# Patient Record
Sex: FEMALE | Race: BLACK OR AFRICAN AMERICAN | NOT HISPANIC OR LATINO | Employment: UNEMPLOYED | URBAN - METROPOLITAN AREA
[De-identification: names, ages, dates, MRNs, and addresses within clinical notes are randomized per-mention and may not be internally consistent; named-entity substitution may affect disease eponyms.]

---

## 2017-03-14 ENCOUNTER — HOSPITAL ENCOUNTER (EMERGENCY)
Facility: HOSPITAL | Age: 47
Discharge: HOME | End: 2017-03-14
Attending: EMERGENCY MEDICINE
Payer: MEDICAID

## 2017-03-14 VITALS
DIASTOLIC BLOOD PRESSURE: 74 MMHG | TEMPERATURE: 98 F | SYSTOLIC BLOOD PRESSURE: 122 MMHG | BODY MASS INDEX: 27.49 KG/M2 | WEIGHT: 165 LBS | HEIGHT: 65 IN | RESPIRATION RATE: 16 BRPM | OXYGEN SATURATION: 100 % | HEART RATE: 90 BPM

## 2017-03-14 DIAGNOSIS — R10.13 EPIGASTRIC PAIN: Primary | ICD-10-CM

## 2017-03-14 DIAGNOSIS — K27.9 PUD (PEPTIC ULCER DISEASE): ICD-10-CM

## 2017-03-14 LAB
ALBUMIN SERPL BCP-MCNC: 4.1 G/DL
ALP SERPL-CCNC: 73 U/L
ALT SERPL W/O P-5'-P-CCNC: 64 U/L
ANION GAP SERPL CALC-SCNC: 8 MMOL/L
AST SERPL-CCNC: 92 U/L
BACTERIA #/AREA URNS HPF: NORMAL /HPF
BASOPHILS # BLD AUTO: 0.01 K/UL
BASOPHILS NFR BLD: 0.2 %
BILIRUB SERPL-MCNC: 0.6 MG/DL
BILIRUB UR QL STRIP: NEGATIVE
BUN SERPL-MCNC: 16 MG/DL
CALCIUM SERPL-MCNC: 9.7 MG/DL
CHLORIDE SERPL-SCNC: 106 MMOL/L
CLARITY UR: ABNORMAL
CO2 SERPL-SCNC: 27 MMOL/L
COLOR UR: ABNORMAL
CREAT SERPL-MCNC: 0.9 MG/DL
DIFFERENTIAL METHOD: ABNORMAL
EOSINOPHIL # BLD AUTO: 0.1 K/UL
EOSINOPHIL NFR BLD: 0.9 %
ERYTHROCYTE [DISTWIDTH] IN BLOOD BY AUTOMATED COUNT: 13.7 %
EST. GFR  (AFRICAN AMERICAN): >60 ML/MIN/1.73 M^2
EST. GFR  (NON AFRICAN AMERICAN): >60 ML/MIN/1.73 M^2
GLUCOSE SERPL-MCNC: 89 MG/DL
GLUCOSE UR QL STRIP: NEGATIVE
HCT VFR BLD AUTO: 42.3 %
HGB BLD-MCNC: 13.9 G/DL
HGB UR QL STRIP: NEGATIVE
HYALINE CASTS #/AREA URNS LPF: 0 /LPF
KETONES UR QL STRIP: ABNORMAL
LEUKOCYTE ESTERASE UR QL STRIP: NEGATIVE
LIPASE SERPL-CCNC: 20 U/L
LYMPHOCYTES # BLD AUTO: 1 K/UL
LYMPHOCYTES NFR BLD: 17 %
MCH RBC QN AUTO: 29.3 PG
MCHC RBC AUTO-ENTMCNC: 32.9 %
MCV RBC AUTO: 89 FL
MICROSCOPIC COMMENT: NORMAL
MONOCYTES # BLD AUTO: 0.4 K/UL
MONOCYTES NFR BLD: 6.3 %
NEUTROPHILS # BLD AUTO: 4.4 K/UL
NEUTROPHILS NFR BLD: 75.4 %
NITRITE UR QL STRIP: NEGATIVE
PH UR STRIP: 5 [PH] (ref 5–8)
PLATELET # BLD AUTO: 335 K/UL
PMV BLD AUTO: 10.7 FL
POTASSIUM SERPL-SCNC: 3.9 MMOL/L
PROT SERPL-MCNC: 7.7 G/DL
PROT UR QL STRIP: ABNORMAL
RBC # BLD AUTO: 4.75 M/UL
RBC #/AREA URNS HPF: 2 /HPF (ref 0–4)
SODIUM SERPL-SCNC: 141 MMOL/L
SP GR UR STRIP: >1.03 (ref 1–1.03)
SQUAMOUS #/AREA URNS HPF: 1 /HPF
TROPONIN I SERPL DL<=0.01 NG/ML-MCNC: <0.006 NG/ML
URN SPEC COLLECT METH UR: ABNORMAL
UROBILINOGEN UR STRIP-ACNC: ABNORMAL EU/DL
WBC # BLD AUTO: 5.76 K/UL
WBC #/AREA URNS HPF: 1 /HPF (ref 0–5)

## 2017-03-14 PROCEDURE — 84484 ASSAY OF TROPONIN QUANT: CPT

## 2017-03-14 PROCEDURE — 99284 EMERGENCY DEPT VISIT MOD MDM: CPT

## 2017-03-14 PROCEDURE — 25000003 PHARM REV CODE 250: Performed by: EMERGENCY MEDICINE

## 2017-03-14 PROCEDURE — 93005 ELECTROCARDIOGRAM TRACING: CPT

## 2017-03-14 PROCEDURE — 85025 COMPLETE CBC W/AUTO DIFF WBC: CPT

## 2017-03-14 PROCEDURE — 80053 COMPREHEN METABOLIC PANEL: CPT

## 2017-03-14 PROCEDURE — 81000 URINALYSIS NONAUTO W/SCOPE: CPT

## 2017-03-14 PROCEDURE — 83690 ASSAY OF LIPASE: CPT

## 2017-03-14 RX ORDER — TRAMADOL HYDROCHLORIDE 50 MG/1
50 TABLET ORAL EVERY 8 HOURS PRN
Qty: 20 TABLET | Refills: 0 | Status: SHIPPED | OUTPATIENT
Start: 2017-03-14 | End: 2017-03-17

## 2017-03-14 RX ORDER — ONDANSETRON 8 MG/1
8 TABLET, ORALLY DISINTEGRATING ORAL
Status: COMPLETED | OUTPATIENT
Start: 2017-03-14 | End: 2017-03-14

## 2017-03-14 RX ORDER — ESOMEPRAZOLE MAGNESIUM 40 MG/1
40 CAPSULE, DELAYED RELEASE ORAL
Qty: 60 CAPSULE | Refills: 1 | Status: SHIPPED | OUTPATIENT
Start: 2017-03-14 | End: 2018-10-20

## 2017-03-14 RX ADMIN — LIDOCAINE HYDROCHLORIDE: 20 SOLUTION ORAL; TOPICAL at 06:03

## 2017-03-14 RX ADMIN — ONDANSETRON 8 MG: 8 TABLET, ORALLY DISINTEGRATING ORAL at 06:03

## 2017-03-14 NOTE — ED AVS SNAPSHOT
[facility]  [place] [address]               Aide Almaraz   3/14/2017  5:40 PM   ED    Description:  Female : 1970   Department:  [facility]           Your Care was Coordinated By:     Provider Role From To    Juan Merrill MD Attending Provider [phone] --      Reason for Visit     Abdominal Pain           Diagnoses this Visit        Comments    Epigastric pain    -  Primary     PUD (peptic ulcer disease)           ED Disposition     ED Disposition Condition Comment    Discharge             To Do List           Follow-up Information     Schedule an appointment as soon as possible for a visit with Vidya Chávez MD.    Specialty:  Family Medicine    Contact information:    [facility] [address] [place] [address]  [phone]         These Medications        Disp Refills Start End    esomeprazole (NEXIUM) 40 MG capsule 60 capsule 1 3/14/2017 2017    Take 1 capsule (40 mg total) by mouth 2 (two) times daily before meals. - Oral    Pharmacy: [facility] [address] [place] [address] [address] AT [facility] Ph #: [phone]       tramadol (ULTRAM) 50 mg tablet 20 tablet 0 3/14/2017 3/17/2017    Take 1 tablet (50 mg total) by mouth every 8 (eight) hours as needed for Pain. - Oral    Pharmacy: [facility] [address] [place] [address] [address] AT [facility] Ph #: [phone]         [facility]     [facility] -  Assistance  Registered nurses in the [facility] provide clinical advisement, health education, appointment booking, and other advisory services.  Call for this free service at [phone].             Medications           Message regarding Medications     Verify the changes and/or additions to your medication regime listed below are the same as discussed with your clinician today.  If any of these changes or additions are  incorrect, please notify your healthcare provider.        START taking these NEW medications        Refills    esomeprazole (NEXIUM) 40 MG capsule 1    Sig: Take 1 capsule (40 mg total) by mouth 2 (two) times daily before meals.    Class: Print    Route: Oral    tramadol (ULTRAM) 50 mg tablet 0    Sig: Take 1 tablet (50 mg total) by mouth every 8 (eight) hours as needed for Pain.    Class: Print    Route: Oral      These medications were administered today        Dose Freq    (pyxis) gi cocktail (mylanta 30 mL, lidocaine 2 % viscous 10 mL, dicyclomine 10 mL) 50 mL  ED 1 Time    Sig: Take by mouth ED 1 Time.    Class: Normal    Route: Oral      STOP taking these medications     ibuprofen (ADVIL,MOTRIN) 800 MG tablet Take 800 mg by mouth 3 (three) times daily.           Verify that the below list of medications is an accurate representation of the medications you are currently taking.  If none reported, the list may be blank. If incorrect, please contact your healthcare provider. Carry this list with you in case of emergency.           Current Medications     ADDERALL XR 25 mg 24 hr capsule Take 1 tablet by mouth once daily.    eszopiclone 3 mg Tab Take 1 tablet (3 mg total) by mouth nightly as needed.    diazepam (VALIUM) 5 MG tablet Take 1 tablet (5 mg total) by mouth every 8 (eight) hours as needed for Anxiety or Insomnia.    esomeprazole (NEXIUM) 40 MG capsule Take 1 capsule (40 mg total) by mouth 2 (two) times daily before meals.    hydrOXYzine HCl (ATARAX) 50 MG tablet Take 1 tablet (50 mg total) by mouth nightly as needed (Insomnia).    metoprolol succinate (TOPROL-XL) 100 MG 24 hr tablet Take 1 tablet (100 mg total) by mouth once daily.    tramadol (ULTRAM) 50 mg tablet Take 1 tablet (50 mg total) by mouth every 8 (eight) hours as needed for Pain.           Clinical Reference Information           Your Vitals Were     BP Pulse Temp Resp Height Weight    119/77 (BP Location: Left arm, Patient Position: Sitting,  "BP Method: Automatic) 92 98.7 °F (37.1 °C) (Oral) 18 5' 5" (1.651 m) 74.8 kg (165 lb)    SpO2 BMI             100% 27.46 kg/m2         Allergies as of 3/14/2017     No Known Allergies      Immunizations Administered on Date of Encounter - 3/14/2017     None      ED Micro, Lab, POCT     Start Ordered       Status Ordering Provider    03/14/17 1714 03/14/17 1713  Troponin I  STAT      In process     03/14/17 1540 03/14/17 1539  CBC auto differential  Once      Final result     03/14/17 1540 03/14/17 1539  Comprehensive metabolic panel  Once      Final result     03/14/17 1540 03/14/17 1539  Urinalysis  Once      Final result     03/14/17 1540 03/14/17 1539  Lipase  Once     Comments:  For upper or mid abdominal pain.    Final result     03/14/17 1539 03/14/17 1539  Urinalysis Microscopic  Once      Final result       ED Imaging Orders     None      Discharge References/Attachments     PEPTIC ULCER DISEASE (ALL CAUSES) (ENGLISH)      [facility] Sign-Up     Activating your [facility] account is as easy as 1-2-3!     1) Visit [email or web address], select Sign Up Now, enter this activation code and your date of birth, then select Next.  [email or web address]  Expires: 4/28/2017  6:29 PM      2) Create a username and password to use when you visit [facility] in the future and select a security question in case you lose your password and select Next.    3) Enter your e-mail address and click Sign Up!    Additional Information  If you have questions, please e-mail [email or web address] or call [phone] to talk to our [facility] staff. Remember, [facility] is NOT to be used for urgent needs. For medical emergencies, dial 911.          [facility] complies with applicable Federal civil rights laws and does not discriminate on the basis of race, color, national origin, age, disability, or sex.        Language Assistance Services     ATTENTION: Language assistance services are available, free of charge. Please call " [phone].      ATENCIÓN: Si habla español, tiene a graham disposición servicios gratuitos de asistencia lingüística. Llame al [phone].     CHÚ Ý: N?u b?n nói Ti?ng Vi?t, có các d?ch v? h? tr? ngôn ng? mi?n phí dành cho b?n. G?i s? [phone].

## 2017-03-14 NOTE — ED PROVIDER NOTES
"Encounter Date: 3/14/2017    SCRIBE #1 NOTE: I, Cale Merlos, am scribing for, and in the presence of,  Juan Merrill MD. I have scribed the following portions of the note - Other sections scribed: HPI/ROS.       History     Chief Complaint   Patient presents with    Abdominal Pain     epigastric pain started yesterday, worse today while she was driving she got lightheaded and diaphoretic. evaluated by EMS on scene and then decided to drive herself here     Review of patient's allergies indicates:  No Known Allergies  HPI Comments: CC: Abdominal Pain    HPI: This 47 y.o. Female with anemia, gall stones, supraventricular tachycardia, PTSD, DM, and HTN presents to the ED c/o sharp, severe (pain rating 7/10), upper middle abdominal pain which began a few weeks ago. The pt stated that she has been taking a lot of "Motrin and goodies" for her degenerative disc disease. Last night the pt's stomach began to hurt so she took more Motrin but the pain was still there when she awoke today. When the pt was on the way to work today, she began to feel lightheaded and her hands went numb so she called EMS. Pt also c/o back pain. The pt denies fever, nausea, or vomiting. No treatment has been attempted.      The history is provided by the patient. No  was used.     Past Medical History:   Diagnosis Date    Anemia     Diabetes mellitus     Gall stones     Hypertension     PTSD (post-traumatic stress disorder)     Supraventricular tachycardia      Past Surgical History:   Procedure Laterality Date    ABDOMINAL SURGERY      Gastric sleeve    ANKLE FUSION       SECTION      placenta previa    CHOLECYSTECTOMY  2009    gastric      HYSTERECTOMY  2001    Inguinal hernia  2004     Family History   Problem Relation Age of Onset    Heart disease Father     Diabetes Mother      hypoglycemic    Breast cancer Maternal Aunt      Social History   Substance Use Topics    Smoking status: " Never Smoker    Smokeless tobacco: Never Used    Alcohol use No     Review of Systems   Constitutional: Negative for fever.   HENT: Negative for sore throat.    Respiratory: Negative for shortness of breath.    Cardiovascular: Negative for chest pain.   Gastrointestinal: Positive for abdominal pain. Negative for nausea.   Genitourinary: Negative for dysuria.   Musculoskeletal: Positive for back pain.   Skin: Negative for rash.   Neurological: Positive for light-headedness and numbness (hands). Negative for weakness.   Hematological: Does not bruise/bleed easily.   All other systems reviewed and are negative.      Physical Exam   Initial Vitals   BP Pulse Resp Temp SpO2   03/14/17 1537 03/14/17 1537 03/14/17 1537 03/14/17 1537 03/14/17 1537   129/79 106 17 98 °F (36.7 °C) 100 %     Physical Exam  Nursing note and vitals reviewed.  Constitutional: Uncomfortable appearing middle aged female in no obvious distress.  HENT:    Head: NC/AT    Eyes: Conjunctivae normal.   (-) scleral icterus.              Mouth/Throat: MMM.     Neck: Neck supple, normal rom.   Cardiovascular: Tachycardic, regular rhythm  Pulmonary/Chest: CTAB   Abdominal: Soft. ND/epigastric TTP  w/o guarding or rebound.  (-) CVA tenderness.  Musculoskeletal: FROM of all major joints. No extremity edema or tenderness.  Neurological: A&Ox4, Normal speech.  No focal motor deficits.  Normal gait  Skin: Skin is warm and dry.   Psychiatric: normal mood and affect.      ED Course   Procedures  Labs Reviewed   CBC W/ AUTO DIFFERENTIAL - Abnormal; Notable for the following:        Result Value    Gran% 75.4 (*)     Lymph% 17.0 (*)     All other components within normal limits    Narrative:     For upper or mid abdominal pain.   COMPREHENSIVE METABOLIC PANEL - Abnormal; Notable for the following:     AST 92 (*)     ALT 64 (*)     All other components within normal limits    Narrative:     For upper or mid abdominal pain.   URINALYSIS - Abnormal; Notable for the  following:     Appearance, UA Hazy (*)     Specific Gravity, UA >1.030 (*)     Protein, UA 1+ (*)     Ketones, UA Trace (*)     Urobilinogen, UA 2.0-3.0 (*)     All other components within normal limits   LIPASE    Narrative:     For upper or mid abdominal pain.   URINALYSIS MICROSCOPIC   TROPONIN I     EKG Readings: (Independently Interpreted)   Initial Reading: No STEMI.   Normal sinus rhythm, rate 99, normal axis/intervals, no ST/T-wave abnormalities.          Medical Decision Making:   History:   I obtained history from: EMS provider.  Old Medical Records: I decided to obtain old medical records.  Old Records Summarized: records from clinic visits and other records.  Independently Interpreted Test(s):   I have ordered and independently interpreted EKG Reading(s) - see prior notes  Clinical Tests:   Lab Tests: Ordered and Reviewed  Radiological Study: Reviewed  Medical Tests: Ordered and Reviewed    Additional Medical Decision Making:   Initial differential diagnosis includes but is not limited to... cholecystitis, food poisoning vs gastroenteritis, pancreatitis,appendicitis IBS/IBD, mesenteric ischemia, SBO.    Emergent evaluation of a 47 y.o. female with epigastric abdominal pain x several days.   On exam, she has epigastric ttp without guarding or rebound.   Based on the physical exam, I doubt an acute surgical abdomen and/or peritonitis.  Basic screening labs unremarkable including baseline H&H.  EKG without evidence of acute ischemia or dysrhythmia.  After having received a GI cocktail, she reports marked improvement.  Findings at this time are most consistent with peptic ulcer disease likely secondary to NSAID use.  She's been started on Nexium twice a day ×8 weeks.  She has been strongly advised to refrain from any and all NSAID use.  She has been prescribed Ultram to be used when necessary.  She has been advised to follow up with her primary care physician as well as GI for reevaluation further  management.  Abdominal pain precautions as well as return instructions discussed prior to discharge.          Scribe Attestation:   Scribe #1: I performed the above scribed service and the documentation accurately describes the services I performed. I attest to the accuracy of the note.    Attending Attestation:           Physician Attestation for Scribe:  Physician Attestation Statement for Scribe #1: I, Juan Merrill MD, reviewed documentation, as scribed by Cale Merlos in my presence, and it is both accurate and complete.                 ED Course     Clinical Impression:   The primary encounter diagnosis was Epigastric pain. A diagnosis of PUD (peptic ulcer disease) was also pertinent to this visit.    Disposition:   Disposition: Discharged       Juan Merrill MD  04/17/17 5126

## 2017-03-14 NOTE — ED TRIAGE NOTES
"Upper center abd pains radiating into back.  Suddenly had light headedness, "it felt like I was going to pass out".   Nausea this morning but denies vomiting.  Denies diarrhea or fever.  Pains at 9/10.  Denies chest pains or sob.  Denies trauma.  "

## 2017-10-31 ENCOUNTER — HOSPITAL ENCOUNTER (INPATIENT)
Facility: HOSPITAL | Age: 47
LOS: 1 days | Discharge: HOME | DRG: 440 | End: 2017-11-01
Attending: EMERGENCY MEDICINE | Admitting: HOSPITALIST
Payer: MEDICAID

## 2017-10-31 DIAGNOSIS — K85.00 IDIOPATHIC ACUTE PANCREATITIS WITHOUT INFECTION OR NECROSIS: Primary | ICD-10-CM

## 2017-10-31 LAB
ALBUMIN SERPL BCP-MCNC: 3.8 G/DL
ALP SERPL-CCNC: 84 U/L
ALT SERPL W/O P-5'-P-CCNC: 37 U/L
ANION GAP SERPL CALC-SCNC: 11 MMOL/L
APTT BLDCRRT: 25.3 SEC
AST SERPL-CCNC: 82 U/L
BASOPHILS # BLD AUTO: 0.02 K/UL
BASOPHILS NFR BLD: 0.2 %
BILIRUB SERPL-MCNC: 0.7 MG/DL
BNP SERPL-MCNC: <10 PG/ML
BUN SERPL-MCNC: 16 MG/DL
CALCIUM SERPL-MCNC: 9.7 MG/DL
CHLORIDE SERPL-SCNC: 107 MMOL/L
CO2 SERPL-SCNC: 23 MMOL/L
CREAT SERPL-MCNC: 0.9 MG/DL
DIFFERENTIAL METHOD: ABNORMAL
EOSINOPHIL # BLD AUTO: 0.1 K/UL
EOSINOPHIL NFR BLD: 0.9 %
ERYTHROCYTE [DISTWIDTH] IN BLOOD BY AUTOMATED COUNT: 13.9 %
EST. GFR  (AFRICAN AMERICAN): >60 ML/MIN/1.73 M^2
EST. GFR  (NON AFRICAN AMERICAN): >60 ML/MIN/1.73 M^2
GLUCOSE SERPL-MCNC: 106 MG/DL
HCT VFR BLD AUTO: 42 %
HGB BLD-MCNC: 14.3 G/DL
INR PPP: 1
LIPASE SERPL-CCNC: 263 U/L
LYMPHOCYTES # BLD AUTO: 1.2 K/UL
LYMPHOCYTES NFR BLD: 13.2 %
MCH RBC QN AUTO: 30.6 PG
MCHC RBC AUTO-ENTMCNC: 34 G/DL
MCV RBC AUTO: 90 FL
MONOCYTES # BLD AUTO: 0.6 K/UL
MONOCYTES NFR BLD: 6.4 %
NEUTROPHILS # BLD AUTO: 7.5 K/UL
NEUTROPHILS NFR BLD: 79.2 %
PLATELET # BLD AUTO: 329 K/UL
PMV BLD AUTO: 11.3 FL
POTASSIUM SERPL-SCNC: 4.6 MMOL/L
PROT SERPL-MCNC: 7.9 G/DL
PROTHROMBIN TIME: 10.1 SEC
RBC # BLD AUTO: 4.67 M/UL
SODIUM SERPL-SCNC: 141 MMOL/L
TROPONIN I SERPL DL<=0.01 NG/ML-MCNC: <0.006 NG/ML
WBC # BLD AUTO: 9.4 K/UL

## 2017-10-31 PROCEDURE — 96365 THER/PROPH/DIAG IV INF INIT: CPT

## 2017-10-31 PROCEDURE — 96361 HYDRATE IV INFUSION ADD-ON: CPT

## 2017-10-31 PROCEDURE — 80053 COMPREHEN METABOLIC PANEL: CPT

## 2017-10-31 PROCEDURE — 63600175 PHARM REV CODE 636 W HCPCS: Performed by: EMERGENCY MEDICINE

## 2017-10-31 PROCEDURE — 63600175 PHARM REV CODE 636 W HCPCS: Performed by: HOSPITALIST

## 2017-10-31 PROCEDURE — 12000002 HC ACUTE/MED SURGE SEMI-PRIVATE ROOM

## 2017-10-31 PROCEDURE — 25000003 PHARM REV CODE 250: Performed by: HOSPITALIST

## 2017-10-31 PROCEDURE — 85730 THROMBOPLASTIN TIME PARTIAL: CPT

## 2017-10-31 PROCEDURE — 93010 ELECTROCARDIOGRAM REPORT: CPT | Mod: ,,, | Performed by: INTERNAL MEDICINE

## 2017-10-31 PROCEDURE — 85610 PROTHROMBIN TIME: CPT

## 2017-10-31 PROCEDURE — 25000003 PHARM REV CODE 250: Performed by: EMERGENCY MEDICINE

## 2017-10-31 PROCEDURE — 85025 COMPLETE CBC W/AUTO DIFF WBC: CPT

## 2017-10-31 PROCEDURE — 99285 EMERGENCY DEPT VISIT HI MDM: CPT | Mod: 25

## 2017-10-31 PROCEDURE — 93005 ELECTROCARDIOGRAM TRACING: CPT

## 2017-10-31 PROCEDURE — 96375 TX/PRO/DX INJ NEW DRUG ADDON: CPT

## 2017-10-31 PROCEDURE — 84484 ASSAY OF TROPONIN QUANT: CPT

## 2017-10-31 PROCEDURE — 83880 ASSAY OF NATRIURETIC PEPTIDE: CPT

## 2017-10-31 PROCEDURE — 83690 ASSAY OF LIPASE: CPT

## 2017-10-31 RX ORDER — MAG HYDROX/ALUMINUM HYD/SIMETH 200-200-20
60 SUSPENSION, ORAL (FINAL DOSE FORM) ORAL
Status: COMPLETED | OUTPATIENT
Start: 2017-10-31 | End: 2017-10-31

## 2017-10-31 RX ORDER — PANTOPRAZOLE SODIUM 40 MG/1
80 TABLET, DELAYED RELEASE ORAL
Status: COMPLETED | OUTPATIENT
Start: 2017-10-31 | End: 2017-10-31

## 2017-10-31 RX ORDER — METOPROLOL SUCCINATE 50 MG/1
100 TABLET, EXTENDED RELEASE ORAL DAILY
Status: DISCONTINUED | OUTPATIENT
Start: 2017-10-31 | End: 2017-11-01 | Stop reason: HOSPADM

## 2017-10-31 RX ORDER — DEXTROSE MONOHYDRATE AND SODIUM CHLORIDE 5; .9 G/100ML; G/100ML
INJECTION, SOLUTION INTRAVENOUS CONTINUOUS
Status: DISCONTINUED | OUTPATIENT
Start: 2017-10-31 | End: 2017-11-01 | Stop reason: HOSPADM

## 2017-10-31 RX ORDER — ONDANSETRON 2 MG/ML
4 INJECTION INTRAMUSCULAR; INTRAVENOUS EVERY 6 HOURS PRN
Status: DISCONTINUED | OUTPATIENT
Start: 2017-10-31 | End: 2017-11-01

## 2017-10-31 RX ORDER — ENOXAPARIN SODIUM 100 MG/ML
40 INJECTION SUBCUTANEOUS EVERY 24 HOURS
Status: DISCONTINUED | OUTPATIENT
Start: 2017-10-31 | End: 2017-11-01 | Stop reason: HOSPADM

## 2017-10-31 RX ORDER — DIPHENHYDRAMINE HYDROCHLORIDE 50 MG/ML
25 INJECTION INTRAMUSCULAR; INTRAVENOUS
Status: COMPLETED | OUTPATIENT
Start: 2017-10-31 | End: 2017-10-31

## 2017-10-31 RX ORDER — PANTOPRAZOLE SODIUM 40 MG/1
40 TABLET, DELAYED RELEASE ORAL 2 TIMES DAILY
Status: DISCONTINUED | OUTPATIENT
Start: 2017-10-31 | End: 2017-11-01 | Stop reason: HOSPADM

## 2017-10-31 RX ORDER — ONDANSETRON 2 MG/ML
8 INJECTION INTRAMUSCULAR; INTRAVENOUS
Status: COMPLETED | OUTPATIENT
Start: 2017-10-31 | End: 2017-10-31

## 2017-10-31 RX ORDER — ACETAMINOPHEN 325 MG/1
650 TABLET ORAL EVERY 6 HOURS PRN
Status: DISCONTINUED | OUTPATIENT
Start: 2017-10-31 | End: 2017-11-01 | Stop reason: HOSPADM

## 2017-10-31 RX ORDER — HYDROMORPHONE HYDROCHLORIDE 2 MG/ML
1 INJECTION, SOLUTION INTRAMUSCULAR; INTRAVENOUS; SUBCUTANEOUS
Status: COMPLETED | OUTPATIENT
Start: 2017-10-31 | End: 2017-10-31

## 2017-10-31 RX ORDER — HYDROMORPHONE HYDROCHLORIDE 2 MG/ML
1 INJECTION, SOLUTION INTRAMUSCULAR; INTRAVENOUS; SUBCUTANEOUS
Status: DISCONTINUED | OUTPATIENT
Start: 2017-10-31 | End: 2017-11-01

## 2017-10-31 RX ORDER — DIAZEPAM 5 MG/1
5 TABLET ORAL EVERY 8 HOURS PRN
Status: DISCONTINUED | OUTPATIENT
Start: 2017-10-31 | End: 2017-11-01 | Stop reason: HOSPADM

## 2017-10-31 RX ADMIN — PANTOPRAZOLE SODIUM 80 MG: 40 TABLET, DELAYED RELEASE ORAL at 08:10

## 2017-10-31 RX ADMIN — ONDANSETRON 8 MG: 2 INJECTION INTRAMUSCULAR; INTRAVENOUS at 08:10

## 2017-10-31 RX ADMIN — DIPHENHYDRAMINE HYDROCHLORIDE 25 MG: 50 INJECTION, SOLUTION INTRAMUSCULAR; INTRAVENOUS at 11:10

## 2017-10-31 RX ADMIN — METOPROLOL SUCCINATE 100 MG: 50 TABLET, EXTENDED RELEASE ORAL at 01:10

## 2017-10-31 RX ADMIN — PROMETHAZINE HYDROCHLORIDE 12.5 MG: 25 INJECTION INTRAMUSCULAR; INTRAVENOUS at 11:10

## 2017-10-31 RX ADMIN — PANTOPRAZOLE SODIUM 40 MG: 40 TABLET, DELAYED RELEASE ORAL at 08:10

## 2017-10-31 RX ADMIN — HYDROMORPHONE HYDROCHLORIDE 1 MG: 2 INJECTION, SOLUTION INTRAMUSCULAR; INTRAVENOUS; SUBCUTANEOUS at 08:10

## 2017-10-31 RX ADMIN — HYDROMORPHONE HYDROCHLORIDE 1 MG: 2 INJECTION, SOLUTION INTRAMUSCULAR; INTRAVENOUS; SUBCUTANEOUS at 01:10

## 2017-10-31 RX ADMIN — ONDANSETRON 4 MG: 2 INJECTION INTRAMUSCULAR; INTRAVENOUS at 09:10

## 2017-10-31 RX ADMIN — ENOXAPARIN SODIUM 40 MG: 100 INJECTION SUBCUTANEOUS at 05:10

## 2017-10-31 RX ADMIN — PANTOPRAZOLE SODIUM 40 MG: 40 TABLET, DELAYED RELEASE ORAL at 01:10

## 2017-10-31 RX ADMIN — DEXTROSE MONOHYDRATE AND SODIUM CHLORIDE: 5; .9 INJECTION, SOLUTION INTRAVENOUS at 01:10

## 2017-10-31 RX ADMIN — DIAZEPAM 5 MG: 5 TABLET ORAL at 09:10

## 2017-10-31 RX ADMIN — ACETAMINOPHEN 650 MG: 325 TABLET ORAL at 05:10

## 2017-10-31 RX ADMIN — HYDROMORPHONE HYDROCHLORIDE 1 MG: 2 INJECTION, SOLUTION INTRAMUSCULAR; INTRAVENOUS; SUBCUTANEOUS at 05:10

## 2017-10-31 RX ADMIN — ALUMINUM HYDROXIDE, MAGNESIUM HYDROXIDE, AND SIMETHICONE 60 ML: 200; 200; 20 SUSPENSION ORAL at 08:10

## 2017-10-31 RX ADMIN — DEXTROSE MONOHYDRATE AND SODIUM CHLORIDE: 5; .9 INJECTION, SOLUTION INTRAVENOUS at 09:10

## 2017-10-31 RX ADMIN — SODIUM CHLORIDE 1000 ML: 0.9 INJECTION, SOLUTION INTRAVENOUS at 08:10

## 2017-10-31 RX ADMIN — ONDANSETRON 4 MG: 2 INJECTION INTRAMUSCULAR; INTRAVENOUS at 04:10

## 2017-10-31 NOTE — H&P
"[facility] Hospital Medicine  History & Physical    Patient Name: Aide Almaraz  MRN: [id number]  Admission Date: 10/31/2017  Attending Physician: Mitul Stafford MD   Primary Care Provider: Primary Doctor No         Patient information was obtained from patient and ER records.     Subjective:     Principal Problem:Idiopathic acute pancreatitis without infection or necrosis    Chief Complaint:   Chief Complaint   Patient presents with    Chest Pain     woke up 0300 with midsternal chest pain and back pain        HPI: 47 y.o F with anemia, , HTN and supraventricular tachycardia presents to the ED c/o acute onset of "sharp" left xiphoid process pain with associated cervical and thoracic back pain which woke her up out of her sleep at 0300. The pt describes her xiphoid process pain as constant with intermittent "thigh pain." The pt also reports R upper extremity pain and numbness, lightheadedness, diarrhea x1 and emesis x5. The pt denies any exacerbating or alleviating factors. The pt also denies sore throat, cough, SOB, fever, chills, headache and abdominal pain. Pt attempted tx with pepto-bismol with no relief.she has lipase level of 283,consistent with acure pancreatitis,she denies alcohol consumption,she has history of cholecystitis,she has only mild elevated AST,she say her abdominal pain is improved at this time.    Past Medical History:   Diagnosis Date    Anemia     Celiac disease     Diabetes mellitus     Gall stones     Hypertension     PTSD (post-traumatic stress disorder)     Supraventricular tachycardia        Past Surgical History:   Procedure Laterality Date    ABDOMINAL SURGERY      Gastric sleeve    ANKLE FUSION       SECTION      placenta previa    CHOLECYSTECTOMY  2009    gastric      HYSTERECTOMY  2001    Inguinal hernia  2004       Review of patient's allergies indicates:  No Known Allergies    No current facility-administered " medications on file prior to encounter.      Current Outpatient Prescriptions on File Prior to Encounter   Medication Sig    ADDERALL XR 25 mg 24 hr capsule Take 1 tablet by mouth once daily.    eszopiclone 3 mg Tab Take 1 tablet (3 mg total) by mouth nightly as needed.    metoprolol succinate (TOPROL-XL) 100 MG 24 hr tablet Take 1 tablet (100 mg total) by mouth once daily.    diazepam (VALIUM) 5 MG tablet Take 1 tablet (5 mg total) by mouth every 8 (eight) hours as needed for Anxiety or Insomnia.    esomeprazole (NEXIUM) 40 MG capsule Take 1 capsule (40 mg total) by mouth 2 (two) times daily before meals.    hydrOXYzine HCl (ATARAX) 50 MG tablet Take 1 tablet (50 mg total) by mouth nightly as needed (Insomnia).     Family History     Problem Relation (Age of Onset)    Breast cancer Maternal Aunt    Diabetes Mother    Heart disease Father        Social History Main Topics    Smoking status: Never Smoker    Smokeless tobacco: Never Used    Alcohol use No    Drug use: No    Sexual activity: No     Review of Systems   Constitutional: Positive for appetite change. Negative for activity change.   HENT: Negative for congestion and dental problem.    Eyes: Negative for discharge and itching.   Respiratory: Negative for apnea and chest tightness.    Cardiovascular: Negative for chest pain.   Gastrointestinal: Positive for abdominal pain, nausea and vomiting.   Endocrine: Negative for cold intolerance and polydipsia.   Genitourinary: Negative for difficulty urinating and dyspareunia.   Musculoskeletal: Negative for arthralgias and back pain.   Skin: Negative for color change and pallor.   Allergic/Immunologic: Negative for environmental allergies and food allergies.   Neurological: Negative for dizziness and facial asymmetry.   Hematological: Negative for adenopathy. Does not bruise/bleed easily.   Psychiatric/Behavioral: Negative for agitation and behavioral problems.     Objective:     Vital Signs (Most  Recent):  Temp: 97.4 °F (36.3 °C) (10/31/17 1259)  Pulse: 79 (10/31/17 1259)  Resp: 19 (10/31/17 1259)  BP: 105/61 (10/31/17 1259)  SpO2: 97 % (10/31/17 1259) Vital Signs (24h Range):  Temp:  [97.4 °F (36.3 °C)] 97.4 °F (36.3 °C)  Pulse:  [68-94] 79  Resp:  [19-20] 19  SpO2:  [95 %-100 %] 97 %  BP: ()/(56-71) 105/61     Weight: (!) 171.6 kg (378 lb 5 oz)  Body mass index is 62.95 kg/m².    Physical Exam   Constitutional: She is oriented to person, place, and time. No distress.   HENT:   Head: Atraumatic.   Eyes: Pupils are equal, round, and reactive to light.   Neck: Normal range of motion.   Cardiovascular: Normal rate and regular rhythm.    Pulmonary/Chest: Effort normal and breath sounds normal.   Abdominal: Soft. There is tenderness.   Musculoskeletal: Normal range of motion. She exhibits no edema.   Neurological: She is oriented to person, place, and time. No cranial nerve deficit. Coordination normal.   Skin: Skin is warm and dry. She is not diaphoretic.   Psychiatric: She has a normal mood and affect. Her behavior is normal.        Significant Labs:   CBC:   Recent Labs  Lab 10/31/17  0749   WBC 9.40   HGB 14.3   HCT 42.0        CMP:   Recent Labs  Lab 10/31/17  0749      K 4.6      CO2 23      BUN 16   CREATININE 0.9   CALCIUM 9.7   PROT 7.9   ALBUMIN 3.8   BILITOT 0.7   ALKPHOS 84   AST 82*   ALT 37   ANIONGAP 11   EGFRNONAA >60     Lipase:   Recent Labs  Lab 10/31/17  0749   LIPASE 263*           Assessment/Plan:     * Idiopathic acute pancreatitis without infection or necrosis    Unclear etiology,she is not a drinker,has history of cholecystectomy,will do Abdominal US,check triglyceride level.she is NPO and is on IVF with IV Antiemetic and pain medication.          History of supraventricular tachycardia    On BB.          Essential hypertension, benign    Stable on metoprolol.            VTE Risk Mitigation         Ordered     enoxaparin injection 40 mg  Daily     Route:   Subcutaneous        10/31/17 1410     Low Risk of VTE  Once      10/31/17 1241             Mitul Stafford MD  Department of Hospital Medicine   [facility]

## 2017-10-31 NOTE — ASSESSMENT & PLAN NOTE
Unclear etiology,she is not a drinker,has history of cholecystectomy,will do Abdominal US,check triglyceride level.she is NPO and is on IVF with IV Antiemetic and pain medication.

## 2017-10-31 NOTE — ED PROVIDER NOTES
"Encounter Date: 10/31/2017    SCRIBE #1 NOTE: I, Jaylen Palencia, am scribing for, and in the presence of,  Bear Eden MD. I have scribed the following portions of the note - Other sections scribed: HPI and ROS.       History     Chief Complaint   Patient presents with    Chest Pain     woke up 0300 with midsternal chest pain and back pain     CC: Chest Pain     HPI: 47 y.o F with anemia, DM, HTN and supraventricular tachycardia presents to the ED c/o acute onset of "sharp" left xiphoid process pain with associated cervical and thoracic back pain which woke her up out of her sleep at 0300. The pt describes her xiphoid process pain as constant with intermittent "thigh pain." The pt also reports R upper extremity pain and numbness, lightheadedness, diarrhea x1 and emesis x5. The pt denies any exacerbating or alleviating factors. The pt also denies sore throat, cough, SOB, fever, chills, headache and abdominal pain. Pt attempted tx with pepto-bismol with no relief.       The history is provided by the patient. No  was used.     Review of patient's allergies indicates:  No Known Allergies  Past Medical History:   Diagnosis Date    Anemia     Celiac disease     Diabetes mellitus     Gall stones     Hypertension     PTSD (post-traumatic stress disorder)     Supraventricular tachycardia      Past Surgical History:   Procedure Laterality Date    ABDOMINAL SURGERY      Gastric sleeve    ANKLE FUSION  2011     SECTION      placenta previa    CHOLECYSTECTOMY  2009    gastric      HYSTERECTOMY  2001    Inguinal hernia  2004     Family History   Problem Relation Age of Onset    Heart disease Father     Diabetes Mother      hypoglycemic    Breast cancer Maternal Aunt      Social History   Substance Use Topics    Smoking status: Never Smoker    Smokeless tobacco: Never Used    Alcohol use No     Review of Systems   Constitutional: Negative for chills and fever.   HENT: " Negative for congestion, ear pain, rhinorrhea and sore throat.    Eyes: Negative for pain and visual disturbance.   Respiratory: Negative for cough and shortness of breath.    Cardiovascular: Negative for chest pain.   Gastrointestinal: Positive for diarrhea (x1) and vomiting (x5). Negative for abdominal pain and nausea.   Genitourinary: Negative for dysuria.   Musculoskeletal: Positive for back pain. Negative for neck pain.        (+) L xiphoid pain  (+) RUE pain   Skin: Negative for rash.   Neurological: Positive for light-headedness and numbness (R upper extremity). Negative for headaches.       Physical Exam     Initial Vitals [10/31/17 0718]   BP Pulse Resp Temp SpO2   129/64 94 20 97.4 °F (36.3 °C) 100 %      MAP       85.67         Physical Exam  The patient was examined specifically for the following:   General:No significant distress, Good color, Warm and dry. Head and neck:Scalp atraumatic, Neck supple. Neurological:Appropriate conversation, Gross motor deficits. Eyes:Conjugate gaze, Clear corneas. ENT: No epistaxis. Cardiac: Regular rate and rhythm, Grossly normal heart tones. Pulmonary: Wheezing, Rales. Gastrointestinal: Abdominal tenderness, Abdominal distention. Musculoskeletal: Extremity deformity, Apparent pain with range of motion of the joints. Skin: Rash.   The findings on examination were normal except for the following: The patient is crying.  She is severely distress.  The lungs are clear and free of wheezing rales of the rhonchi.  The patient is slightly tachycardic with a heart rate of 110 during the physical examination.  The abdomen is nontender the chest is nontender the thoracic back is nontender there is no pain with range of motion of the joints of the right upper extremity.  The patient has no rash.  Xiphoid chest is nontender.  The patient has brisk pulses in all 4 extremities.  ED Course   Procedures  Labs Reviewed   COMPREHENSIVE METABOLIC PANEL - Abnormal; Notable for the  following:        Result Value    AST 82 (*)     All other components within normal limits   CBC W/ AUTO DIFFERENTIAL - Abnormal; Notable for the following:     Gran% 79.2 (*)     Lymph% 13.2 (*)     All other components within normal limits   LIPASE - Abnormal; Notable for the following:     Lipase 263 (*)     All other components within normal limits   B-TYPE NATRIURETIC PEPTIDE   TROPONIN I   PROTIME-INR   APTT     EKG Readings: (Independently Interpreted)   Patient's left cardiogram reveals a normal sinus rhythm with a heart rate of 83.  The FL QRS and QT intervals are normal.  There is no evidence of acute myocardial infarction or malignant arrhythmia.       X-Rays:   Independently Interpreted Readings:   Other Readings:  Chest x-ray fails to reveal pneumothorax pneumonia pleural effusion      Medical decision making: Given the above this patient presents emergency with subxiphoid chest pain, right thoracic back pain pain in the right upper extremity.  She has good pulses throughout.  Her mediastinum is normal on chest x-ray.  There is no pneumothorax pneumonia pleural effusion.  The patient's lites cardiogram shows no evidence of ischemia.  The troponin is negative.  The lipase is 263.  This is more than 3 times normal.  The patient will be admitted for pancreatitis.  I discussed case with , who agrees with disposition.  This curious that the patient has very little left upper quadrant abdominal tenderness.  It is curious that the pain is radiating to the right side.  I thought a thoracic aortic dissection but the aorta looks normal on x-ray.  The patient has brisk pulses in all 4 extremities.  I will admit the patient for further evaluation by hospital medicine.             Scribe Attestation:   Scribe #1: I performed the above scribed service and the documentation accurately describes the services I performed. I attest to the accuracy of the note.    Attending Attestation:           Physician  Attestation for Scribe:  Physician Attestation Statement for Scribe #1: I, Bear Eden MD, reviewed documentation, as scribed by Jaylen Palencia in my presence, and it is both accurate and complete.                 ED Course      Clinical Impression:   The encounter diagnosis was Idiopathic acute pancreatitis without infection or necrosis.                           Bear Eden MD  10/31/17 1803

## 2017-10-31 NOTE — PLAN OF CARE
Problem: Patient Care Overview  Goal: Plan of Care Review  Outcome: Ongoing (interventions implemented as appropriate)   10/31/17 3986   Coping/Psychosocial   Plan Of Care Reviewed With patient     Patient calm and alert.  Notified MD of fever; ordered oral anti-pyretic.  Medicated as needed for pain.  IVF initiated.  Monitoring labs. Pt is NPO.

## 2017-10-31 NOTE — SUBJECTIVE & OBJECTIVE
Past Medical History:   Diagnosis Date    Anemia     Celiac disease     Diabetes mellitus     Gall stones     Hypertension     PTSD (post-traumatic stress disorder)     Supraventricular tachycardia        Past Surgical History:   Procedure Laterality Date    ABDOMINAL SURGERY      Gastric sleeve    ANKLE FUSION  2011     SECTION      placenta previa    CHOLECYSTECTOMY  2009    gastric      HYSTERECTOMY  2001    Inguinal hernia  2004       Review of patient's allergies indicates:  No Known Allergies    No current facility-administered medications on file prior to encounter.      Current Outpatient Prescriptions on File Prior to Encounter   Medication Sig    ADDERALL XR 25 mg 24 hr capsule Take 1 tablet by mouth once daily.    eszopiclone 3 mg Tab Take 1 tablet (3 mg total) by mouth nightly as needed.    metoprolol succinate (TOPROL-XL) 100 MG 24 hr tablet Take 1 tablet (100 mg total) by mouth once daily.    diazepam (VALIUM) 5 MG tablet Take 1 tablet (5 mg total) by mouth every 8 (eight) hours as needed for Anxiety or Insomnia.    esomeprazole (NEXIUM) 40 MG capsule Take 1 capsule (40 mg total) by mouth 2 (two) times daily before meals.    hydrOXYzine HCl (ATARAX) 50 MG tablet Take 1 tablet (50 mg total) by mouth nightly as needed (Insomnia).     Family History     Problem Relation (Age of Onset)    Breast cancer Maternal Aunt    Diabetes Mother    Heart disease Father        Social History Main Topics    Smoking status: Never Smoker    Smokeless tobacco: Never Used    Alcohol use No    Drug use: No    Sexual activity: No     Review of Systems   Constitutional: Positive for appetite change. Negative for activity change.   HENT: Negative for congestion and dental problem.    Eyes: Negative for discharge and itching.   Respiratory: Negative for apnea and chest tightness.    Cardiovascular: Negative for chest pain.   Gastrointestinal: Positive for abdominal pain, nausea and  vomiting.   Endocrine: Negative for cold intolerance and polydipsia.   Genitourinary: Negative for difficulty urinating and dyspareunia.   Musculoskeletal: Negative for arthralgias and back pain.   Skin: Negative for color change and pallor.   Allergic/Immunologic: Negative for environmental allergies and food allergies.   Neurological: Negative for dizziness and facial asymmetry.   Hematological: Negative for adenopathy. Does not bruise/bleed easily.   Psychiatric/Behavioral: Negative for agitation and behavioral problems.     Objective:     Vital Signs (Most Recent):  Temp: 97.4 °F (36.3 °C) (10/31/17 1259)  Pulse: 79 (10/31/17 1259)  Resp: 19 (10/31/17 1259)  BP: 105/61 (10/31/17 1259)  SpO2: 97 % (10/31/17 1259) Vital Signs (24h Range):  Temp:  [97.4 °F (36.3 °C)] 97.4 °F (36.3 °C)  Pulse:  [68-94] 79  Resp:  [19-20] 19  SpO2:  [95 %-100 %] 97 %  BP: ()/(56-71) 105/61     Weight: (!) 171.6 kg (378 lb 5 oz)  Body mass index is 62.95 kg/m².    Physical Exam   Constitutional: She is oriented to person, place, and time. No distress.   HENT:   Head: Atraumatic.   Eyes: Pupils are equal, round, and reactive to light.   Neck: Normal range of motion.   Cardiovascular: Normal rate and regular rhythm.    Pulmonary/Chest: Effort normal and breath sounds normal.   Abdominal: Soft. There is tenderness.   Musculoskeletal: Normal range of motion. She exhibits no edema.   Neurological: She is oriented to person, place, and time. No cranial nerve deficit. Coordination normal.   Skin: Skin is warm and dry. She is not diaphoretic.   Psychiatric: She has a normal mood and affect. Her behavior is normal.        Significant Labs:   CBC:   Recent Labs  Lab 10/31/17  0749   WBC 9.40   HGB 14.3   HCT 42.0        CMP:   Recent Labs  Lab 10/31/17  0749      K 4.6      CO2 23      BUN 16   CREATININE 0.9   CALCIUM 9.7   PROT 7.9   ALBUMIN 3.8   BILITOT 0.7   ALKPHOS 84   AST 82*   ALT 37   ANIONGAP 11    EGFRNONAA >60     Lipase:   Recent Labs  Lab 10/31/17  0749   LIPASE 263*

## 2017-10-31 NOTE — HPI
"47 y.o F with anemia, , HTN and supraventricular tachycardia presents to the ED c/o acute onset of "sharp" left xiphoid process pain with associated cervical and thoracic back pain which woke her up out of her sleep at 0300. The pt describes her xiphoid process pain as constant with intermittent "thigh pain." The pt also reports R upper extremity pain and numbness, lightheadedness, diarrhea x1 and emesis x5. The pt denies any exacerbating or alleviating factors. The pt also denies sore throat, cough, SOB, fever, chills, headache and abdominal pain. Pt attempted tx with pepto-bismol with no relief.she has lipase level of 283,consistent with acure pancreatitis,she denies alcohol consumption,she has history of cholecystitis,she has only mild elevated AST,she say her abdominal pain is improved at this time.  "

## 2017-10-31 NOTE — ED TRIAGE NOTES
Pt arrived to ED via personal transport with c/o of midsternal chest pain x 2-3 hrs. Pt describes the pain as a pressure, with no aggrevating or relieving factors. Pt states the pain radiates to the lower back and she is experiencing tingling and heaviness of the right arm. Pt also reports nausea and 4 episodes vomiting this morning. Pt is AAO x 4 at this time.

## 2017-11-01 VITALS
RESPIRATION RATE: 17 BRPM | TEMPERATURE: 99 F | BODY MASS INDEX: 28.6 KG/M2 | HEIGHT: 65 IN | DIASTOLIC BLOOD PRESSURE: 62 MMHG | HEART RATE: 70 BPM | SYSTOLIC BLOOD PRESSURE: 102 MMHG | OXYGEN SATURATION: 97 % | WEIGHT: 171.63 LBS

## 2017-11-01 LAB
ALBUMIN SERPL BCP-MCNC: 3.7 G/DL
ALP SERPL-CCNC: 81 U/L
ALT SERPL W/O P-5'-P-CCNC: 161 U/L
ANION GAP SERPL CALC-SCNC: 9 MMOL/L
AST SERPL-CCNC: 126 U/L
BILIRUB SERPL-MCNC: 0.8 MG/DL
BUN SERPL-MCNC: 7 MG/DL
CALCIUM SERPL-MCNC: 9.3 MG/DL
CHLORIDE SERPL-SCNC: 106 MMOL/L
CO2 SERPL-SCNC: 24 MMOL/L
CREAT SERPL-MCNC: 0.8 MG/DL
EST. GFR  (AFRICAN AMERICAN): >60 ML/MIN/1.73 M^2
EST. GFR  (NON AFRICAN AMERICAN): >60 ML/MIN/1.73 M^2
GLUCOSE SERPL-MCNC: 101 MG/DL
LIPASE SERPL-CCNC: 12 U/L
POTASSIUM SERPL-SCNC: 4.1 MMOL/L
PROT SERPL-MCNC: 7.4 G/DL
SODIUM SERPL-SCNC: 139 MMOL/L
TRIGL SERPL-MCNC: 85 MG/DL

## 2017-11-01 PROCEDURE — 84478 ASSAY OF TRIGLYCERIDES: CPT

## 2017-11-01 PROCEDURE — 83690 ASSAY OF LIPASE: CPT

## 2017-11-01 PROCEDURE — 25000003 PHARM REV CODE 250: Performed by: EMERGENCY MEDICINE

## 2017-11-01 PROCEDURE — 36415 COLL VENOUS BLD VENIPUNCTURE: CPT

## 2017-11-01 PROCEDURE — 80053 COMPREHEN METABOLIC PANEL: CPT

## 2017-11-01 PROCEDURE — 63600175 PHARM REV CODE 636 W HCPCS: Performed by: EMERGENCY MEDICINE

## 2017-11-01 PROCEDURE — 25000003 PHARM REV CODE 250: Performed by: HOSPITALIST

## 2017-11-01 RX ORDER — HYDROXYZINE PAMOATE 25 MG/1
50 CAPSULE ORAL EVERY 12 HOURS PRN
Status: DISCONTINUED | OUTPATIENT
Start: 2017-11-01 | End: 2017-11-01 | Stop reason: HOSPADM

## 2017-11-01 RX ORDER — ONDANSETRON 2 MG/ML
4 INJECTION INTRAMUSCULAR; INTRAVENOUS EVERY 4 HOURS PRN
Status: DISCONTINUED | OUTPATIENT
Start: 2017-11-01 | End: 2017-11-01 | Stop reason: HOSPADM

## 2017-11-01 RX ADMIN — HYDROXYZINE PAMOATE 50 MG: 25 CAPSULE ORAL at 12:11

## 2017-11-01 RX ADMIN — ONDANSETRON 4 MG: 2 INJECTION INTRAMUSCULAR; INTRAVENOUS at 03:11

## 2017-11-01 RX ADMIN — ONDANSETRON 4 MG: 2 INJECTION INTRAMUSCULAR; INTRAVENOUS at 08:11

## 2017-11-01 RX ADMIN — HYDROMORPHONE HYDROCHLORIDE 1 MG: 2 INJECTION, SOLUTION INTRAMUSCULAR; INTRAVENOUS; SUBCUTANEOUS at 08:11

## 2017-11-01 RX ADMIN — DEXTROSE MONOHYDRATE AND SODIUM CHLORIDE: 5; .9 INJECTION, SOLUTION INTRAVENOUS at 06:11

## 2017-11-01 RX ADMIN — HYDROMORPHONE HYDROCHLORIDE 1 MG: 2 INJECTION, SOLUTION INTRAMUSCULAR; INTRAVENOUS; SUBCUTANEOUS at 12:11

## 2017-11-01 RX ADMIN — HYDROMORPHONE HYDROCHLORIDE 1 MG: 2 INJECTION, SOLUTION INTRAMUSCULAR; INTRAVENOUS; SUBCUTANEOUS at 05:11

## 2017-11-01 RX ADMIN — PANTOPRAZOLE SODIUM 40 MG: 40 TABLET, DELAYED RELEASE ORAL at 08:11

## 2017-11-01 RX ADMIN — DIAZEPAM 5 MG: 5 TABLET ORAL at 08:11

## 2017-11-01 NOTE — NURSING
Discharge instructions provided to pt regarding taking medication as prescribed, going to follow up appointment.  Pt education on medication changes.   Return to work notice provided.   Education provided on activity: as tolerated.   Diet: regular as tolerated. [place] encouraged    Additional education provided on follow up appointment:  Dr. Caldera 11/6/17 7569.      Education provided on 24/7 on call nurse line, Advance Directives, and Language assistance Services if needed. Instructions provided on Patient Portal using [employer].   Copy of all instructions given to pt.   Pt verbalized understanding of all instructions.   Pt stable and ready for discharge.

## 2017-11-01 NOTE — PLAN OF CARE
Problem: Patient Care Overview  Goal: Plan of Care Review  Outcome: Ongoing (interventions implemented as appropriate)   11/01/17 0618   Coping/Psychosocial   Plan Of Care Reviewed With patient   AAO x4. Abdominal pain being managed with dilaudid IV PRN. Still with complaints of nausea. States she vomited 3-4 times in small amounts, and clear and yellowish in color. Zofran IV given for complaints of nausea which only gives her little to no relief. Hydroxyzine PO given for complaints of itchiness. Will continue to monitor.

## 2017-11-01 NOTE — NURSING
PIV removed. Sensicare utilized. Gauze and coban dressing applied for bleeding precautions.   Pt tolerated well.

## 2017-11-01 NOTE — DISCHARGE SUMMARY
"[facility]  Discharge Summary      Patient Name: Aide Almaraz  MRN: [id number]  Admission Date: 10/31/2017  Hospital Length of Stay: 1 days  Discharge Date and Time:  11/01/2017 10:14 AM  Attending Physician: Mitul Stafford MD   Discharging Provider: Mitul Stafford MD  Primary Care Provider: Primary Doctor No      HPI:   47 y.o F with anemia, , HTN and supraventricular tachycardia presents to the ED c/o acute onset of "sharp" left xiphoid process pain with associated cervical and thoracic back pain which woke her up out of her sleep at 0300. The pt describes her xiphoid process pain as constant with intermittent "thigh pain." The pt also reports R upper extremity pain and numbness, lightheadedness, diarrhea x1 and emesis x5. The pt denies any exacerbating or alleviating factors. The pt also denies sore throat, cough, SOB, fever, chills, headache and abdominal pain. Pt attempted tx with pepto-bismol with no relief.she has lipase level of 283,consistent with acure pancreatitis,she denies alcohol consumption,she has history of cholecystitis,she has only mild elevated AST,she say her abdominal pain is improved at this time.    * No surgery found *      Hospital Course:   47 y.o F with anemia, , HTN and supraventricular tachycardia presents to the ED c/o acute onset of "sharp" left xiphoid process pain with associated cervical and thoracic back pain which woke her up out of her sleep at 0300. The pt describes her xiphoid process pain as constant with intermittent "thigh pain." The pt also reports R upper extremity pain and numbness, lightheadedness, diarrhea x1 and emesis x5. The pt denies any exacerbating or alleviating factors. The pt also denies sore throat, cough, SOB, fever, chills, headache and abdominal pain. Pt attempted tx with pepto-bismol with no relief.she has lipase level of 283,consistent with acure pancreatitis,she denies regular alcohol " consumption,she has history of cholecystitis,she has only mild elevated AST,ALT  with no elevated TB,abdominal US show no sign of obstruction,,she has history of cholecystectomy,she may consume alcohol more than stated,she was NPO and and on IVF,her lipase level was back to normal and she tolerated diet,abdomijnal pain is much improved,she will follow with her PCP and GI  Dr. Baker,in next few days.she has been consulted avoid alcohol consumption.     Consults:     No new Assessment & Plan notes have been filed under this hospital service since the last note was generated.  Service: Hospital Medicine    Final Active Diagnoses:    Diagnosis Date Noted POA    PRINCIPAL PROBLEM:  Idiopathic acute pancreatitis without infection or necrosis [K85.00] 10/31/2017 Yes    Essential hypertension, benign [I10] 02/25/2016 Yes    History of supraventricular tachycardia [Z86.79] 02/25/2016 Not Applicable      Problems Resolved During this Admission:    Diagnosis Date Noted Date Resolved POA       Discharged Condition: stable    Disposition: Home or Self Care    Follow Up:  Follow-up Information     Primary Doctor No In 1 week.               Patient Instructions:     Diet general     Activity as tolerated         Significant Diagnostic Studies: Labs:   CMP   Recent Labs  Lab 10/31/17  0749 11/01/17  0450    139   K 4.6 4.1    106   CO2 23 24    101   BUN 16 7   CREATININE 0.9 0.8   CALCIUM 9.7 9.3   PROT 7.9 7.4   ALBUMIN 3.8 3.7   BILITOT 0.7 0.8   ALKPHOS 84 81   AST 82* 126*   ALT 37 161*   ANIONGAP 11 9   ESTGFRAFRICA >60 >60   EGFRNONAA >60 >60    and CBC   Recent Labs  Lab 10/31/17  0749   WBC 9.40   HGB 14.3   HCT 42.0        Radiology: Ultrasound: abdomen     Pending Diagnostic Studies:     None         Medications:  Reconciled Home Medications:   Current Discharge Medication List      CONTINUE these medications which have NOT CHANGED    Details   ADDERALL XR 25 mg 24 hr capsule Take 1 tablet  by mouth once daily.  Refills: 0      eszopiclone 3 mg Tab Take 1 tablet (3 mg total) by mouth nightly as needed.  Qty: 20 tablet, Refills: 2    Associated Diagnoses: Insomnia, unspecified insomnia      metoprolol succinate (TOPROL-XL) 100 MG 24 hr tablet Take 1 tablet (100 mg total) by mouth once daily.  Qty: 30 tablet, Refills: 11      esomeprazole (NEXIUM) 40 MG capsule Take 1 capsule (40 mg total) by mouth 2 (two) times daily before meals.  Qty: 60 capsule, Refills: 1      hydrOXYzine HCl (ATARAX) 50 MG tablet Take 1 tablet (50 mg total) by mouth nightly as needed (Insomnia).  Qty: 30 tablet, Refills: 0         STOP taking these medications       diazepam (VALIUM) 5 MG tablet Comments:   Reason for Stopping:               Indwelling Lines/Drains at time of discharge:   Lines/Drains/Airways          No matching active lines, drains, or airways          Time spent on the discharge of patient: 30  minutes  Patient was seen and examined on the date of discharge and determined to be suitable for discharge.         Mitul Stafford MD  Department of Hospital Medicine  [facility]

## 2017-11-01 NOTE — PLAN OF CARE
Problem: Patient Care Overview  Goal: Plan of Care Review  Outcome: Ongoing (interventions implemented as appropriate)  Acute pain resolving. Nausea medication given as needed. Some improvement noted. No falls or injury. Pt assisted with all ALDs. Pt stable and ready for discharge.

## 2017-11-01 NOTE — PROGRESS NOTES
Follow-up Information     Isidoro Caldera MD On 11/6/2017.    Specialty:  Internal Medicine  Why:  Appointment scheduled for Monday November 6th at 12:30pm  Contact information:  [address]  [place] [id number] [phone]                 Thank you for choosing [facility] for your care. Within 48-72 hours after leaving the hospital you will receive a call from [facility] Nurses following up to see how you are doing. The team will ask you a few questions and the call will last approximately 20 minutes.     Please answer any calls you may receive from [facility] we want to continue to support you as you manage your healthcare needs. [facility] is happy to have the opportunity to serve you.     [facility] On Call Nurse Care Line - 24/7 Assistance  Registered [facility] nurses can provide appointment booking, health education, clinical advisement, and other advisory services.   Call for this free service at [phone].              Sincerely,   Your [facility] Team,   Tasha Chowdhury RN/Tn  [phone]

## 2017-11-01 NOTE — PLAN OF CARE
11/01/17 1120   Final Note   Assessment Type Final Discharge Note   Discharge Disposition Home   Hospital Follow Up  Appt(s) scheduled? Yes   Discharge plans and expectations educations in teach back method with documentation complete? Yes   Right Care Referral Info   Post Acute Recommendation No Care     Follow-up Information     Isidoro Caldera MD On 11/6/2017.    Specialty:  Internal Medicine  Why:  Appointment scheduled for Monday November 6th at 12:30pm  Contact information:  [address]  [place] [address] [phone]                 Call placed to Jessika molina nurse to notify her that all CM needs have been addressed and that she may proceed with nursing d/c instructions to complete d/c process

## 2017-11-01 NOTE — PLAN OF CARE
11/01/17 1119   Discharge Assessment   Assessment Type Discharge Planning Assessment  (discharge order entered prior to completion of d/c assessment )

## 2017-11-01 NOTE — HOSPITAL COURSE
"47 y.o F with anemia, , HTN and supraventricular tachycardia presents to the ED c/o acute onset of "sharp" left xiphoid process pain with associated cervical and thoracic back pain which woke her up out of her sleep at 0300. The pt describes her xiphoid process pain as constant with intermittent "thigh pain." The pt also reports R upper extremity pain and numbness, lightheadedness, diarrhea x1 and emesis x5. The pt denies any exacerbating or alleviating factors. The pt also denies sore throat, cough, SOB, fever, chills, headache and abdominal pain. Pt attempted tx with pepto-bismol with no relief.she has lipase level of 283,consistent with acure pancreatitis,she denies regular alcohol consumption,she has history of cholecystitis,she has only mild elevated AST,ALT  with no elevated TB,abdominal US show no sign of obstruction,,she has history of cholecystectomy,she may consume alcohol more than stated,she was NPO and and on IVF,her lipase level was back to normal and she tolerated diet,abdomijnal pain is much improved,she will follow with her PCP and GI  Dr. Baker,in next few days.she has been consulted avoid alcohol consumption.  "

## 2017-11-01 NOTE — NURSING
Tasha with CM notified to check on d/c status. Pt not cleared by cm as of now. Will call me or right note when pt clear. [phone].

## 2018-02-27 ENCOUNTER — HOSPITAL ENCOUNTER (EMERGENCY)
Facility: HOSPITAL | Age: 48
Discharge: HOME | End: 2018-02-27
Attending: EMERGENCY MEDICINE
Payer: MEDICAID

## 2018-02-27 VITALS
SYSTOLIC BLOOD PRESSURE: 118 MMHG | DIASTOLIC BLOOD PRESSURE: 69 MMHG | TEMPERATURE: 98 F | RESPIRATION RATE: 18 BRPM | OXYGEN SATURATION: 100 % | WEIGHT: 165 LBS | BODY MASS INDEX: 27.46 KG/M2 | HEART RATE: 62 BPM

## 2018-02-27 DIAGNOSIS — F43.21 GRIEF REACTION: Primary | ICD-10-CM

## 2018-02-27 DIAGNOSIS — R07.9 CHEST PAIN: ICD-10-CM

## 2018-02-27 LAB
ANION GAP SERPL CALC-SCNC: 7 MMOL/L
BASOPHILS # BLD AUTO: 0.04 K/UL
BASOPHILS NFR BLD: 0.9 %
BUN SERPL-MCNC: 10 MG/DL
CALCIUM SERPL-MCNC: 9.8 MG/DL
CHLORIDE SERPL-SCNC: 109 MMOL/L
CO2 SERPL-SCNC: 26 MMOL/L
CREAT SERPL-MCNC: 0.8 MG/DL
DIFFERENTIAL METHOD: ABNORMAL
EOSINOPHIL # BLD AUTO: 0 K/UL
EOSINOPHIL NFR BLD: 0.9 %
ERYTHROCYTE [DISTWIDTH] IN BLOOD BY AUTOMATED COUNT: 13.6 %
EST. GFR  (AFRICAN AMERICAN): >60 ML/MIN/1.73 M^2
EST. GFR  (NON AFRICAN AMERICAN): >60 ML/MIN/1.73 M^2
GLUCOSE SERPL-MCNC: 95 MG/DL
HCT VFR BLD AUTO: 40.7 %
HGB BLD-MCNC: 13.7 G/DL
LYMPHOCYTES # BLD AUTO: 2.1 K/UL
LYMPHOCYTES NFR BLD: 48.8 %
MCH RBC QN AUTO: 30.4 PG
MCHC RBC AUTO-ENTMCNC: 33.7 G/DL
MCV RBC AUTO: 90 FL
MONOCYTES # BLD AUTO: 0.3 K/UL
MONOCYTES NFR BLD: 6.3 %
NEUTROPHILS # BLD AUTO: 1.9 K/UL
NEUTROPHILS NFR BLD: 43.1 %
PLATELET # BLD AUTO: 344 K/UL
PMV BLD AUTO: 10.5 FL
POTASSIUM SERPL-SCNC: 4.5 MMOL/L
RBC # BLD AUTO: 4.5 M/UL
SODIUM SERPL-SCNC: 142 MMOL/L
TROPONIN I SERPL DL<=0.01 NG/ML-MCNC: <0.006 NG/ML
WBC # BLD AUTO: 4.3 K/UL

## 2018-02-27 PROCEDURE — 85025 COMPLETE CBC W/AUTO DIFF WBC: CPT

## 2018-02-27 PROCEDURE — 84484 ASSAY OF TROPONIN QUANT: CPT

## 2018-02-27 PROCEDURE — 93010 ELECTROCARDIOGRAM REPORT: CPT | Mod: ,,, | Performed by: INTERNAL MEDICINE

## 2018-02-27 PROCEDURE — 80048 BASIC METABOLIC PNL TOTAL CA: CPT

## 2018-02-27 PROCEDURE — 93005 ELECTROCARDIOGRAM TRACING: CPT

## 2018-02-27 PROCEDURE — 25000003 PHARM REV CODE 250: Performed by: EMERGENCY MEDICINE

## 2018-02-27 PROCEDURE — 99284 EMERGENCY DEPT VISIT MOD MDM: CPT

## 2018-02-27 RX ORDER — DIAZEPAM 5 MG/1
10 TABLET ORAL
Status: COMPLETED | OUTPATIENT
Start: 2018-02-27 | End: 2018-02-27

## 2018-02-27 RX ADMIN — DIAZEPAM 10 MG: 5 TABLET ORAL at 12:02

## 2018-02-27 NOTE — DISCHARGE INSTRUCTIONS
Continue your medications as prescribed.  It is very important that she make an appointment to see her psychiatrist as soon as possible.  Read the attached information about grief and use some of the coping techniques they recommend.  Return to the emergency room immediately for worsening chest pain, breathing difficulty, feelings of hurting yourself or any new or worsening symptoms.

## 2018-02-27 NOTE — ED TRIAGE NOTES
"Pt arrived to ED crying and stating to overwhelmed " my mother past away on Sunday" Pt denies CP but states " my heart hurts." emotionally.Denies SOB at this time  "

## 2018-02-27 NOTE — ED PROVIDER NOTES
"Encounter Date: 2018    SCRIBE #1 NOTE: I, Osmar Francis, am scribing for, and in the presence of,  Ryder Gillespie MD. I have scribed the following portions of the note - Other sections scribed: HPI and ROS.       History     Chief Complaint   Patient presents with    Anxiety     "I feel like I am on the verge of having a nervous breakdown.  My mother passed away , and I am having a hard time coping with it."  Denies SI.       CC: Anxiety    HPI: Patient is a 48 y.o. female with history of PTSD who presents to ED for evaluation of anxiety. Patient reports that her mother figure  2 days ago. She has been "trying to hold herself together" but is not handling the grief well. Patient attests to a decreased appetite as well as difficulty sleeping. Patient otherwise has no complaints. Denies SI.      The history is provided by the patient. No  was used.     Review of patient's allergies indicates:  No Known Allergies  Past Medical History:   Diagnosis Date    Anemia     Celiac disease     Diabetes mellitus     Gall stones     Hypertension     PTSD (post-traumatic stress disorder)     Supraventricular tachycardia      Past Surgical History:   Procedure Laterality Date    ABDOMINAL SURGERY      Gastric sleeve    ANKLE FUSION       SECTION      placenta previa    CHOLECYSTECTOMY  2009    gastric      HYSTERECTOMY  2001    Inguinal hernia  2004     Family History   Problem Relation Age of Onset    Heart disease Father     Diabetes Mother      hypoglycemic    Breast cancer Maternal Aunt      Social History   Substance Use Topics    Smoking status: Never Smoker    Smokeless tobacco: Never Used    Alcohol use No     Review of Systems   Constitutional: Positive for appetite change (decreased). Negative for fever.   HENT: Negative for sore throat.    Eyes: Negative for visual disturbance.   Respiratory: Negative for shortness of breath.  "   Cardiovascular: Negative for chest pain.   Gastrointestinal: Negative for abdominal pain, diarrhea, nausea and vomiting.   Genitourinary: Negative for dysuria.   Musculoskeletal: Negative for back pain.   Skin: Negative for rash.   Neurological: Negative for weakness and headaches.   Hematological: Does not bruise/bleed easily.   Psychiatric/Behavioral: Negative for suicidal ideas.       Physical Exam     Initial Vitals [02/27/18 1157]   BP Pulse Resp Temp SpO2   134/80 (!) 112 20 98.6 °F (37 °C) 100 %      MAP       98         Physical Exam    Nursing note and vitals reviewed.  Constitutional: She appears well-developed and well-nourished. She is cooperative.  Non-toxic appearance.   Patient is tearful upon examination   HENT:   Head: Normocephalic and atraumatic.   Mouth/Throat: Oropharynx is clear and moist.   Eyes: Conjunctivae and EOM are normal. Pupils are equal, round, and reactive to light.   Neck: Normal range of motion and full passive range of motion without pain. Neck supple. No thyromegaly present. No JVD present.   Cardiovascular: Normal rate, regular rhythm, normal heart sounds and normal pulses.   Pulmonary/Chest: Effort normal and breath sounds normal. No respiratory distress.   Abdominal: Soft. Normal appearance and bowel sounds are normal. She exhibits no distension and no mass. There is no tenderness.   Musculoskeletal: Normal range of motion.   Neurological: She is alert and oriented to person, place, and time. She has normal strength. No cranial nerve deficit or sensory deficit.   Skin: Skin is warm, dry and intact. No rash noted.   Psychiatric: She has a normal mood and affect. Her speech is normal and behavior is normal. Judgment and thought content normal.         ED Course   Procedures  Labs Reviewed   CBC W/ AUTO DIFFERENTIAL - Abnormal; Notable for the following:        Result Value    Lymph% 48.8 (*)     All other components within normal limits   BASIC METABOLIC PANEL - Abnormal;  Notable for the following:     Anion Gap 7 (*)     All other components within normal limits   TROPONIN I     EKG Readings: (Independently Interpreted)    NSR, nl axis, nl intervals, no definite acute ischemic changes           Medical Decision Making:   Initial Assessment:   Patient with history of anxiety presents complaining of feeling on the verge of a nervous breakdown and her heart hurting in the setting of the recent death of her mother figure.  Patient denies suicidal ideation and homicidal ideation or hallucinations  Differential Diagnosis:   Grief reaction  Anxiety  ACS  Independently Interpreted Test(s):   I have ordered and independently interpreted EKG Reading(s) - see prior notes  Clinical Tests:   Lab Tests: Ordered and Reviewed  Radiological Study: Ordered and Reviewed  ED Management:  Patient tearful emotional on arrival, denies suicidal ideation, homicidal ideation or hallucinations.  Given by mouth Valium in the ER with significant improvement in symptoms.  EKG without acute ischemic changes labs within acceptable limits with negative troponin.  Low clinical suspicion of ACS in this patient.  Symptoms likely due to grief reaction.  Significant time spent with patient discussing the grief process and coping mechanisms.  Patient does not appear to be a danger to herself or others, but offered evaluation by psychiatrist which she declined stating that she feels better.  Patient reports having follow-up with her psychiatrist on Thursday.  Patient given strict instructions to contact 911 or return to the emergency room if she feels like she cannot cope.  Patient expresses understanding and agreement with treatment plan.  She states that she feels better and she would like to be discharged she can return to playing the .  Patient hemodynamically stable, clinically sober, with steady gait at time of discharge.            Scribe Attestation:   Scribe #1: I performed the above scribed service and  the documentation accurately describes the services I performed. I attest to the accuracy of the note.    Attending Attestation:           Physician Attestation for Scribe:  Physician Attestation Statement for Scribe #1: I, Ryder Gillespie MD, reviewed documentation, as scribed by Osmar Francis in my presence, and it is both accurate and complete.                    Clinical Impression:   The primary encounter diagnosis was Grief reaction. A diagnosis of Chest pain was also pertinent to this visit.    Disposition:   Disposition: Discharged  Condition: Stable                        Ryder Gillespie MD  02/28/18 0700

## 2018-10-20 ENCOUNTER — HOSPITAL ENCOUNTER (EMERGENCY)
Facility: HOSPITAL | Age: 48
Discharge: HOME | End: 2018-10-20
Attending: EMERGENCY MEDICINE
Payer: MEDICAID

## 2018-10-20 VITALS
SYSTOLIC BLOOD PRESSURE: 116 MMHG | DIASTOLIC BLOOD PRESSURE: 64 MMHG | BODY MASS INDEX: 27.49 KG/M2 | OXYGEN SATURATION: 96 % | HEART RATE: 82 BPM | HEIGHT: 65 IN | RESPIRATION RATE: 19 BRPM | TEMPERATURE: 98 F | WEIGHT: 165 LBS

## 2018-10-20 DIAGNOSIS — R07.89 CHEST PAIN, NON-CARDIAC: Primary | ICD-10-CM

## 2018-10-20 DIAGNOSIS — R10.13 ABDOMINAL PAIN, ACUTE, EPIGASTRIC: ICD-10-CM

## 2018-10-20 DIAGNOSIS — R07.9 CHEST PAIN: ICD-10-CM

## 2018-10-20 LAB
ALBUMIN SERPL BCP-MCNC: 4 G/DL
ALP SERPL-CCNC: 73 U/L
ALT SERPL W/O P-5'-P-CCNC: 17 U/L
ANION GAP SERPL CALC-SCNC: 10 MMOL/L
AST SERPL-CCNC: 20 U/L
BASOPHILS # BLD AUTO: 0.02 K/UL
BASOPHILS NFR BLD: 0.6 %
BILIRUB SERPL-MCNC: 0.5 MG/DL
BILIRUB UR QL STRIP: NEGATIVE
BUN SERPL-MCNC: 13 MG/DL
CALCIUM SERPL-MCNC: 9.8 MG/DL
CHLORIDE SERPL-SCNC: 107 MMOL/L
CLARITY UR: CLEAR
CO2 SERPL-SCNC: 22 MMOL/L
COLOR UR: YELLOW
CREAT SERPL-MCNC: 0.8 MG/DL
DIFFERENTIAL METHOD: ABNORMAL
EOSINOPHIL # BLD AUTO: 0.1 K/UL
EOSINOPHIL NFR BLD: 2.8 %
ERYTHROCYTE [DISTWIDTH] IN BLOOD BY AUTOMATED COUNT: 14.1 %
EST. GFR  (AFRICAN AMERICAN): >60 ML/MIN/1.73 M^2
EST. GFR  (NON AFRICAN AMERICAN): >60 ML/MIN/1.73 M^2
GLUCOSE SERPL-MCNC: 87 MG/DL
GLUCOSE UR QL STRIP: NEGATIVE
HCT VFR BLD AUTO: 43.2 %
HGB BLD-MCNC: 14 G/DL
HGB UR QL STRIP: NEGATIVE
KETONES UR QL STRIP: ABNORMAL
LEUKOCYTE ESTERASE UR QL STRIP: NEGATIVE
LIPASE SERPL-CCNC: 18 U/L
LYMPHOCYTES # BLD AUTO: 2.1 K/UL
LYMPHOCYTES NFR BLD: 65.9 %
MCH RBC QN AUTO: 30.2 PG
MCHC RBC AUTO-ENTMCNC: 32.4 G/DL
MCV RBC AUTO: 93 FL
MICROSCOPIC COMMENT: NORMAL
MONOCYTES # BLD AUTO: 0.2 K/UL
MONOCYTES NFR BLD: 6.3 %
NEUTROPHILS # BLD AUTO: 0.8 K/UL
NEUTROPHILS NFR BLD: 24.4 %
NITRITE UR QL STRIP: NEGATIVE
PH UR STRIP: 7 [PH] (ref 5–8)
PLATELET # BLD AUTO: ABNORMAL K/UL
PLATELET BLD QL SMEAR: ABNORMAL
PMV BLD AUTO: ABNORMAL FL
POTASSIUM SERPL-SCNC: 4.2 MMOL/L
PROT SERPL-MCNC: 7.3 G/DL
PROT UR QL STRIP: NEGATIVE
RBC # BLD AUTO: 4.63 M/UL
RBC #/AREA URNS HPF: 2 /HPF (ref 0–4)
SODIUM SERPL-SCNC: 139 MMOL/L
SP GR UR STRIP: 1.02 (ref 1–1.03)
SQUAMOUS #/AREA URNS HPF: NORMAL /HPF
TROPONIN I SERPL DL<=0.01 NG/ML-MCNC: <0.006 NG/ML
TROPONIN I SERPL DL<=0.01 NG/ML-MCNC: <0.006 NG/ML
URN SPEC COLLECT METH UR: ABNORMAL
UROBILINOGEN UR STRIP-ACNC: ABNORMAL EU/DL
WBC # BLD AUTO: 3.2 K/UL

## 2018-10-20 PROCEDURE — 80053 COMPREHEN METABOLIC PANEL: CPT

## 2018-10-20 PROCEDURE — 96375 TX/PRO/DX INJ NEW DRUG ADDON: CPT

## 2018-10-20 PROCEDURE — 25000003 PHARM REV CODE 250: Performed by: EMERGENCY MEDICINE

## 2018-10-20 PROCEDURE — 96361 HYDRATE IV INFUSION ADD-ON: CPT

## 2018-10-20 PROCEDURE — 93005 ELECTROCARDIOGRAM TRACING: CPT

## 2018-10-20 PROCEDURE — C9113 INJ PANTOPRAZOLE SODIUM, VIA: HCPCS | Performed by: EMERGENCY MEDICINE

## 2018-10-20 PROCEDURE — 85025 COMPLETE CBC W/AUTO DIFF WBC: CPT

## 2018-10-20 PROCEDURE — 96372 THER/PROPH/DIAG INJ SC/IM: CPT

## 2018-10-20 PROCEDURE — 93010 ELECTROCARDIOGRAM REPORT: CPT | Mod: ,,, | Performed by: INTERNAL MEDICINE

## 2018-10-20 PROCEDURE — 81000 URINALYSIS NONAUTO W/SCOPE: CPT

## 2018-10-20 PROCEDURE — 83690 ASSAY OF LIPASE: CPT

## 2018-10-20 PROCEDURE — 96374 THER/PROPH/DIAG INJ IV PUSH: CPT

## 2018-10-20 PROCEDURE — 63600175 PHARM REV CODE 636 W HCPCS: Performed by: EMERGENCY MEDICINE

## 2018-10-20 PROCEDURE — 84484 ASSAY OF TROPONIN QUANT: CPT

## 2018-10-20 PROCEDURE — 84484 ASSAY OF TROPONIN QUANT: CPT | Mod: 91

## 2018-10-20 PROCEDURE — 99284 EMERGENCY DEPT VISIT MOD MDM: CPT | Mod: 25

## 2018-10-20 RX ORDER — BETAMETHASONE SODIUM PHOSPHATE AND BETAMETHASONE ACETATE 3; 3 MG/ML; MG/ML
12 INJECTION, SUSPENSION INTRA-ARTICULAR; INTRALESIONAL; INTRAMUSCULAR; SOFT TISSUE
Status: COMPLETED | OUTPATIENT
Start: 2018-10-20 | End: 2018-10-20

## 2018-10-20 RX ORDER — PANTOPRAZOLE SODIUM 40 MG/10ML
80 INJECTION, POWDER, LYOPHILIZED, FOR SOLUTION INTRAVENOUS
Status: COMPLETED | OUTPATIENT
Start: 2018-10-20 | End: 2018-10-20

## 2018-10-20 RX ORDER — MORPHINE SULFATE 4 MG/ML
6 INJECTION, SOLUTION INTRAMUSCULAR; INTRAVENOUS
Status: DISCONTINUED | OUTPATIENT
Start: 2018-10-20 | End: 2018-10-20

## 2018-10-20 RX ORDER — HYDROCODONE BITARTRATE AND ACETAMINOPHEN 5; 325 MG/1; MG/1
2 TABLET ORAL
Status: DISCONTINUED | OUTPATIENT
Start: 2018-10-20 | End: 2018-10-20

## 2018-10-20 RX ORDER — HYDROCODONE BITARTRATE AND ACETAMINOPHEN 5; 325 MG/1; MG/1
1 TABLET ORAL
Status: COMPLETED | OUTPATIENT
Start: 2018-10-20 | End: 2018-10-20

## 2018-10-20 RX ORDER — ONDANSETRON 4 MG/1
4 TABLET, ORALLY DISINTEGRATING ORAL
Status: COMPLETED | OUTPATIENT
Start: 2018-10-20 | End: 2018-10-20

## 2018-10-20 RX ORDER — MAG HYDROX/ALUMINUM HYD/SIMETH 200-200-20
60 SUSPENSION, ORAL (FINAL DOSE FORM) ORAL
Status: COMPLETED | OUTPATIENT
Start: 2018-10-20 | End: 2018-10-20

## 2018-10-20 RX ORDER — CYCLOBENZAPRINE HCL 10 MG
10 TABLET ORAL 3 TIMES DAILY PRN
Qty: 15 TABLET | Refills: 0 | Status: SHIPPED | OUTPATIENT
Start: 2018-10-20 | End: 2018-10-25

## 2018-10-20 RX ORDER — MORPHINE SULFATE 4 MG/ML
6 INJECTION, SOLUTION INTRAMUSCULAR; INTRAVENOUS
Status: COMPLETED | OUTPATIENT
Start: 2018-10-20 | End: 2018-10-20

## 2018-10-20 RX ORDER — DICYCLOMINE HYDROCHLORIDE 10 MG/ML
20 INJECTION INTRAMUSCULAR
Status: COMPLETED | OUTPATIENT
Start: 2018-10-20 | End: 2018-10-20

## 2018-10-20 RX ORDER — LORAZEPAM 2 MG/ML
1 INJECTION INTRAMUSCULAR
Status: COMPLETED | OUTPATIENT
Start: 2018-10-20 | End: 2018-10-20

## 2018-10-20 RX ADMIN — HYDROCODONE BITARTRATE AND ACETAMINOPHEN 1 TABLET: 5; 325 TABLET ORAL at 10:10

## 2018-10-20 RX ADMIN — PANTOPRAZOLE SODIUM 80 MG: 40 INJECTION, POWDER, FOR SOLUTION INTRAVENOUS at 01:10

## 2018-10-20 RX ADMIN — ONDANSETRON 4 MG: 4 TABLET, ORALLY DISINTEGRATING ORAL at 11:10

## 2018-10-20 RX ADMIN — LORAZEPAM 1 MG: 2 INJECTION INTRAMUSCULAR; INTRAVENOUS at 01:10

## 2018-10-20 RX ADMIN — DICYCLOMINE HYDROCHLORIDE 20 MG: 10 INJECTION INTRAMUSCULAR at 01:10

## 2018-10-20 RX ADMIN — BETAMETHASONE SODIUM PHOSPHATE AND BETAMETHASONE ACETATE 12 MG: 3; 3 INJECTION, SUSPENSION INTRA-ARTICULAR; INTRALESIONAL; INTRAMUSCULAR at 11:10

## 2018-10-20 RX ADMIN — ALUMINUM HYDROXIDE, MAGNESIUM HYDROXIDE, AND SIMETHICONE 60 ML: 200; 200; 20 SUSPENSION ORAL at 01:10

## 2018-10-20 RX ADMIN — SODIUM CHLORIDE 1000 ML: 0.9 INJECTION, SOLUTION INTRAVENOUS at 12:10

## 2018-10-20 RX ADMIN — MORPHINE SULFATE 6 MG: 4 INJECTION INTRAVENOUS at 11:10

## 2018-10-20 NOTE — ED TRIAGE NOTES
Pt arrived via personal transport with c/o midsrernal cp that radiates to left arm and neck as well as back; pt also c/o n/v; pt states symptoms began 4 days ago; denies fever, chills, diarrhea, at this time;  Pt reports intermittent sob with cp

## 2018-10-20 NOTE — ED NOTES
Pt complaining of abdominal pain upon discharge. While walking to discharge fraser with pt and daughter, pt assisted with wheelchair, near nursing station. And wheeled back to room.

## 2018-10-20 NOTE — ED PROVIDER NOTES
Encounter Date: 10/20/2018  SORT:   48-year-old female history of hypertension, diabetes, SVT presenting for evaluation of a history of intermittent chest pain shortness of breath and nausea.Initial orders placed. ZEE Galvan      History     Chief Complaint   Patient presents with    Chest Pain     pt c/o intermittent stabbing chest pain with SOB and nausea X 3 days.     HPI   This 48-year-old female presents to the emergency room complaining of a left pectoral chest pain.  The patient has had intermittent pain there for 3 days.  The patient has some very mild shortness of breath.  She is essentially otherwise well without other injuries or problems.  She has a history of congestive heart failure.  She has never had pulmonary emboli are deep venous thrombosis she is not using estrogen therapy.  There is no cough or hemoptysis.  The patient is otherwise well. The patient's pain is left, unilateral, pectoral very lateral in very superior.   Review of patient's allergies indicates:  No Known Allergies  Past Medical History:   Diagnosis Date    Anemia     Celiac disease     Diabetes mellitus     Gall stones     Hypertension     PTSD (post-traumatic stress disorder)     Supraventricular tachycardia      Past Surgical History:   Procedure Laterality Date    ABDOMINAL SURGERY      Gastric sleeve    ANKLE FUSION       SECTION      placenta previa    CHOLECYSTECTOMY  2009    gastric      HYSTERECTOMY  2001    Inguinal hernia  2004     Family History   Problem Relation Age of Onset    Heart disease Father     Diabetes Mother         hypoglycemic    Breast cancer Maternal Aunt      Social History     Tobacco Use    Smoking status: Never Smoker    Smokeless tobacco: Never Used   Substance Use Topics    Alcohol use: No    Drug use: No     Review of Systems  The patient was questioned specifically with regard to the following.  General: Fever, chills, sweats. Neuro: Headache. Eyes:  eye problems. ENT: Ear pain, sore throat. Cardiovascular: Chest pain. Respiratory: Cough, shortness of breath. Gastrointestinal: Abdominal pain, vomiting, diarrhea. Genitourinary: Painful urination.  Musculoskeletal: Arm and leg problems. Skin: Rash.  The review of systems was negative except for the following:  Chest pain and shortness of breath  Physical Exam     Initial Vitals [10/20/18 0956]   BP Pulse Resp Temp SpO2   122/66 94 18 98.3 °F (36.8 °C) 100 %      MAP       --         Physical Exam  The patient was examined specifically for the following:   General:No significant distress, Good color, Warm and dry. Head and neck:Scalp atraumatic, Neck supple. Neurological:Appropriate conversation, Gross motor deficits. Eyes:Conjugate gaze, Clear corneas. ENT: No epistaxis. Cardiac: Regular rate and rhythm, Grossly normal heart tones. Pulmonary: Wheezing, Rales. Gastrointestinal: Abdominal tenderness, Abdominal distention. Musculoskeletal: Extremity deformity, Apparent pain with range of motion of the joints. Skin: Rash.   The findings on examination were normal except for the following:  The patient has tenderness of the chest in the anterior axillary line at the axilla.  Moving the left arm anteriorly across the front of the body reproduces the patient's pain. Posterior distraction of the left arm does not produce any symptoms.  Patient has a heart rate of 89 during the physical examination. Her oxygen saturations 100%.  Her blood pressure is 125/75.  There is no evidence of respiratory distress the lungs are clear and free of wheezing rales or rhonchi.  Heart tones are normal.  The patient has regular rate and rhythm. The abdomen is soft.  There is no rash. There is no leg swelling or tenderness.  ED Course   Procedures  Labs Reviewed   CBC W/ AUTO DIFFERENTIAL - Abnormal; Notable for the following components:       Result Value    WBC 3.20 (*)     Gran # (ANC) 0.8 (*)     Mono # 0.2 (*)     Gran% 24.4 (*)      Lymph% 65.9 (*)     Platelet Estimate Clumped (*)     All other components within normal limits   COMPREHENSIVE METABOLIC PANEL - Abnormal; Notable for the following components:    CO2 22 (*)     All other components within normal limits   URINALYSIS, REFLEX TO URINE CULTURE - Abnormal; Notable for the following components:    Ketones, UA Trace (*)     Urobilinogen, UA 4.0-6.0 (*)     All other components within normal limits    Narrative:     Preferred Collection Type->Urine, Clean Catch   TROPONIN I   LIPASE   TROPONIN I   URINALYSIS MICROSCOPIC    Narrative:     Preferred Collection Type->Urine, Clean Catch     EKG Readings: (Independently Interpreted)   This patient is in a normal sinus rhythm with a heart rate of 93.  The patient has a normal axis.  There are no significant ST segment or T-wave changes.  This is a normal EKG.       Imaging Results          X-Ray Chest AP Portable (Final result)  Result time 10/20/18 10:31:40    Final result by Juan Ortiz MD (10/20/18 10:31:40)                 Impression:      As above.      Electronically signed by: Juan Ortiz MD  Date:    10/20/2018  Time:    10:31             Narrative:    EXAMINATION:  XR CHEST AP PORTABLE    CLINICAL HISTORY:  Chest Pain;    TECHNIQUE:  Single frontal view of the chest was performed.    FINDINGS:  The lungs are clear.  There is no pneumothorax or pleural fluid.  The cardiac silhouette is not enlarged.  The osseous structures are unremarkable.                                    Additional MDM:   PERC Rule:   Age is greater than or equal to 50 = 0.0  Heart Rate is greater than or equal to 100 = 0.0  SaO2 on room air < 95% = 0.0  Unilateral leg swelling = 0.0  Hemoptysis = 0.0  Recent surgery or trauma = 0.0  Prior PE or DVT =  0.0  Hormone use = 0.00  PERC Score = 0  Heart Score:    History:          Slightly suspicious.  ECG:             Normal  Age:               45-65 years  Risk factors: 1-2 risk factors  Troponin:       Less than or  equal to normal limit  Final Score: 2        Medical decision making:  This patient presents to the emergency with a left anterior shoulder pain. Pain is sharp in positional.  The patient has tenderness in the anterior axillary line.  I believe this is musculoskeletal.  There is no precordial discomfort.  The patient has vague symptoms of shortness of breath but a good oxygen saturation and no tachycardia.  I doubt pulmonary embolus.  The patient is perc negative. This syndrome this none suggest cardiac disease.  The troponin is negative the EKG is essentially unremarkable. I will discharge on steroids because this is recurrent problem for this patient.  I will have her follow up with primary care.    Emergency room course:  The patient was being discharged from the emergency room after shot of morphine for her left shoulder strain.  She then developed acute epigastric abdominal pain while standing in the bacon. This radiates straight through to her back.  The patient was put back in her examination room.  She was re-examined.  She had mild diffuse epigastric tenderness.  There is no guarding rebound mass or distention.  Laboratory work was ordered.  The patient's lipase, repeat troponin and urinalysis were negative.  Magnesium was normal.  The patient was treated with Ativan Protonix and Mylanta.  Her abdominal pain completely resolved.  She is ready for discharge again at 12:35 p.m..  She is asymptomatic.                 Clinical Impression:   The primary encounter diagnosis was Chest pain, non-cardiac. Diagnoses of Chest pain and Abdominal pain, acute, epigastric were also pertinent to this visit.                             Bear Eden MD  10/20/18 1130       Bear Eden MD  10/20/18 123       Bear Eden MD  10/20/18 7441

## 2019-05-14 ENCOUNTER — HOSPITAL ENCOUNTER (EMERGENCY)
Facility: HOSPITAL | Age: 49
End: 2019-05-14
Attending: EMERGENCY MEDICINE
Payer: MEDICAID

## 2019-05-14 VITALS
OXYGEN SATURATION: 96 % | HEIGHT: 65 IN | WEIGHT: 164 LBS | RESPIRATION RATE: 18 BRPM | BODY MASS INDEX: 27.32 KG/M2 | TEMPERATURE: 98 F | DIASTOLIC BLOOD PRESSURE: 61 MMHG | HEART RATE: 81 BPM | SYSTOLIC BLOOD PRESSURE: 116 MMHG

## 2019-05-14 DIAGNOSIS — N64.4 BREAST PAIN, LEFT: Primary | ICD-10-CM

## 2019-05-14 PROCEDURE — 99284 EMERGENCY DEPT VISIT MOD MDM: CPT

## 2019-05-14 PROCEDURE — 25000003 PHARM REV CODE 250: Performed by: PHYSICIAN ASSISTANT

## 2019-05-14 RX ORDER — FLUCONAZOLE 200 MG/1
200 TABLET ORAL DAILY
Qty: 1 TABLET | Refills: 0 | Status: SHIPPED | OUTPATIENT
Start: 2019-05-14 | End: 2019-05-15

## 2019-05-14 RX ORDER — IBUPROFEN 400 MG/1
400 TABLET ORAL EVERY 4 HOURS
COMMUNITY
End: 2019-12-04

## 2019-05-14 RX ORDER — CEPHALEXIN 500 MG/1
500 CAPSULE ORAL
Status: COMPLETED | OUTPATIENT
Start: 2019-05-14 | End: 2019-05-14

## 2019-05-14 RX ORDER — CEPHALEXIN 500 MG/1
500 CAPSULE ORAL EVERY 12 HOURS
Qty: 20 CAPSULE | Refills: 0 | Status: SHIPPED | OUTPATIENT
Start: 2019-05-14 | End: 2019-05-24

## 2019-05-14 RX ADMIN — CEPHALEXIN 500 MG: 500 CAPSULE ORAL at 09:05

## 2019-05-14 NOTE — ED TRIAGE NOTES
The pt states her left breast started hurting when she removed her bra last night. Reports she applied ice last night and when she woke up this morning she still was having pain her her left breast.

## 2019-05-14 NOTE — ED PROVIDER NOTES
"Encounter Date: 2019    SCRIBE #1 NOTE: I, Nikki Lillianwacody, am scribing for, and in the presence of,  Ashish Venegas PA-C. I have scribed the following portions of the note - Other sections scribed: HPI and ROS.       History     Chief Complaint   Patient presents with    Breast Pain     " I am having pain in my left breast, I noticed it last night". Pt reports tenderness to light palpation, describes it as " a knot".      CC: Breast Pain    HPI: This 49 y.o. Female, with a medical history of anemia, celiac disease, diabetes mellitus, gall stones, hypertension, PTSD, and supraventricular tachycardia, presents to the ED c/o left breast pain. Pt reports that she began to experience pain to the left breast upon returning home from work last night. She states that she initially attributed the pain to her bra, prompting her to take the bra off and place ice to the area. She reports that she awoke this morning with persisting pain accompanied by a hardened area felt to the breast. The symptoms are acute, constant and severe (8/10). No previous episodes of similar symptoms. Pt denies chest pain, shortness of breath and emesis. No other associated symptoms. No alleviating factors.    The history is provided by the patient.     Review of patient's allergies indicates:   Allergen Reactions    Latex, natural rubber Itching and Rash     Past Medical History:   Diagnosis Date    Anemia     Celiac disease     Diabetes mellitus     Gall stones     Hypertension     PTSD (post-traumatic stress disorder)     Supraventricular tachycardia      Past Surgical History:   Procedure Laterality Date    ABDOMINAL SURGERY      Gastric sleeve    ANKLE FUSION       SECTION      placenta previa    CHOLECYSTECTOMY  2009    gastric      gastric sleeve      pt reported     HERNIA REPAIR      HYSTERECTOMY  2001    Inguinal hernia  2004     Family History   Problem Relation Age of Onset    Heart disease " Father     Diabetes Mother         hypoglycemic    Breast cancer Maternal Aunt      Social History     Tobacco Use    Smoking status: Never Smoker    Smokeless tobacco: Never Used   Substance Use Topics    Alcohol use: No    Drug use: No     Review of Systems   Constitutional: Negative for fever.   HENT: Negative for sore throat.    Respiratory: Negative for shortness of breath.    Cardiovascular: Negative for chest pain.   Gastrointestinal: Negative for nausea.   Genitourinary: Negative for dysuria.   Musculoskeletal: Negative for back pain.        (+) left breast pain (with hardened area)   Skin: Negative for rash.   Neurological: Negative for weakness.       Physical Exam     Initial Vitals [05/14/19 0826]   BP Pulse Resp Temp SpO2   120/73 110 20 99.1 °F (37.3 °C) 99 %      MAP       --         Physical Exam    Nursing note and vitals reviewed.  Constitutional: She appears well-developed and well-nourished. She is not diaphoretic. No distress.   HENT:   Head: Normocephalic and atraumatic.   Nose: Nose normal.   Eyes: Conjunctivae and EOM are normal. Right eye exhibits no discharge. Left eye exhibits no discharge.   Neck: Normal range of motion. No tracheal deviation present. No JVD present.   Cardiovascular: Normal rate, regular rhythm and normal heart sounds. Exam reveals no friction rub.    No murmur heard.  Pulmonary/Chest: Breath sounds normal. No stridor. No respiratory distress. She has no wheezes. She has no rhonchi. She has no rales. She exhibits no tenderness.   Musculoskeletal: Normal range of motion.   Neurological: She is alert and oriented to person, place, and time.   Skin: Skin is warm and dry. No rash noted. No pallor.        4 cm by 4 cm area of very faint erythema with associated tenderness. There is a palpable mass at the 4:00 position.  No induration or fluctuance.         ED Course   Procedures  Labs Reviewed - No data to display       Imaging Results    None          Medical Decision  Making:   History:   Old Medical Records: I decided to obtain old medical records.  Initial Assessment:   49-year-old female with breast pain  ED Management:  Patient appears to be developing very early cellulitis.  No obvious/drainable abscess in the ED at this time.  Symptoms should undergo further investigation by OBGYN for concerns of potential underlying malignancy.  No systemic sepsis.    Sent home with supportive care. Advising PCP follow up. Strict return precautions discussed. Agreeable to plan.     Other:   I have discussed this case with another health care provider.            Scribe Attestation:   Scribe #1: I performed the above scribed service and the documentation accurately describes the services I performed. I attest to the accuracy of the note.    Attending Attestation:           Physician Attestation for Scribe:  Physician Attestation Statement for Scribe #1: I, Ashish Venegas PA-C, reviewed documentation, as scribed by Nikki Caballero in my presence, and it is both accurate and complete.                    Clinical Impression:       ICD-10-CM ICD-9-CM   1. Breast pain, left N64.4 611.71                                Ashish Venegas PA-C  05/14/19 0915

## 2019-05-15 ENCOUNTER — OFFICE VISIT (OUTPATIENT)
Dept: OBSTETRICS AND GYNECOLOGY | Facility: CLINIC | Age: 49
End: 2019-05-15
Payer: MEDICAID

## 2019-05-15 VITALS
BODY MASS INDEX: 29.61 KG/M2 | SYSTOLIC BLOOD PRESSURE: 116 MMHG | DIASTOLIC BLOOD PRESSURE: 84 MMHG | WEIGHT: 177.69 LBS | HEIGHT: 65 IN

## 2019-05-15 DIAGNOSIS — N63.25 BREAST LUMP ON LEFT SIDE AT 3 O'CLOCK POSITION: Primary | ICD-10-CM

## 2019-05-15 PROCEDURE — 99213 OFFICE O/P EST LOW 20 MIN: CPT | Mod: PBBFAC | Performed by: OBSTETRICS & GYNECOLOGY

## 2019-05-15 PROCEDURE — 99203 PR OFFICE/OUTPT VISIT, NEW, LEVL III, 30-44 MIN: ICD-10-PCS | Mod: S$PBB,,, | Performed by: OBSTETRICS & GYNECOLOGY

## 2019-05-15 PROCEDURE — 99999 PR PBB SHADOW E&M-EST. PATIENT-LVL III: CPT | Mod: PBBFAC,,, | Performed by: OBSTETRICS & GYNECOLOGY

## 2019-05-15 PROCEDURE — 99999 PR PBB SHADOW E&M-EST. PATIENT-LVL III: ICD-10-PCS | Mod: PBBFAC,,, | Performed by: OBSTETRICS & GYNECOLOGY

## 2019-05-15 PROCEDURE — 99203 OFFICE O/P NEW LOW 30 MIN: CPT | Mod: S$PBB,,, | Performed by: OBSTETRICS & GYNECOLOGY

## 2019-05-15 RX ORDER — ESZOPICLONE 3 MG/1
TABLET, FILM COATED ORAL
Refills: 0 | COMMUNITY
Start: 2019-04-15 | End: 2023-06-08

## 2019-05-15 RX ORDER — CLONAZEPAM 1 MG/1
TABLET ORAL
Refills: 0 | COMMUNITY
Start: 2019-04-15 | End: 2019-08-08

## 2019-05-15 NOTE — PROGRESS NOTES
Gynecology    SUBJECTIVE:     Chief Complaint: Breast Pain (left )       History of Present Illness:  49 year old who presents with left sided breast tenderness two days ago.  She also noticed a lump in the breast.  She used some cold compresses at the time, but then had pain the next morning.  She had a low grade fever at home, but took ibuprofen.  She then went to the emergency room.  Temp in the ED was 99.1.  ED note suspects early cellulitis.  She was started on antibiotics- keflex and diflucan.  She is having pain without touching it.  No breast skin changes, no nipple retraction, no nipple discharge.  She does have a strong family history of breast cancer- paternal aunt, maternal aunt, and sister.  Does report that she had another fever this morning, unsure the number.  Has taken motrin.  Is feeling very weak and has general malaise for the last week or two.     Review of Systems:  Review of Systems   Constitutional: Positive for chills, fatigue and fever.   Integumentary:  Positive for breast mass and breast tenderness. Negative for nipple discharge and breast skin changes.   Breast: Positive for lump, mass, mastodynia and tenderness.Negative for asymmetry, nipple discharge and skin changes       OBJECTIVE:     Physical Exam:  Physical Exam   Constitutional: She is oriented to person, place, and time. She appears well-developed and well-nourished.   Pulmonary/Chest: Effort normal. Right breast exhibits no inverted nipple, no mass, no nipple discharge, no skin change and no tenderness. Left breast exhibits mass and tenderness. Left breast exhibits no inverted nipple, no nipple discharge and no skin change.       Neurological: She is oriented to person, place, and time.   Skin: No pallor.   Psychiatric: She has a normal mood and affect. Her behavior is normal. Judgment and thought content normal.   Nursing note and vitals reviewed.          ASSESSMENT:       ICD-10-CM ICD-9-CM    1. Breast lump on left side at  3 o'clock position N63.20 611.72 Mammo Digital Diagnostic Bilat with Robert      US Breast Left Complete          Plan:      Aide was seen today for breast pain.    Diagnoses and all orders for this visit:    Breast lump on left side at 3 o'clock position  -     Mammo Digital Diagnostic Bilat with Robert; Future  -     US Breast Left Complete; Future    - sent for diagnostic mammogram and breast ultrasound  - low suspicion for abscess given no induration, no fluctuance; concern for possible malignancy    Orders Placed This Encounter   Procedures    Mammo Digital Diagnostic Bilat with Robert    US Breast Left Complete       Follow up for annual or prn.    Radha Alcocer

## 2019-05-16 ENCOUNTER — TELEPHONE (OUTPATIENT)
Dept: OBSTETRICS AND GYNECOLOGY | Facility: CLINIC | Age: 49
End: 2019-05-16

## 2019-05-16 ENCOUNTER — HOSPITAL ENCOUNTER (OUTPATIENT)
Dept: RADIOLOGY | Facility: OTHER | Age: 49
Discharge: HOME | End: 2019-05-16
Attending: OBSTETRICS & GYNECOLOGY
Payer: MEDICAID

## 2019-05-16 DIAGNOSIS — F41.9 ANXIETY: Primary | ICD-10-CM

## 2019-05-16 DIAGNOSIS — N63.25 BREAST LUMP ON LEFT SIDE AT 3 O'CLOCK POSITION: ICD-10-CM

## 2019-05-16 PROCEDURE — 77066 DX MAMMO INCL CAD BI: CPT | Mod: TC

## 2019-05-16 PROCEDURE — 77066 MAMMO DIGITAL DIAGNOSTIC BILAT WITH TOMOSYNTHESIS_CAD: ICD-10-PCS | Mod: 26,,, | Performed by: RADIOLOGY

## 2019-05-16 PROCEDURE — 76642 ULTRASOUND BREAST LIMITED: CPT | Mod: 26,LT,, | Performed by: RADIOLOGY

## 2019-05-16 PROCEDURE — 77062 MAMMO DIGITAL DIAGNOSTIC BILAT WITH TOMOSYNTHESIS_CAD: ICD-10-PCS | Mod: 26,,, | Performed by: RADIOLOGY

## 2019-05-16 PROCEDURE — 76642 US BREAST LEFT LIMITED: ICD-10-PCS | Mod: 26,LT,, | Performed by: RADIOLOGY

## 2019-05-16 PROCEDURE — 77062 BREAST TOMOSYNTHESIS BI: CPT | Mod: 26,,, | Performed by: RADIOLOGY

## 2019-05-16 PROCEDURE — 76642 ULTRASOUND BREAST LIMITED: CPT | Mod: TC,LT

## 2019-05-16 PROCEDURE — 77066 DX MAMMO INCL CAD BI: CPT | Mod: 26,,, | Performed by: RADIOLOGY

## 2019-05-16 RX ORDER — CLONAZEPAM 1 MG/1
1 TABLET ORAL NIGHTLY PRN
Qty: 10 TABLET | Refills: 0 | Status: SHIPPED | OUTPATIENT
Start: 2019-05-16 | End: 2019-12-04

## 2019-05-17 ENCOUNTER — HOSPITAL ENCOUNTER (EMERGENCY)
Facility: HOSPITAL | Age: 49
Discharge: HOME | End: 2019-05-17
Attending: EMERGENCY MEDICINE
Payer: MEDICAID

## 2019-05-17 ENCOUNTER — TELEPHONE (OUTPATIENT)
Dept: RADIOLOGY | Facility: OTHER | Age: 49
End: 2019-05-17

## 2019-05-17 VITALS
TEMPERATURE: 99 F | HEART RATE: 86 BPM | BODY MASS INDEX: 27.49 KG/M2 | HEIGHT: 65 IN | DIASTOLIC BLOOD PRESSURE: 62 MMHG | RESPIRATION RATE: 16 BRPM | SYSTOLIC BLOOD PRESSURE: 108 MMHG | OXYGEN SATURATION: 97 % | WEIGHT: 165 LBS

## 2019-05-17 DIAGNOSIS — F41.9 ANXIETY DISORDER, UNSPECIFIED TYPE: Primary | ICD-10-CM

## 2019-05-17 PROCEDURE — 96375 TX/PRO/DX INJ NEW DRUG ADDON: CPT

## 2019-05-17 PROCEDURE — 99284 EMERGENCY DEPT VISIT MOD MDM: CPT | Mod: 25

## 2019-05-17 PROCEDURE — 96374 THER/PROPH/DIAG INJ IV PUSH: CPT

## 2019-05-17 PROCEDURE — 63600175 PHARM REV CODE 636 W HCPCS: Performed by: EMERGENCY MEDICINE

## 2019-05-17 RX ORDER — LORAZEPAM 2 MG/ML
1 INJECTION INTRAMUSCULAR
Status: COMPLETED | OUTPATIENT
Start: 2019-05-17 | End: 2019-05-17

## 2019-05-17 RX ORDER — HALOPERIDOL 5 MG/ML
5 INJECTION INTRAMUSCULAR
Status: COMPLETED | OUTPATIENT
Start: 2019-05-17 | End: 2019-05-17

## 2019-05-17 RX ADMIN — LORAZEPAM 1 MG: 2 INJECTION INTRAMUSCULAR; INTRAVENOUS at 07:05

## 2019-05-17 RX ADMIN — HALOPERIDOL LACTATE 5 MG: 5 INJECTION INTRAMUSCULAR at 07:05

## 2019-05-17 NOTE — TELEPHONE ENCOUNTER
"Patient very anxious and crying "I'm so scared." She expressed concerns about breast biopsy and having breast cancer due to family history. Patient states she received a prescription from MD for Klonipin but she has taken it so much in the past it is not effective. Message sent to MD per pt request for a different prescription. Discussed calming techniques with patient and recommended seeking support from family and other's. Patient states her psychiatrist is out of town and may be back next week. Instructed patient to call 911 or go to the emergency room if she feels like she cannot cope. Spoke with patient for over 20 minutes. Plans to keep biopsy appt at this time.  "

## 2019-05-17 NOTE — ED PROVIDER NOTES
"Encounter Date: 2019       History     Chief Complaint   Patient presents with    Panic Attack     hx of anxiety; pt states that she has been anxious all day and has taken her klonopin "several" times today and it has not helped; pt is tearful in triage; pt states she wanted to go to her daughter's graduation today and could not make it and is also scared that she has breast cancer becasue "everyone else in my family does"     HPI   This 49-year-old female presents to the emergency room complaining of being severely anxious about having breast cancer.  The patient underwent a mammogram and breast ultrasound on the left for palpable breast mass. No one told her about the results.  She is afraid that she is having breast cancer.  She is dysfunctional and cannot a tender daughter's graduation.  She is otherwise well in her normal state of good health.  The patient has a history of PTSD.  Review of patient's allergies indicates:   Allergen Reactions    Latex, natural rubber Itching and Rash     Past Medical History:   Diagnosis Date    Anemia     Celiac disease     Diabetes mellitus     Gall stones     Hypertension     PTSD (post-traumatic stress disorder)     Supraventricular tachycardia      Past Surgical History:   Procedure Laterality Date    ABDOMINAL SURGERY      Gastric sleeve    ANKLE FUSION  2011     SECTION      placenta previa    CHOLECYSTECTOMY  2009    gastric      gastric sleeve      pt reported     HERNIA REPAIR      HYSTERECTOMY  2001    Inguinal hernia  2004     Family History   Problem Relation Age of Onset    Heart disease Father     Diabetes Mother         hypoglycemic    Breast cancer Maternal Aunt     Breast cancer Sister     Breast cancer Paternal Aunt     Ovarian cancer Neg Hx      Social History     Tobacco Use    Smoking status: Never Smoker    Smokeless tobacco: Never Used   Substance Use Topics    Alcohol use: No    Drug use: No     Review of " Systems  The patient was questioned specifically with regard to the following.  General: Fever, chills, sweats. Neuro: Headache. Eyes: eye problems. ENT: Ear pain, sore throat. Cardiovascular: Chest pain. Respiratory: Cough, shortness of breath. Gastrointestinal: Abdominal pain, vomiting, diarrhea. Genitourinary: Painful urination.  Musculoskeletal: Arm and leg problems. Skin: Rash.  The review of systems was negative except for the following:  Anxiety about having breast cancer  Physical Exam     Initial Vitals [05/17/19 1756]   BP Pulse Resp Temp SpO2   123/80 110 (!) 24 99.1 °F (37.3 °C) (!) 93 %      MAP       --         Physical Exam  The patient was examined specifically for the following:   General:No significant distress, Good color, Warm and dry. Head and neck:Scalp atraumatic, Neck supple. Neurological:Appropriate conversation, Gross motor deficits. Eyes:Conjugate gaze, Clear corneas. ENT: No epistaxis. Cardiac: Regular rate and rhythm, Grossly normal heart tones. Pulmonary: Wheezing, Rales. Gastrointestinal: Abdominal tenderness, Abdominal distention. Musculoskeletal: Extremity deformity, Apparent pain with range of motion of the joints. Skin: Rash.   The findings on examination were normal except for the following:  The patient is crying and upset.  I find no significant masses in the left breast.   ED Course   Procedures  Labs Reviewed - No data to display       Imaging Results    None       Medical decision making:  Reviewed this patient's mammogram port.  She has a 1 cm lesion left breast that is moderately concerning.  The patient refused to be consoled during her emergency room visit.  She was treated with Ativan twice.  She was treated with Haldol once.  The patient finally went to sleep and is resting at 8:30 p.m..  Her  is here.  He is comfortable taking her home.  He will bring her back if he has any more problems.  She can continue her Klonopin.  [facility] her [facility].                          Clinical Impression:       ICD-10-CM ICD-9-CM   1. Anxiety disorder, unspecified type F41.9 300.00                                Bear Eden MD  05/17/19 2047

## 2019-05-17 NOTE — ED TRIAGE NOTES
"Pt brought in by EMS. States pt has hx of anxiety; pt states that she has been anxious all day and has taken her klonopin "several" times today and it has not helped. Pt is tearful in triage; States she wanted to go to her daughter's graduation today and could not make it and is also scared that she has breast cancer becasue "everyone else in my family does."  "

## 2019-05-18 NOTE — DISCHARGE INSTRUCTIONS
Continue her Klonopin.  Rest.  Return immediately if you get worse or if new problems develop.  Please follow-up with the  [facility] on Monday for  treatment of your anxiety.  Continue Klonopin.  Rest.

## 2019-05-20 ENCOUNTER — HOSPITAL ENCOUNTER (OUTPATIENT)
Dept: RADIOLOGY | Facility: OTHER | Age: 49
Discharge: HOME | End: 2019-05-20
Attending: OBSTETRICS & GYNECOLOGY
Payer: MEDICAID

## 2019-05-20 DIAGNOSIS — R92.8 ABNORMAL MAMMOGRAM: ICD-10-CM

## 2019-05-20 DIAGNOSIS — N63.0 BREAST MASS: Primary | ICD-10-CM

## 2019-05-20 PROCEDURE — 19083 US BREAST BIOPSY WITH IMAGING 1ST SITE LEFT: ICD-10-PCS | Mod: LT,,, | Performed by: RADIOLOGY

## 2019-05-20 PROCEDURE — 88305 TISSUE EXAM BY PATHOLOGIST: CPT | Mod: 26,,, | Performed by: PATHOLOGY

## 2019-05-20 PROCEDURE — 27201068 US BREAST BIOPSY WITH IMAGING 1ST SITE LEFT

## 2019-05-20 PROCEDURE — 19083 BX BREAST 1ST LESION US IMAG: CPT | Mod: LT,,, | Performed by: RADIOLOGY

## 2019-05-20 PROCEDURE — 88305 TISSUE SPECIMEN TO PATHOLOGY, RADIOLOGY: ICD-10-PCS | Mod: 26,,, | Performed by: PATHOLOGY

## 2019-05-20 PROCEDURE — 25000003 PHARM REV CODE 250: Performed by: RADIOLOGY

## 2019-05-20 PROCEDURE — 88305 TISSUE EXAM BY PATHOLOGIST: CPT | Performed by: PATHOLOGY

## 2019-05-20 RX ORDER — LIDOCAINE HCL/EPINEPHRINE/PF 2%-1:200K
10 VIAL (ML) INJECTION ONCE
Status: COMPLETED | OUTPATIENT
Start: 2019-05-20 | End: 2019-05-20

## 2019-05-20 RX ORDER — LIDOCAINE HYDROCHLORIDE 10 MG/ML
5 INJECTION INFILTRATION; PERINEURAL ONCE
Status: COMPLETED | OUTPATIENT
Start: 2019-05-20 | End: 2019-05-20

## 2019-05-20 RX ADMIN — LIDOCAINE HYDROCHLORIDE 5 ML: 10 INJECTION, SOLUTION INFILTRATION; PERINEURAL at 10:05

## 2019-05-20 RX ADMIN — LIDOCAINE HYDROCHLORIDE,EPINEPHRINE BITARTRATE 10 ML: 20; .005 INJECTION, SOLUTION EPIDURAL; INFILTRATION; INTRACAUDAL; PERINEURAL at 10:05

## 2019-05-22 ENCOUNTER — TELEPHONE (OUTPATIENT)
Dept: RADIOLOGY | Facility: OTHER | Age: 49
End: 2019-05-22

## 2019-05-22 NOTE — TELEPHONE ENCOUNTER
Patient notified of left breast biopsy results per pathology reported on 5/22/19. Diagnosis: BENIGN BREAST TISSUE WITH CHRONICALLY INFLAMED GRANULATION TISSUE AND USUAL DUCTAL HYPERPLASIA. Explained to patient results are negative for breast cancer. Instructed on need for short interval follow up in 6 months. Questions answered, patient happy and calm. She is requesting surgical consult to review options for removal as lump has been bothersome and painful. Appointment request sent. Will follow up to confirm.

## 2019-06-10 ENCOUNTER — OFFICE VISIT (OUTPATIENT)
Dept: SURGERY | Facility: CLINIC | Age: 49
End: 2019-06-10
Attending: SURGERY
Payer: MEDICAID

## 2019-06-10 VITALS
WEIGHT: 164.88 LBS | HEART RATE: 82 BPM | BODY MASS INDEX: 27.47 KG/M2 | DIASTOLIC BLOOD PRESSURE: 81 MMHG | HEIGHT: 65 IN | SYSTOLIC BLOOD PRESSURE: 126 MMHG

## 2019-06-10 DIAGNOSIS — N63.20 LEFT BREAST MASS: Primary | ICD-10-CM

## 2019-06-10 PROCEDURE — 99999 PR PBB SHADOW E&M-EST. PATIENT-LVL IV: CPT | Mod: PBBFAC,,, | Performed by: SURGERY

## 2019-06-10 PROCEDURE — 99203 PR OFFICE/OUTPT VISIT, NEW, LEVL III, 30-44 MIN: ICD-10-PCS | Mod: S$PBB,,, | Performed by: SURGERY

## 2019-06-10 PROCEDURE — 99999 PR PBB SHADOW E&M-EST. PATIENT-LVL IV: ICD-10-PCS | Mod: PBBFAC,,, | Performed by: SURGERY

## 2019-06-10 PROCEDURE — 99203 OFFICE O/P NEW LOW 30 MIN: CPT | Mod: S$PBB,,, | Performed by: SURGERY

## 2019-06-10 PROCEDURE — 99214 OFFICE O/P EST MOD 30 MIN: CPT | Mod: PBBFAC,PO | Performed by: SURGERY

## 2019-06-10 NOTE — PROGRESS NOTES
History and Physical  [facility]  Department of Surgery    CHIEF COMPLAINT: left breast mass    REFERRING:  Aaareferral Self  No address on file  Primary Doctor No      Subjective:      Aide Almaraz is a 49 y.o. postmenopausal female referred for evaluation of a breast mass. Change was noted recently.  Patient does routinely do self breast exams.  Patient has noted a change on breast exam.  Patient denies nipple discharge. Patient denies to previous breast biopsy. Patient denies a personal history of breast cancer.    GYN History:  Age of menarche was 13. Age of menopause was unsure. hysterectomy. Patient denies hormonal therapy. Patient is . Age of first live birth was 20.  Patient did not breast feed.    FAMILY history:  Maternal aunt breast cancer at age 50s.  Paternal aunt breast cancer at age 59  Sister breast cancer at age 42?  Unsure if cancerous.      Past Medical History:   Diagnosis Date    Anemia     Celiac disease     Diabetes mellitus     Gall stones     Hypertension     PTSD (post-traumatic stress disorder)     Supraventricular tachycardia      Past Surgical History:   Procedure Laterality Date    ABDOMINAL SURGERY      Gastric sleeve    ANKLE FUSION       SECTION      placenta previa    CHOLECYSTECTOMY  2009    gastric      gastric sleeve      pt reported     HERNIA REPAIR      HYSTERECTOMY  2001    Inguinal hernia  2004     Current Outpatient Medications on File Prior to Visit   Medication Sig Dispense Refill    clonazePAM (KLONOPIN) 1 MG tablet TK 1 T PO  BID  0    clonazePAM (KLONOPIN) 1 MG tablet Take 1 tablet (1 mg total) by mouth nightly as needed for Anxiety. 10 tablet 0    eszopiclone (LUNESTA) 3 mg Tab TK 1 T PO QD HS  0    ibuprofen (ADVIL,MOTRIN) 400 MG tablet Take 400 mg by mouth every 4 (four) hours.       No current facility-administered medications on file prior to visit.      Social History     Socioeconomic History     Marital status: Single     Spouse name: Not on file    Number of children: 2    Years of education: Not on file    Highest education level: Not on file   Occupational History    Not on file   Social Needs    Financial resource strain: Not on file    Food insecurity:     Worry: Not on file     Inability: Not on file    Transportation needs:     Medical: Not on file     Non-medical: Not on file   Tobacco Use    Smoking status: Never Smoker    Smokeless tobacco: Never Used   Substance and Sexual Activity    Alcohol use: No    Drug use: No    Sexual activity: Never     Partners: Male     Birth control/protection: Surgical   Lifestyle    Physical activity:     Days per week: Not on file     Minutes per session: Not on file    Stress: Not on file   Relationships    Social connections:     Talks on phone: Not on file     Gets together: Not on file     Attends Rastafari service: Not on file     Active member of club or organization: Not on file     Attends meetings of clubs or organizations: Not on file     Relationship status: Not on file   Other Topics Concern    Not on file   Social History Narrative    Not on file     Family History   Problem Relation Age of Onset    Heart disease Father     Diabetes Mother         hypoglycemic    Breast cancer Maternal Aunt     Breast cancer Sister     Breast cancer Paternal Aunt     Ovarian cancer Neg Hx        Review of Systems  Pertinent items are noted in HPI.       Objective:        LMP  (LMP Unknown)   General appearance: alert, appears stated age and cooperative  Head: Normocephalic, without obvious abnormality, atraumatic  Neck: no adenopathy and supple, symmetrical, trachea midline  Lungs: normal effort, nonlabored breathing  Breasts: No nipple retraction or dimpling, No nipple discharge or bleeding, No axillary or supraclavicular adenopathy, positive findings: palpable mass left bresat 3OC  Heart: regular rate and rhythm  Abdomen: soft, non-tender;  bowel sounds normal; no masses,  no organomegaly  Extremities: extremities normal, atraumatic, no cyanosis or edema  Skin: normal, no edema and no lesions noted  Lymph nodes: Cervical, supraclavicular, and axillary nodes normal.  Neurologic: Grossly normal    Radiology review: Images personally reviewed by me in the clinic.  Mammogram with US 05/16/19  Findings:  This procedure was performed using tomosynthesis.  Computer-aided detection was utilized in the interpretation of this examination.  The breasts are heterogeneously dense, which may obscure small masses.      Left  US Breast Left Limited  There is a 9 mm x 9 mm x 6 mm irregularly shaped, hypoechoic mass with angular margins seen in the left breast at 3 o'clock, 3 cm from the nipple. The mass correlates with the palpable mass reported by the patient.      Mammo Digital Diagnostic Bilat with Robert  There are no corresponding masses seen on this modality.      Right  Mammo Digital Diagnostic Bilat with Robert  There is no evidence of suspicious masses, calcifications, or other abnormal findings.      The left axilla is normal.      Impression:  Left  Mass: Left breast 9 mm x 9 mm x 6 mm mass at the 3 o'clock position. Assessment: 4B - Suspicious finding. Biopsy is recommended.      Right  There is no mammographic or sonographic evidence of malignancy.      The findings and recommendations were discussed in detail with the patient.      BI-RADS Category:   Overall: 4B - Moderate Suspicion for Malignancy    Pathology review:  FINAL PATHOLOGIC DIAGNOSIS  Breast, left, 3 o'clock, stereotactic biopsy:  -Benign breast tissue with chronically inflamed granulation tissue and usual ductal hyperplasia.  -Negative for atypia or malignancy.    Assessment:      Aide Almaraz is a 49 y.o. perimenopausal female with left breast mass     Plan:   We discussed the options for management of this.    We discussed observation vs. surgical excision. Surgical excision is usually  recommended if the mass is growing, or is symptomatic.  If we move forward with observation, we will need repeat imaging in 6 months and will need to ensure stability for 2 years.  Patient understand the options.  All questions were asked and answered.  Patient elects to proceed with surgical excision.    Surgery will be coordinated with the patient's schedule.  Risks and benefits were explained including but not limited to bleeding, infection, pain, recurrence, and need for further surgery.

## 2019-07-12 ENCOUNTER — TELEPHONE (OUTPATIENT)
Dept: SURGERY | Facility: CLINIC | Age: 49
End: 2019-07-12

## 2019-07-12 NOTE — TELEPHONE ENCOUNTER
----- Message from Bethanie Fnog sent at 7/12/2019 11:05 AM CDT -----  Contact: Patient  Needs Advice    Reason for call: Patient have some questions about procedure she has scheduled. Last time her breast wasn't numb and she has some concerns        Communication Preference: [phone]    Additional Information:

## 2019-07-12 NOTE — TELEPHONE ENCOUNTER
"Left VM with my direct contact information, patient advised to give a return call with any questions or concerns regarding upcoming surgery and "seed" marker placement   "

## 2019-07-16 ENCOUNTER — TELEPHONE (OUTPATIENT)
Dept: SURGERY | Facility: CLINIC | Age: 49
End: 2019-07-16

## 2019-07-16 NOTE — TELEPHONE ENCOUNTER
Patient called regarding SEED placement on 7-24-19, patient states she had a very difficult time with her biopsy and difficulty with pain control, patient states she felt the entire biopsy and it was extremely painful, patient is very anxious and worried about upcoming procedure, pt has hx anxiety and PTSD and takes Klonopin daily, will reach out to MD at this time regarding patient questions and concerns for seed marker placement

## 2019-07-17 ENCOUNTER — TELEPHONE (OUTPATIENT)
Dept: SURGERY | Facility: CLINIC | Age: 49
End: 2019-07-17

## 2019-07-17 NOTE — TELEPHONE ENCOUNTER
Returned call to patient, discussed seed localization with patient again, pt has anxiety regarding procedure, all questions answered at this time, pt given reassurance, notified radiology regarding patient's anxiety and lidocaine tolerance

## 2019-07-17 NOTE — TELEPHONE ENCOUNTER
----- Message from Mercedez Caldera sent at 7/17/2019 11:47 AM CDT -----  Contact: Patient   Needs Advice    Reason for call: Patient needs to speak with the nurse regarding some information that she previously requested concerning her breast marker         Communication Preference: [phone]     Additional Information: please contact the patient

## 2019-07-24 ENCOUNTER — HOSPITAL ENCOUNTER (OUTPATIENT)
Dept: RADIOLOGY | Facility: HOSPITAL | Age: 49
Discharge: HOME | End: 2019-07-24
Attending: SURGERY
Payer: MEDICAID

## 2019-07-24 DIAGNOSIS — N63.20 BREAST MASS, LEFT: ICD-10-CM

## 2019-07-24 PROCEDURE — 77065 MAMMO DIGITAL DIAGNOSTIC LEFT WITH CAD: ICD-10-PCS | Mod: 26,LT,, | Performed by: RADIOLOGY

## 2019-07-24 PROCEDURE — 25000003 PHARM REV CODE 250: Mod: PO | Performed by: SURGERY

## 2019-07-24 PROCEDURE — 19285 PERQ DEV BREAST 1ST US IMAG: CPT | Mod: LT,,, | Performed by: RADIOLOGY

## 2019-07-24 PROCEDURE — 77065 DX MAMMO INCL CAD UNI: CPT | Mod: TC,PO,LT

## 2019-07-24 PROCEDURE — 19285 US LEFT MAGSEED LOCALIZATION: ICD-10-PCS | Mod: LT,,, | Performed by: RADIOLOGY

## 2019-07-24 PROCEDURE — A4648 IMPLANTABLE TISSUE MARKER: HCPCS | Mod: PO

## 2019-07-24 PROCEDURE — 77065 DX MAMMO INCL CAD UNI: CPT | Mod: 26,LT,, | Performed by: RADIOLOGY

## 2019-07-24 RX ORDER — LIDOCAINE HYDROCHLORIDE 20 MG/ML
9 INJECTION, SOLUTION INFILTRATION; PERINEURAL ONCE
Status: COMPLETED | OUTPATIENT
Start: 2019-07-24 | End: 2019-07-24

## 2019-07-24 RX ADMIN — LIDOCAINE HYDROCHLORIDE 9 ML: 20 INJECTION, SOLUTION INFILTRATION; PERINEURAL at 09:07

## 2019-07-25 ENCOUNTER — TELEPHONE (OUTPATIENT)
Dept: SURGERY | Facility: CLINIC | Age: 49
End: 2019-07-25

## 2019-07-25 ENCOUNTER — ANESTHESIA EVENT (OUTPATIENT)
Dept: SURGERY | Facility: HOSPITAL | Age: 49
End: 2019-07-25
Payer: MEDICAID

## 2019-07-25 NOTE — TELEPHONE ENCOUNTER
Returned call to patient regarding surgery questions, all questions answered at this time, arrival time confirmed for 0900

## 2019-07-25 NOTE — TELEPHONE ENCOUNTER
Spoke to Patient.  Arrival time of 0900 given to check in at the Surgery Center on the 2nd Floor of the Hospital on [facility].  Instructed to remain NPO after 0000, to wash up tonight and in the morning with an antibacterial soap, not to wear anything metal or to apply any lotions, powders, or deodorant.  Patient is very concerned about having her Supervisor - Sonia Coleman visit.  She has asked her Supervisor not to come on the day of surgery.  She is asking for our assistance to prevent Ms. Coleman from having access to her or any of her medical information.  Plan to speak to the Surgery Center Charge Nurse, the Surgery Center Liason Nurse, and the Surgery Center check in desk employees.  Patient verbalized understanding of instructions.

## 2019-07-25 NOTE — ANESTHESIA PREPROCEDURE EVALUATION
[facility]  Anesthesia Pre-Operative Evaluation         Patient Name: Aide Almaraz  YOB: 1970  MRN: [id number]    SUBJECTIVE:     Pre-operative evaluation for Procedure(s) (LRB):  EXCISIONAL BIOPSY LEFT with SEED (CONSENT AM OF) 1.0 hr case (Left)     2019    Aide Almaraz is a 49 y.o. female w/ a significant PMHx of HTN and anxiety.  Pt noted a breast lump, so underwent biopsy, which showed benign tissue.    Patient now presents for the above procedure(s).      LDA: None documented.    Prev airway: None documented.    Drips: None documented.      Patient Active Problem List   Diagnosis    Essential hypertension, benign    Anxiety disorder    History of depression    PTSD (post-traumatic stress disorder)    Insomnia    History of supraventricular tachycardia    BMI 30.0-30.9,adult    Non morbid obesity due to excess calories    Hypercholesterolemia    Fatigue    Epigastric pain    Headache    Idiopathic acute pancreatitis without infection or necrosis    Grief reaction       Review of patient's allergies indicates:   Allergen Reactions    Latex, natural rubber Itching and Rash       Current Inpatient Medications:      No current facility-administered medications on file prior to encounter.      Current Outpatient Medications on File Prior to Encounter   Medication Sig Dispense Refill    clonazePAM (KLONOPIN) 1 MG tablet TK 1 T PO  BID  0    clonazePAM (KLONOPIN) 1 MG tablet Take 1 tablet (1 mg total) by mouth nightly as needed for Anxiety. 10 tablet 0    eszopiclone (LUNESTA) 3 mg Tab TK 1 T PO QD HS  0    ibuprofen (ADVIL,MOTRIN) 400 MG tablet Take 400 mg by mouth every 4 (four) hours.         Past Surgical History:   Procedure Laterality Date    ABDOMINAL SURGERY      Gastric sleeve    ANKLE FUSION       SECTION      placenta previa    CHOLECYSTECTOMY  2009    gastric      gastric sleeve      pt reported     HERNIA REPAIR       HYSTERECTOMY  09/19/2001    Inguinal hernia  2004       Social History     Socioeconomic History    Marital status: Single     Spouse name: Not on file    Number of children: 2    Years of education: Not on file    Highest education level: Not on file   Occupational History    Not on file   Social Needs    Financial resource strain: Not on file    Food insecurity:     Worry: Not on file     Inability: Not on file    Transportation needs:     Medical: Not on file     Non-medical: Not on file   Tobacco Use    Smoking status: Never Smoker    Smokeless tobacco: Never Used   Substance and Sexual Activity    Alcohol use: No    Drug use: No    Sexual activity: Never     Partners: Male     Birth control/protection: Surgical   Lifestyle    Physical activity:     Days per week: Not on file     Minutes per session: Not on file    Stress: Not on file   Relationships    Social connections:     Talks on phone: Not on file     Gets together: Not on file     Attends Restoration service: Not on file     Active member of club or organization: Not on file     Attends meetings of clubs or organizations: Not on file     Relationship status: Not on file   Other Topics Concern    Not on file   Social History Narrative    Not on file       OBJECTIVE:     Vital Signs Range (Last 24H):         Significant Labs:  Lab Results   Component Value Date    WBC 3.20 (L) 10/20/2018    HGB 14.0 10/20/2018    HCT 43.2 10/20/2018    PLT SEE COMMENT 10/20/2018    CHOL 224 (H) 04/18/2016    TRIG 85 11/01/2017    HDL 74 04/18/2016    ALT 17 10/20/2018    AST 20 10/20/2018     10/20/2018    K 4.2 10/20/2018     10/20/2018    CREATININE 0.8 10/20/2018    BUN 13 10/20/2018    CO2 22 (L) 10/20/2018    TSH 1.650 04/18/2016    INR 1.0 10/31/2017       Diagnostic Studies: No relevant studies.    EKG: No recent studies available.    2D ECHO:  No results found for this or any previous visit.      ASSESSMENT/PLAN:            07/25/2019  Aide Almaraz is a 49 y.o., female.    Anesthesia Evaluation         Review of Systems  Anesthesia Hx:  No problems with previous Anesthesia Denies Hx of Anesthetic complications   Social:  Non-Smoker, No Alcohol Use    Hematology/Oncology:  Hematology Normal   Oncology Normal    -- Denies Anemia:    EENT/Dental:EENT/Dental Normal   Cardiovascular:   Hypertension, well controlled Denies CABG/stent.         Pulmonary:  Pulmonary Normal  Denies COPD.  Denies Asthma.    Renal/:  Renal/ Normal  Denies Chronic Renal Disease.     Hepatic/GI:  Hepatic/GI Normal    Musculoskeletal:  Musculoskeletal Normal    Neurological:   Denies CVA. Denies Seizures.    Endocrine:   Denies Hypothyroidism. Denies Hyperthyroidism.    Psych:   Psychiatric History anxiety          Physical Exam  General:  Well nourished    Airway/Jaw/Neck:  Airway Findings: Mouth Opening: Normal Tongue: Normal  General Airway Assessment: Adult  Mallampati: I  TM Distance: Normal, at least 6 cm  Jaw/Neck Findings:  Micrognathia: Negative Neck ROM: Normal ROM     Eyes/Ears/Nose:  EYES/EARS/NOSE FINDINGS: Normal   Dental:  Dental Findings: In tact   Chest/Lungs:  Chest/Lungs Clear    Heart/Vascular:  Heart Findings: Normal Heart murmur: negative    Abdomen:  Abdomen Findings: Normal    Musculoskeletal:  Musculoskeletal Findings: Normal   Skin:  Skin Findings: Normal    Mental Status:  Mental Status Findings:  Cooperative, Alert and Oriented         Anesthesia Plan  Type of Anesthesia, risks & benefits discussed:  Anesthesia Type:  MAC, general  Patient's Preference:   Intra-op Monitoring Plan: standard ASA monitors  Intra-op Monitoring Plan Comments:   Post Op Pain Control Plan: per primary service following discharge from PACU, IV/PO Opioids PRN and multimodal analgesia  Post Op Pain Control Plan Comments:   Induction:   IV  Beta Blocker:  Patient is not currently on a Beta-Blocker (No further documentation required).       Informed  Consent: Patient understands risks and agrees with Anesthesia plan.  Questions answered. Anesthesia consent signed with patient.  ASA Score: 2     Day of Surgery Review of History & Physical:            Ready For Surgery From Anesthesia Perspective.

## 2019-07-25 NOTE — TELEPHONE ENCOUNTER
----- Message from Larry Bradley sent at 7/25/2019  1:12 PM CDT -----  Contact: pt  Needs Advice    Reason for call: Pt calling to speak with the nurse regarding what is the protocol of having additional people coming with her to the surgery.         Communication Preference: [phone]     Additional Information:

## 2019-07-26 ENCOUNTER — HOSPITAL ENCOUNTER (OUTPATIENT)
Dept: RADIOLOGY | Facility: HOSPITAL | Age: 49
Discharge: HOME | End: 2019-07-26
Attending: SURGERY | Admitting: SURGERY
Payer: MEDICAID

## 2019-07-26 ENCOUNTER — ANESTHESIA (OUTPATIENT)
Dept: SURGERY | Facility: HOSPITAL | Age: 49
End: 2019-07-26
Payer: MEDICAID

## 2019-07-26 ENCOUNTER — HOSPITAL ENCOUNTER (OUTPATIENT)
Facility: HOSPITAL | Age: 49
Discharge: HOME | End: 2019-07-26
Attending: SURGERY | Admitting: SURGERY
Payer: MEDICAID

## 2019-07-26 VITALS
BODY MASS INDEX: 27.49 KG/M2 | HEIGHT: 65 IN | TEMPERATURE: 98 F | DIASTOLIC BLOOD PRESSURE: 82 MMHG | HEART RATE: 66 BPM | RESPIRATION RATE: 14 BRPM | SYSTOLIC BLOOD PRESSURE: 144 MMHG | WEIGHT: 165 LBS | OXYGEN SATURATION: 99 %

## 2019-07-26 DIAGNOSIS — N63.20 LEFT BREAST MASS: Primary | ICD-10-CM

## 2019-07-26 DIAGNOSIS — N63.20 BREAST MASS, LEFT: ICD-10-CM

## 2019-07-26 PROCEDURE — 63600175 PHARM REV CODE 636 W HCPCS: Performed by: SURGERY

## 2019-07-26 PROCEDURE — 25000003 PHARM REV CODE 250: Performed by: STUDENT IN AN ORGANIZED HEALTH CARE EDUCATION/TRAINING PROGRAM

## 2019-07-26 PROCEDURE — 88307 TISSUE EXAM BY PATHOLOGIST: CPT | Mod: 26,,, | Performed by: PATHOLOGY

## 2019-07-26 PROCEDURE — 76098 X-RAY EXAM SURGICAL SPECIMEN: CPT | Mod: 26,,, | Performed by: RADIOLOGY

## 2019-07-26 PROCEDURE — 37000009 HC ANESTHESIA EA ADD 15 MINS: Performed by: SURGERY

## 2019-07-26 PROCEDURE — 63600175 PHARM REV CODE 636 W HCPCS: Performed by: STUDENT IN AN ORGANIZED HEALTH CARE EDUCATION/TRAINING PROGRAM

## 2019-07-26 PROCEDURE — 00400 ANES INTEGUMENTARY SYS NOS: CPT | Performed by: SURGERY

## 2019-07-26 PROCEDURE — 71000044 HC DOSC ROUTINE RECOVERY FIRST HOUR: Performed by: SURGERY

## 2019-07-26 PROCEDURE — 19125 PR EXCISE BREAST LES W XRAY MARKER: ICD-10-PCS | Mod: LT,,, | Performed by: SURGERY

## 2019-07-26 PROCEDURE — 36000707: Performed by: SURGERY

## 2019-07-26 PROCEDURE — D9220A PRA ANESTHESIA: ICD-10-PCS | Mod: ,,, | Performed by: ANESTHESIOLOGY

## 2019-07-26 PROCEDURE — 88307 TISSUE SPECIMEN TO PATHOLOGY - SURGERY: ICD-10-PCS | Mod: 26,,, | Performed by: PATHOLOGY

## 2019-07-26 PROCEDURE — 71000015 HC POSTOP RECOV 1ST HR: Performed by: SURGERY

## 2019-07-26 PROCEDURE — 76098 MAMMO BREAST SPECIMEN: ICD-10-PCS | Mod: 26,,, | Performed by: RADIOLOGY

## 2019-07-26 PROCEDURE — 76098 X-RAY EXAM SURGICAL SPECIMEN: CPT | Mod: TC,PO

## 2019-07-26 PROCEDURE — D9220A PRA ANESTHESIA: Mod: ,,, | Performed by: ANESTHESIOLOGY

## 2019-07-26 PROCEDURE — 36000706: Performed by: SURGERY

## 2019-07-26 PROCEDURE — 19125 EXCISION BREAST LESION: CPT | Mod: LT,,, | Performed by: SURGERY

## 2019-07-26 PROCEDURE — 88307 TISSUE EXAM BY PATHOLOGIST: CPT | Performed by: PATHOLOGY

## 2019-07-26 PROCEDURE — 37000008 HC ANESTHESIA 1ST 15 MINUTES: Performed by: SURGERY

## 2019-07-26 PROCEDURE — 25000003 PHARM REV CODE 250: Performed by: SURGERY

## 2019-07-26 RX ORDER — MIDAZOLAM HYDROCHLORIDE 1 MG/ML
INJECTION, SOLUTION INTRAMUSCULAR; INTRAVENOUS
Status: DISCONTINUED | OUTPATIENT
Start: 2019-07-26 | End: 2019-07-26

## 2019-07-26 RX ORDER — LIDOCAINE HYDROCHLORIDE 10 MG/ML
INJECTION, SOLUTION EPIDURAL; INFILTRATION; INTRACAUDAL; PERINEURAL
Status: DISCONTINUED | OUTPATIENT
Start: 2019-07-26 | End: 2019-07-26 | Stop reason: HOSPADM

## 2019-07-26 RX ORDER — ONDANSETRON 8 MG/1
8 TABLET, ORALLY DISINTEGRATING ORAL EVERY 8 HOURS PRN
Status: DISCONTINUED | OUTPATIENT
Start: 2019-07-26 | End: 2019-07-26 | Stop reason: HOSPADM

## 2019-07-26 RX ORDER — ACETAMINOPHEN 10 MG/ML
INJECTION, SOLUTION INTRAVENOUS
Status: DISCONTINUED | OUTPATIENT
Start: 2019-07-26 | End: 2019-07-26

## 2019-07-26 RX ORDER — SODIUM CHLORIDE 9 MG/ML
INJECTION, SOLUTION INTRAVENOUS CONTINUOUS
Status: DISCONTINUED | OUTPATIENT
Start: 2019-07-26 | End: 2019-07-26 | Stop reason: HOSPADM

## 2019-07-26 RX ORDER — KETAMINE HYDROCHLORIDE 10 MG/ML
INJECTION, SOLUTION INTRAMUSCULAR; INTRAVENOUS
Status: DISCONTINUED | OUTPATIENT
Start: 2019-07-26 | End: 2019-07-26

## 2019-07-26 RX ORDER — SODIUM CHLORIDE 0.9 % (FLUSH) 0.9 %
10 SYRINGE (ML) INJECTION
Status: DISCONTINUED | OUTPATIENT
Start: 2019-07-26 | End: 2019-07-26 | Stop reason: HOSPADM

## 2019-07-26 RX ORDER — LIDOCAINE HCL/PF 100 MG/5ML
SYRINGE (ML) INTRAVENOUS
Status: DISCONTINUED | OUTPATIENT
Start: 2019-07-26 | End: 2019-07-26

## 2019-07-26 RX ORDER — FENTANYL CITRATE 50 UG/ML
INJECTION, SOLUTION INTRAMUSCULAR; INTRAVENOUS
Status: DISCONTINUED | OUTPATIENT
Start: 2019-07-26 | End: 2019-07-26

## 2019-07-26 RX ORDER — HYDROCODONE BITARTRATE AND ACETAMINOPHEN 5; 325 MG/1; MG/1
1 TABLET ORAL EVERY 4 HOURS PRN
Status: DISCONTINUED | OUTPATIENT
Start: 2019-07-26 | End: 2019-07-26 | Stop reason: HOSPADM

## 2019-07-26 RX ORDER — HYDROCODONE BITARTRATE AND ACETAMINOPHEN 5; 325 MG/1; MG/1
1 TABLET ORAL EVERY 6 HOURS PRN
Qty: 5 TABLET | Refills: 0 | Status: SHIPPED | OUTPATIENT
Start: 2019-07-26 | End: 2019-08-08

## 2019-07-26 RX ORDER — PROPOFOL 10 MG/ML
INJECTION, EMULSION INTRAVENOUS
Status: DISCONTINUED | OUTPATIENT
Start: 2019-07-26 | End: 2019-07-26

## 2019-07-26 RX ORDER — FENTANYL CITRATE 50 UG/ML
25 INJECTION, SOLUTION INTRAMUSCULAR; INTRAVENOUS EVERY 5 MIN PRN
Status: DISCONTINUED | OUTPATIENT
Start: 2019-07-26 | End: 2019-07-26 | Stop reason: HOSPADM

## 2019-07-26 RX ORDER — CEFAZOLIN SODIUM 1 G/3ML
2 INJECTION, POWDER, FOR SOLUTION INTRAMUSCULAR; INTRAVENOUS
Status: COMPLETED | OUTPATIENT
Start: 2019-07-26 | End: 2019-07-26

## 2019-07-26 RX ORDER — ONDANSETRON 2 MG/ML
INJECTION INTRAMUSCULAR; INTRAVENOUS
Status: DISCONTINUED | OUTPATIENT
Start: 2019-07-26 | End: 2019-07-26

## 2019-07-26 RX ORDER — PROPOFOL 10 MG/ML
INJECTION, EMULSION INTRAVENOUS CONTINUOUS PRN
Status: DISCONTINUED | OUTPATIENT
Start: 2019-07-26 | End: 2019-07-26

## 2019-07-26 RX ORDER — LIDOCAINE HYDROCHLORIDE 10 MG/ML
1 INJECTION, SOLUTION EPIDURAL; INFILTRATION; INTRACAUDAL; PERINEURAL ONCE
Status: DISCONTINUED | OUTPATIENT
Start: 2019-07-26 | End: 2019-07-26 | Stop reason: HOSPADM

## 2019-07-26 RX ADMIN — FENTANYL CITRATE 50 MCG: 50 INJECTION, SOLUTION INTRAMUSCULAR; INTRAVENOUS at 10:07

## 2019-07-26 RX ADMIN — LIDOCAINE HYDROCHLORIDE 60 MG: 20 INJECTION, SOLUTION INTRAVENOUS at 10:07

## 2019-07-26 RX ADMIN — ONDANSETRON 4 MG: 2 INJECTION INTRAMUSCULAR; INTRAVENOUS at 11:07

## 2019-07-26 RX ADMIN — CEFAZOLIN 2 G: 330 INJECTION, POWDER, FOR SOLUTION INTRAMUSCULAR; INTRAVENOUS at 10:07

## 2019-07-26 RX ADMIN — FENTANYL CITRATE 25 MCG: 50 INJECTION, SOLUTION INTRAMUSCULAR; INTRAVENOUS at 10:07

## 2019-07-26 RX ADMIN — FENTANYL CITRATE 25 MCG: 50 INJECTION, SOLUTION INTRAMUSCULAR; INTRAVENOUS at 11:07

## 2019-07-26 RX ADMIN — SODIUM CHLORIDE: 0.9 INJECTION, SOLUTION INTRAVENOUS at 10:07

## 2019-07-26 RX ADMIN — KETAMINE HYDROCHLORIDE 20 MG: 10 INJECTION, SOLUTION INTRAMUSCULAR; INTRAVENOUS at 10:07

## 2019-07-26 RX ADMIN — PROPOFOL 20 MG: 10 INJECTION, EMULSION INTRAVENOUS at 10:07

## 2019-07-26 RX ADMIN — KETAMINE HYDROCHLORIDE 10 MG: 10 INJECTION, SOLUTION INTRAMUSCULAR; INTRAVENOUS at 10:07

## 2019-07-26 RX ADMIN — MIDAZOLAM HYDROCHLORIDE 2 MG: 1 INJECTION, SOLUTION INTRAMUSCULAR; INTRAVENOUS at 10:07

## 2019-07-26 RX ADMIN — ACETAMINOPHEN 1000 MG: 10 INJECTION, SOLUTION INTRAVENOUS at 10:07

## 2019-07-26 RX ADMIN — PROPOFOL 50 MCG/KG/MIN: 10 INJECTION, EMULSION INTRAVENOUS at 10:07

## 2019-07-26 RX ADMIN — HYDROCODONE BITARTRATE AND ACETAMINOPHEN 1 TABLET: 5; 325 TABLET ORAL at 11:07

## 2019-07-26 NOTE — DISCHARGE INSTRUCTIONS
POSTOPERATIVE INSTRUCTIONS FOLLOWING BIOPSY OR LUMPECTOMY    The following are post-operative instructions that will help you to recover from your surgery.  Please read over these instructions carefully and contact us if we can answer any of your questions or concerns.    Dressing/breast binder (surgi-bra)  A surgical bra may be placed around your chest after your surgery.  If you are given the bra, please wear it as close to 24 hours a day as possible until your post-operative clinic appointment.  If the elastic around the bra irritates your skin, you may wear a soft t-shirt underneath the bra.  You may go without wearing the bra long enough to bath, to launder and dry the bra.  If the bra is extremely uncomfortable, you may wear a supportive sports bra instead after 2 days.  You may shower after 2 days.  Do not take a tub bath and do not soak the surgical site. Please do not remove the white strips of tape (steri-strips) that cover your incision.  They will be removed at your clinic visit.    Activity   You should be able to return to your regular activities 2 days after your surgery.  However, do not engage in strenuous activities in which you use your upper body such as:  golf, tennis, aerobics, washing windows, raking the yard, mopping, vacuuming, heavy lifting (e.g children) until you are seen for your follow-up appointment in clinic.    Medication for pain  You may find that over the counter pain medications may be sufficient for your pain.  You will be given a prescription for pain medication for more severe pain.  You should not drive or operate machinery while taking these.  Please take narcotics with food.  Narcotics can cause, or worse, constipation.  You will need to increase your fluid intake, eat high fiber foods (such as fruits and bran) and make sure that you are up and walking. You may need to take an over the counter stool softener for constipation.    Please report the following:   Temperature  greater than 101 degrees   Discharge or bad odor from the wound   Excessive bleeding, such as bloody dressing or extreme bruising   Redness at incision and/or drain sites   Swelling or buildup of fluid around incision     Additional information  I will see you approximately 2 weeks following your surgery.  If this follow-up appointment has not been made, please call the office.    If you have any questions or problems, please call my office or my nurse.    Dr. Flor Willson RN  [phone]    After hours and on weekends, you may call the main [facility] line at [phone] and ask to have the general surgery resident paged or have me paged.

## 2019-07-26 NOTE — DISCHARGE SUMMARY
[facility]  Discharge Summary  General Surgery      Admit Date: 7/26/2019    Discharge Date and Time:  07/26/2019    Attending Physician: Flor Rosas MD     Discharge Provider: Flor Rosas    Reason for Admission: surgery    Procedures Performed: Procedure(s) (LRB):  EXCISIONAL BIOPSY LEFT with SEED (CONSENT AM OF) 1.0 hr case (Left)    Final Diagnoses:   Principal Problem: left breast mass       Discharged Condition: stable    Disposition: Home or Self Care    Follow Up/Patient Instructions: 7-14 days    Medications:  Reconciled Home Medications:      Medication List      ASK your doctor about these medications    * clonazePAM 1 MG tablet  Commonly known as:  KLONOPIN  TK 1 T PO  BID     * clonazePAM 1 MG tablet  Commonly known as:  KlonoPIN  Take 1 tablet (1 mg total) by mouth nightly as needed for Anxiety.     eszopiclone 3 mg Tab  Commonly known as:  LUNESTA  TK 1 T PO QD HS     ibuprofen 400 MG tablet  Commonly known as:  ADVIL,MOTRIN  Take 400 mg by mouth every 4 (four) hours.         * This list has 2 medication(s) that are the same as other medications prescribed for you. Read the directions carefully, and ask your doctor or other care provider to review them with you.              No discharge procedures on file.      Diet: regular    Activity: as tolerated

## 2019-07-26 NOTE — OP NOTE
DATE OF PROCEDURE: 7/26/2019    SURGEON: Surgeon(s) and Role:     * Flor Rosas MD - Primary    PREOPERATIVE DIAGNOSIS: mass of the left breast     POSTOPERATIVE DIAGNOSIS: same    ANESTHESIA: local and IV sedation    PROCEDURES PERFORMED:    left breast seed localization excisional biopsy      PROCEDURE IN DETAIL:   The patient underwent informed consent.  The seed was placed in the left breast. The localization films were reviewed.    She was then brought to the Operating Room and placed in the supine position. Anesthesia with local/monitored anesthesia care with sedation was administered.  The left breast, anterior chest, arm and axilla were then prepped and draped in a sterile fashion.     We turned our attention to the left breast. Local anesthetic was injected in the area. An incision was made in the periareolar position of the left breast. The specimen was dissected circumferentially around the lesion using the seed and probe as a guide.  The localization lumpectomy specimen was marked using short stitch superior, long stitch lateral.  It was then submitted for specimen radiograph, which confirmed the seed, clip and area of interest within the specimen.     Within the lumpectomy cavity, hemostasis was achieved with cautery. The wound was irrigated until clear. There was no evidence of bleeding. It was closed in multiple layers with deep dermal and subcutaneous interrupted Vicryl sutures and a running 4-0 Monocryl subcuticular skin closure.    Dermabond was applied. Sterile fluff gauze was placed and a post-procedure bra was placed. She tolerated the procedure well without complication and was turned over to Anesthesia for transport to the recovery area in a satisfactory condition. All specimens were sent to Pathology for permanent sectioning.    ESTIMATED BLOOD LOSS:   5 ml    COMPLICATIONS: none    DISPOSITION:  PACU--hemodynamically stable    ATTESTATION:  I performed the procedure.

## 2019-07-26 NOTE — H&P
History and Physical  [facility]  Department of Surgery     CHIEF COMPLAINT: left breast mass     REFERRING:  Aaareferral Self  No address on file  Primary Doctor No        Subjective:       Aide Almaraz is a 49 y.o. postmenopausal female referred for evaluation of a breast mass. Change was noted recently.  Patient does routinely do self breast exams.  Patient has noted a change on breast exam.  Patient denies nipple discharge. Patient denies to previous breast biopsy. Patient denies a personal history of breast cancer.     GYN History:  Age of menarche was 13. Age of menopause was unsure. hysterectomy. Patient denies hormonal therapy. Patient is . Age of first live birth was 20.  Patient did not breast feed.     FAMILY history:  Maternal aunt breast cancer at age 50s.  Paternal aunt breast cancer at age 59  Sister breast cancer at age 42?  Unsure if cancerous.            Past Medical History:   Diagnosis Date    Anemia      Celiac disease      Diabetes mellitus      Gall stones      Hypertension      PTSD (post-traumatic stress disorder)      Supraventricular tachycardia             Past Surgical History:   Procedure Laterality Date    ABDOMINAL SURGERY         Gastric sleeve    ANKLE FUSION        SECTION         placenta previa    CHOLECYSTECTOMY   2009    gastric        gastric sleeve         pt reported     HERNIA REPAIR        HYSTERECTOMY   2001    Inguinal hernia   2004             Current Outpatient Medications on File Prior to Visit   Medication Sig Dispense Refill    clonazePAM (KLONOPIN) 1 MG tablet TK 1 T PO  BID   0    clonazePAM (KLONOPIN) 1 MG tablet Take 1 tablet (1 mg total) by mouth nightly as needed for Anxiety. 10 tablet 0    eszopiclone (LUNESTA) 3 mg Tab TK 1 T PO QD HS   0    ibuprofen (ADVIL,MOTRIN) 400 MG tablet Take 400 mg by mouth every 4 (four) hours.          No current facility-administered medications on file prior to  visit.       Social History            Socioeconomic History    Marital status: Single       Spouse name: Not on file    Number of children: 2    Years of education: Not on file    Highest education level: Not on file   Occupational History    Not on file   Social Needs    Financial resource strain: Not on file    Food insecurity:       Worry: Not on file       Inability: Not on file    Transportation needs:       Medical: Not on file       Non-medical: Not on file   Tobacco Use    Smoking status: Never Smoker    Smokeless tobacco: Never Used   Substance and Sexual Activity    Alcohol use: No    Drug use: No    Sexual activity: Never       Partners: Male       Birth control/protection: Surgical   Lifestyle    Physical activity:       Days per week: Not on file       Minutes per session: Not on file    Stress: Not on file   Relationships    Social connections:       Talks on phone: Not on file       Gets together: Not on file       Attends Anabaptism service: Not on file       Active member of club or organization: Not on file       Attends meetings of clubs or organizations: Not on file       Relationship status: Not on file   Other Topics Concern    Not on file   Social History Narrative    Not on file            Family History   Problem Relation Age of Onset    Heart disease Father      Diabetes Mother           hypoglycemic    Breast cancer Maternal Aunt      Breast cancer Sister      Breast cancer Paternal Aunt      Ovarian cancer Neg Hx           Review of Systems  Pertinent items are noted in HPI.       Objective:      LMP  (LMP Unknown)   General appearance: alert, appears stated age and cooperative  Head: Normocephalic, without obvious abnormality, atraumatic  Neck: no adenopathy and supple, symmetrical, trachea midline  Lungs: normal effort, nonlabored breathing  Breasts: No nipple retraction or dimpling, No nipple discharge or bleeding, No axillary or supraclavicular adenopathy,  positive findings: palpable mass left bresat 3OC  Heart: regular rate and rhythm  Abdomen: soft, non-tender; bowel sounds normal; no masses,  no organomegaly  Extremities: extremities normal, atraumatic, no cyanosis or edema  Skin: normal, no edema and no lesions noted  Lymph nodes: Cervical, supraclavicular, and axillary nodes normal.  Neurologic: Grossly normal     Radiology review: Images personally reviewed by me in the clinic.  Mammogram with US 05/16/19  Findings:  This procedure was performed using tomosynthesis.  Computer-aided detection was utilized in the interpretation of this examination.  The breasts are heterogeneously dense, which may obscure small masses.      Left  US Breast Left Limited  There is a 9 mm x 9 mm x 6 mm irregularly shaped, hypoechoic mass with angular margins seen in the left breast at 3 o'clock, 3 cm from the nipple. The mass correlates with the palpable mass reported by the patient.      Mammo Digital Diagnostic Bilat with Robert  There are no corresponding masses seen on this modality.      Right  Mammo Digital Diagnostic Bilat with Robert  There is no evidence of suspicious masses, calcifications, or other abnormal findings.      The left axilla is normal.      Impression:  Left  Mass: Left breast 9 mm x 9 mm x 6 mm mass at the 3 o'clock position. Assessment: 4B - Suspicious finding. Biopsy is recommended.      Right  There is no mammographic or sonographic evidence of malignancy.      The findings and recommendations were discussed in detail with the patient.      BI-RADS Category:   Overall: 4B - Moderate Suspicion for Malignancy     Pathology review:  FINAL PATHOLOGIC DIAGNOSIS  Breast, left, 3 o'clock, stereotactic biopsy:  -Benign breast tissue with chronically inflamed granulation tissue and usual ductal hyperplasia.  -Negative for atypia or malignancy.     Assessment:       Aide Almaraz is a 49 y.o. perimenopausal female with left breast mass     Plan:   We discussed the  options for management of this.     We discussed observation vs. surgical excision. Surgical excision is usually recommended if the mass is growing, or is symptomatic.  If we move forward with observation, we will need repeat imaging in 6 months and will need to ensure stability for 2 years.  Patient understand the options.  All questions were asked and answered.  Patient elects to proceed with surgical excision.     Surgery will be coordinated with the patient's schedule.  Risks and benefits were explained including but not limited to bleeding, infection, pain, recurrence, and need for further surgery.

## 2019-07-26 NOTE — ANESTHESIA POSTPROCEDURE EVALUATION
Anesthesia Post Evaluation    Patient: Aide Almaraz    Procedure(s) Performed: Procedure(s) (LRB):  EXCISIONAL BIOPSY LEFT with SEED (CONSENT AM OF) 1.0 hr case (Left)    Final Anesthesia Type: general  Patient location during evaluation: PACU  Patient participation: Yes- Able to Participate  Level of consciousness: awake and alert  Post-procedure vital signs: reviewed and stable  Pain management: adequate  Airway patency: patent  PONV status at discharge: No PONV  Anesthetic complications: no      Cardiovascular status: blood pressure returned to baseline  Respiratory status: unassisted  Hydration status: euvolemic  Follow-up not needed.          Vitals Value Taken Time   /82 7/26/2019 12:17 PM   Temp 36.8 °C (98.2 °F) 7/26/2019 12:15 PM   Pulse 63 7/26/2019 12:23 PM   Resp 14 7/26/2019 12:15 PM   SpO2 100 % 7/26/2019 12:23 PM   Vitals shown include unvalidated device data.      No case tracking events are documented in the log.      Pain/Estefanía Score: Pain Rating Prior to Med Admin: 6 (7/26/2019 11:28 AM)  Estefanía Score: 9 (7/26/2019 12:10 PM)

## 2019-07-26 NOTE — TRANSFER OF CARE
"Anesthesia Transfer of Care Note    Patient: Aide Almaraz    Procedure(s) Performed: Procedure(s) (LRB):  EXCISIONAL BIOPSY LEFT with SEED (CONSENT AM OF) 1.0 hr case (Left)    Patient location: PACU    Anesthesia Type: general and MAC    Transport from OR: Transported from OR on 6-10 L/min O2 by face mask with adequate spontaneous ventilation    Post pain: adequate analgesia    Post assessment: no apparent anesthetic complications and tolerated procedure well    Post vital signs: stable    Level of consciousness: awake, alert and oriented    Nausea/Vomiting: no nausea/vomiting    Complications: none    Transfer of care protocol was followed      Last vitals:   Visit Vitals  /60 (BP Location: Right arm, Patient Position: Lying)   Pulse 67   Temp 37 °C (98.6 °F) (Oral)   Resp 18   Ht 5' 5" (1.651 m)   Wt 74.8 kg (165 lb)   LMP  (LMP Unknown)   SpO2 100%   BMI 27.46 kg/m²     "

## 2019-07-28 ENCOUNTER — HOSPITAL ENCOUNTER (EMERGENCY)
Facility: HOSPITAL | Age: 49
Discharge: HOME | End: 2019-07-28
Attending: EMERGENCY MEDICINE
Payer: MEDICAID

## 2019-07-28 ENCOUNTER — NURSE TRIAGE (OUTPATIENT)
Dept: ADMINISTRATIVE | Facility: CLINIC | Age: 49
End: 2019-07-28

## 2019-07-28 VITALS
DIASTOLIC BLOOD PRESSURE: 66 MMHG | BODY MASS INDEX: 27.99 KG/M2 | SYSTOLIC BLOOD PRESSURE: 125 MMHG | HEIGHT: 65 IN | WEIGHT: 168 LBS | RESPIRATION RATE: 15 BRPM | HEART RATE: 71 BPM | OXYGEN SATURATION: 100 % | TEMPERATURE: 98 F

## 2019-07-28 DIAGNOSIS — G89.18 POST-OP PAIN: Primary | ICD-10-CM

## 2019-07-28 DIAGNOSIS — N64.4 BREAST PAIN: ICD-10-CM

## 2019-07-28 LAB
BASOPHILS # BLD AUTO: 0.07 K/UL (ref 0–0.2)
BASOPHILS NFR BLD: 1.5 % (ref 0–1.9)
DIFFERENTIAL METHOD: ABNORMAL
EOSINOPHIL # BLD AUTO: 0.1 K/UL (ref 0–0.5)
EOSINOPHIL NFR BLD: 1.9 % (ref 0–8)
ERYTHROCYTE [DISTWIDTH] IN BLOOD BY AUTOMATED COUNT: 16.4 % (ref 11.5–14.5)
HCT VFR BLD AUTO: 49.7 % (ref 37–48.5)
HGB BLD-MCNC: 14.9 G/DL (ref 12–16)
IMM GRANULOCYTES # BLD AUTO: 0.03 K/UL (ref 0–0.04)
IMM GRANULOCYTES NFR BLD AUTO: 0.6 % (ref 0–0.5)
LYMPHOCYTES # BLD AUTO: 2 K/UL (ref 1–4.8)
LYMPHOCYTES NFR BLD: 41.2 % (ref 18–48)
MCH RBC QN AUTO: 30.5 PG (ref 27–31)
MCHC RBC AUTO-ENTMCNC: 30 G/DL (ref 32–36)
MCV RBC AUTO: 102 FL (ref 82–98)
MONOCYTES # BLD AUTO: 0.4 K/UL (ref 0.3–1)
MONOCYTES NFR BLD: 8.5 % (ref 4–15)
NEUTROPHILS # BLD AUTO: 2.2 K/UL (ref 1.8–7.7)
NEUTROPHILS NFR BLD: 46.3 % (ref 38–73)
NRBC BLD-RTO: 0 /100 WBC
PLATELET # BLD AUTO: 322 K/UL (ref 150–350)
PMV BLD AUTO: 10.8 FL (ref 9.2–12.9)
RBC # BLD AUTO: 4.88 M/UL (ref 4–5.4)
WBC # BLD AUTO: 4.81 K/UL (ref 3.9–12.7)

## 2019-07-28 PROCEDURE — 99284 PR EMERGENCY DEPT VISIT,LEVEL IV: ICD-10-PCS | Mod: ,,, | Performed by: PHYSICIAN ASSISTANT

## 2019-07-28 PROCEDURE — 25000003 PHARM REV CODE 250: Performed by: PHYSICIAN ASSISTANT

## 2019-07-28 PROCEDURE — 96374 THER/PROPH/DIAG INJ IV PUSH: CPT

## 2019-07-28 PROCEDURE — 63600175 PHARM REV CODE 636 W HCPCS: Performed by: PHYSICIAN ASSISTANT

## 2019-07-28 PROCEDURE — 99284 EMERGENCY DEPT VISIT MOD MDM: CPT | Mod: ,,, | Performed by: PHYSICIAN ASSISTANT

## 2019-07-28 PROCEDURE — 25000003 PHARM REV CODE 250: Performed by: EMERGENCY MEDICINE

## 2019-07-28 PROCEDURE — 85025 COMPLETE CBC W/AUTO DIFF WBC: CPT

## 2019-07-28 PROCEDURE — 99284 EMERGENCY DEPT VISIT MOD MDM: CPT | Mod: 25

## 2019-07-28 RX ORDER — ONDANSETRON 4 MG/1
8 TABLET, ORALLY DISINTEGRATING ORAL EVERY 6 HOURS PRN
Qty: 21 TABLET | Refills: 0 | Status: SHIPPED | OUTPATIENT
Start: 2019-07-28 | End: 2019-08-04

## 2019-07-28 RX ORDER — ONDANSETRON 2 MG/ML
4 INJECTION INTRAMUSCULAR; INTRAVENOUS
Status: COMPLETED | OUTPATIENT
Start: 2019-07-28 | End: 2019-07-28

## 2019-07-28 RX ORDER — HYDROCODONE BITARTRATE AND ACETAMINOPHEN 5; 325 MG/1; MG/1
1 TABLET ORAL
Status: COMPLETED | OUTPATIENT
Start: 2019-07-28 | End: 2019-07-28

## 2019-07-28 RX ORDER — OXYCODONE AND ACETAMINOPHEN 5; 325 MG/1; MG/1
1 TABLET ORAL EVERY 4 HOURS PRN
Qty: 12 TABLET | Refills: 0 | Status: SHIPPED | OUTPATIENT
Start: 2019-07-28 | End: 2019-08-08

## 2019-07-28 RX ORDER — ONDANSETRON 4 MG/1
4 TABLET, ORALLY DISINTEGRATING ORAL
Status: COMPLETED | OUTPATIENT
Start: 2019-07-28 | End: 2019-07-28

## 2019-07-28 RX ADMIN — HYDROCODONE BITARTRATE AND ACETAMINOPHEN 1 TABLET: 5; 325 TABLET ORAL at 03:07

## 2019-07-28 RX ADMIN — HYDROCODONE BITARTRATE AND ACETAMINOPHEN 1 TABLET: 5; 325 TABLET ORAL at 11:07

## 2019-07-28 RX ADMIN — ONDANSETRON 4 MG: 2 INJECTION INTRAMUSCULAR; INTRAVENOUS at 12:07

## 2019-07-28 RX ADMIN — ONDANSETRON 4 MG: 4 TABLET, ORALLY DISINTEGRATING ORAL at 11:07

## 2019-07-28 NOTE — ED NOTES
LOC: The patient is awake and alert; oriented x 3 and speaking appropriately.  APPEARANCE: Patient resting comfortably, patient is clean and well groomed  SKIN: warm and dry, normal skin turgor & moist mucus membranes, skin intact, no breakdown noted. Surgical incision left breast post cystectomy 2 days ago  MUSCULOSKELETAL: Patient moving all extremities well, no obvious swelling or deformities noted  RESPIRATORY: Airway is open and patent,  respirations are spontaneous, normal effort and rate  CARDIAC: Patient has a normal rate, no peripheral edema noted, capillary refill < 3 seconds; No complaints of chest pain   ABDOMEN: Soft and non tender to palpation, no distention noted.

## 2019-07-28 NOTE — CONSULTS
[facility]  General Surgery  Consult Note    Inpatient consult to General Surgery  Consult performed by: Natalie Griffith MD  Consult ordered by: Kristy Oconnor PA-C        Subjective:     Chief Complaint/Reason for Admission: Breast pain    History of Present Illness:   Aide Almaraz is a 49 y.o. female s/p lumpectomy on  who presents for evaluation of surgical site pain. Was given four norcos which she has already used and now reports pain at the surgical site. She reports chills but no fever. No redness or drainage from the site. She has been wearing her post surgical bra. When she took the pain medications it improved her symptoms. No leukocytosis on cbc.    No current facility-administered medications on file prior to encounter.      Current Outpatient Medications on File Prior to Encounter   Medication Sig    clonazePAM (KLONOPIN) 1 MG tablet Take 1 tablet (1 mg total) by mouth nightly as needed for Anxiety.    eszopiclone (LUNESTA) 3 mg Tab TK 1 T PO QD HS    ibuprofen (ADVIL,MOTRIN) 400 MG tablet Take 400 mg by mouth every 4 (four) hours.    clonazePAM (KLONOPIN) 1 MG tablet TK 1 T PO  BID    HYDROcodone-acetaminophen (NORCO) 5-325 mg per tablet Take 1 tablet by mouth every 6 (six) hours as needed for Pain.       Review of patient's allergies indicates:   Allergen Reactions    Latex, natural rubber Itching and Rash       Past Medical History:   Diagnosis Date    Anemia     Celiac disease     Diabetes mellitus     Gall stones     Hypertension     PTSD (post-traumatic stress disorder)     Supraventricular tachycardia      Past Surgical History:   Procedure Laterality Date    ABDOMINAL SURGERY      Gastric sleeve    ANKLE FUSION  2011    BREAST SURGERY       SECTION      placenta previa    CHOLECYSTECTOMY  2009    gastric      gastric sleeve      pt reported     HERNIA REPAIR      HYSTERECTOMY  2001    Inguinal hernia  2004     Family  History     Problem Relation (Age of Onset)    Breast cancer Maternal Aunt, Sister, Paternal Aunt    Diabetes Mother    Heart disease Father        Tobacco Use    Smoking status: Never Smoker    Smokeless tobacco: Never Used   Substance and Sexual Activity    Alcohol use: Yes     Comment: rarely    Drug use: No    Sexual activity: Never     Partners: Male     Birth control/protection: Surgical     Review of Systems   Constitutional: Positive for chills. Negative for fever.   Respiratory: Negative for cough and shortness of breath.    Cardiovascular: Negative for chest pain and palpitations.   Gastrointestinal: Positive for nausea. Negative for abdominal distention, abdominal pain, diarrhea and vomiting.   Neurological: Negative for dizziness and headaches.   Psychiatric/Behavioral: Negative for confusion.     Objective:     Vital Signs (Most Recent):  Temp: 98 °F (36.7 °C) (07/28/19 1345)  Pulse: 71 (07/28/19 1345)  Resp: 15 (07/28/19 1345)  BP: 125/66 (07/28/19 1345)  SpO2: 100 % (07/28/19 1345) Vital Signs (24h Range):  Temp:  [98 °F (36.7 °C)-98.4 °F (36.9 °C)] 98 °F (36.7 °C)  Pulse:  [69-71] 71  Resp:  [15-18] 15  SpO2:  [100 %] 100 %  BP: (117-125)/(59-66) 125/66     Weight: 76.2 kg (168 lb)  Body mass index is 27.96 kg/m².    No intake or output data in the 24 hours ending 07/28/19 1509    Physical Exam   Constitutional: She is oriented to person, place, and time. She appears well-developed and well-nourished. No distress.   Cardiovascular: Normal rate and regular rhythm.   Pulmonary/Chest: Effort normal. No respiratory distress.   Incision c/d/i with dermabond in place  No erythema or drainage. Minimal induration. No fluctuance. Some tenderness to palpation.       Abdominal: Soft. She exhibits no distension.   Neurological: She is alert and oriented to person, place, and time.   Skin: Skin is warm and dry.   Psychiatric: She has a normal mood and affect. Her behavior is normal.       Significant  Labs:  CBC:   Recent Labs   Lab 07/28/19  1436   WBC 4.81   RBC 4.88   HGB 14.9   HCT 49.7*      *   MCH 30.5   MCHC 30.0*       Assessment/Plan:     Aide Almaraz is a 49 y.o. female with incisional pain. No signs of infection.   Patient was instructed to continue using her surgical bra and was educated on the symptoms of infection, which would prompt a return to the ED.   She will be discharged home with percocet and already has a follow up clinic visit made.    Natalie Griffith MD  General Surgery  [facility]

## 2019-07-28 NOTE — ED PROVIDER NOTES
Encounter Date: 2019    SCRIBE #1 NOTE: I, Juan Malcolm, am scribing for, and in the presence of,  Dr. Berrios. I have scribed the following portions of the note - the APC attestation.       History     Chief Complaint   Patient presents with    Post-op Problem     bx of L breast, having more pain     49-year-old female with PMHx of PTSD, anemia, SVT, HTN, DM, celiac disease, and s/p left breast cystectomy on  who presents to the ED with c/o post op pain. She reports that her post op pain was mildly controlled with take the 5 tablets of Norco, prescribed to her by her surgeon. She ran out of this medication and has had acute worsening of her pain. She describes her pain as a constant ache, localized to her incision site, worse with movement of her left arm and neck. She rates her pain as an 8/10 currently. She has been taking Tylenol and Aleve with mild temporary relief. She last took Tylenol around 6:30 AM this morning. She also reports nausea and chills, but denies fevers. Denies wound drainage, bleeding, chest pain, SOB, vomiting, abdominal pain, numbness, weakness, urinary symptoms, or any other medical complaints.     The history is provided by the patient.     Review of patient's allergies indicates:   Allergen Reactions    Latex, natural rubber Itching and Rash     Past Medical History:   Diagnosis Date    Anemia     Celiac disease     Diabetes mellitus     Gall stones     Hypertension     PTSD (post-traumatic stress disorder)     Supraventricular tachycardia      Past Surgical History:   Procedure Laterality Date    ABDOMINAL SURGERY      Gastric sleeve    ANKLE FUSION      BREAST SURGERY       SECTION      placenta previa    CHOLECYSTECTOMY  2009    EXCISIONAL BIOPSY LEFT with SEED (CONSENT AM OF) 1.0 hr case Left 2019    [facility]    gastric      gastric sleeve      pt reported     HERNIA REPAIR       HYSTERECTOMY  09/19/2001    Inguinal hernia  2004     Family History   Problem Relation Age of Onset    Heart disease Father     Diabetes Mother         hypoglycemic    Breast cancer Maternal Aunt     Breast cancer Sister     Breast cancer Paternal Aunt     Ovarian cancer Neg Hx      Social History     Tobacco Use    Smoking status: Never Smoker    Smokeless tobacco: Never Used   Substance Use Topics    Alcohol use: Yes     Comment: rarely    Drug use: No     Review of Systems   Constitutional: Positive for chills. Negative for fever.   HENT: Negative for congestion.    Eyes: Negative for visual disturbance.   Respiratory: Negative for shortness of breath.    Cardiovascular: Negative for chest pain.        +left breast pain   Gastrointestinal: Positive for nausea. Negative for abdominal pain and vomiting.   Genitourinary: Negative for dysuria, frequency and hematuria.   Musculoskeletal: Negative for back pain and neck pain.   Skin: Positive for wound (surgical incision site). Negative for color change and rash.   Neurological: Negative for dizziness, weakness, numbness and headaches.   Hematological: Does not bruise/bleed easily.   Psychiatric/Behavioral: Negative for confusion.       Physical Exam     Initial Vitals [07/28/19 1012]   BP Pulse Resp Temp SpO2   (!) 117/59 69 18 98.4 °F (36.9 °C) 100 %      MAP       --         Physical Exam    Nursing note and vitals reviewed.  Constitutional: She appears well-developed and well-nourished.   HENT:   Head: Normocephalic and atraumatic.   Eyes: Conjunctivae and EOM are normal.   Neck: Normal range of motion. Neck supple.   Cardiovascular: Normal rate, regular rhythm and normal heart sounds.   Pulmonary/Chest: Breath sounds normal. She has no wheezes. She has no rhonchi. She has no rales.   Left areola surgical incision site c/d/i with dermabond in place. No surrounding erythema or drainage. Minimal induration. No fluctuance. Some tenderness to palpation.     Abdominal: Soft. There is no tenderness. There is no rebound and no guarding.   Musculoskeletal: Normal range of motion. She exhibits no edema or tenderness.   Neurological: She is alert and oriented to person, place, and time. She has normal strength.   Skin: Skin is warm. Capillary refill takes less than 2 seconds.   Psychiatric: She has a normal mood and affect.                  ED Course   Procedures  Labs Reviewed   CBC W/ AUTO DIFFERENTIAL - Abnormal; Notable for the following components:       Result Value    Hematocrit 49.7 (*)     Mean Corpuscular Volume 102 (*)     Mean Corpuscular Hemoglobin Conc 30.0 (*)     RDW 16.4 (*)     Immature Granulocytes 0.6 (*)     All other components within normal limits   CBC W/ AUTO DIFFERENTIAL          Imaging Results    None          Medical Decision Making:   History:   Old Medical Records: I decided to obtain old medical records.  Old Records Summarized: records from clinic visits.       <> Summary of Records: Reviewed op note from 7/26/2019.   Initial Assessment:   49-year-old female with PMHx of PTSD, anemia, SVT, HTN, DM, celiac disease, and s/p left breast cystectomy on 7/26/219 who presents to the ED with c/o post op pain. She ran out of Norco tablets and has had difficulty controlling pain ever since. She has associated nausea and chills, denies fevers. Vital signs are stable. RRR. Lungs CTA bilaterally. Abdomen soft and nontender. Left areola surgical incision site c/d/i with dermabond in place. No surrounding erythema or drainage. Minimal induration. No fluctuance. Some tenderness to palpation. See above picture.   Differential Diagnosis:   DDx includes but is not limited to post operative pain, abscess, other infectious process.   Clinical Tests:   Lab Tests: Ordered and Reviewed  ED Management:  Will give Norco 5-325 mg and Zofran 4 mg for symptomatic relief. Discussed with General Surgery, who will come evaluate the patient in the ED. Recommended obtaining  CBC to check WBC.     Upon reassessment, patient reports slight improvement in pain. Will given an additional dose of medications while waiting for results.     CBC without leukocytosis. Hemoglobin stable. After their evaluation, General Surgery recommended discharging with prescription for Perocet. Reviewed proper use of surgical bra. Reviewed signs and symptoms of infection. Pt given strict ER return precautions. All questions answered. Pt expressed understanding and is agreeable with plan.   Other:   I have discussed this case with another health care provider.       <> Summary of the Discussion: General Surgery            Scribe Attestation:   Scribe #1: I performed the above scribed service and the documentation accurately describes the services I performed. I attest to the accuracy of the note.    Attending Attestation:     Physician Attestation Statement for NP/PA:   I discussed this assessment and plan of this patient with the NP/PA, but I did not personally examine the patient. The face to face encounter was performed by the NP/PA.                     Clinical Impression:       ICD-10-CM ICD-9-CM   1. Post-op pain G89.18 338.18   2. Breast pain N64.4 611.71         Disposition:   Disposition: Discharged  Condition: Stable                        Kristy Oconnor PA-C  07/29/19 9055       Mark Berrios MD  07/30/19 7701

## 2019-07-28 NOTE — TELEPHONE ENCOUNTER
Friday had incisional biopsy of left breast with seed.  This am it is very sore in the surgical area, under her arm and in her back as well.  She is out of pain medication, Norco 5/325.  She has tried using cold to the area, and tylenol, aleve, but still too painful to function.  Rates pain 8/10, constant, with episodes of sharp intermittent pain as well.  She states the incisional area is a little warm to touch, and there is bruising, but no increased redness/drainage.      Reason for Disposition   Severe pain in the incision    Protocols used: POST-OP INCISION SYMPTOMS-A-AH

## 2019-07-28 NOTE — ED TRIAGE NOTES
2 days ago has a left breast cystectomy- now presents w/ uncontrolled pain .  Needs another pain rx .  Having chills but no fever. Poor appetite.   
reviewed

## 2019-07-28 NOTE — DISCHARGE INSTRUCTIONS
You were evaluated by General Surgery today in the Er. They would like you to continues to take Percocet for additional relief. You can also take Zofran as needed for nausea. Please return to the ER if you develop fevers, incision site drainage, or any other concerning symptoms.

## 2019-07-29 ENCOUNTER — DOCUMENTATION ONLY (OUTPATIENT)
Dept: SURGERY | Facility: CLINIC | Age: 49
End: 2019-07-29

## 2019-07-29 NOTE — PROGRESS NOTES
Spoke with patient.  She went to the ED yesterday due to poor pain control.  She is doing better now.  Reports pain is improved.  Keep scheduled postop visit.

## 2019-08-05 ENCOUNTER — TELEPHONE (OUTPATIENT)
Dept: SURGERY | Facility: CLINIC | Age: 49
End: 2019-08-05

## 2019-08-05 NOTE — TELEPHONE ENCOUNTER
----- Message from Eufemiaiadidi Thompsontes sent at 8/5/2019  3:23 PM CDT -----  Contact: pt  Needs Advice    Reason for call: the pt is calling to speak with the nurse regarding pain she is experiencing. The pt states when she puts on her seat belt or when she hits bumps in a vehicle her breast give her pain. The pt states she is trying to put more padding around her breast and even wearing a sports bra but its not helping.         Communication Preference: [phone]     Additional Information:

## 2019-08-06 NOTE — TELEPHONE ENCOUNTER
Returned call to patient, discussed post op questions, all questions answered at this time, patient states pain is controlled with current pain medication, post op education reinforced, post op appt reminder provided

## 2019-08-08 ENCOUNTER — OFFICE VISIT (OUTPATIENT)
Dept: SURGERY | Facility: CLINIC | Age: 49
End: 2019-08-08
Payer: MEDICAID

## 2019-08-08 VITALS
HEART RATE: 96 BPM | SYSTOLIC BLOOD PRESSURE: 138 MMHG | TEMPERATURE: 98 F | WEIGHT: 168 LBS | BODY MASS INDEX: 27.99 KG/M2 | HEIGHT: 65 IN | DIASTOLIC BLOOD PRESSURE: 89 MMHG

## 2019-08-08 DIAGNOSIS — N63.20 LEFT BREAST MASS: Primary | ICD-10-CM

## 2019-08-08 PROCEDURE — 99024 PR POST-OP FOLLOW-UP VISIT: ICD-10-PCS | Mod: ,,, | Performed by: SURGERY

## 2019-08-08 PROCEDURE — 99213 OFFICE O/P EST LOW 20 MIN: CPT | Mod: PBBFAC | Performed by: SURGERY

## 2019-08-08 PROCEDURE — 99024 POSTOP FOLLOW-UP VISIT: CPT | Mod: ,,, | Performed by: SURGERY

## 2019-08-08 PROCEDURE — 99999 PR PBB SHADOW E&M-EST. PATIENT-LVL III: ICD-10-PCS | Mod: PBBFAC,,, | Performed by: SURGERY

## 2019-08-08 PROCEDURE — 99999 PR PBB SHADOW E&M-EST. PATIENT-LVL III: CPT | Mod: PBBFAC,,, | Performed by: SURGERY

## 2019-08-08 NOTE — PROGRESS NOTES
REFERRING PHYSICIAN:  No referring provider defined for this encounter.       Primary Doctor No    DATE: 8/8/2019    DIAGNOSIS:    This is a 49 y.o. female with mass of the left breast.    TREATMENT SUMMARY:  The patient is status post left excisional breast biopsy.  Final pathology showed fibrous tissue, possible diabetic mastopathy.    INTERVAL HISTORY:   Aide Almaraz comes in for a post-op check.  She denies fever, chills, chest pain or shortness of breath.  Her pain is well controlled.        PHYSICAL EXAMINATION:   General:  This is a well appearing female with appropriate speech, affect and gait.     Breast:  Incision clean, dry, and intact    IMPRESSION:   The patient has had an uneventful postoperative course.    PLAN:   1. return in 6 months for a follow up office visit and breast exam  2. bilateral mammogram in 9 months  3. The patient is advised in continued exam of the breast chest wall and to report to this office sooner should she note any areas of abnormality or concern.

## 2019-10-08 ENCOUNTER — HOSPITAL ENCOUNTER (EMERGENCY)
Facility: HOSPITAL | Age: 49
Discharge: HOME | End: 2019-10-08
Attending: EMERGENCY MEDICINE
Payer: MEDICAID

## 2019-10-08 VITALS
WEIGHT: 170 LBS | HEIGHT: 65 IN | HEART RATE: 70 BPM | BODY MASS INDEX: 28.32 KG/M2 | RESPIRATION RATE: 16 BRPM | SYSTOLIC BLOOD PRESSURE: 105 MMHG | TEMPERATURE: 98 F | DIASTOLIC BLOOD PRESSURE: 59 MMHG | OXYGEN SATURATION: 98 %

## 2019-10-08 DIAGNOSIS — R07.89 ATYPICAL CHEST PAIN: ICD-10-CM

## 2019-10-08 DIAGNOSIS — R07.9 CHEST PAIN: Primary | ICD-10-CM

## 2019-10-08 LAB
ALBUMIN SERPL BCP-MCNC: 4.2 G/DL (ref 3.5–5.2)
ALP SERPL-CCNC: 73 U/L (ref 55–135)
ALT SERPL W/O P-5'-P-CCNC: 44 U/L (ref 10–44)
ANION GAP SERPL CALC-SCNC: 8 MMOL/L (ref 8–16)
AST SERPL-CCNC: 30 U/L (ref 10–40)
BASOPHILS # BLD AUTO: 0.02 K/UL (ref 0–0.2)
BASOPHILS NFR BLD: 0.5 % (ref 0–1.9)
BILIRUB SERPL-MCNC: 0.6 MG/DL (ref 0.1–1)
BNP SERPL-MCNC: 23 PG/ML (ref 0–99)
BUN SERPL-MCNC: 11 MG/DL (ref 6–20)
CALCIUM SERPL-MCNC: 9.7 MG/DL (ref 8.7–10.5)
CHLORIDE SERPL-SCNC: 110 MMOL/L (ref 95–110)
CO2 SERPL-SCNC: 24 MMOL/L (ref 23–29)
CREAT SERPL-MCNC: 0.8 MG/DL (ref 0.5–1.4)
D DIMER PPP IA.FEU-MCNC: <0.19 MG/L FEU
DIFFERENTIAL METHOD: ABNORMAL
EOSINOPHIL # BLD AUTO: 0.1 K/UL (ref 0–0.5)
EOSINOPHIL NFR BLD: 2.2 % (ref 0–8)
ERYTHROCYTE [DISTWIDTH] IN BLOOD BY AUTOMATED COUNT: 13.9 % (ref 11.5–14.5)
EST. GFR  (AFRICAN AMERICAN): >60 ML/MIN/1.73 M^2
EST. GFR  (NON AFRICAN AMERICAN): >60 ML/MIN/1.73 M^2
GLUCOSE SERPL-MCNC: 92 MG/DL (ref 70–110)
HCT VFR BLD AUTO: 42.8 % (ref 37–48.5)
HGB BLD-MCNC: 13.9 G/DL (ref 12–16)
INR PPP: 0.9 (ref 0.8–1.2)
LYMPHOCYTES # BLD AUTO: 2.1 K/UL (ref 1–4.8)
LYMPHOCYTES NFR BLD: 51.1 % (ref 18–48)
MCH RBC QN AUTO: 31.1 PG (ref 27–31)
MCHC RBC AUTO-ENTMCNC: 32.5 G/DL (ref 32–36)
MCV RBC AUTO: 96 FL (ref 82–98)
MONOCYTES # BLD AUTO: 0.3 K/UL (ref 0.3–1)
MONOCYTES NFR BLD: 8.5 % (ref 4–15)
NEUTROPHILS # BLD AUTO: 1.5 K/UL (ref 1.8–7.7)
NEUTROPHILS NFR BLD: 37.9 % (ref 38–73)
PLATELET # BLD AUTO: 345 K/UL (ref 150–350)
PMV BLD AUTO: 10.8 FL (ref 9.2–12.9)
POTASSIUM SERPL-SCNC: 4.4 MMOL/L (ref 3.5–5.1)
PROT SERPL-MCNC: 7.4 G/DL (ref 6–8.4)
PROTHROMBIN TIME: 9.6 SEC (ref 9–12.5)
RBC # BLD AUTO: 4.47 M/UL (ref 4–5.4)
SODIUM SERPL-SCNC: 142 MMOL/L (ref 136–145)
TROPONIN I SERPL DL<=0.01 NG/ML-MCNC: <0.006 NG/ML (ref 0–0.03)
WBC # BLD AUTO: 4.01 K/UL (ref 3.9–12.7)

## 2019-10-08 PROCEDURE — 85379 FIBRIN DEGRADATION QUANT: CPT

## 2019-10-08 PROCEDURE — 93010 EKG 12-LEAD: ICD-10-PCS | Mod: ,,, | Performed by: INTERNAL MEDICINE

## 2019-10-08 PROCEDURE — 84484 ASSAY OF TROPONIN QUANT: CPT

## 2019-10-08 PROCEDURE — 85610 PROTHROMBIN TIME: CPT

## 2019-10-08 PROCEDURE — S0028 INJECTION, FAMOTIDINE, 20 MG: HCPCS | Performed by: EMERGENCY MEDICINE

## 2019-10-08 PROCEDURE — 96374 THER/PROPH/DIAG INJ IV PUSH: CPT

## 2019-10-08 PROCEDURE — 63600175 PHARM REV CODE 636 W HCPCS: Performed by: EMERGENCY MEDICINE

## 2019-10-08 PROCEDURE — 85025 COMPLETE CBC W/AUTO DIFF WBC: CPT

## 2019-10-08 PROCEDURE — 99285 EMERGENCY DEPT VISIT HI MDM: CPT | Mod: 25

## 2019-10-08 PROCEDURE — 93010 ELECTROCARDIOGRAM REPORT: CPT | Mod: ,,, | Performed by: INTERNAL MEDICINE

## 2019-10-08 PROCEDURE — 93005 ELECTROCARDIOGRAM TRACING: CPT

## 2019-10-08 PROCEDURE — 83880 ASSAY OF NATRIURETIC PEPTIDE: CPT

## 2019-10-08 PROCEDURE — 25000003 PHARM REV CODE 250: Performed by: EMERGENCY MEDICINE

## 2019-10-08 PROCEDURE — 80053 COMPREHEN METABOLIC PANEL: CPT

## 2019-10-08 PROCEDURE — 96375 TX/PRO/DX INJ NEW DRUG ADDON: CPT

## 2019-10-08 RX ORDER — KETOROLAC TROMETHAMINE 30 MG/ML
15 INJECTION, SOLUTION INTRAMUSCULAR; INTRAVENOUS
Status: COMPLETED | OUTPATIENT
Start: 2019-10-08 | End: 2019-10-08

## 2019-10-08 RX ORDER — PROCHLORPERAZINE EDISYLATE 5 MG/ML
10 INJECTION INTRAMUSCULAR; INTRAVENOUS ONCE
Status: COMPLETED | OUTPATIENT
Start: 2019-10-08 | End: 2019-10-08

## 2019-10-08 RX ORDER — HYDROCODONE BITARTRATE AND ACETAMINOPHEN 5; 325 MG/1; MG/1
1 TABLET ORAL EVERY 4 HOURS PRN
Qty: 8 TABLET | Refills: 0 | Status: SHIPPED | OUTPATIENT
Start: 2019-10-08 | End: 2019-12-04

## 2019-10-08 RX ORDER — DIPHENHYDRAMINE HYDROCHLORIDE 50 MG/ML
25 INJECTION INTRAMUSCULAR; INTRAVENOUS
Status: COMPLETED | OUTPATIENT
Start: 2019-10-08 | End: 2019-10-08

## 2019-10-08 RX ORDER — SUCRALFATE 1 G/1
1 TABLET ORAL 4 TIMES DAILY
Qty: 60 TABLET | Refills: 0 | Status: SHIPPED | OUTPATIENT
Start: 2019-10-08 | End: 2019-12-04

## 2019-10-08 RX ORDER — FAMOTIDINE 10 MG/ML
40 INJECTION INTRAVENOUS
Status: COMPLETED | OUTPATIENT
Start: 2019-10-08 | End: 2019-10-08

## 2019-10-08 RX ORDER — ONDANSETRON 2 MG/ML
4 INJECTION INTRAMUSCULAR; INTRAVENOUS
Status: COMPLETED | OUTPATIENT
Start: 2019-10-08 | End: 2019-10-08

## 2019-10-08 RX ORDER — SUCRALFATE 1 G/10ML
1 SUSPENSION ORAL
Status: COMPLETED | OUTPATIENT
Start: 2019-10-08 | End: 2019-10-08

## 2019-10-08 RX ORDER — NAPROXEN SODIUM 220 MG/1
324 TABLET, FILM COATED ORAL ONCE
Status: COMPLETED | OUTPATIENT
Start: 2019-10-08 | End: 2019-10-08

## 2019-10-08 RX ORDER — LORAZEPAM 2 MG/ML
2 INJECTION INTRAMUSCULAR
Status: COMPLETED | OUTPATIENT
Start: 2019-10-08 | End: 2019-10-08

## 2019-10-08 RX ORDER — LIDOCAINE 50 MG/G
1 PATCH TOPICAL
Status: DISCONTINUED | OUTPATIENT
Start: 2019-10-08 | End: 2019-10-08 | Stop reason: HOSPADM

## 2019-10-08 RX ADMIN — ASPIRIN 81 MG 324 MG: 81 TABLET ORAL at 10:10

## 2019-10-08 RX ADMIN — LORAZEPAM 2 MG: 2 INJECTION INTRAMUSCULAR; INTRAVENOUS at 10:10

## 2019-10-08 RX ADMIN — SUCRALFATE 1 G: 1 SUSPENSION ORAL at 12:10

## 2019-10-08 RX ADMIN — KETOROLAC TROMETHAMINE 15 MG: 30 INJECTION, SOLUTION INTRAMUSCULAR at 11:10

## 2019-10-08 RX ADMIN — PROCHLORPERAZINE EDISYLATE 10 MG: 5 INJECTION INTRAMUSCULAR; INTRAVENOUS at 01:10

## 2019-10-08 RX ADMIN — LIDOCAINE 1 PATCH: 50 PATCH CUTANEOUS at 11:10

## 2019-10-08 RX ADMIN — DIPHENHYDRAMINE HYDROCHLORIDE 25 MG: 50 INJECTION INTRAMUSCULAR; INTRAVENOUS at 01:10

## 2019-10-08 RX ADMIN — FAMOTIDINE 40 MG: 10 INJECTION INTRAVENOUS at 12:10

## 2019-10-08 RX ADMIN — SODIUM CHLORIDE 1000 ML: 0.9 INJECTION, SOLUTION INTRAVENOUS at 12:10

## 2019-10-08 RX ADMIN — ONDANSETRON HYDROCHLORIDE 4 MG: 2 SOLUTION INTRAMUSCULAR; INTRAVENOUS at 11:10

## 2019-10-08 NOTE — ED TRIAGE NOTES
Pt presents to ED c/o intermittent chest pains that radiate to her back, numbness in left arm.  Symptoms started 2 weeks ago.

## 2019-10-08 NOTE — ED PROVIDER NOTES
Encounter Date: 10/8/2019    SCRIBE #1 NOTE: I, Nikki Caballero, am scribing for, and in the presence of,  Td Valderrama MD. I have scribed the following portions of the note - Other sections scribed: HPI, ROS and PE.       History     Chief Complaint   Patient presents with    Chest Pain     Pt reports intermittent cp (tightness) x 2 weeks that radiates to back. Pt also c/o left arm numbness, vomiting, HA, SOB. Hx of SVT     CC: Chest Pain    HPI: This 49 y.o female, with a medical history of anemia, celiac disease, diabetes mellitus, hypertension, PTSD, and supraventricular tachycardia, presents to the ED c/o intermittent, severe (8/10) chest pain for the last few weeks. Pt reports that the symptoms worsened on Sunday as she was unable to move during an episode of pain, which began after experiencing light-headedness and emesis. She states that she was later able to fall asleep, but she notes that she awoke yesterday morning experiencing pain to the back, neck and left upper arm as well as a tingling sensation to the fingers. Pt reports experiencing similar symptoms in the past, noting that she was seen at [facility]'s ED where she was seen by her cardiologist, Dr. Rob. She states that she has a history of SVT, noting that she has had an echocardiogram preformed in the past and wears a Holter monitor every so often. Pt notes that she had been doing well for a while prior to the present onset of symptoms. Pt denies stent placement. No other associated symptoms. No alleviating factors.    The history is provided by the patient. No  was used.     Review of patient's allergies indicates:   Allergen Reactions    Latex, natural rubber Itching and Rash     Past Medical History:   Diagnosis Date    Anemia     Celiac disease     Diabetes mellitus     Gall stones     Hypertension     PTSD (post-traumatic stress disorder)     Supraventricular tachycardia 2001     Past  Surgical History:   Procedure Laterality Date    ABDOMINAL SURGERY      Gastric sleeve    ANKLE FUSION      BREAST SURGERY       SECTION      placenta previa    CHOLECYSTECTOMY  2009    EXCISIONAL BIOPSY Left 2019    Procedure: EXCISIONAL BIOPSY LEFT with SEED (CONSENT AM OF) 1.0 hr case;  Surgeon: Flor Rosas MD;  Location: [facility] OR [address] [place] [address];  Service: General;  Laterality: Left;    gastric      gastric sleeve      pt reported     HERNIA REPAIR      HYSTERECTOMY  2001    Inguinal hernia  2004     Family History   Problem Relation Age of Onset    Heart disease Father     Diabetes Mother         hypoglycemic    Breast cancer Maternal Aunt     Breast cancer Sister     Breast cancer Paternal Aunt     Ovarian cancer Neg Hx      Social History     Tobacco Use    Smoking status: Never Smoker    Smokeless tobacco: Never Used   Substance Use Topics    Alcohol use: Yes     Comment: rarely    Drug use: No     Review of Systems   Constitutional: Negative for chills and fever.   HENT: Negative for congestion, rhinorrhea and sore throat.    Eyes: Negative for visual disturbance.   Respiratory: Negative for cough and shortness of breath.    Cardiovascular: Positive for chest pain.   Gastrointestinal: Negative for abdominal pain, diarrhea, nausea and vomiting.   Genitourinary: Negative for dysuria, frequency and hematuria.   Musculoskeletal: Positive for back pain and neck pain.        (+) left upper arm pain   Skin: Negative for rash.   Neurological: Negative for dizziness, weakness and headaches.        (+) tingling sensation to the fingers       Physical Exam     Initial Vitals [10/08/19 0930]   BP Pulse Resp Temp SpO2   126/79 106 18 98.5 °F (36.9 °C) 99 %      MAP       --         Physical Exam    Nursing note and vitals reviewed.  Constitutional: She is not diaphoretic. No distress.   Patient is crying.   HENT:   Head: Normocephalic and atraumatic.   Mouth/Throat: Oropharynx is  clear and moist.   Eyes: EOM are normal. Pupils are equal, round, and reactive to light.   Neck: Normal range of motion. Neck supple.   Cardiovascular: Normal rate, regular rhythm and normal heart sounds.   Pulmonary/Chest: Breath sounds normal. No respiratory distress.   Abdominal: Soft. Bowel sounds are normal. She exhibits no distension. There is no tenderness.   Musculoskeletal: Normal range of motion. She exhibits no edema or tenderness.   Left trapezius tenderness present.   Neurological: She is alert and oriented to person, place, and time.   Skin: Skin is warm and dry.   Psychiatric: Her mood appears anxious.         ED Course   Procedures  Labs Reviewed   CBC W/ AUTO DIFFERENTIAL - Abnormal; Notable for the following components:       Result Value    Mean Corpuscular Hemoglobin 31.1 (*)     Gran # (ANC) 1.5 (*)     Gran% 37.9 (*)     Lymph% 51.1 (*)     All other components within normal limits   COMPREHENSIVE METABOLIC PANEL   B-TYPE NATRIURETIC PEPTIDE   TROPONIN I   PROTIME-INR   D DIMER, QUANTITATIVE        ECG Results          EKG 12-lead (Final result)  Result time 10/08/19 18:43:09    Final result by Interface, Lab In [facility] (10/08/19 18:43:09)                 Narrative:    Test Reason : R07.9,    Vent. Rate : 079 BPM     Atrial Rate : 079 BPM     P-R Int : 152 ms          QRS Dur : 080 ms      QT Int : 378 ms       P-R-T Axes : 056 049 041 degrees     QTc Int : 433 ms    Normal sinus rhythm  Normal ECG  When compared with ECG of 20-OCT-2018 09:53,  No significant change was found  Confirmed by Orville Deal MD ([id number]) on 10/8/2019 6:43:01 PM    Referred By:             Confirmed By:Orville Deal MD                            Imaging Results          X-Ray Chest 1 View (Final result)  Result time 10/08/19 10:30:21    Final result by Peewee Prasad MD (10/08/19 10:30:21)                 Impression:      No acute abnormality.      Electronically signed by: Peewee Prasad  MD  Date:    10/08/2019  Time:    10:30             Narrative:    EXAMINATION:  XR CHEST 1 VIEW    CLINICAL HISTORY:  Chest pain, unspecified    TECHNIQUE:  Single frontal view of the chest was performed.    COMPARISON:  October 20, 2018    FINDINGS:  Lungs are clear.  No effusion or pneumothorax.    No acute bone abnormality.                                                MDM:    49 y.o.female with PMHx as noted above, presents with chest pain x 3 days. Physical exam remarkable for extremely anxious appearing female, conversing with ease, CTAB, RRR, mild left trapezius ttp.  ED workup remarkable for EKG - nsr, no ischemic changes compared to prior, no STEMI, trop - wnl, BNP - wnl, CBC/CMP - wnl, Lipase - wnl, CXR - unremarkable, D-dimer - negative. Pt presentation consistent with atypical chest pain.   Pt initially given toradol/ativan with ttp over trapezius and long h/o clonazepam use with negative troponin, however after re-evaluation patient noting no improvement in sxms.  Suspect with h/o bariatric surgery possible GI etiology with carafate/pepcid given as well as some IVFs with improvement in sxms.  At this time given patient's history, physical exam, and ED workup do not suspect arrhythmia, MI/ACS, PE, PTX, aortic dissection, pericarditis, PNA, shingles, or any further malignant cause.  Discussed diagnosis and further treatment with patient, including f/u with cardiology in 1 week, and GI referral placed, return precautions given and all questions answered.  Patient in understanding of plan.  Pt discharged to home improved and stable.             Clinical Impression:       ICD-10-CM ICD-9-CM   1. Chest pain R07.9 786.50   2. Atypical chest pain R07.89 786.59            Scribe attestation: I, Td Valderrama M.D., personally performed the services described in this documentation. All medical record entries made by the scribe were at my direction and in my presence. I have reviewed the chart and agree that  the record reflects my personal performance and is accurate and complete.                    Td Valderrama MD  10/09/19 9006

## 2019-11-05 ENCOUNTER — TELEPHONE (OUTPATIENT)
Dept: OBSTETRICS AND GYNECOLOGY | Facility: CLINIC | Age: 49
End: 2019-11-05

## 2019-11-05 NOTE — TELEPHONE ENCOUNTER
----- Message from Antoine Hodges sent at 11/5/2019  9:41 AM CST -----  Contact: RAGHU LENTZ [[id number]]  Appointment Request From: [facility]      With Provider:Leodan     Preferred Date Range: Any     Preferred Times: any      Reason for visit: [facility]      Comments:[phone]

## 2019-11-21 ENCOUNTER — HOSPITAL ENCOUNTER (EMERGENCY)
Facility: HOSPITAL | Age: 49
Discharge: HOME | End: 2019-11-21
Attending: EMERGENCY MEDICINE
Payer: MEDICAID

## 2019-11-21 VITALS
TEMPERATURE: 99 F | BODY MASS INDEX: 27.99 KG/M2 | SYSTOLIC BLOOD PRESSURE: 118 MMHG | DIASTOLIC BLOOD PRESSURE: 72 MMHG | HEIGHT: 65 IN | RESPIRATION RATE: 16 BRPM | HEART RATE: 72 BPM | WEIGHT: 168 LBS | OXYGEN SATURATION: 99 %

## 2019-11-21 DIAGNOSIS — R30.0 DYSURIA: ICD-10-CM

## 2019-11-21 DIAGNOSIS — N76.0 ACUTE VAGINITIS: Primary | ICD-10-CM

## 2019-11-21 LAB
B-HCG UR QL: NEGATIVE
BACTERIA GENITAL QL WET PREP: ABNORMAL
BILIRUB UR QL STRIP: NEGATIVE
CLARITY UR: CLEAR
CLUE CELLS VAG QL WET PREP: ABNORMAL
COLOR UR: YELLOW
CTP QC/QA: YES
FILAMENT FUNGI VAG WET PREP-#/AREA: ABNORMAL
GLUCOSE UR QL STRIP: NEGATIVE
HGB UR QL STRIP: NEGATIVE
KETONES UR QL STRIP: ABNORMAL
LEUKOCYTE ESTERASE UR QL STRIP: ABNORMAL
MICROSCOPIC COMMENT: ABNORMAL
NITRITE UR QL STRIP: NEGATIVE
PH UR STRIP: 5 [PH] (ref 5–8)
PROT UR QL STRIP: NEGATIVE
RBC #/AREA URNS HPF: 6 /HPF (ref 0–4)
SP GR UR STRIP: 1.03 (ref 1–1.03)
SPECIMEN SOURCE: ABNORMAL
T VAGINALIS GENITAL QL WET PREP: ABNORMAL
URN SPEC COLLECT METH UR: ABNORMAL
UROBILINOGEN UR STRIP-ACNC: NEGATIVE EU/DL
WBC #/AREA URNS HPF: 5 /HPF (ref 0–5)
WBC #/AREA VAG WET PREP: ABNORMAL
YEAST GENITAL QL WET PREP: ABNORMAL

## 2019-11-21 PROCEDURE — 87210 SMEAR WET MOUNT SALINE/INK: CPT

## 2019-11-21 PROCEDURE — 81025 URINE PREGNANCY TEST: CPT | Performed by: EMERGENCY MEDICINE

## 2019-11-21 PROCEDURE — 25000003 PHARM REV CODE 250: Performed by: PHYSICIAN ASSISTANT

## 2019-11-21 PROCEDURE — 81000 URINALYSIS NONAUTO W/SCOPE: CPT

## 2019-11-21 PROCEDURE — 99284 EMERGENCY DEPT VISIT MOD MDM: CPT

## 2019-11-21 PROCEDURE — 87491 CHLMYD TRACH DNA AMP PROBE: CPT

## 2019-11-21 RX ORDER — NITROFURANTOIN 25; 75 MG/1; MG/1
100 CAPSULE ORAL 2 TIMES DAILY
Qty: 10 CAPSULE | Refills: 0 | Status: SHIPPED | OUTPATIENT
Start: 2019-11-21 | End: 2019-11-26

## 2019-11-21 RX ORDER — FLUCONAZOLE 50 MG/1
150 TABLET ORAL
Status: COMPLETED | OUTPATIENT
Start: 2019-11-21 | End: 2019-11-21

## 2019-11-21 RX ORDER — FLUCONAZOLE 150 MG/1
150 TABLET ORAL ONCE
Qty: 1 TABLET | Refills: 0 | Status: SHIPPED | OUTPATIENT
Start: 2019-11-21 | End: 2019-11-21

## 2019-11-21 RX ADMIN — FLUCONAZOLE 150 MG: 50 TABLET ORAL at 06:11

## 2019-11-21 NOTE — ED PROVIDER NOTES
Encounter Date: 2019       History     Chief Complaint   Patient presents with    Groin Swelling     reports vaginal swelling and pain. itching. reports she's been trying to treat it with yogurt as if she had a yeast infection.      49-year-old female with history of anemia, celiac disease, hypertension, history of SVT, with chief complaint vaginal irritation, discomfort, labial swelling, all worsening times 2-3 days.  Denies vaginal discharge. Denies suspicion for STI or new sexual partner.  She admits to dysuria x2 days.  No increased urinary frequency, no hematuria, no strong odor to urine.  No flank pain. No nausea/vomiting.  No fever.  No alleviating or exacerbating factors.  No radiation of pain. No significant abdominal pain. Symptoms are acute, constant, severity 5/10.        Review of patient's allergies indicates:   Allergen Reactions    Latex, natural rubber Itching and Rash     Past Medical History:   Diagnosis Date    Anemia     Celiac disease     Diabetes mellitus     Gall stones     Hypertension     PTSD (post-traumatic stress disorder)     Supraventricular tachycardia      Past Surgical History:   Procedure Laterality Date    ABDOMINAL SURGERY      Gastric sleeve    ANKLE FUSION      BREAST SURGERY       SECTION      placenta previa    CHOLECYSTECTOMY      EXCISIONAL BIOPSY Left 2019    Procedure: EXCISIONAL BIOPSY LEFT with SEED (CONSENT AM OF) 1.0 hr case;  Surgeon: Flor Rosas MD;  Location: [facility] OR [address] [place] [address];  Service: General;  Laterality: Left;    gastric      gastric sleeve      pt reported     HERNIA REPAIR      HYSTERECTOMY  2001    Inguinal hernia  2004     Family History   Problem Relation Age of Onset    Heart disease Father     Diabetes Mother         hypoglycemic    Breast cancer Maternal Aunt     Breast cancer Sister     Breast cancer Paternal Aunt     Ovarian cancer Neg Hx      Social History     Tobacco Use    Smoking  status: Never Smoker    Smokeless tobacco: Never Used   Substance Use Topics    Alcohol use: Yes     Comment: rarely    Drug use: No     Review of Systems   Constitutional: Negative for chills and fever.   HENT: Negative for congestion, rhinorrhea and sore throat.    Eyes: Negative.    Respiratory: Negative for shortness of breath.    Cardiovascular: Negative for chest pain.   Gastrointestinal: Negative for abdominal pain, constipation, diarrhea, nausea and vomiting.   Endocrine: Negative.    Genitourinary: Positive for dysuria and vaginal pain. Negative for difficulty urinating, flank pain, frequency, hematuria, pelvic pain, vaginal bleeding and vaginal discharge.   Musculoskeletal: Negative for back pain, neck pain and neck stiffness.   Skin: Negative for rash.   Neurological: Negative for weakness and headaches.   Hematological: Does not bruise/bleed easily.   Psychiatric/Behavioral: Negative.    All other systems reviewed and are negative.      Physical Exam     Initial Vitals [11/21/19 0456]   BP Pulse Resp Temp SpO2   126/79 85 15 98.7 °F (37.1 °C) 98 %      MAP       --         Physical Exam    Nursing note and vitals reviewed.  Constitutional: She appears well-developed and well-nourished. She is not diaphoretic. No distress.   HENT:   Head: Normocephalic and atraumatic.   Eyes: Conjunctivae and EOM are normal. Pupils are equal, round, and reactive to light.   Neck: Normal range of motion. Neck supple. No tracheal deviation present.   Cardiovascular: Intact distal pulses.   Pulmonary/Chest: No stridor. No respiratory distress.   Abdominal: There is no tenderness.   Genitourinary:   Genitourinary Comments: No vesicular lesions or open sores to external genitalia. Mild irritation/swelling to inner aspect of labia minora bilaterally. Curd-like d/c within vagina. No gross bleeding, tissue within vaginal vault. No adnexal mass or ttp. Cervix absent.   Musculoskeletal: Normal range of motion. She exhibits no  tenderness.   Lymphadenopathy:     She has no cervical adenopathy.   Neurological: She is alert and oriented to person, place, and time.   Skin: Skin is warm and dry. Capillary refill takes less than 2 seconds.   Psychiatric: She has a normal mood and affect. Her behavior is normal. Judgment and thought content normal.         ED Course   Procedures  Labs Reviewed   URINALYSIS, REFLEX TO URINE CULTURE - Abnormal; Notable for the following components:       Result Value    Ketones, UA Trace (*)     Leukocytes, UA 1+ (*)     All other components within normal limits    Narrative:     Preferred Collection Type->Urine, Clean Catch   VAGINAL SCREEN - Abnormal; Notable for the following components:    WBC - Vaginal Screen Occasional (*)     Bacteria - Vaginal Screen Moderate (*)     All other components within normal limits   URINALYSIS MICROSCOPIC - Abnormal; Notable for the following components:    RBC, UA 6 (*)     All other components within normal limits    Narrative:     Preferred Collection Type->Urine, Clean Catch   C. TRACHOMATIS/N. GONORRHOEAE BY AMP DNA   POCT URINE PREGNANCY          Imaging Results    None          Medical Decision Making:   Differential Diagnosis:   Yeast vaginitis, UTI, STI  ED Management:  I will treat as yeast vaginitis.  Vaginal screen pending.    She began with vaginal irritation prior to any urinary complaints. There may be a UTI, however based on my pelvic exam, I do think this is a vaginitis rather than a bladder issue.  There is no suprapubic tenderness. There is no flank pain. No significant comorbidities.  Vitals are reassuring.  I will await culture rather than treat empirically.  I have given patient the offer to begin antibiotics should her symptoms persist.  I will discharge her with a prescription of Macrobid.  She will follow up with her OBGYN.                                 Clinical Impression:       ICD-10-CM ICD-9-CM   1. Acute vaginitis N76.0 616.10   2. Dysuria R30.0  788.1         Disposition:   Disposition: Discharged  Condition: Stable                     Cosmo Heredia PA-C  11/21/19 0613

## 2019-11-21 NOTE — DISCHARGE INSTRUCTIONS
Take 2nd dose of Diflucan in 72 hr.  If no relief even with Diflucan dosing, begin taking Macrobid.    Follow-up with your OBGYN as planned.  ReturnTo this ED if you begin with flank pain, if you begin with abdominal pain, if you begin with fever, if any other problems occur.

## 2019-11-22 LAB
C TRACH DNA SPEC QL NAA+PROBE: NOT DETECTED
N GONORRHOEA DNA SPEC QL NAA+PROBE: NOT DETECTED

## 2019-11-23 ENCOUNTER — NURSE TRIAGE (OUTPATIENT)
Dept: ADMINISTRATIVE | Facility: CLINIC | Age: 49
End: 2019-11-23

## 2019-11-23 ENCOUNTER — HOSPITAL ENCOUNTER (EMERGENCY)
Facility: HOSPITAL | Age: 49
Discharge: HOME | End: 2019-11-23
Attending: EMERGENCY MEDICINE
Payer: MEDICAID

## 2019-11-23 VITALS
OXYGEN SATURATION: 99 % | HEIGHT: 65 IN | DIASTOLIC BLOOD PRESSURE: 78 MMHG | HEART RATE: 88 BPM | BODY MASS INDEX: 27.99 KG/M2 | WEIGHT: 168 LBS | SYSTOLIC BLOOD PRESSURE: 121 MMHG | TEMPERATURE: 99 F | RESPIRATION RATE: 20 BRPM

## 2019-11-23 DIAGNOSIS — B96.89 BACTERIAL VAGINOSIS: Primary | ICD-10-CM

## 2019-11-23 DIAGNOSIS — N76.0 BACTERIAL VAGINOSIS: Primary | ICD-10-CM

## 2019-11-23 LAB
B-HCG UR QL: NEGATIVE
BACTERIA GENITAL QL WET PREP: ABNORMAL
BILIRUB UR QL STRIP: NEGATIVE
CLARITY UR: CLEAR
CLUE CELLS VAG QL WET PREP: ABNORMAL
COLOR UR: YELLOW
CTP QC/QA: YES
FILAMENT FUNGI VAG WET PREP-#/AREA: ABNORMAL
GLUCOSE UR QL STRIP: NEGATIVE
HGB UR QL STRIP: NEGATIVE
KETONES UR QL STRIP: NEGATIVE
LEUKOCYTE ESTERASE UR QL STRIP: ABNORMAL
MICROSCOPIC COMMENT: NORMAL
NITRITE UR QL STRIP: NEGATIVE
PH UR STRIP: 6 [PH] (ref 5–8)
PROT UR QL STRIP: NEGATIVE
RBC #/AREA URNS HPF: 2 /HPF (ref 0–4)
SP GR UR STRIP: 1.02 (ref 1–1.03)
SPECIMEN SOURCE: ABNORMAL
SQUAMOUS #/AREA URNS HPF: NORMAL /HPF
T VAGINALIS GENITAL QL WET PREP: ABNORMAL
URN SPEC COLLECT METH UR: ABNORMAL
UROBILINOGEN UR STRIP-ACNC: NEGATIVE EU/DL
WBC #/AREA URNS HPF: 4 /HPF (ref 0–5)
WBC #/AREA VAG WET PREP: ABNORMAL
YEAST GENITAL QL WET PREP: ABNORMAL

## 2019-11-23 PROCEDURE — 81000 URINALYSIS NONAUTO W/SCOPE: CPT

## 2019-11-23 PROCEDURE — 87210 SMEAR WET MOUNT SALINE/INK: CPT

## 2019-11-23 PROCEDURE — 81025 URINE PREGNANCY TEST: CPT | Performed by: EMERGENCY MEDICINE

## 2019-11-23 PROCEDURE — 99284 EMERGENCY DEPT VISIT MOD MDM: CPT

## 2019-11-23 RX ORDER — METRONIDAZOLE 7.5 MG/G
GEL TOPICAL DAILY
Qty: 1 TUBE | Refills: 0 | Status: SHIPPED | OUTPATIENT
Start: 2019-11-23 | End: 2019-11-28

## 2019-11-23 RX ORDER — METRONIDAZOLE 500 MG/1
500 TABLET ORAL EVERY 12 HOURS
Qty: 14 TABLET | Refills: 0 | Status: SHIPPED | OUTPATIENT
Start: 2019-11-23 | End: 2019-12-04

## 2019-11-23 NOTE — TELEPHONE ENCOUNTER
Reason for Disposition   Second attempt to contact family AND no contact made.  Phone number verified.    Additional Information   Negative: Caller is angry or rude (e.g., hangs up, verbally abusive, yelling)   Negative: Caller hangs up   Negative: Caller has already spoken with the PCP and has no further questions.   Negative: Caller has already spoken with another triager and has no further questions.   Negative: Caller has already spoken with another triager or PCP AND has further questions AND triager able to answer questions.   Negative: Busy signal.  First attempt to contact caller.  Follow-up call scheduled within 15 minutes.   Negative: No answer.  First attempt to contact caller.  Follow-up call scheduled within 15 minutes.   Negative: Message left on identified voice mail   Negative: Message left on unidentified voice mail.  Phone number verified.   Negative: Message left with person in household.   Negative: Wrong number reached.  Phone number verified.    Protocols used: NO CONTACT OR DUPLICATE [facility]

## 2019-11-23 NOTE — ED TRIAGE NOTES
Pt c/o vaginal pain/irritation with odor and discharge that started last night. Pt reports being dx with yeast infection on 11/21/2019 and states she took fluconazole and had relief that same day before symptoms suddenly returned. Pt reports she was supposed to wait 72 hours to take another dose but states she came in because of how bad the pain is. Pain is 10/10. Denies taking any meds for symptoms

## 2019-12-04 ENCOUNTER — OFFICE VISIT (OUTPATIENT)
Dept: OBSTETRICS AND GYNECOLOGY | Facility: CLINIC | Age: 49
End: 2019-12-04
Payer: MEDICAID

## 2019-12-04 ENCOUNTER — LAB VISIT (OUTPATIENT)
Dept: LAB | Facility: OTHER | Age: 49
End: 2019-12-04
Attending: OBSTETRICS & GYNECOLOGY
Payer: MEDICAID

## 2019-12-04 VITALS
SYSTOLIC BLOOD PRESSURE: 118 MMHG | DIASTOLIC BLOOD PRESSURE: 74 MMHG | BODY MASS INDEX: 31.7 KG/M2 | WEIGHT: 190.25 LBS | HEIGHT: 65 IN

## 2019-12-04 DIAGNOSIS — Z01.419 WELL WOMAN EXAM WITH ROUTINE GYNECOLOGICAL EXAM: Primary | ICD-10-CM

## 2019-12-04 DIAGNOSIS — N89.8 VAGINAL DISCHARGE: ICD-10-CM

## 2019-12-04 DIAGNOSIS — Z11.3 SCREEN FOR STD (SEXUALLY TRANSMITTED DISEASE): ICD-10-CM

## 2019-12-04 DIAGNOSIS — Z12.11 SCREEN FOR COLON CANCER: ICD-10-CM

## 2019-12-04 PROCEDURE — 99396 PR PREVENTIVE VISIT,EST,40-64: ICD-10-PCS | Mod: S$PBB,,, | Performed by: OBSTETRICS & GYNECOLOGY

## 2019-12-04 PROCEDURE — 87661 TRICHOMONAS VAGINALIS AMPLIF: CPT

## 2019-12-04 PROCEDURE — 86703 HIV-1/HIV-2 1 RESULT ANTBDY: CPT

## 2019-12-04 PROCEDURE — 86592 SYPHILIS TEST NON-TREP QUAL: CPT

## 2019-12-04 PROCEDURE — 36415 COLL VENOUS BLD VENIPUNCTURE: CPT

## 2019-12-04 PROCEDURE — 80074 ACUTE HEPATITIS PANEL: CPT

## 2019-12-04 PROCEDURE — 99396 PREV VISIT EST AGE 40-64: CPT | Mod: S$PBB,,, | Performed by: OBSTETRICS & GYNECOLOGY

## 2019-12-04 PROCEDURE — 99213 OFFICE O/P EST LOW 20 MIN: CPT | Mod: PBBFAC | Performed by: OBSTETRICS & GYNECOLOGY

## 2019-12-04 PROCEDURE — 87801 DETECT AGNT MULT DNA AMPLI: CPT

## 2019-12-04 PROCEDURE — 99999 PR PBB SHADOW E&M-EST. PATIENT-LVL III: ICD-10-PCS | Mod: PBBFAC,,, | Performed by: OBSTETRICS & GYNECOLOGY

## 2019-12-04 PROCEDURE — 99999 PR PBB SHADOW E&M-EST. PATIENT-LVL III: CPT | Mod: PBBFAC,,, | Performed by: OBSTETRICS & GYNECOLOGY

## 2019-12-04 PROCEDURE — 87481 CANDIDA DNA AMP PROBE: CPT | Mod: 59

## 2019-12-04 PROCEDURE — 87491 CHLMYD TRACH DNA AMP PROBE: CPT

## 2019-12-04 RX ORDER — DEXTROAMPHETAMINE SULFATE, DEXTROAMPHETAMINE SACCHARATE, AMPHETAMINE SULFATE AND AMPHETAMINE ASPARTATE 6.25; 6.25; 6.25; 6.25 MG/1; MG/1; MG/1; MG/1
CAPSULE, EXTENDED RELEASE ORAL
Refills: 0 | COMMUNITY
Start: 2019-11-25 | End: 2020-08-23

## 2019-12-04 RX ORDER — DEXTROAMPHETAMINE SULFATE, DEXTROAMPHETAMINE SACCHARATE, AMPHETAMINE SULFATE AND AMPHETAMINE ASPARTATE 7.5; 7.5; 7.5; 7.5 MG/1; MG/1; MG/1; MG/1
CAPSULE, EXTENDED RELEASE ORAL
Refills: 0 | COMMUNITY
Start: 2019-10-19 | End: 2020-08-23

## 2019-12-04 NOTE — PROGRESS NOTES
History & Physical  Gynecology      SUBJECTIVE:     Chief Complaint: Well Woman       History of Present Illness:  Annual Exam-Postmenopausal  Patient presents for annual exam. The patient has complaints today.  Recently had a yeast and a BV infection. Patient denies post-menopausal vaginal bleeding. The patient is sexually active. The patient is not currently taking hormone replacement therapy.  The patient participates in regular exercise: yes.  She does not smoke.     GYN screening history: hysterectomy  Mammogram history: ordered-- recently lumpectomy  Colonoscopy history: 5 years ago, was told to return in five years; had polyps  Dexa history: n/a    FH:   Breast cancer: maternal and paternal aunts, sister  Colon cancer: none  Ovarian cancer: neg    Review of patient's allergies indicates:   Allergen Reactions    Lidocaine Other (See Comments)     Unknown reaction    Latex, natural rubber Itching and Rash       Past Medical History:   Diagnosis Date    Anemia     Celiac disease     Diabetes mellitus     Gall stones     Hypertension     PTSD (post-traumatic stress disorder)     Supraventricular tachycardia     Yeast infection      Past Surgical History:   Procedure Laterality Date    ABDOMINAL SURGERY      Gastric sleeve    ANKLE FUSION      BREAST SURGERY       SECTION      placenta previa    CHOLECYSTECTOMY  2009    EXCISIONAL BIOPSY Left 2019    Procedure: EXCISIONAL BIOPSY LEFT with SEED (CONSENT AM OF) 1.0 hr case;  Surgeon: Flor Rosas MD;  Location: [facility] OR [address] [place] [address];  Service: General;  Laterality: Left;    gastric      gastric sleeve      pt reported     HERNIA REPAIR      HYSTERECTOMY  2001    Inguinal hernia  2004     OB History        4    Para   2    Term   1       1    AB   2    Living   2       SAB   2    TAB        Ectopic        Multiple        Live Births   2               Family History   Problem Relation Age of Onset    Heart  disease Father     Diabetes Mother         hypoglycemic    Breast cancer Maternal Aunt     Breast cancer Sister     Breast cancer Paternal Aunt     Ovarian cancer Neg Hx      Social History     Tobacco Use    Smoking status: Never Smoker    Smokeless tobacco: Never Used   Substance Use Topics    Alcohol use: Yes     Comment: rarely    Drug use: No       Current Outpatient Medications   Medication Sig    ADDERALL XR 25 mg 24 hr capsule TK 1 C PO ONCE D IN THE MORNING    ADDERALL XR 30 mg 24 hr capsule     eszopiclone (LUNESTA) 3 mg Tab TK 1 T PO QD HS    ranitidine (ZANTAC) 150 MG capsule Take 1 capsule (150 mg total) by mouth 2 (two) times daily.     No current facility-administered medications for this visit.        Review of Systems:  Review of Systems   Constitutional: Negative for activity change, appetite change and fever.   Respiratory: Negative for shortness of breath.    Cardiovascular: Negative for chest pain.   Gastrointestinal: Negative for abdominal pain, constipation, diarrhea, nausea and vomiting.   Genitourinary: Positive for vaginal discharge. Negative for menorrhagia, menstrual problem, pelvic pain, vaginal bleeding, vaginal pain and vaginal odor.   Integumentary:  Negative for nipple discharge.   Neurological: Negative for numbness and headaches.   Breast: Positive for mastodynia (after lumpectomy).Negative for nipple discharge       OBJECTIVE:     Physical Exam:  Physical Exam   Constitutional: She is oriented to person, place, and time. She appears well-developed and well-nourished.   Neck: Normal range of motion. Neck supple. No tracheal deviation present. No thyromegaly present.   Cardiovascular: Normal rate, regular rhythm and normal heart sounds. Exam reveals no gallop and no friction rub.   No murmur heard.  Pulmonary/Chest: Effort normal and breath sounds normal. No respiratory distress. She has no wheezes. She has no rales. Right breast exhibits no inverted nipple, no mass, no  nipple discharge, no skin change and no tenderness. Left breast exhibits no inverted nipple, no mass, no nipple discharge, no skin change and no tenderness. Breasts are symmetrical.   Abdominal: Soft. Bowel sounds are normal. She exhibits no distension. There is no tenderness. There is no rebound and no guarding.   Genitourinary: Vagina normal. No labial fusion. There is no rash, tenderness, lesion or injury on the right labia. There is no rash, tenderness, lesion or injury on the left labia. Right adnexum displays no mass, no tenderness and no fullness. Left adnexum displays no mass, no tenderness and no fullness. No erythema, tenderness or bleeding in the vagina. No foreign body in the vagina. No signs of injury around the vagina. No vaginal discharge found.   Genitourinary Comments: Urethra: normal appearing urethra with no masses, tenderness or lesions  Urethral meatus: normal size, anterior vaginal wall with no prolapse, no lesions   Neurological: She is alert and oriented to person, place, and time.   Psychiatric: She has a normal mood and affect. Her behavior is normal. Judgment and thought content normal.   Nursing note and vitals reviewed.      Chaperoned by: Merna    ASSESSMENT:       ICD-10-CM ICD-9-CM    1. Well woman exam with routine gynecological exam Z01.419 V72.31    2. Vaginal discharge N89.8 623.5 Vaginosis Screen by DNA Probe   3. Screen for colon cancer Z12.11 V76.51 Case request GI: COLONOSCOPY   4. Screen for STD (sexually transmitted disease) Z11.3 V74.5 C. trachomatis/N. gonorrhoeae by AMP DNA      HIV 1/2 Ag/Ab (4th Gen)      RPR      Hepatitis panel, acute          Plan:      Aide was seen today for well woman.    Diagnoses and all orders for this visit:    Well woman exam with routine gynecological exam    Vaginal discharge  -     Vaginosis Screen by DNA Probe    Screen for colon cancer  -     Case request GI: COLONOSCOPY    Screen for STD (sexually transmitted disease)  -     C.  trachomatis/N. gonorrhoeae by AMP DNA  -     HIV 1/2 Ag/Ab (4th Gen); Future  -     RPR; Future  -     Hepatitis panel, acute; Future        Orders Placed This Encounter   Procedures    Vaginosis Screen by DNA Probe    C. trachomatis/N. gonorrhoeae by AMP DNA    HIV 1/2 Ag/Ab (4th Gen)    RPR    Hepatitis panel, acute       Well Woman:   - Pap smear: no longer needed  - Mammogram: scheduled for March  - Colonoscopy: ordered, given number  - Dexa: due age 65  - Immunizations: n/a  - Labs: HIV, Syphilis (RPR), Hepatitis   - Exercise counseling provided  - vaginosis screen today        Follow up in one year for annual, or prn.    Radha Alcocer

## 2019-12-05 LAB
C TRACH DNA SPEC QL NAA+PROBE: NOT DETECTED
HAV IGM SERPL QL IA: NEGATIVE
HBV CORE IGM SERPL QL IA: NEGATIVE
HBV SURFACE AG SERPL QL IA: NEGATIVE
HCV AB SERPL QL IA: NEGATIVE
HIV 1+2 AB+HIV1 P24 AG SERPL QL IA: NEGATIVE
N GONORRHOEA DNA SPEC QL NAA+PROBE: NOT DETECTED
RPR SER QL: NORMAL

## 2019-12-07 LAB
BACTERIAL VAGINOSIS DNA: NEGATIVE
CANDIDA GLABRATA DNA: NEGATIVE
CANDIDA KRUSEI DNA: NEGATIVE
CANDIDA RRNA VAG QL PROBE: NEGATIVE
T VAGINALIS RRNA GENITAL QL PROBE: NEGATIVE

## 2019-12-19 ENCOUNTER — TELEPHONE (OUTPATIENT)
Dept: OBSTETRICS AND GYNECOLOGY | Facility: CLINIC | Age: 49
End: 2019-12-19

## 2019-12-19 DIAGNOSIS — B37.31 YEAST INFECTION INVOLVING THE VAGINA AND SURROUNDING AREA: Primary | ICD-10-CM

## 2019-12-19 RX ORDER — FLUCONAZOLE 150 MG/1
150 TABLET ORAL ONCE
Qty: 1 TABLET | Refills: 0 | Status: SHIPPED | OUTPATIENT
Start: 2019-12-19 | End: 2019-12-19

## 2019-12-19 NOTE — TELEPHONE ENCOUNTER
"----- Message from Fouzia Lee sent at 12/19/2019 10:08 AM CST -----  Contact: RAGHU LENTZ [[id number]]  Name of Who is Calling:  RAGHU LENTZ [[id number]]      What is the request in detail:   Patient called requesting medication for a possible yeast infection. Patient states, "she would like to have Diflucan called into her local pharmacy."      Please give a call back at your earliest convenience and further advise.       THANKS!      Reply by [facility]: no      Call Back: RAGHU LENTZ / ph# [phone] (M)                                      "

## 2019-12-19 NOTE — TELEPHONE ENCOUNTER
Spoke with patient in regards to her having a possible Yeast Infection. She would like diflucan sent to her local pharmacy. Medication request sent over to

## 2020-01-23 ENCOUNTER — OFFICE VISIT (OUTPATIENT)
Dept: URGENT CARE | Facility: CLINIC | Age: 50
End: 2020-01-23
Payer: MEDICAID

## 2020-01-23 VITALS
DIASTOLIC BLOOD PRESSURE: 87 MMHG | HEIGHT: 65 IN | TEMPERATURE: 99 F | WEIGHT: 190 LBS | HEART RATE: 101 BPM | SYSTOLIC BLOOD PRESSURE: 136 MMHG | OXYGEN SATURATION: 98 % | BODY MASS INDEX: 31.65 KG/M2 | RESPIRATION RATE: 12 BRPM

## 2020-01-23 DIAGNOSIS — J06.9 VIRAL URI WITH COUGH: Primary | ICD-10-CM

## 2020-01-23 DIAGNOSIS — R50.9 FEVER, UNSPECIFIED FEVER CAUSE: ICD-10-CM

## 2020-01-23 LAB
CTP QC/QA: YES
CTP QC/QA: YES
FLUAV AG NPH QL: NEGATIVE
FLUBV AG NPH QL: NEGATIVE
MOLECULAR STREP A: NEGATIVE

## 2020-01-23 PROCEDURE — 99214 OFFICE O/P EST MOD 30 MIN: CPT | Mod: 25,S$GLB,, | Performed by: PHYSICIAN ASSISTANT

## 2020-01-23 PROCEDURE — 87651 STREP A DNA AMP PROBE: CPT | Mod: QW,S$GLB,, | Performed by: PHYSICIAN ASSISTANT

## 2020-01-23 PROCEDURE — 87804 INFLUENZA ASSAY W/OPTIC: CPT | Mod: QW,S$GLB,, | Performed by: PHYSICIAN ASSISTANT

## 2020-01-23 PROCEDURE — 87804 POCT INFLUENZA A/B: ICD-10-PCS | Mod: 59,QW,S$GLB, | Performed by: PHYSICIAN ASSISTANT

## 2020-01-23 PROCEDURE — 99214 PR OFFICE/OUTPT VISIT, EST, LEVL IV, 30-39 MIN: ICD-10-PCS | Mod: 25,S$GLB,, | Performed by: PHYSICIAN ASSISTANT

## 2020-01-23 PROCEDURE — 87651 POCT STREP A MOLECULAR: ICD-10-PCS | Mod: QW,S$GLB,, | Performed by: PHYSICIAN ASSISTANT

## 2020-01-23 RX ORDER — PREDNISONE 10 MG/1
20 TABLET ORAL DAILY
Qty: 6 TABLET | Refills: 0 | Status: SHIPPED | OUTPATIENT
Start: 2020-01-23 | End: 2020-01-26

## 2020-01-23 RX ORDER — PROMETHAZINE HYDROCHLORIDE AND DEXTROMETHORPHAN HYDROBROMIDE 6.25; 15 MG/5ML; MG/5ML
5 SYRUP ORAL NIGHTLY PRN
Qty: 150 ML | Refills: 0 | Status: SHIPPED | OUTPATIENT
Start: 2020-01-23 | End: 2020-02-02

## 2020-01-23 RX ORDER — BENZONATATE 100 MG/1
100 CAPSULE ORAL 3 TIMES DAILY PRN
Qty: 30 CAPSULE | Refills: 0 | Status: SHIPPED | OUTPATIENT
Start: 2020-01-23 | End: 2020-02-02

## 2020-01-23 NOTE — PROGRESS NOTES
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 female.    Vitals:  height is 5' 5" (1.651 m) and weight is 86.2 kg (190 lb). Her temperature is 99.2 °F (37.3 °C). Her blood pressure is 136/87 and her pulse is 101. Her respiration is 12 and oxygen saturation is 98%.     Chief Complaint: Generalized Body Aches    Patient states that yesterday morning, she woke up with sneezing, runny nose, and feeling weak. States that she took a benedryl which didn't help. This morning, she states that she woke up feeling even worse with cough and sore throat. States that she felt warm so she took a Tylenol.      Constitution: Positive for chills, sweating, fatigue, fever and generalized weakness.   HENT: Negative for congestion and sore throat.    Neck: Negative for painful lymph nodes.   Cardiovascular: Negative for chest pain and leg swelling.   Eyes: Negative for double vision and blurred vision.   Respiratory: Positive for cough. Negative for shortness of breath.    Gastrointestinal: Negative for nausea, vomiting and diarrhea.   Genitourinary: Negative for dysuria, frequency, urgency and history of kidney stones.   Musculoskeletal: Negative for joint pain, joint swelling, muscle cramps and muscle ache.   Skin: Negative for color change, pale, rash and bruising.   Allergic/Immunologic: Negative for seasonal allergies.   Neurological: Negative for dizziness, history of vertigo, light-headedness, passing out and headaches.   Hematologic/Lymphatic: Negative for swollen lymph nodes.   Psychiatric/Behavioral: Negative for nervous/anxious, sleep disturbance and depression. The patient is not nervous/anxious.        Objective:      Physical Exam   Constitutional: She is oriented to person, place, and time. She appears well-developed and well-nourished. She is cooperative.  Non-toxic appearance. She does not have a sickly appearance. She appears ill. No distress.   HENT:   Head: Normocephalic and atraumatic.   Right Ear: " Hearing, tympanic membrane, external ear and ear canal normal.   Left Ear: Hearing, tympanic membrane, external ear and ear canal normal.   Nose: Mucosal edema and rhinorrhea present. No nasal deformity. No epistaxis. Right sinus exhibits no maxillary sinus tenderness and no frontal sinus tenderness. Left sinus exhibits no maxillary sinus tenderness and no frontal sinus tenderness.   Mouth/Throat: Uvula is midline and mucous membranes are normal. No trismus in the jaw. Normal dentition. No uvula swelling. Posterior oropharyngeal erythema present. No oropharyngeal exudate or posterior oropharyngeal edema. Tonsils are 1+ on the right. Tonsils are 1+ on the left. Tonsillar exudate (left tonsil).   Eyes: Conjunctivae and lids are normal. No scleral icterus.   Neck: Trachea normal, full passive range of motion without pain and phonation normal. Neck supple. No neck rigidity. No edema and no erythema present.   Cardiovascular: Normal rate, regular rhythm, normal heart sounds, intact distal pulses and normal pulses.   Pulmonary/Chest: Effort normal and breath sounds normal. No respiratory distress. She has no decreased breath sounds. She has no wheezes. She has no rhonchi.   Abdominal: Normal appearance.   Musculoskeletal: Normal range of motion. She exhibits no edema or deformity.   Neurological: She is alert and oriented to person, place, and time. She exhibits normal muscle tone. Coordination normal.   Skin: Skin is warm, dry, intact, not diaphoretic and not pale.   Psychiatric: She has a normal mood and affect. Her speech is normal and behavior is normal. Judgment and thought content normal. Cognition and memory are normal.   Nursing note and vitals reviewed.        Recent Results (from the past 48 hour(s))   POCT Influenza A/B    Collection Time: 01/23/20 10:00 AM   Result Value Ref Range    Rapid Influenza A Ag Negative Negative    Rapid Influenza B Ag Negative Negative     Acceptable Yes    POCT Strep  A, Molecular    Collection Time: 01/23/20 10:32 AM   Result Value Ref Range    Molecular Strep A, POC Negative Negative     Acceptable Yes    ]    Assessment:       1. Viral URI with cough    2. Fever, unspecified fever cause        Plan:         Viral URI with cough  -     benzonatate (TESSALON) 100 MG capsule; Take 1 capsule (100 mg total) by mouth 3 (three) times daily as needed.  Dispense: 30 capsule; Refill: 0  -     promethazine-dextromethorphan (PROMETHAZINE-DM) 6.25-15 mg/5 mL Syrp; Take 5 mLs by mouth nightly as needed (cough).  Dispense: 150 mL; Refill: 0  -     predniSONE (DELTASONE) 10 MG tablet; Take 2 tablets (20 mg total) by mouth once daily. for 3 days  Dispense: 6 tablet; Refill: 0    Fever, unspecified fever cause  -     POCT Influenza A/B  -     POCT Strep A, Molecular          Patient Instructions   General Instructions for Viral URI:    Below are suggestions for symptomatic relief:              -Tylenol every 4 hours OR ibuprofen every 6 hours as needed for pain/fever.              -Salt water gargles to soothe throat pain.              -Chloroseptic spray also helps to numb throat pain.              -Nasal saline spray reduces inflammation and dryness.              -Warm face compresses to help with facial sinus pain/pressure.              -Vicks vapor rub at night.              -Flonase OTC or Nasacort OTC for nasal congestion.              -Simple foods like chicken noodle soup.              -Delsym helps with coughing at night              -Zyrtec/Claritin during the day & Benadryl at night may help with allergies.                If you DO NOT have Hypertension or any history of palpitations, it is ok to take over the counter Sudafed or Mucinex D or Allegra-D or Claritin-D or Zyrtec-D.  If you do take one of the above, it is ok to combine that with plain over the counter Mucinex or Allegra or Claritin or Zyrtec. If, for example, you are taking Zyrtec -D, you can combine that  with Mucinex, but not Mucinex-D.  If you are taking Mucinex-D, you can combine that with plain Allegra or Claritin or Zyrtec.   If you DO have Hypertension or palpitations, it is safe to take Coricidin HBP for relief of sinus symptoms.     Please follow up with your primary care provider within 2-5 days if your signs and symptoms have not resolved or worsen.      If your condition worsens or fails to improve we recommend that you receive another evaluation at the emergency room immediately or contact your primary medical clinic to discuss your concerns.   You must understand that you have received an Urgent Care treatment only and that you may be released before all of your medical problems are known or treated. You, the patient, will arrange for follow up care as instructed.       Viral Upper Respiratory Illness (Adult)  You have a viral upper respiratory illness (URI), which is another term for the common cold. This illness is contagious during the first few days. It is spread through the air by coughing and sneezing. It may also be spread by direct contact (touching the sick person and then touching your own eyes, nose, or mouth). Frequent handwashing will decrease risk of spread. Most viral illnesses go away within 7 to 10 days with rest and simple home remedies. Sometimes the illness may last for several weeks. Antibiotics will not kill a virus, and they are generally not prescribed for this condition.    Home care  · If symptoms are severe, rest at home for the first 2 to 3 days. When you resume activity, don't let yourself get too tired.  · Avoid being exposed to cigarette smoke (yours or others).  · You may use acetaminophen or ibuprofen to control pain and fever, unless another medicine was prescribed. (Note: If you have chronic liver or kidney disease, have ever had a stomach ulcer or gastrointestinal bleeding, or are taking blood-thinning medicines, talk with your healthcare provider before using these  medicines.) Aspirin should never be given to anyone under 18 years of age who is ill with a viral infection or fever. It may cause severe liver or brain damage.  · Your appetite may be poor, so a light diet is fine. Avoid dehydration by drinking 6 to 8 glasses of fluids per day (water, soft drinks, juices, tea, or soup). Extra fluids will help loosen secretions in the nose and lungs.  · Over-the-counter cold medicines will not shorten the length of time youre sick, but they may be helpful for the following symptoms: cough, sore throat, and nasal and sinus congestion. (Note: Do not use decongestants if you have high blood pressure.)  Follow-up care  Follow up with your healthcare provider, or as advised.  When to seek medical advice  Call your healthcare provider right away if any of these occur:  · Cough with lots of colored sputum (mucus)  · Severe headache; face, neck, or ear pain  · Difficulty swallowing due to throat pain  · Fever of 100.4°F (38°C)  Call 911, or get immediate medical care  Call emergency services right away if any of these occur:  · Chest pain, shortness of breath, wheezing, or difficulty breathing  · Coughing up blood  · Inability to swallow due to throat pain  Date Last Reviewed: 9/13/2015  © 8379-1791 [employer]. [address] [place] [address], [place] [address]. All rights reserved. This information is not intended as a substitute for professional medical care. Always follow your healthcare professional's instructions.

## 2020-01-23 NOTE — LETTER
January 23, 2020      [facility] [address], [address]  [place] [address]  Phone: [phone]  Fax: [phone]       Patient: Aide Almaraz   YOB: 1970  Date of Visit: 01/23/2020    To Whom It May Concern:    Adolfo Almaraz  was at [facility] on 01/23/2020. She may return to work/school on 1/24/2020 with no restrictions. If you have any questions or concerns, or if I can be of further assistance, please do not hesitate to contact me.    Sincerely,      Sanjay Caldera PA-C

## 2020-01-23 NOTE — PATIENT INSTRUCTIONS
General Instructions for Viral URI:    Below are suggestions for symptomatic relief:              -Tylenol every 4 hours OR ibuprofen every 6 hours as needed for pain/fever.              -Salt water gargles to soothe throat pain.              -Chloroseptic spray also helps to numb throat pain.              -Nasal saline spray reduces inflammation and dryness.              -Warm face compresses to help with facial sinus pain/pressure.              -Vicks vapor rub at night.              -Flonase OTC or Nasacort OTC for nasal congestion.              -Simple foods like chicken noodle soup.              -Delsym helps with coughing at night              -Zyrtec/Claritin during the day & Benadryl at night may help with allergies.                If you DO NOT have Hypertension or any history of palpitations, it is ok to take over the counter Sudafed or Mucinex D or Allegra-D or Claritin-D or Zyrtec-D.  If you do take one of the above, it is ok to combine that with plain over the counter Mucinex or Allegra or Claritin or Zyrtec. If, for example, you are taking Zyrtec -D, you can combine that with Mucinex, but not Mucinex-D.  If you are taking Mucinex-D, you can combine that with plain Allegra or Claritin or Zyrtec.   If you DO have Hypertension or palpitations, it is safe to take Coricidin HBP for relief of sinus symptoms.     Please follow up with your primary care provider within 2-5 days if your signs and symptoms have not resolved or worsen.      If your condition worsens or fails to improve we recommend that you receive another evaluation at the emergency room immediately or contact your primary medical clinic to discuss your concerns.   You must understand that you have received an Urgent Care treatment only and that you may be released before all of your medical problems are known or treated. You, the patient, will arrange for follow up care as instructed.       Viral Upper Respiratory Illness (Adult)  You have a  viral upper respiratory illness (URI), which is another term for the common cold. This illness is contagious during the first few days. It is spread through the air by coughing and sneezing. It may also be spread by direct contact (touching the sick person and then touching your own eyes, nose, or mouth). Frequent handwashing will decrease risk of spread. Most viral illnesses go away within 7 to 10 days with rest and simple home remedies. Sometimes the illness may last for several weeks. Antibiotics will not kill a virus, and they are generally not prescribed for this condition.    Home care  · If symptoms are severe, rest at home for the first 2 to 3 days. When you resume activity, don't let yourself get too tired.  · Avoid being exposed to cigarette smoke (yours or others).  · You may use acetaminophen or ibuprofen to control pain and fever, unless another medicine was prescribed. (Note: If you have chronic liver or kidney disease, have ever had a stomach ulcer or gastrointestinal bleeding, or are taking blood-thinning medicines, talk with your healthcare provider before using these medicines.) Aspirin should never be given to anyone under 18 years of age who is ill with a viral infection or fever. It may cause severe liver or brain damage.  · Your appetite may be poor, so a light diet is fine. Avoid dehydration by drinking 6 to 8 glasses of fluids per day (water, soft drinks, juices, tea, or soup). Extra fluids will help loosen secretions in the nose and lungs.  · Over-the-counter cold medicines will not shorten the length of time youre sick, but they may be helpful for the following symptoms: cough, sore throat, and nasal and sinus congestion. (Note: Do not use decongestants if you have high blood pressure.)  Follow-up care  Follow up with your healthcare provider, or as advised.  When to seek medical advice  Call your healthcare provider right away if any of these occur:  · Cough with lots of colored sputum  (mucus)  · Severe headache; face, neck, or ear pain  · Difficulty swallowing due to throat pain  · Fever of 100.4°F (38°C)  Call 911, or get immediate medical care  Call emergency services right away if any of these occur:  · Chest pain, shortness of breath, wheezing, or difficulty breathing  · Coughing up blood  · Inability to swallow due to throat pain  Date Last Reviewed: 9/13/2015  © 4186-3383 [employer]. [address] [place] [address], [place] [address]. All rights reserved. This information is not intended as a substitute for professional medical care. Always follow your healthcare professional's instructions.

## 2020-01-31 ENCOUNTER — OFFICE VISIT (OUTPATIENT)
Dept: URGENT CARE | Facility: CLINIC | Age: 50
End: 2020-01-31
Payer: MEDICAID

## 2020-01-31 VITALS
WEIGHT: 190 LBS | DIASTOLIC BLOOD PRESSURE: 68 MMHG | SYSTOLIC BLOOD PRESSURE: 121 MMHG | RESPIRATION RATE: 16 BRPM | BODY MASS INDEX: 31.65 KG/M2 | HEART RATE: 70 BPM | OXYGEN SATURATION: 98 % | HEIGHT: 65 IN | TEMPERATURE: 98 F

## 2020-01-31 DIAGNOSIS — K14.3: Primary | ICD-10-CM

## 2020-01-31 LAB
CTP QC/QA: YES
MOLECULAR STREP A: NEGATIVE

## 2020-01-31 PROCEDURE — 87651 STREP A DNA AMP PROBE: CPT | Mod: QW,S$GLB,, | Performed by: INTERNAL MEDICINE

## 2020-01-31 PROCEDURE — 99214 OFFICE O/P EST MOD 30 MIN: CPT | Mod: S$GLB,,, | Performed by: INTERNAL MEDICINE

## 2020-01-31 PROCEDURE — 87651 POCT STREP A MOLECULAR: ICD-10-PCS | Mod: QW,S$GLB,, | Performed by: INTERNAL MEDICINE

## 2020-01-31 PROCEDURE — 99214 PR OFFICE/OUTPT VISIT, EST, LEVL IV, 30-39 MIN: ICD-10-PCS | Mod: S$GLB,,, | Performed by: INTERNAL MEDICINE

## 2020-01-31 RX ORDER — NYSTATIN 100000 [USP'U]/ML
4 SUSPENSION ORAL
Qty: 60 ML | Refills: 0 | Status: SHIPPED | OUTPATIENT
Start: 2020-01-31 | End: 2020-02-05

## 2020-01-31 NOTE — PATIENT INSTRUCTIONS
Canker Sore    A canker sore (also called an aphthous ulcer) is a painful sore on the lining of the mouth. It is most painful during the first few days, and it lasts about 7 to 14 days before going away.  Causes  Canker sores are not cold sores or fever blisters. They are not contagious, so they are not spread by contact. The exact cause of canker sores is not clear, but there are a number of things that can trigger them in different people.  · Mild injury, such as biting the inside of the mouth, lip, or cheek, or dental procedures  · Stress  · Poor diet, or lack of certain nutrients, including B vitamins and iron  · Foods that can irritate the mouth, including tomatoes, citrus fruits, and some nuts (foods that are acidic or contain bitter substances called tannins)  · Irritating chemicals, such as those in some toothpastes and mouthwashes  · Certain chronic illnesses  Symptoms  Canker sores are found on the lining of the mouth. They can be inside the cheeks or lips, on the roof of the mouth, at the base of the gums, on the tongue, or in the back of the throat. Canker sores typically have these characteristics:  · Small, flat (not raised) sores  · Can be white or yellowish bumps that are red around the edges or have a red halo  · Usually small in size, roundish, and in groups  · Accompanied by pain or burning  Canker sores do not leave a scar. But they usually come back.  Home care  The goals of canker sore treatment are to decrease the pain, speed healing, and prevent recurrence. No single treatment works for everyone. Try a number of techniques to see what works best.  General care  · You may find that soft, easy-to-chew foods cause less pain. Use a straw to direct liquids away from the sore.  · Use a soft-bristle toothbrush, and brush your teeth gently.  · Avoid acidic, salty, or spicy foods.  · Avoid injuring the inside of your mouth, or scraping your existing canker sores, by avoiding crusty and crunchy foods  like french bread and chips.  Medicines  You can try over-the-counter medicines that cover the sores and numb them. This protects the sores while they heal and helps reduce pain.  Homemade rinses and solutions  You can use these solutions as mouth rinses. Spit them out after using them. You can also dab them on the sores. You can repeat these treatments as often as needed.  · Rinse your mouth with saltwater.  · Mix equal amounts of hydrogen peroxide and water. You can use it as a mouthwash or dab it on spots with a cotton swab. You can also add sodium bicarbonate to this to make a paste, and then dab it on spots.  Follow-up care  Follow up with your healthcare provider, or as advised.  · If a culture was done, you will be notified if the treatment needs to be changed. You can call as directed for the results.  Call 911  Contact emergency services if any of these occur:  · Trouble breathing  · Inability to swallow  · Extreme drowsiness or trouble awakening  · Fainting or loss of consciousness  · Rapid heart rate  · Seizure  · Stiff neck  When to seek medical advice  Call your healthcare provider right away if any of these occur:  · You have a fever of 100.4°F (38°C) or higher.  · You are pregnant.  · You just had surgery or another medical procedure, or you were just discharged from the hospital.  · You are unable to eat or swallow due to pain.  Date Last Reviewed: 7/30/2015  © 3092-6098 [employer]. [address] [place] [address]. All rights reserved. This information is not intended as a substitute for professional medical care. Always follow your healthcare professional's instructions.      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 I placed a referral to ENT for you - they should be calling in 2-3 days to schedule an appointment. If you don't hear from them, call [facility] to make an  appointment. Use mouth wash

## 2020-01-31 NOTE — LETTER
January 31, 2020      [facility] [address], [address]  [place] [address]  Phone: [phone]  Fax: [phone]       Patient: Aide Almaraz   YOB: 1970  Date of Visit: 01/31/2020    To Whom It May Concern:    Adolfo Almaraz  was at [facility] on 01/31/2020. She may return to work/school on 2/2/2020 with no restrictions. If you have any questions or concerns, or if I can be of further assistance, please do not hesitate to contact me.    Sincerely,    Velia Chand PA-C

## 2020-01-31 NOTE — PROGRESS NOTES
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 female.    Vitals:  height is 5' 5" (1.651 m) and weight is 86.2 kg (190 lb). Her temperature is 97.8 °F (36.6 °C). Her blood pressure is 121/68 and her pulse is 70. Her respiration is 16 and oxygen saturation is 98%.     Chief Complaint: Oral Pain    49 y/o female with PMHx Diabetes Mellitus, HTN, Celiac Disease, and SVT presents to urgent care complaining of sore throat, tongue pain, mouth sores and nasal congestion that started 3 days ago. Symptoms have been constant and moderate since onset. No prior treatment attempted. Pt denies recent illness/travel, fever, chills, ear pain, difficulty swallowing, cough, SOB, chest pain, leg pain/swelling, N/V/D, abdominal pain, back pain, neck pain, headache, vision changes, and rash.      Oral Pain    This is a new problem. Episode onset: 3 days. The problem occurs constantly. The problem has been gradually worsening. The pain is at a severity of 7/10. The pain is moderate. Associated symptoms include difficulty swallowing. Pertinent negatives include no fever. She has tried nothing for the symptoms. The treatment provided no relief.       Constitution: Negative for chills, fatigue and fever.   HENT: Positive for mouth sores, tongue pain, congestion and sore throat. Negative for ear pain and trouble swallowing.    Neck: Negative for neck pain, painful lymph nodes and neck swelling.   Cardiovascular: Negative for chest pain, leg swelling and sob on exertion.   Eyes: Negative for double vision and blurred vision.   Respiratory: Positive for cough. Negative for shortness of breath and wheezing.    Gastrointestinal: Negative for abdominal pain, nausea, vomiting and diarrhea.   Genitourinary: Negative for dysuria, frequency, urgency and history of kidney stones.   Musculoskeletal: Negative for joint pain, joint swelling, back pain, muscle cramps and muscle ache.   Skin: Negative for color change, pale, rash and " bruising.   Allergic/Immunologic: Negative for seasonal allergies.   Neurological: Negative for dizziness, history of vertigo, light-headedness, passing out and headaches.   Hematologic/Lymphatic: Negative for swollen lymph nodes.   Psychiatric/Behavioral: Negative for nervous/anxious, sleep disturbance and depression. The patient is not nervous/anxious.        Objective:      Physical Exam   Constitutional: She is oriented to person, place, and time. She appears well-developed and well-nourished. She is cooperative.  Non-toxic appearance. She does not have a sickly appearance. She does not appear ill. No distress.   HENT:   Head: Normocephalic and atraumatic.   Right Ear: Hearing, tympanic membrane, external ear and ear canal normal.   Left Ear: Hearing, tympanic membrane, external ear and ear canal normal.   Nose: Nose normal. No mucosal edema, rhinorrhea or nasal deformity. No epistaxis. Right sinus exhibits no maxillary sinus tenderness and no frontal sinus tenderness. Left sinus exhibits no maxillary sinus tenderness and no frontal sinus tenderness.   Mouth/Throat: Uvula is midline, oropharynx is clear and moist and mucous membranes are normal. Oral lesions present. No trismus in the jaw. Normal dentition. No uvula swelling. No oropharyngeal exudate, posterior oropharyngeal edema or posterior oropharyngeal erythema.       Eyes: Conjunctivae, EOM and lids are normal. No scleral icterus.   Neck: Trachea normal, full passive range of motion without pain and phonation normal. Neck supple. No neck rigidity. No edema and no erythema present.   Cardiovascular: Normal rate, regular rhythm, normal heart sounds, intact distal pulses and normal pulses.   Pulmonary/Chest: Effort normal and breath sounds normal. No respiratory distress. She has no decreased breath sounds. She has no rhonchi.   Abdominal: Soft. Normal appearance. There is no tenderness.   Musculoskeletal: Normal range of motion. She exhibits no edema or  deformity.   Lymphadenopathy:     She has no cervical adenopathy.   Neurological: She is alert and oriented to person, place, and time. She exhibits normal muscle tone. Coordination normal.   Skin: Skin is warm, dry, intact, not diaphoretic and not pale.   Psychiatric: She has a normal mood and affect. Her speech is normal and behavior is normal. Judgment and thought content normal. Cognition and memory are normal.   Nursing note and vitals reviewed.                Assessment:       1. Lingua villosa nigra        Plan:         Lingua villosa nigra  -     Cancel: POCT Influenza A/B  -     POCT Strep A, Molecular  -     nystatin (MYCOSTATIN) 100,000 unit/mL suspension; Take 4 mLs (400,000 Units total) by mouth after meals as needed.  Dispense: 60 mL; Refill: 0  -     Ambulatory referral to ENT      Results for orders placed or performed in visit on 01/31/20   POCT Strep A, Molecular   Result Value Ref Range    Molecular Strep A, POC Negative Negative     Acceptable Yes      Patient Instructions     Canker Sore    A canker sore (also called an aphthous ulcer) is a painful sore on the lining of the mouth. It is most painful during the first few days, and it lasts about 7 to 14 days before going away.  Causes  Canker sores are not cold sores or fever blisters. They are not contagious, so they are not spread by contact. The exact cause of canker sores is not clear, but there are a number of things that can trigger them in different people.  · Mild injury, such as biting the inside of the mouth, lip, or cheek, or dental procedures  · Stress  · Poor diet, or lack of certain nutrients, including B vitamins and iron  · Foods that can irritate the mouth, including tomatoes, citrus fruits, and some nuts (foods that are acidic or contain bitter substances called tannins)  · Irritating chemicals, such as those in some toothpastes and mouthwashes  · Certain chronic illnesses  Symptoms  Canker sores are found on the  lining of the mouth. They can be inside the cheeks or lips, on the roof of the mouth, at the base of the gums, on the tongue, or in the back of the throat. Canker sores typically have these characteristics:  · Small, flat (not raised) sores  · Can be white or yellowish bumps that are red around the edges or have a red halo  · Usually small in size, roundish, and in groups  · Accompanied by pain or burning  Canker sores do not leave a scar. But they usually come back.  Home care  The goals of canker sore treatment are to decrease the pain, speed healing, and prevent recurrence. No single treatment works for everyone. Try a number of techniques to see what works best.  General care  · You may find that soft, easy-to-chew foods cause less pain. Use a straw to direct liquids away from the sore.  · Use a soft-bristle toothbrush, and brush your teeth gently.  · Avoid acidic, salty, or spicy foods.  · Avoid injuring the inside of your mouth, or scraping your existing canker sores, by avoiding crusty and crunchy foods like french bread and chips.  Medicines  You can try over-the-counter medicines that cover the sores and numb them. This protects the sores while they heal and helps reduce pain.  Homemade rinses and solutions  You can use these solutions as mouth rinses. Spit them out after using them. You can also dab them on the sores. You can repeat these treatments as often as needed.  · Rinse your mouth with saltwater.  · Mix equal amounts of hydrogen peroxide and water. You can use it as a mouthwash or dab it on spots with a cotton swab. You can also add sodium bicarbonate to this to make a paste, and then dab it on spots.  Follow-up care  Follow up with your healthcare provider, or as advised.  · If a culture was done, you will be notified if the treatment needs to be changed. You can call as directed for the results.  Call 911  Contact emergency services if any of these occur:  · Trouble breathing  · Inability to  swallow  · Extreme drowsiness or trouble awakening  · Fainting or loss of consciousness  · Rapid heart rate  · Seizure  · Stiff neck  When to seek medical advice  Call your healthcare provider right away if any of these occur:  · You have a fever of 100.4°F (38°C) or higher.  · You are pregnant.  · You just had surgery or another medical procedure, or you were just discharged from the hospital.  · You are unable to eat or swallow due to pain.  Date Last Reviewed: 7/30/2015  © 8228-4819 [employer]. [address] [place] [address]. All rights reserved. This information is not intended as a substitute for professional medical care. Always follow your healthcare professional's instructions.      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 I placed a referral to ENT for you - they should be calling in 2-3 days to schedule an appointment. If you don't hear from them, call [facility] to make an appointment. Use mouth wash

## 2020-01-31 NOTE — LETTER
January 31, 2020      [facility] [address], [address]  [place] [address]  Phone: [phone]  Fax: [phone]       Patient: Aide Almaraz   YOB: 1970  Date of Visit: 01/31/2020    To Whom It May Concern:    Adolfo Almaraz  was at [facility] on 01/31/2020. She may return to work/school on 02/01/2020 with no restrictions. If you have any questions or concerns, or if I can be of further assistance, please do not hesitate to contact me.    Sincerely,    Maylin Hogan, RT

## 2020-02-17 ENCOUNTER — TELEPHONE (OUTPATIENT)
Dept: OTOLARYNGOLOGY | Facility: CLINIC | Age: 50
End: 2020-02-17

## 2020-02-17 NOTE — TELEPHONE ENCOUNTER
----- Message from Trae Llanos sent at 2/17/2020  8:50 AM CST -----  Contact: self  Type:  Sooner Apoointment Request    Caller is requesting a sooner appointment.  Caller declined first available appointment listed below.  Caller will not accept being placed on the waitlist and is requesting a message be sent to doctor.  Name of Caller:Pt   When is the first available appointment? 5/2020  Symptoms: Lingua villosa nigra  Would the patient rather a call back or a response via [facility]? callback  Best Call Back Number: [phone]  Additional Information: Lingua villosa nigra, Pt stated that mouth is hurting bad and will like to see someone asap

## 2020-08-22 ENCOUNTER — HOSPITAL ENCOUNTER (EMERGENCY)
Facility: HOSPITAL | Age: 50
Discharge: ANOTHER HOSPITAL | End: 2020-08-23
Attending: EMERGENCY MEDICINE
Payer: MEDICAID

## 2020-08-22 DIAGNOSIS — T81.49XA POSTOPERATIVE WOUND INFECTION: Primary | ICD-10-CM

## 2020-08-22 DIAGNOSIS — R50.9 FEVER: ICD-10-CM

## 2020-08-22 LAB — POCT GLUCOSE: 112 MG/DL (ref 70–110)

## 2020-08-22 PROCEDURE — 83880 ASSAY OF NATRIURETIC PEPTIDE: CPT

## 2020-08-22 PROCEDURE — 83690 ASSAY OF LIPASE: CPT

## 2020-08-22 PROCEDURE — 87040 BLOOD CULTURE FOR BACTERIA: CPT

## 2020-08-22 PROCEDURE — 25000003 PHARM REV CODE 250: Performed by: EMERGENCY MEDICINE

## 2020-08-22 PROCEDURE — 82565 ASSAY OF CREATININE: CPT

## 2020-08-22 PROCEDURE — 83605 ASSAY OF LACTIC ACID: CPT

## 2020-08-22 PROCEDURE — 87077 CULTURE AEROBIC IDENTIFY: CPT | Mod: 59

## 2020-08-22 PROCEDURE — 87186 SC STD MICRODIL/AGAR DIL: CPT

## 2020-08-22 PROCEDURE — 99291 CRITICAL CARE FIRST HOUR: CPT | Mod: 25

## 2020-08-22 PROCEDURE — 80053 COMPREHEN METABOLIC PANEL: CPT

## 2020-08-22 PROCEDURE — 93010 EKG 12-LEAD: ICD-10-PCS | Mod: ,,, | Performed by: INTERNAL MEDICINE

## 2020-08-22 PROCEDURE — 93005 ELECTROCARDIOGRAM TRACING: CPT

## 2020-08-22 PROCEDURE — 96361 HYDRATE IV INFUSION ADD-ON: CPT | Mod: 59

## 2020-08-22 PROCEDURE — 82330 ASSAY OF CALCIUM: CPT | Mod: 91

## 2020-08-22 PROCEDURE — 84295 ASSAY OF SERUM SODIUM: CPT

## 2020-08-22 PROCEDURE — 93010 ELECTROCARDIOGRAM REPORT: CPT | Mod: ,,, | Performed by: INTERNAL MEDICINE

## 2020-08-22 PROCEDURE — 84145 PROCALCITONIN (PCT): CPT

## 2020-08-22 PROCEDURE — 85014 HEMATOCRIT: CPT | Mod: 91

## 2020-08-22 PROCEDURE — 83735 ASSAY OF MAGNESIUM: CPT | Mod: 91

## 2020-08-22 PROCEDURE — 85025 COMPLETE CBC W/AUTO DIFF WBC: CPT

## 2020-08-22 PROCEDURE — 84132 ASSAY OF SERUM POTASSIUM: CPT | Mod: 91

## 2020-08-22 PROCEDURE — 82803 BLOOD GASES ANY COMBINATION: CPT

## 2020-08-22 PROCEDURE — 99900035 HC TECH TIME PER 15 MIN (STAT)

## 2020-08-22 PROCEDURE — 84484 ASSAY OF TROPONIN QUANT: CPT

## 2020-08-22 PROCEDURE — 82962 GLUCOSE BLOOD TEST: CPT

## 2020-08-22 RX ORDER — FENTANYL CITRATE 50 UG/ML
50 INJECTION, SOLUTION INTRAMUSCULAR; INTRAVENOUS
Status: COMPLETED | OUTPATIENT
Start: 2020-08-22 | End: 2020-08-23

## 2020-08-22 RX ORDER — ONDANSETRON 2 MG/ML
4 INJECTION INTRAMUSCULAR; INTRAVENOUS
Status: COMPLETED | OUTPATIENT
Start: 2020-08-22 | End: 2020-08-23

## 2020-08-22 RX ADMIN — SODIUM CHLORIDE 2586 ML: 0.9 INJECTION, SOLUTION INTRAVENOUS at 11:08

## 2020-08-23 VITALS
SYSTOLIC BLOOD PRESSURE: 129 MMHG | HEIGHT: 65 IN | OXYGEN SATURATION: 96 % | HEART RATE: 107 BPM | RESPIRATION RATE: 18 BRPM | TEMPERATURE: 100 F | DIASTOLIC BLOOD PRESSURE: 82 MMHG | BODY MASS INDEX: 31.65 KG/M2 | WEIGHT: 190 LBS

## 2020-08-23 LAB
ALBUMIN SERPL BCP-MCNC: 2.8 G/DL (ref 3.5–5.2)
ALLENS TEST: ABNORMAL
ALP SERPL-CCNC: 72 U/L (ref 55–135)
ALT SERPL W/O P-5'-P-CCNC: 92 U/L (ref 10–44)
ANION GAP SERPL CALC-SCNC: 13 MMOL/L (ref 8–16)
ANION GAP SERPL CALC-SCNC: 5 MMOL/L (ref 8–16)
AST SERPL-CCNC: 99 U/L (ref 10–40)
BASOPHILS # BLD AUTO: 0.03 K/UL (ref 0–0.2)
BASOPHILS NFR BLD: 0.3 % (ref 0–1.9)
BILIRUB SERPL-MCNC: 0.3 MG/DL (ref 0.1–1)
BILIRUB UR QL STRIP: NEGATIVE
BNP SERPL-MCNC: 21 PG/ML (ref 0–99)
BUN SERPL-MCNC: 13 MG/DL (ref 6–20)
BUN SERPL-MCNC: 14 MG/DL (ref 6–30)
CALCIUM SERPL-MCNC: 7.7 MG/DL (ref 8.7–10.5)
CHLORIDE SERPL-SCNC: 102 MMOL/L (ref 95–110)
CHLORIDE SERPL-SCNC: 97 MMOL/L (ref 95–110)
CLARITY UR: CLEAR
CO2 SERPL-SCNC: 26 MMOL/L (ref 23–29)
COLOR UR: YELLOW
CREAT SERPL-MCNC: 1 MG/DL (ref 0.5–1.4)
CREAT SERPL-MCNC: 1 MG/DL (ref 0.5–1.4)
DELSYS: ABNORMAL
DIFFERENTIAL METHOD: ABNORMAL
EOSINOPHIL # BLD AUTO: 0.1 K/UL (ref 0–0.5)
EOSINOPHIL NFR BLD: 0.5 % (ref 0–8)
ERYTHROCYTE [DISTWIDTH] IN BLOOD BY AUTOMATED COUNT: 14.2 % (ref 11.5–14.5)
EST. GFR  (AFRICAN AMERICAN): >60 ML/MIN/1.73 M^2
EST. GFR  (NON AFRICAN AMERICAN): >60 ML/MIN/1.73 M^2
FLOW: 2
GLUCOSE SERPL-MCNC: 102 MG/DL (ref 70–110)
GLUCOSE SERPL-MCNC: 108 MG/DL (ref 70–110)
GLUCOSE UR QL STRIP: NEGATIVE
HCO3 UR-SCNC: 29.6 MMOL/L (ref 24–28)
HCT VFR BLD AUTO: 29.9 % (ref 37–48.5)
HCT VFR BLD CALC: 32 %PCV (ref 36–54)
HGB BLD-MCNC: 9.4 G/DL (ref 12–16)
HGB UR QL STRIP: NEGATIVE
IMM GRANULOCYTES # BLD AUTO: 0.12 K/UL (ref 0–0.04)
IMM GRANULOCYTES NFR BLD AUTO: 1 % (ref 0–0.5)
KETONES UR QL STRIP: NEGATIVE
LACTATE SERPL-SCNC: 1 MMOL/L (ref 0.5–2.2)
LEUKOCYTE ESTERASE UR QL STRIP: NEGATIVE
LIPASE SERPL-CCNC: 11 U/L (ref 4–60)
LYMPHOCYTES # BLD AUTO: 1.6 K/UL (ref 1–4.8)
LYMPHOCYTES NFR BLD: 14.1 % (ref 18–48)
MAGNESIUM SERPL-MCNC: 2.1 MG/DL (ref 1.6–2.6)
MCH RBC QN AUTO: 30.8 PG (ref 27–31)
MCHC RBC AUTO-ENTMCNC: 31.4 G/DL (ref 32–36)
MCV RBC AUTO: 98 FL (ref 82–98)
MICROSCOPIC COMMENT: NORMAL
MODE: ABNORMAL
MONOCYTES # BLD AUTO: 0.6 K/UL (ref 0.3–1)
MONOCYTES NFR BLD: 5.1 % (ref 4–15)
NEUTROPHILS # BLD AUTO: 9 K/UL (ref 1.8–7.7)
NEUTROPHILS NFR BLD: 79 % (ref 38–73)
NITRITE UR QL STRIP: NEGATIVE
NRBC BLD-RTO: 0 /100 WBC
PCO2 BLDA: 51.9 MMHG (ref 35–45)
PH SMN: 7.37 [PH] (ref 7.35–7.45)
PH UR STRIP: 6 [PH] (ref 5–8)
PLATELET # BLD AUTO: 491 K/UL (ref 150–350)
PMV BLD AUTO: 9.3 FL (ref 9.2–12.9)
PO2 BLDA: 24 MMHG (ref 40–60)
POC BE: 3 MMOL/L
POC IONIZED CALCIUM: 1.25 MMOL/L (ref 1.06–1.42)
POC SATURATED O2: 38 % (ref 95–100)
POC TCO2 (MEASURED): 28 MMOL/L (ref 23–29)
POC TCO2: 31 MMOL/L (ref 24–29)
POTASSIUM BLD-SCNC: 4.7 MMOL/L (ref 3.5–5.1)
POTASSIUM SERPL-SCNC: 4.3 MMOL/L (ref 3.5–5.1)
PROCALCITONIN SERPL IA-MCNC: 0.1 NG/ML
PROT SERPL-MCNC: 6.3 G/DL (ref 6–8.4)
PROT UR QL STRIP: NEGATIVE
RBC # BLD AUTO: 3.05 M/UL (ref 4–5.4)
SAMPLE: ABNORMAL
SAMPLE: ABNORMAL
SARS-COV-2 RDRP RESP QL NAA+PROBE: NEGATIVE
SITE: ABNORMAL
SODIUM BLD-SCNC: 137 MMOL/L (ref 136–145)
SODIUM SERPL-SCNC: 128 MMOL/L (ref 136–145)
SP GR UR STRIP: >1.03 (ref 1–1.03)
TROPONIN I SERPL DL<=0.01 NG/ML-MCNC: 0.01 NG/ML (ref 0–0.03)
URN SPEC COLLECT METH UR: ABNORMAL
UROBILINOGEN UR STRIP-ACNC: ABNORMAL EU/DL
WBC # BLD AUTO: 11.45 K/UL (ref 3.9–12.7)

## 2020-08-23 PROCEDURE — 25000003 PHARM REV CODE 250: Performed by: EMERGENCY MEDICINE

## 2020-08-23 PROCEDURE — 96375 TX/PRO/DX INJ NEW DRUG ADDON: CPT | Mod: 59

## 2020-08-23 PROCEDURE — 63600175 PHARM REV CODE 636 W HCPCS: Performed by: EMERGENCY MEDICINE

## 2020-08-23 PROCEDURE — 51701 INSERT BLADDER CATHETER: CPT

## 2020-08-23 PROCEDURE — 81000 URINALYSIS NONAUTO W/SCOPE: CPT

## 2020-08-23 PROCEDURE — 25500020 PHARM REV CODE 255: Performed by: EMERGENCY MEDICINE

## 2020-08-23 PROCEDURE — 96376 TX/PRO/DX INJ SAME DRUG ADON: CPT

## 2020-08-23 PROCEDURE — U0002 COVID-19 LAB TEST NON-CDC: HCPCS

## 2020-08-23 PROCEDURE — 96366 THER/PROPH/DIAG IV INF ADDON: CPT | Mod: 59

## 2020-08-23 PROCEDURE — 87040 BLOOD CULTURE FOR BACTERIA: CPT

## 2020-08-23 PROCEDURE — 96365 THER/PROPH/DIAG IV INF INIT: CPT | Mod: 59

## 2020-08-23 RX ORDER — MORPHINE SULFATE 10 MG/ML
4 INJECTION INTRAMUSCULAR; INTRAVENOUS; SUBCUTANEOUS EVERY 4 HOURS PRN
Status: DISCONTINUED | OUTPATIENT
Start: 2020-08-23 | End: 2020-08-23 | Stop reason: HOSPADM

## 2020-08-23 RX ORDER — HYDROMORPHONE HYDROCHLORIDE 2 MG/ML
0.5 INJECTION, SOLUTION INTRAMUSCULAR; INTRAVENOUS; SUBCUTANEOUS
Status: DISCONTINUED | OUTPATIENT
Start: 2020-08-23 | End: 2020-08-23

## 2020-08-23 RX ORDER — DIPHENHYDRAMINE HYDROCHLORIDE 50 MG/ML
12.5 INJECTION INTRAMUSCULAR; INTRAVENOUS
Status: COMPLETED | OUTPATIENT
Start: 2020-08-23 | End: 2020-08-23

## 2020-08-23 RX ORDER — HYDROMORPHONE HYDROCHLORIDE 2 MG/ML
1 INJECTION, SOLUTION INTRAMUSCULAR; INTRAVENOUS; SUBCUTANEOUS
Status: DISCONTINUED | OUTPATIENT
Start: 2020-08-23 | End: 2020-08-23

## 2020-08-23 RX ORDER — SODIUM CHLORIDE, SODIUM LACTATE, POTASSIUM CHLORIDE, CALCIUM CHLORIDE 600; 310; 30; 20 MG/100ML; MG/100ML; MG/100ML; MG/100ML
1000 INJECTION, SOLUTION INTRAVENOUS
Status: COMPLETED | OUTPATIENT
Start: 2020-08-23 | End: 2020-08-23

## 2020-08-23 RX ORDER — MORPHINE SULFATE 10 MG/ML
4 INJECTION INTRAMUSCULAR; INTRAVENOUS; SUBCUTANEOUS
Status: COMPLETED | OUTPATIENT
Start: 2020-08-23 | End: 2020-08-23

## 2020-08-23 RX ORDER — ACETAMINOPHEN 500 MG
1000 TABLET ORAL
Status: COMPLETED | OUTPATIENT
Start: 2020-08-23 | End: 2020-08-23

## 2020-08-23 RX ORDER — HYDROMORPHONE HYDROCHLORIDE 2 MG/ML
0.5 INJECTION, SOLUTION INTRAMUSCULAR; INTRAVENOUS; SUBCUTANEOUS
Status: COMPLETED | OUTPATIENT
Start: 2020-08-23 | End: 2020-08-23

## 2020-08-23 RX ADMIN — HYDROMORPHONE HYDROCHLORIDE 0.5 MG: 2 INJECTION, SOLUTION INTRAMUSCULAR; INTRAVENOUS; SUBCUTANEOUS at 07:08

## 2020-08-23 RX ADMIN — MORPHINE SULFATE 4 MG: 10 INJECTION, SOLUTION INTRAMUSCULAR; INTRAVENOUS at 10:08

## 2020-08-23 RX ADMIN — SODIUM CHLORIDE, SODIUM LACTATE, POTASSIUM CHLORIDE, AND CALCIUM CHLORIDE 1000 ML: .6; .31; .03; .02 INJECTION, SOLUTION INTRAVENOUS at 08:08

## 2020-08-23 RX ADMIN — PIPERACILLIN AND TAZOBACTAM 4.5 G: 4; .5 INJECTION, POWDER, LYOPHILIZED, FOR SOLUTION INTRAVENOUS; PARENTERAL at 08:08

## 2020-08-23 RX ADMIN — MORPHINE SULFATE 4 MG: 10 INJECTION, SOLUTION INTRAMUSCULAR; INTRAVENOUS at 04:08

## 2020-08-23 RX ADMIN — MORPHINE SULFATE 4 MG: 10 INJECTION INTRAVENOUS at 02:08

## 2020-08-23 RX ADMIN — DIPHENHYDRAMINE HYDROCHLORIDE 12.5 MG: 50 INJECTION INTRAMUSCULAR; INTRAVENOUS at 10:08

## 2020-08-23 RX ADMIN — PIPERACILLIN SODIUM AND TAZOBACTAM SODIUM 4.5 G: 4; .5 INJECTION, POWDER, LYOPHILIZED, FOR SOLUTION INTRAVENOUS at 12:08

## 2020-08-23 RX ADMIN — ACETAMINOPHEN 1000 MG: 500 TABLET ORAL at 04:08

## 2020-08-23 RX ADMIN — FENTANYL CITRATE 50 MCG: 50 INJECTION INTRAMUSCULAR; INTRAVENOUS at 12:08

## 2020-08-23 RX ADMIN — ONDANSETRON 4 MG: 2 INJECTION INTRAMUSCULAR; INTRAVENOUS at 12:08

## 2020-08-23 RX ADMIN — IOHEXOL 100 ML: 350 INJECTION, SOLUTION INTRAVENOUS at 01:08

## 2020-08-23 RX ADMIN — VANCOMYCIN HYDROCHLORIDE 1750 MG: 100 INJECTION, POWDER, LYOPHILIZED, FOR SOLUTION INTRAVENOUS at 02:08

## 2020-08-23 NOTE — ED PROVIDER NOTES
Encounter Date: 2020    SCRIBE #1 NOTE: I, Cristal Meeks, am scribing for, and in the presence of,  Ryder Gillespie MD. I have scribed the following portions of the note - Other sections scribed: HPI, ROS, PE, EKG, MDM.       History     Chief Complaint   Patient presents with    Post-op Problem     had tummy tuck on , reports fever, weakness and dysuria and dark colored urine despite hydrating with gatorade, did not take medications for fever pta      Aide Almaraz is a 50 y.o. female presents to the ED  with complaint of of generalized pain, abdominal pain, fever, weakness, nausea, and mild shortness of breath. Patient had a tummy tuck 9 days ago. Patient denies diarrhea, urinary difficulty or changes in bowel movement.  Patient is febrile and tachycardic and in acute painful distress at time of history and physical, limiting history and review of systems    The history is provided by the patient.     Review of patient's allergies indicates:   Allergen Reactions    Latex, natural rubber Itching and Rash    Lidocaine Rash    Tramadol Hives and Rash     Past Medical History:   Diagnosis Date    Anemia     Celiac disease     Diabetes mellitus     Gall stones     Hypertension     PTSD (post-traumatic stress disorder)     Supraventricular tachycardia     Yeast infection      Past Surgical History:   Procedure Laterality Date    ABDOMINAL SURGERY      Gastric sleeve    ANKLE FUSION      BREAST SURGERY       SECTION      placenta previa    CHOLECYSTECTOMY      EXCISIONAL BIOPSY Left 2019    Procedure: EXCISIONAL BIOPSY LEFT with SEED (CONSENT AM OF) 1.0 hr case;  Surgeon: Flor Rosas MD;  Location: [facility] OR [address] [place] [address];  Service: General;  Laterality: Left;    gastric      gastric sleeve      pt reported     HERNIA REPAIR      HYSTERECTOMY  2001    Inguinal hernia  2004     Family History   Problem Relation Age of Onset    Heart disease Father     Diabetes  Mother         hypoglycemic    Breast cancer Maternal Aunt     Breast cancer Sister     Breast cancer Paternal Aunt     Ovarian cancer Neg Hx      Social History     Tobacco Use    Smoking status: Never Smoker    Smokeless tobacco: Never Used   Substance Use Topics    Alcohol use: Yes     Comment: rarely    Drug use: No     Review of Systems   Unable to perform ROS: Acuity of condition   Constitutional: Positive for fever.        (+) pain   HENT: Negative for sore throat.    Respiratory: Positive for shortness of breath (mild).    Cardiovascular: Negative for chest pain.   Gastrointestinal: Positive for abdominal pain, constipation and nausea. Negative for blood in stool, diarrhea and vomiting.   Genitourinary: Negative for dysuria.   Musculoskeletal: Positive for back pain.   Skin: Negative for rash.   Neurological: Positive for weakness (generalized).       Physical Exam     Initial Vitals [08/22/20 2313]   BP Pulse Resp Temp SpO2   (!) 122/54 (!) 124 (!) 22 (!) 102.2 °F (39 °C) 98 %      MAP       --         Physical Exam    Nursing note and vitals reviewed.  Constitutional: She is not diaphoretic. She appears distressed.   HENT:   Head: Normocephalic and atraumatic.   Dry mucous membranes   Eyes: EOM are normal. Pupils are equal, round, and reactive to light. No scleral icterus.   Neck: Normal range of motion. Neck supple. No JVD present.   Cardiovascular: Regular rhythm and intact distal pulses. Tachycardia present.    Pulmonary/Chest: Breath sounds normal. No stridor. No respiratory distress. She has no wheezes. She has no rales.   Abdominal: Soft. Bowel sounds are normal. She exhibits no distension. There is abdominal tenderness. There is guarding. There is no rebound.   Musculoskeletal: Normal range of motion. Tenderness present. No edema.      Comments: Bilateral paraspinal lumbar tenderness   Neurological: She is alert. She has normal strength. No cranial nerve deficit or sensory deficit. GCS score  is 15. GCS eye subscore is 4. GCS verbal subscore is 5. GCS motor subscore is 6.   Skin: Skin is warm and dry.   Purulence at umbilical incision. Induration at low transverse incision.   Penrose drain in place to gluteal cleft with serosanguineous drainage         ED Course   Procedures  Labs Reviewed   CBC W/ AUTO DIFFERENTIAL - Abnormal; Notable for the following components:       Result Value    RBC 3.05 (*)     Hemoglobin 9.4 (*)     Hematocrit 29.9 (*)     Mean Corpuscular Hemoglobin Conc 31.4 (*)     Platelets 491 (*)     Immature Granulocytes 1.0 (*)     Gran # (ANC) 9.0 (*)     Immature Grans (Abs) 0.12 (*)     Gran% 79.0 (*)     Lymph% 14.1 (*)     All other components within normal limits   COMPREHENSIVE METABOLIC PANEL - Abnormal; Notable for the following components:    Sodium 128 (*)     Calcium 7.7 (*)     Albumin 2.8 (*)     AST 99 (*)     ALT 92 (*)     Anion Gap 5 (*)     All other components within normal limits   URINALYSIS, REFLEX TO URINE CULTURE - Abnormal; Notable for the following components:    Specific Gravity, UA >1.030 (*)     Urobilinogen, UA 4.0-6.0 (*)     All other components within normal limits    Narrative:     Specimen Source->Urine   POCT GLUCOSE - Abnormal; Notable for the following components:    POCT Glucose 112 (*)     All other components within normal limits   ISTAT PROCEDURE - Abnormal; Notable for the following components:    POC Hematocrit 32 (*)     All other components within normal limits   ISTAT PROCEDURE - Abnormal; Notable for the following components:    POC PCO2 51.9 (*)     POC PO2 24 (*)     POC HCO3 29.6 (*)     POC SATURATED O2 38 (*)     POC TCO2 31 (*)     All other components within normal limits   CULTURE, BLOOD   CULTURE, BLOOD   LACTIC ACID, PLASMA   MAGNESIUM   LIPASE   TROPONIN I   B-TYPE NATRIURETIC PEPTIDE   PROCALCITONIN   URINALYSIS MICROSCOPIC    Narrative:     Specimen Source->Urine   LACTIC ACID, PLASMA   SARS-COV-2 RNA AMPLIFICATION, QUAL    ISTAT CHEM8     EKG Readings: (Independently Interpreted)   Initial Reading: No STEMI. Rhythm: Sinus Tachycardia. Heart Rate: 115. Ectopy: No Ectopy. Conduction: Normal. ST Segments: Normal ST Segments. T Waves Flipped: III. Axis: Normal.       Imaging Results          CTA Chest Abdomen Pelvis (Final result)  Result time 08/23/20 02:04:37   Procedure changed from CTA Chest Non-Coronary (PE Study)     Final result by Lisa Estrada MD (08/23/20 02:04:37)                 Impression:      No pulmonary embolism.  Moderate bibasilar atelectasis.  No definite focal consolidation.    Postsurgical changes of a tummy tuck.  Focal fluid collections in the subcutaneous fat of the lower back.  Findings may represent a postoperative seroma, involving hematoma, or abscesses.    Moderately large rectal stool.      Electronically signed by: Lisa Estrada  Date:    08/23/2020  Time:    02:04             Narrative:    EXAMINATION:  CTA CHEST ABDOMEN PELVIS WITH    CLINICAL HISTORY:  PE suspected, high pretest prob; Postoperative problem tummy tuck on 08/13 reporting fevers weakness in dysuria and dark colored urine.    TECHNIQUE:  1.25 mm axial images were obtained through the pulmonary arteries.  5 mm enhanced axial images were obtained from the lung bases through the greater trochanters.  One hundred mL of Omnipaque 350 was injected.  Coronal and sagittal reformatted images were provided.    COMPARISON:  None.    FINDINGS:  In the chest, there are no filling defects to suggest pulmonary emboli.  Moderate bibasilar atelectatic changes are present.  There is moderate asymmetric elevation of the right hemidiaphragm.  There is no pneumothorax or definite the areas of focal consolidation.  There is no axillary, hilar, or mediastinal adenopathy.  The heart size is within normal limits.    In the abdomen, postsurgical changes of a tummy tuck are seen.  There is extensive infiltration in the subcutaneous fat about the abdomen and  pelvis, along with a few foci air.  A Penrose drainage catheter is seen in the right posterior subcutaneous tissues extending from a focal fluid collection overlying the right lumbar rectus musculature to the gluteal cleft.  This focal fluid collection is difficult to measure contains several foci air measuring approximately 7 x 3 cm (series 3 axial image 186).  A similar focal fluid collection is seen on the left, which measures greater in width (series 3 axial images 74 through 92) and similar in depth at 3 cm.  The liver, spleen, pancreas, kidneys, and adrenal glands are unremarkable. The gallbladder is surgically absent. There is no definite evidence for abdominal adenopathy or ascites.    In the pelvis, there is no free fluid in the pelvis. There are no pelvic masses or adenopathy.  Moderately large stool is seen in the patulous rectum.  The uterus is surgically absent.  There are prominent bilateral inguinal lymph nodes.                               X-Ray Chest AP Portable (Final result)  Result time 08/23/20 00:49:57    Final result by Bal Tohmpson MD (08/23/20 00:49:57)                 Impression:      Hypoventilatory changes in the lung bases with incomplete inspiratory phase of the film.      Electronically signed by: Bal Thompson  Date:    08/23/2020  Time:    00:49             Narrative:    EXAMINATION:  XR CHEST AP PORTABLE    CLINICAL HISTORY:  Sepsis;    TECHNIQUE:  Single frontal view of the chest was performed.    COMPARISON:  Chest x-ray, 10/08/2019    FINDINGS:  EKG leads project over the patient.  Hypoventilatory changes with incomplete inspiratory phase is noted.  The heart mediastinal contours appear stable with the trachea midline.                                 Medical Decision Making:   History:   Old Medical Records: I decided to obtain old medical records.  Differential Diagnosis:   Differential diagnosis include sepsis, cellulitis, abscess, intraabdominal infection, and  operative complications.   Independently Interpreted Test(s):   I have ordered and independently interpreted EKG Reading(s) - see prior notes  Clinical Tests:   Lab Tests: Ordered and Reviewed  Radiological Study: Ordered and Reviewed  Medical Tests: Ordered and Reviewed  ED Management:  Patient febrile and tachycardic at time history and physical.  Sepsis protocol initiated.  Patient started on broad-spectrum antibiotics after blood culture sent.  Labs without leukocytosis but with some granulocytosis.  Patient has hyponatremia.  Lactic acid is within normal ranges.  CTA of the chest does not demonstrate PE.  CT of the abdomen pelvis demonstrates extensive infiltration of the subcutaneous fat of the abdomen and pelvis as well as fluid collections overlying the lumbar rectus musculature.    Consult: I have spoken with patient's plastic surgeon Dr. Chandler regarding the patient's presentation and study results.  At this time, the recommendation is patient be transferred to [facility] where he has privileges for continuity of care.        On reassessment patient is resting comfortably in stretcher.  She has had some improvement in tachycardia after IV hydration.  Will continue to monitor pending transfer.    Please put in 60 minutes of critical care due to patient having a sepsis.   Separate from teaching and exclusive of procedure and ekg time  Includes:  Time at bedside  Time reviewing test results  Time discussing case with staff  Time documenting the medical record  Time spent with family members  Time spent with consults  Management   This chart was completed using dictation software, as a result there may be some transcription errors.             Scribe Attestation:   Scribe #1: I performed the above scribed service and the documentation accurately describes the services I performed. I attest to the accuracy of the note.                          Clinical Impression:       ICD-10-CM ICD-9-CM   1. Postoperative  wound infection  T81.49XA 998.59   2. Fever  R50.9 780.60         Disposition:   Disposition: Transferred  Condition: Fair     ED Disposition Condition    Transfer to Another Facility Stable                 I, Ryder Gillespie , personally performed the services described in this documentation. All medical record entries made by the scribe were at my direction and in my presence.  I have reviewed the chart and agree that the record reflects my personal performance and is accurate and complete.         Ryder Gillespie MD  08/23/20 0416

## 2020-08-23 NOTE — ED NOTES
Dr. Jeffrey Villela (surgeon) called pt requesting information. He will call back to speak to Dr. Gillespie.

## 2020-08-23 NOTE — PROVIDER PROGRESS NOTES - EMERGENCY DEPT.
Encounter Date: 8/22/2020    ED Physician Progress Notes        Physician Note:   This is Dr. Barragan.  I was approached by RN due to patient having continued pain despite regularly dosed morphine.  I have ordered a single dose of hydromorphone in the emergency department.  The patient is not hypoxic or sedated.  Patient is awaiting transfer to [facility].    Transfer center connected me with Dr. Colón, hospitalist at [facility].  Reviewed the chart.  After discussion, decision to give IV fluids and 2nd dose of Zosyn.  Patient has already received Zosyn and vancomycin.  She is resting comfortably after hydromorphone.

## 2020-08-23 NOTE — ED TRIAGE NOTES
Pt presented to the ED complaining of post- surgical pain. Pt states that she had a tummy tuck on the 13th of August. Pt states that she in pain all over rating the pain as a 9/10. Pt states that she has not had a bowel movement sense the 13th but has urinated earlier today. Pt denies SOB, N/V.

## 2020-08-23 NOTE — ED NOTES
Transfer center reports capacity at [facility] at this time, but will accept pt after shift change. Pt will remain in ED until acceptance.

## 2020-08-25 LAB
BACTERIA BLD CULT: ABNORMAL

## 2020-08-28 LAB — BACTERIA BLD CULT: NORMAL

## 2021-04-06 ENCOUNTER — HOSPITAL ENCOUNTER (EMERGENCY)
Facility: HOSPITAL | Age: 51
Discharge: HOME | End: 2021-04-06
Attending: EMERGENCY MEDICINE
Payer: MEDICAID

## 2021-04-06 VITALS
HEIGHT: 65 IN | BODY MASS INDEX: 31.65 KG/M2 | TEMPERATURE: 98 F | SYSTOLIC BLOOD PRESSURE: 132 MMHG | OXYGEN SATURATION: 100 % | DIASTOLIC BLOOD PRESSURE: 73 MMHG | WEIGHT: 190 LBS | HEART RATE: 89 BPM | RESPIRATION RATE: 18 BRPM

## 2021-04-06 DIAGNOSIS — R11.2 NON-INTRACTABLE VOMITING WITH NAUSEA, UNSPECIFIED VOMITING TYPE: ICD-10-CM

## 2021-04-06 DIAGNOSIS — M54.50 CHRONIC MIDLINE LOW BACK PAIN WITHOUT SCIATICA: Primary | ICD-10-CM

## 2021-04-06 DIAGNOSIS — G89.29 CHRONIC MIDLINE LOW BACK PAIN WITHOUT SCIATICA: Primary | ICD-10-CM

## 2021-04-06 DIAGNOSIS — K92.1 HEMATOCHEZIA: ICD-10-CM

## 2021-04-06 LAB
ALBUMIN SERPL BCP-MCNC: 4.3 G/DL (ref 3.5–5.2)
ALP SERPL-CCNC: 85 U/L (ref 55–135)
ALT SERPL W/O P-5'-P-CCNC: 19 U/L (ref 10–44)
ANION GAP SERPL CALC-SCNC: 11 MMOL/L (ref 8–16)
AST SERPL-CCNC: 19 U/L (ref 10–40)
BASOPHILS # BLD AUTO: 0.04 K/UL (ref 0–0.2)
BASOPHILS NFR BLD: 0.8 % (ref 0–1.9)
BILIRUB SERPL-MCNC: 0.4 MG/DL (ref 0.1–1)
BILIRUB UR QL STRIP: NEGATIVE
BUN SERPL-MCNC: 10 MG/DL (ref 6–20)
CALCIUM SERPL-MCNC: 9.5 MG/DL (ref 8.7–10.5)
CHLORIDE SERPL-SCNC: 106 MMOL/L (ref 95–110)
CLARITY UR: ABNORMAL
CO2 SERPL-SCNC: 26 MMOL/L (ref 23–29)
COLOR UR: YELLOW
CREAT SERPL-MCNC: 1 MG/DL (ref 0.5–1.4)
DIFFERENTIAL METHOD: NORMAL
EOSINOPHIL # BLD AUTO: 0.1 K/UL (ref 0–0.5)
EOSINOPHIL NFR BLD: 1 % (ref 0–8)
ERYTHROCYTE [DISTWIDTH] IN BLOOD BY AUTOMATED COUNT: 13.2 % (ref 11.5–14.5)
EST. GFR  (AFRICAN AMERICAN): >60 ML/MIN/1.73 M^2
EST. GFR  (NON AFRICAN AMERICAN): >60 ML/MIN/1.73 M^2
GLUCOSE SERPL-MCNC: 99 MG/DL (ref 70–110)
GLUCOSE UR QL STRIP: NEGATIVE
HCT VFR BLD AUTO: 39.7 % (ref 37–48.5)
HGB BLD-MCNC: 12.7 G/DL (ref 12–16)
HGB UR QL STRIP: NEGATIVE
IMM GRANULOCYTES # BLD AUTO: 0.02 K/UL (ref 0–0.04)
IMM GRANULOCYTES NFR BLD AUTO: 0.4 % (ref 0–0.5)
KETONES UR QL STRIP: NEGATIVE
LEUKOCYTE ESTERASE UR QL STRIP: NEGATIVE
LIPASE SERPL-CCNC: 17 U/L (ref 4–60)
LYMPHOCYTES # BLD AUTO: 1.7 K/UL (ref 1–4.8)
LYMPHOCYTES NFR BLD: 32.8 % (ref 18–48)
MCH RBC QN AUTO: 30.2 PG (ref 27–31)
MCHC RBC AUTO-ENTMCNC: 32 G/DL (ref 32–36)
MCV RBC AUTO: 95 FL (ref 82–98)
MONOCYTES # BLD AUTO: 0.4 K/UL (ref 0.3–1)
MONOCYTES NFR BLD: 7.9 % (ref 4–15)
NEUTROPHILS # BLD AUTO: 3 K/UL (ref 1.8–7.7)
NEUTROPHILS NFR BLD: 57.1 % (ref 38–73)
NITRITE UR QL STRIP: NEGATIVE
NRBC BLD-RTO: 0 /100 WBC
PH UR STRIP: 6 [PH] (ref 5–8)
PLATELET # BLD AUTO: 386 K/UL (ref 150–450)
PMV BLD AUTO: 9.8 FL (ref 9.2–12.9)
POTASSIUM SERPL-SCNC: 4.2 MMOL/L (ref 3.5–5.1)
PROT SERPL-MCNC: 7.6 G/DL (ref 6–8.4)
PROT UR QL STRIP: ABNORMAL
RBC # BLD AUTO: 4.2 M/UL (ref 4–5.4)
SODIUM SERPL-SCNC: 143 MMOL/L (ref 136–145)
SP GR UR STRIP: 1.02 (ref 1–1.03)
URN SPEC COLLECT METH UR: ABNORMAL
UROBILINOGEN UR STRIP-ACNC: NEGATIVE EU/DL
WBC # BLD AUTO: 5.18 K/UL (ref 3.9–12.7)

## 2021-04-06 PROCEDURE — 63600175 PHARM REV CODE 636 W HCPCS: Performed by: PHYSICIAN ASSISTANT

## 2021-04-06 PROCEDURE — 25000003 PHARM REV CODE 250: Performed by: PHYSICIAN ASSISTANT

## 2021-04-06 PROCEDURE — 99284 EMERGENCY DEPT VISIT MOD MDM: CPT | Mod: 25

## 2021-04-06 PROCEDURE — 85025 COMPLETE CBC W/AUTO DIFF WBC: CPT | Performed by: EMERGENCY MEDICINE

## 2021-04-06 PROCEDURE — 83690 ASSAY OF LIPASE: CPT | Performed by: EMERGENCY MEDICINE

## 2021-04-06 PROCEDURE — 96375 TX/PRO/DX INJ NEW DRUG ADDON: CPT

## 2021-04-06 PROCEDURE — 81003 URINALYSIS AUTO W/O SCOPE: CPT | Performed by: EMERGENCY MEDICINE

## 2021-04-06 PROCEDURE — 80053 COMPREHEN METABOLIC PANEL: CPT | Performed by: EMERGENCY MEDICINE

## 2021-04-06 PROCEDURE — 96374 THER/PROPH/DIAG INJ IV PUSH: CPT

## 2021-04-06 RX ORDER — ONDANSETRON 2 MG/ML
4 INJECTION INTRAMUSCULAR; INTRAVENOUS
Status: COMPLETED | OUTPATIENT
Start: 2021-04-06 | End: 2021-04-06

## 2021-04-06 RX ORDER — MORPHINE SULFATE 4 MG/ML
4 INJECTION, SOLUTION INTRAMUSCULAR; INTRAVENOUS
Status: COMPLETED | OUTPATIENT
Start: 2021-04-06 | End: 2021-04-06

## 2021-04-06 RX ORDER — CYCLOBENZAPRINE HCL 10 MG
10 TABLET ORAL 3 TIMES DAILY PRN
Qty: 20 TABLET | Refills: 0 | Status: SHIPPED | OUTPATIENT
Start: 2021-04-06 | End: 2021-04-13

## 2021-04-06 RX ORDER — CYCLOBENZAPRINE HCL 10 MG
10 TABLET ORAL
Status: COMPLETED | OUTPATIENT
Start: 2021-04-06 | End: 2021-04-06

## 2021-04-06 RX ORDER — ACETAMINOPHEN 500 MG
500 TABLET ORAL EVERY 4 HOURS PRN
Qty: 20 TABLET | Refills: 0 | Status: SHIPPED | OUTPATIENT
Start: 2021-04-06 | End: 2021-04-11

## 2021-04-06 RX ORDER — LIDOCAINE 50 MG/G
1 PATCH TOPICAL DAILY
Qty: 15 PATCH | Refills: 0 | Status: SHIPPED | OUTPATIENT
Start: 2021-04-06 | End: 2021-04-21

## 2021-04-06 RX ORDER — IBUPROFEN 600 MG/1
600 TABLET ORAL EVERY 6 HOURS PRN
Qty: 20 TABLET | Refills: 0 | Status: SHIPPED | OUTPATIENT
Start: 2021-04-06 | End: 2021-04-11

## 2021-04-06 RX ADMIN — ONDANSETRON 4 MG: 2 INJECTION, SOLUTION INTRAMUSCULAR; INTRAVENOUS at 05:04

## 2021-04-06 RX ADMIN — MORPHINE SULFATE 4 MG: 4 INJECTION, SOLUTION INTRAMUSCULAR; INTRAVENOUS at 04:04

## 2021-04-06 RX ADMIN — CYCLOBENZAPRINE 10 MG: 10 TABLET, FILM COATED ORAL at 05:04

## 2021-04-09 ENCOUNTER — PES CALL (OUTPATIENT)
Dept: ADMINISTRATIVE | Facility: CLINIC | Age: 51
End: 2021-04-09

## 2021-04-13 ENCOUNTER — PATIENT MESSAGE (OUTPATIENT)
Dept: RESEARCH | Facility: HOSPITAL | Age: 51
End: 2021-04-13

## 2021-05-05 ENCOUNTER — HOSPITAL ENCOUNTER (EMERGENCY)
Facility: HOSPITAL | Age: 51
Discharge: HOME | End: 2021-05-05
Attending: EMERGENCY MEDICINE
Payer: MEDICAID

## 2021-05-05 VITALS
HEART RATE: 93 BPM | OXYGEN SATURATION: 99 % | BODY MASS INDEX: 37.49 KG/M2 | TEMPERATURE: 99 F | DIASTOLIC BLOOD PRESSURE: 65 MMHG | RESPIRATION RATE: 18 BRPM | SYSTOLIC BLOOD PRESSURE: 112 MMHG | HEIGHT: 65 IN | WEIGHT: 225 LBS

## 2021-05-05 DIAGNOSIS — R13.10 DYSPHAGIA, UNSPECIFIED TYPE: Primary | ICD-10-CM

## 2021-05-05 DIAGNOSIS — R11.2 NON-INTRACTABLE VOMITING WITH NAUSEA, UNSPECIFIED VOMITING TYPE: ICD-10-CM

## 2021-05-05 LAB
ALBUMIN SERPL BCP-MCNC: 4.5 G/DL (ref 3.5–5.2)
ALP SERPL-CCNC: 99 U/L (ref 55–135)
ALT SERPL W/O P-5'-P-CCNC: 25 U/L (ref 10–44)
ANION GAP SERPL CALC-SCNC: 9 MMOL/L (ref 8–16)
AST SERPL-CCNC: 26 U/L (ref 10–40)
BASOPHILS # BLD AUTO: 0.06 K/UL (ref 0–0.2)
BASOPHILS NFR BLD: 1.1 % (ref 0–1.9)
BILIRUB SERPL-MCNC: 0.5 MG/DL (ref 0.1–1)
BILIRUB UR QL STRIP: NEGATIVE
BUN SERPL-MCNC: 11 MG/DL (ref 6–20)
CALCIUM SERPL-MCNC: 9.6 MG/DL (ref 8.7–10.5)
CHLORIDE SERPL-SCNC: 106 MMOL/L (ref 95–110)
CLARITY UR: CLEAR
CO2 SERPL-SCNC: 25 MMOL/L (ref 23–29)
COLOR UR: YELLOW
CREAT SERPL-MCNC: 0.9 MG/DL (ref 0.5–1.4)
DIFFERENTIAL METHOD: NORMAL
EOSINOPHIL # BLD AUTO: 0.1 K/UL (ref 0–0.5)
EOSINOPHIL NFR BLD: 0.9 % (ref 0–8)
ERYTHROCYTE [DISTWIDTH] IN BLOOD BY AUTOMATED COUNT: 12.7 % (ref 11.5–14.5)
EST. GFR  (AFRICAN AMERICAN): >60 ML/MIN/1.73 M^2
EST. GFR  (NON AFRICAN AMERICAN): >60 ML/MIN/1.73 M^2
GLUCOSE SERPL-MCNC: 90 MG/DL (ref 70–110)
GLUCOSE UR QL STRIP: NEGATIVE
HCT VFR BLD AUTO: 44.2 % (ref 37–48.5)
HGB BLD-MCNC: 14.7 G/DL (ref 12–16)
HGB UR QL STRIP: NEGATIVE
IMM GRANULOCYTES # BLD AUTO: 0.02 K/UL (ref 0–0.04)
IMM GRANULOCYTES NFR BLD AUTO: 0.4 % (ref 0–0.5)
KETONES UR QL STRIP: ABNORMAL
LEUKOCYTE ESTERASE UR QL STRIP: NEGATIVE
LIPASE SERPL-CCNC: 14 U/L (ref 4–60)
LYMPHOCYTES # BLD AUTO: 2.1 K/UL (ref 1–4.8)
LYMPHOCYTES NFR BLD: 37.5 % (ref 18–48)
MCH RBC QN AUTO: 30.5 PG (ref 27–31)
MCHC RBC AUTO-ENTMCNC: 33.3 G/DL (ref 32–36)
MCV RBC AUTO: 92 FL (ref 82–98)
MONOCYTES # BLD AUTO: 0.4 K/UL (ref 0.3–1)
MONOCYTES NFR BLD: 6.8 % (ref 4–15)
NEUTROPHILS # BLD AUTO: 2.9 K/UL (ref 1.8–7.7)
NEUTROPHILS NFR BLD: 53.3 % (ref 38–73)
NITRITE UR QL STRIP: NEGATIVE
NRBC BLD-RTO: 0 /100 WBC
PH UR STRIP: 6 [PH] (ref 5–8)
PLATELET # BLD AUTO: 415 K/UL (ref 150–450)
PMV BLD AUTO: 10.1 FL (ref 9.2–12.9)
POTASSIUM SERPL-SCNC: 5 MMOL/L (ref 3.5–5.1)
PROT SERPL-MCNC: 9 G/DL (ref 6–8.4)
PROT UR QL STRIP: ABNORMAL
RBC # BLD AUTO: 4.82 M/UL (ref 4–5.4)
SODIUM SERPL-SCNC: 140 MMOL/L (ref 136–145)
SP GR UR STRIP: 1.02 (ref 1–1.03)
TROPONIN I SERPL DL<=0.01 NG/ML-MCNC: <0.006 NG/ML (ref 0–0.03)
URN SPEC COLLECT METH UR: ABNORMAL
UROBILINOGEN UR STRIP-ACNC: NEGATIVE EU/DL
WBC # BLD AUTO: 5.47 K/UL (ref 3.9–12.7)

## 2021-05-05 PROCEDURE — 96375 TX/PRO/DX INJ NEW DRUG ADDON: CPT

## 2021-05-05 PROCEDURE — 96366 THER/PROPH/DIAG IV INF ADDON: CPT

## 2021-05-05 PROCEDURE — 99284 EMERGENCY DEPT VISIT MOD MDM: CPT | Mod: 25

## 2021-05-05 PROCEDURE — 63600175 PHARM REV CODE 636 W HCPCS: Performed by: PHYSICIAN ASSISTANT

## 2021-05-05 PROCEDURE — 84484 ASSAY OF TROPONIN QUANT: CPT | Performed by: PHYSICIAN ASSISTANT

## 2021-05-05 PROCEDURE — 85025 COMPLETE CBC W/AUTO DIFF WBC: CPT | Performed by: PHYSICIAN ASSISTANT

## 2021-05-05 PROCEDURE — 96365 THER/PROPH/DIAG IV INF INIT: CPT

## 2021-05-05 PROCEDURE — 80053 COMPREHEN METABOLIC PANEL: CPT | Performed by: PHYSICIAN ASSISTANT

## 2021-05-05 PROCEDURE — 25000003 PHARM REV CODE 250: Performed by: PHYSICIAN ASSISTANT

## 2021-05-05 PROCEDURE — 83690 ASSAY OF LIPASE: CPT | Performed by: PHYSICIAN ASSISTANT

## 2021-05-05 PROCEDURE — 81003 URINALYSIS AUTO W/O SCOPE: CPT | Performed by: PHYSICIAN ASSISTANT

## 2021-05-05 PROCEDURE — 96361 HYDRATE IV INFUSION ADD-ON: CPT

## 2021-05-05 RX ORDER — ONDANSETRON 2 MG/ML
4 INJECTION INTRAMUSCULAR; INTRAVENOUS
Status: COMPLETED | OUTPATIENT
Start: 2021-05-05 | End: 2021-05-05

## 2021-05-05 RX ORDER — GABAPENTIN 600 MG/1
TABLET ORAL
COMMUNITY
Start: 2021-03-10 | End: 2021-11-04

## 2021-05-05 RX ORDER — CLONAZEPAM 2 MG/1
2 TABLET ORAL
COMMUNITY
Start: 2021-04-26 | End: 2021-05-26

## 2021-05-05 RX ORDER — PROMETHAZINE HYDROCHLORIDE 25 MG/1
25 TABLET ORAL EVERY 6 HOURS PRN
Qty: 15 TABLET | Refills: 0 | Status: SHIPPED | OUTPATIENT
Start: 2021-05-05 | End: 2021-11-04

## 2021-05-05 RX ORDER — LORAZEPAM 2 MG/ML
0.5 INJECTION INTRAMUSCULAR
Status: COMPLETED | OUTPATIENT
Start: 2021-05-05 | End: 2021-05-05

## 2021-05-05 RX ADMIN — LORAZEPAM 0.5 MG: 2 INJECTION INTRAMUSCULAR; INTRAVENOUS at 04:05

## 2021-05-05 RX ADMIN — PROMETHAZINE HYDROCHLORIDE 25 MG: 25 INJECTION INTRAMUSCULAR; INTRAVENOUS at 04:05

## 2021-05-05 RX ADMIN — SODIUM CHLORIDE 1000 ML: 0.9 INJECTION, SOLUTION INTRAVENOUS at 03:05

## 2021-05-05 RX ADMIN — ONDANSETRON 4 MG: 2 INJECTION INTRAMUSCULAR; INTRAVENOUS at 03:05

## 2021-05-13 ENCOUNTER — HOSPITAL ENCOUNTER (EMERGENCY)
Facility: HOSPITAL | Age: 51
Discharge: HOME | End: 2021-05-13
Attending: EMERGENCY MEDICINE
Payer: MEDICAID

## 2021-05-13 ENCOUNTER — NURSE TRIAGE (OUTPATIENT)
Dept: ADMINISTRATIVE | Facility: CLINIC | Age: 51
End: 2021-05-13

## 2021-05-13 VITALS
SYSTOLIC BLOOD PRESSURE: 114 MMHG | DIASTOLIC BLOOD PRESSURE: 91 MMHG | RESPIRATION RATE: 17 BRPM | BODY MASS INDEX: 37.49 KG/M2 | HEART RATE: 84 BPM | OXYGEN SATURATION: 92 % | WEIGHT: 225 LBS | TEMPERATURE: 98 F | HEIGHT: 65 IN

## 2021-05-13 DIAGNOSIS — R10.9 ABDOMINAL PAIN: Primary | ICD-10-CM

## 2021-05-13 DIAGNOSIS — M54.9 BACK PAIN, UNSPECIFIED BACK LOCATION, UNSPECIFIED BACK PAIN LATERALITY, UNSPECIFIED CHRONICITY: ICD-10-CM

## 2021-05-13 LAB
ALBUMIN SERPL BCP-MCNC: 3.9 G/DL (ref 3.5–5.2)
ALP SERPL-CCNC: 96 U/L (ref 55–135)
ALT SERPL W/O P-5'-P-CCNC: 23 U/L (ref 10–44)
ANION GAP SERPL CALC-SCNC: 11 MMOL/L (ref 8–16)
AST SERPL-CCNC: 29 U/L (ref 10–40)
BASOPHILS # BLD AUTO: 0.05 K/UL (ref 0–0.2)
BASOPHILS NFR BLD: 1 % (ref 0–1.9)
BILIRUB SERPL-MCNC: 0.3 MG/DL (ref 0.1–1)
BUN SERPL-MCNC: 9 MG/DL (ref 6–20)
CALCIUM SERPL-MCNC: 9.3 MG/DL (ref 8.7–10.5)
CHLORIDE SERPL-SCNC: 108 MMOL/L (ref 95–110)
CO2 SERPL-SCNC: 22 MMOL/L (ref 23–29)
CREAT SERPL-MCNC: 0.8 MG/DL (ref 0.5–1.4)
DIFFERENTIAL METHOD: ABNORMAL
EOSINOPHIL # BLD AUTO: 0.1 K/UL (ref 0–0.5)
EOSINOPHIL NFR BLD: 1.2 % (ref 0–8)
ERYTHROCYTE [DISTWIDTH] IN BLOOD BY AUTOMATED COUNT: 12.8 % (ref 11.5–14.5)
EST. GFR  (AFRICAN AMERICAN): >60 ML/MIN/1.73 M^2
EST. GFR  (NON AFRICAN AMERICAN): >60 ML/MIN/1.73 M^2
GLUCOSE SERPL-MCNC: 100 MG/DL (ref 70–110)
HCT VFR BLD AUTO: 41.9 % (ref 37–48.5)
HGB BLD-MCNC: 13.7 G/DL (ref 12–16)
IMM GRANULOCYTES # BLD AUTO: 0.03 K/UL (ref 0–0.04)
IMM GRANULOCYTES NFR BLD AUTO: 0.6 % (ref 0–0.5)
LIPASE SERPL-CCNC: 19 U/L (ref 4–60)
LYMPHOCYTES # BLD AUTO: 1.9 K/UL (ref 1–4.8)
LYMPHOCYTES NFR BLD: 38.7 % (ref 18–48)
MCH RBC QN AUTO: 30.3 PG (ref 27–31)
MCHC RBC AUTO-ENTMCNC: 32.7 G/DL (ref 32–36)
MCV RBC AUTO: 93 FL (ref 82–98)
MONOCYTES # BLD AUTO: 0.4 K/UL (ref 0.3–1)
MONOCYTES NFR BLD: 7.6 % (ref 4–15)
NEUTROPHILS # BLD AUTO: 2.6 K/UL (ref 1.8–7.7)
NEUTROPHILS NFR BLD: 50.9 % (ref 38–73)
NRBC BLD-RTO: 0 /100 WBC
PLATELET # BLD AUTO: 361 K/UL (ref 150–450)
PMV BLD AUTO: 10.5 FL (ref 9.2–12.9)
POTASSIUM SERPL-SCNC: 5.6 MMOL/L (ref 3.5–5.1)
PROT SERPL-MCNC: 8.2 G/DL (ref 6–8.4)
RBC # BLD AUTO: 4.52 M/UL (ref 4–5.4)
SODIUM SERPL-SCNC: 141 MMOL/L (ref 136–145)
WBC # BLD AUTO: 5.01 K/UL (ref 3.9–12.7)

## 2021-05-13 PROCEDURE — 93010 EKG 12-LEAD: ICD-10-PCS | Mod: ,,, | Performed by: INTERNAL MEDICINE

## 2021-05-13 PROCEDURE — 93010 ELECTROCARDIOGRAM REPORT: CPT | Mod: ,,, | Performed by: INTERNAL MEDICINE

## 2021-05-13 PROCEDURE — 99284 EMERGENCY DEPT VISIT MOD MDM: CPT | Mod: 25

## 2021-05-13 PROCEDURE — 96375 TX/PRO/DX INJ NEW DRUG ADDON: CPT

## 2021-05-13 PROCEDURE — 96374 THER/PROPH/DIAG INJ IV PUSH: CPT

## 2021-05-13 PROCEDURE — 80053 COMPREHEN METABOLIC PANEL: CPT | Performed by: EMERGENCY MEDICINE

## 2021-05-13 PROCEDURE — 83690 ASSAY OF LIPASE: CPT | Performed by: EMERGENCY MEDICINE

## 2021-05-13 PROCEDURE — 93005 ELECTROCARDIOGRAM TRACING: CPT

## 2021-05-13 PROCEDURE — 85025 COMPLETE CBC W/AUTO DIFF WBC: CPT | Performed by: EMERGENCY MEDICINE

## 2021-05-13 PROCEDURE — 63600175 PHARM REV CODE 636 W HCPCS: Performed by: EMERGENCY MEDICINE

## 2021-05-13 PROCEDURE — 25000003 PHARM REV CODE 250: Performed by: EMERGENCY MEDICINE

## 2021-05-13 RX ORDER — LORAZEPAM 2 MG/ML
0.5 INJECTION INTRAMUSCULAR
Status: COMPLETED | OUTPATIENT
Start: 2021-05-13 | End: 2021-05-13

## 2021-05-13 RX ORDER — ONDANSETRON 2 MG/ML
4 INJECTION INTRAMUSCULAR; INTRAVENOUS
Status: COMPLETED | OUTPATIENT
Start: 2021-05-13 | End: 2021-05-13

## 2021-05-13 RX ORDER — CYCLOBENZAPRINE HCL 10 MG
10 TABLET ORAL
Status: COMPLETED | OUTPATIENT
Start: 2021-05-13 | End: 2021-05-13

## 2021-05-13 RX ORDER — DIAZEPAM 10 MG/2ML
5 INJECTION INTRAMUSCULAR
Status: COMPLETED | OUTPATIENT
Start: 2021-05-13 | End: 2021-05-13

## 2021-05-13 RX ORDER — KETOROLAC TROMETHAMINE 30 MG/ML
15 INJECTION, SOLUTION INTRAMUSCULAR; INTRAVENOUS
Status: COMPLETED | OUTPATIENT
Start: 2021-05-13 | End: 2021-05-13

## 2021-05-13 RX ADMIN — CYCLOBENZAPRINE 10 MG: 10 TABLET, FILM COATED ORAL at 03:05

## 2021-05-13 RX ADMIN — KETOROLAC TROMETHAMINE 15 MG: 30 INJECTION, SOLUTION INTRAMUSCULAR; INTRAVENOUS at 03:05

## 2021-05-13 RX ADMIN — DIAZEPAM 5 MG: 10 INJECTION, SOLUTION INTRAMUSCULAR; INTRAVENOUS at 05:05

## 2021-05-13 RX ADMIN — ONDANSETRON 4 MG: 2 INJECTION INTRAMUSCULAR; INTRAVENOUS at 03:05

## 2021-05-13 RX ADMIN — LORAZEPAM 0.5 MG: 2 INJECTION INTRAMUSCULAR; INTRAVENOUS at 03:05

## 2021-05-14 ENCOUNTER — TELEPHONE (OUTPATIENT)
Dept: SURGERY | Facility: CLINIC | Age: 51
End: 2021-05-14

## 2021-07-15 ENCOUNTER — HOSPITAL ENCOUNTER (EMERGENCY)
Facility: HOSPITAL | Age: 51
Discharge: LEFT BEFORE DISCHARGE | End: 2021-07-15
Attending: EMERGENCY MEDICINE
Payer: MEDICAID

## 2021-07-15 VITALS
DIASTOLIC BLOOD PRESSURE: 75 MMHG | RESPIRATION RATE: 18 BRPM | HEART RATE: 97 BPM | SYSTOLIC BLOOD PRESSURE: 116 MMHG | OXYGEN SATURATION: 100 % | HEIGHT: 64 IN | BODY MASS INDEX: 32.44 KG/M2 | WEIGHT: 190 LBS | TEMPERATURE: 99 F

## 2021-07-15 DIAGNOSIS — R10.9 ABDOMINAL PAIN, UNSPECIFIED ABDOMINAL LOCATION: Primary | ICD-10-CM

## 2021-07-15 LAB
ALBUMIN SERPL BCP-MCNC: 4.4 G/DL (ref 3.5–5.2)
ALP SERPL-CCNC: 94 U/L (ref 55–135)
ALT SERPL W/O P-5'-P-CCNC: 17 U/L (ref 10–44)
ANION GAP SERPL CALC-SCNC: 13 MMOL/L (ref 8–16)
AST SERPL-CCNC: 24 U/L (ref 10–40)
BACTERIA #/AREA URNS HPF: ABNORMAL /HPF
BASOPHILS # BLD AUTO: 0.04 K/UL (ref 0–0.2)
BASOPHILS NFR BLD: 0.8 % (ref 0–1.9)
BILIRUB SERPL-MCNC: 0.6 MG/DL (ref 0.1–1)
BILIRUB UR QL STRIP: NEGATIVE
BUN SERPL-MCNC: 12 MG/DL (ref 6–20)
CALCIUM SERPL-MCNC: 8.7 MG/DL (ref 8.7–10.5)
CHLORIDE SERPL-SCNC: 108 MMOL/L (ref 95–110)
CLARITY UR: CLEAR
CO2 SERPL-SCNC: 17 MMOL/L (ref 23–29)
COLOR UR: YELLOW
CREAT SERPL-MCNC: 0.9 MG/DL (ref 0.5–1.4)
DIFFERENTIAL METHOD: ABNORMAL
EOSINOPHIL # BLD AUTO: 0.1 K/UL (ref 0–0.5)
EOSINOPHIL NFR BLD: 1.2 % (ref 0–8)
ERYTHROCYTE [DISTWIDTH] IN BLOOD BY AUTOMATED COUNT: 14.5 % (ref 11.5–14.5)
EST. GFR  (AFRICAN AMERICAN): >60 ML/MIN/1.73 M^2
EST. GFR  (NON AFRICAN AMERICAN): >60 ML/MIN/1.73 M^2
GLUCOSE SERPL-MCNC: 99 MG/DL (ref 70–110)
GLUCOSE UR QL STRIP: NEGATIVE
HCT VFR BLD AUTO: 44.9 % (ref 37–48.5)
HGB BLD-MCNC: 14.6 G/DL (ref 12–16)
HGB UR QL STRIP: NEGATIVE
HYALINE CASTS #/AREA URNS LPF: 0 /LPF
IMM GRANULOCYTES # BLD AUTO: 0.01 K/UL (ref 0–0.04)
IMM GRANULOCYTES NFR BLD AUTO: 0.2 % (ref 0–0.5)
KETONES UR QL STRIP: ABNORMAL
LEUKOCYTE ESTERASE UR QL STRIP: NEGATIVE
LIPASE SERPL-CCNC: 17 U/L (ref 4–60)
LYMPHOCYTES # BLD AUTO: 2.4 K/UL (ref 1–4.8)
LYMPHOCYTES NFR BLD: 50 % (ref 18–48)
MCH RBC QN AUTO: 29.8 PG (ref 27–31)
MCHC RBC AUTO-ENTMCNC: 32.5 G/DL (ref 32–36)
MCV RBC AUTO: 92 FL (ref 82–98)
MICROSCOPIC COMMENT: ABNORMAL
MONOCYTES # BLD AUTO: 0.4 K/UL (ref 0.3–1)
MONOCYTES NFR BLD: 7.9 % (ref 4–15)
NEUTROPHILS # BLD AUTO: 1.9 K/UL (ref 1.8–7.7)
NEUTROPHILS NFR BLD: 39.9 % (ref 38–73)
NITRITE UR QL STRIP: NEGATIVE
NRBC BLD-RTO: 0 /100 WBC
PH UR STRIP: 6 [PH] (ref 5–8)
PLATELET # BLD AUTO: 325 K/UL (ref 150–450)
PMV BLD AUTO: 9.9 FL (ref 9.2–12.9)
POTASSIUM SERPL-SCNC: 5.2 MMOL/L (ref 3.5–5.1)
PROT SERPL-MCNC: 8.7 G/DL (ref 6–8.4)
PROT UR QL STRIP: ABNORMAL
RBC # BLD AUTO: 4.9 M/UL (ref 4–5.4)
RBC #/AREA URNS HPF: 2 /HPF (ref 0–4)
SODIUM SERPL-SCNC: 138 MMOL/L (ref 136–145)
SP GR UR STRIP: >1.03 (ref 1–1.03)
SQUAMOUS #/AREA URNS HPF: 2 /HPF
URN SPEC COLLECT METH UR: ABNORMAL
UROBILINOGEN UR STRIP-ACNC: ABNORMAL EU/DL
WBC # BLD AUTO: 4.82 K/UL (ref 3.9–12.7)
WBC #/AREA URNS HPF: 6 /HPF (ref 0–5)

## 2021-07-15 PROCEDURE — 99284 EMERGENCY DEPT VISIT MOD MDM: CPT | Mod: 25

## 2021-07-15 PROCEDURE — 83690 ASSAY OF LIPASE: CPT | Performed by: NURSE PRACTITIONER

## 2021-07-15 PROCEDURE — 96365 THER/PROPH/DIAG IV INF INIT: CPT

## 2021-07-15 PROCEDURE — 25000003 PHARM REV CODE 250: Performed by: NURSE PRACTITIONER

## 2021-07-15 PROCEDURE — 85025 COMPLETE CBC W/AUTO DIFF WBC: CPT | Performed by: NURSE PRACTITIONER

## 2021-07-15 PROCEDURE — 63600175 PHARM REV CODE 636 W HCPCS: Performed by: EMERGENCY MEDICINE

## 2021-07-15 PROCEDURE — 80053 COMPREHEN METABOLIC PANEL: CPT | Performed by: NURSE PRACTITIONER

## 2021-07-15 PROCEDURE — 25000003 PHARM REV CODE 250: Performed by: EMERGENCY MEDICINE

## 2021-07-15 PROCEDURE — 96375 TX/PRO/DX INJ NEW DRUG ADDON: CPT

## 2021-07-15 PROCEDURE — 81000 URINALYSIS NONAUTO W/SCOPE: CPT | Performed by: NURSE PRACTITIONER

## 2021-07-15 RX ORDER — KETOROLAC TROMETHAMINE 30 MG/ML
15 INJECTION, SOLUTION INTRAMUSCULAR; INTRAVENOUS
Status: COMPLETED | OUTPATIENT
Start: 2021-07-15 | End: 2021-07-15

## 2021-07-15 RX ORDER — ACETAMINOPHEN 325 MG/1
650 TABLET ORAL
Status: COMPLETED | OUTPATIENT
Start: 2021-07-15 | End: 2021-07-15

## 2021-07-15 RX ADMIN — PROMETHAZINE HYDROCHLORIDE 12.5 MG: 25 INJECTION INTRAMUSCULAR; INTRAVENOUS at 05:07

## 2021-07-15 RX ADMIN — ACETAMINOPHEN 650 MG: 325 TABLET ORAL at 05:07

## 2021-07-15 RX ADMIN — KETOROLAC TROMETHAMINE 15 MG: 30 INJECTION, SOLUTION INTRAMUSCULAR; INTRAVENOUS at 06:07

## 2021-07-16 ENCOUNTER — HOSPITAL ENCOUNTER (EMERGENCY)
Facility: HOSPITAL | Age: 51
Discharge: HOME | End: 2021-07-17
Attending: EMERGENCY MEDICINE
Payer: MEDICAID

## 2021-07-16 VITALS
RESPIRATION RATE: 17 BRPM | TEMPERATURE: 99 F | WEIGHT: 190 LBS | HEIGHT: 64 IN | DIASTOLIC BLOOD PRESSURE: 79 MMHG | SYSTOLIC BLOOD PRESSURE: 114 MMHG | HEART RATE: 87 BPM | OXYGEN SATURATION: 96 % | BODY MASS INDEX: 32.44 KG/M2

## 2021-07-16 DIAGNOSIS — R10.9 CHRONIC ABDOMINAL PAIN: ICD-10-CM

## 2021-07-16 DIAGNOSIS — R10.31 RIGHT LOWER QUADRANT PAIN: Primary | ICD-10-CM

## 2021-07-16 DIAGNOSIS — G89.29 CHRONIC ABDOMINAL PAIN: ICD-10-CM

## 2021-07-16 LAB
ALBUMIN SERPL BCP-MCNC: 4 G/DL (ref 3.5–5.2)
ALP SERPL-CCNC: 87 U/L (ref 55–135)
ALT SERPL W/O P-5'-P-CCNC: 13 U/L (ref 10–44)
ANION GAP SERPL CALC-SCNC: 11 MMOL/L (ref 8–16)
AST SERPL-CCNC: 14 U/L (ref 10–40)
BASOPHILS # BLD AUTO: 0.03 K/UL (ref 0–0.2)
BASOPHILS NFR BLD: 0.5 % (ref 0–1.9)
BILIRUB SERPL-MCNC: 0.4 MG/DL (ref 0.1–1)
BUN SERPL-MCNC: 14 MG/DL (ref 6–20)
CALCIUM SERPL-MCNC: 9.6 MG/DL (ref 8.7–10.5)
CHLORIDE SERPL-SCNC: 112 MMOL/L (ref 95–110)
CO2 SERPL-SCNC: 18 MMOL/L (ref 23–29)
CREAT SERPL-MCNC: 1 MG/DL (ref 0.5–1.4)
DIFFERENTIAL METHOD: NORMAL
EOSINOPHIL # BLD AUTO: 0.1 K/UL (ref 0–0.5)
EOSINOPHIL NFR BLD: 0.9 % (ref 0–8)
ERYTHROCYTE [DISTWIDTH] IN BLOOD BY AUTOMATED COUNT: 14.4 % (ref 11.5–14.5)
EST. GFR  (AFRICAN AMERICAN): >60 ML/MIN/1.73 M^2
EST. GFR  (NON AFRICAN AMERICAN): >60 ML/MIN/1.73 M^2
GLUCOSE SERPL-MCNC: 152 MG/DL (ref 70–110)
HCT VFR BLD AUTO: 41.7 % (ref 37–48.5)
HGB BLD-MCNC: 13.6 G/DL (ref 12–16)
IMM GRANULOCYTES # BLD AUTO: 0.01 K/UL (ref 0–0.04)
IMM GRANULOCYTES NFR BLD AUTO: 0.2 % (ref 0–0.5)
LIPASE SERPL-CCNC: 22 U/L (ref 4–60)
LYMPHOCYTES # BLD AUTO: 2.2 K/UL (ref 1–4.8)
LYMPHOCYTES NFR BLD: 38.1 % (ref 18–48)
MCH RBC QN AUTO: 29.9 PG (ref 27–31)
MCHC RBC AUTO-ENTMCNC: 32.6 G/DL (ref 32–36)
MCV RBC AUTO: 92 FL (ref 82–98)
MONOCYTES # BLD AUTO: 0.3 K/UL (ref 0.3–1)
MONOCYTES NFR BLD: 5.8 % (ref 4–15)
NEUTROPHILS # BLD AUTO: 3.1 K/UL (ref 1.8–7.7)
NEUTROPHILS NFR BLD: 54.5 % (ref 38–73)
NRBC BLD-RTO: 0 /100 WBC
PLATELET # BLD AUTO: 419 K/UL (ref 150–450)
PMV BLD AUTO: 10.2 FL (ref 9.2–12.9)
POTASSIUM SERPL-SCNC: 3.8 MMOL/L (ref 3.5–5.1)
PROT SERPL-MCNC: 7.6 G/DL (ref 6–8.4)
RBC # BLD AUTO: 4.55 M/UL (ref 4–5.4)
SODIUM SERPL-SCNC: 141 MMOL/L (ref 136–145)
WBC # BLD AUTO: 5.65 K/UL (ref 3.9–12.7)

## 2021-07-16 PROCEDURE — 63600175 PHARM REV CODE 636 W HCPCS

## 2021-07-16 PROCEDURE — 96375 TX/PRO/DX INJ NEW DRUG ADDON: CPT

## 2021-07-16 PROCEDURE — 63600175 PHARM REV CODE 636 W HCPCS: Performed by: EMERGENCY MEDICINE

## 2021-07-16 PROCEDURE — C9113 INJ PANTOPRAZOLE SODIUM, VIA: HCPCS | Performed by: EMERGENCY MEDICINE

## 2021-07-16 PROCEDURE — 96372 THER/PROPH/DIAG INJ SC/IM: CPT | Mod: 59

## 2021-07-16 PROCEDURE — 85025 COMPLETE CBC W/AUTO DIFF WBC: CPT | Performed by: EMERGENCY MEDICINE

## 2021-07-16 PROCEDURE — 80053 COMPREHEN METABOLIC PANEL: CPT | Performed by: EMERGENCY MEDICINE

## 2021-07-16 PROCEDURE — 83690 ASSAY OF LIPASE: CPT | Performed by: EMERGENCY MEDICINE

## 2021-07-16 PROCEDURE — 96361 HYDRATE IV INFUSION ADD-ON: CPT

## 2021-07-16 PROCEDURE — 25000003 PHARM REV CODE 250: Performed by: EMERGENCY MEDICINE

## 2021-07-16 PROCEDURE — 99285 EMERGENCY DEPT VISIT HI MDM: CPT | Mod: 25

## 2021-07-16 PROCEDURE — 96365 THER/PROPH/DIAG IV INF INIT: CPT

## 2021-07-16 RX ORDER — METOCLOPRAMIDE 10 MG/1
10 TABLET ORAL EVERY 6 HOURS PRN
Qty: 30 TABLET | Refills: 1 | Status: SHIPPED | OUTPATIENT
Start: 2021-07-16 | End: 2023-06-08

## 2021-07-16 RX ORDER — PANTOPRAZOLE SODIUM 20 MG/1
20 TABLET, DELAYED RELEASE ORAL DAILY
Qty: 30 TABLET | Refills: 1 | OUTPATIENT
Start: 2021-07-16 | End: 2021-12-14

## 2021-07-16 RX ORDER — MAG HYDROX/ALUMINUM HYD/SIMETH 200-200-20
60 SUSPENSION, ORAL (FINAL DOSE FORM) ORAL
Status: COMPLETED | OUTPATIENT
Start: 2021-07-16 | End: 2021-07-16

## 2021-07-16 RX ORDER — DICYCLOMINE HYDROCHLORIDE 10 MG/ML
20 INJECTION INTRAMUSCULAR
Status: COMPLETED | OUTPATIENT
Start: 2021-07-16 | End: 2021-07-16

## 2021-07-16 RX ORDER — HYDROMORPHONE HYDROCHLORIDE 2 MG/ML
1 INJECTION, SOLUTION INTRAMUSCULAR; INTRAVENOUS; SUBCUTANEOUS
Status: COMPLETED | OUTPATIENT
Start: 2021-07-16 | End: 2021-07-16

## 2021-07-16 RX ORDER — PANTOPRAZOLE SODIUM 40 MG/10ML
80 INJECTION, POWDER, LYOPHILIZED, FOR SOLUTION INTRAVENOUS
Status: COMPLETED | OUTPATIENT
Start: 2021-07-16 | End: 2021-07-16

## 2021-07-16 RX ORDER — POLYETHYLENE GLYCOL 3350 17 G/17G
17 POWDER, FOR SOLUTION ORAL 2 TIMES DAILY PRN
Qty: 24 EACH | Refills: 1 | Status: SHIPPED | OUTPATIENT
Start: 2021-07-16 | End: 2023-06-08

## 2021-07-16 RX ORDER — ONDANSETRON 2 MG/ML
INJECTION INTRAMUSCULAR; INTRAVENOUS
Status: COMPLETED
Start: 2021-07-16 | End: 2021-07-16

## 2021-07-16 RX ORDER — ONDANSETRON 4 MG/1
4 TABLET, ORALLY DISINTEGRATING ORAL
Status: COMPLETED | OUTPATIENT
Start: 2021-07-16 | End: 2021-07-16

## 2021-07-16 RX ORDER — DIPHENHYDRAMINE HYDROCHLORIDE 50 MG/ML
25 INJECTION INTRAMUSCULAR; INTRAVENOUS
Status: COMPLETED | OUTPATIENT
Start: 2021-07-16 | End: 2021-07-16

## 2021-07-16 RX ORDER — ONDANSETRON 2 MG/ML
4 INJECTION INTRAMUSCULAR; INTRAVENOUS
Status: COMPLETED | OUTPATIENT
Start: 2021-07-16 | End: 2021-07-16

## 2021-07-16 RX ORDER — OXYCODONE AND ACETAMINOPHEN 10; 325 MG/1; MG/1
1 TABLET ORAL EVERY 4 HOURS PRN
Qty: 14 TABLET | Refills: 0 | Status: SHIPPED | OUTPATIENT
Start: 2021-07-16 | End: 2021-11-04

## 2021-07-16 RX ADMIN — ONDANSETRON 4 MG: 4 TABLET, ORALLY DISINTEGRATING ORAL at 07:07

## 2021-07-16 RX ADMIN — SODIUM CHLORIDE 2000 ML: 0.9 INJECTION, SOLUTION INTRAVENOUS at 07:07

## 2021-07-16 RX ADMIN — PROMETHAZINE HYDROCHLORIDE 12.5 MG: 25 INJECTION INTRAMUSCULAR; INTRAVENOUS at 07:07

## 2021-07-16 RX ADMIN — ONDANSETRON 4 MG: 2 INJECTION, SOLUTION INTRAMUSCULAR; INTRAVENOUS at 08:07

## 2021-07-16 RX ADMIN — MAGNESIUM HYDROXIDE/ALUMINUM HYDROXICE/SIMETHICONE 60 ML: 120; 1200; 1200 SUSPENSION ORAL at 07:07

## 2021-07-16 RX ADMIN — DICYCLOMINE HYDROCHLORIDE 20 MG: 20 INJECTION, SOLUTION INTRAMUSCULAR at 07:07

## 2021-07-16 RX ADMIN — DIPHENHYDRAMINE HYDROCHLORIDE 25 MG: 50 INJECTION INTRAMUSCULAR; INTRAVENOUS at 07:07

## 2021-07-16 RX ADMIN — PANTOPRAZOLE SODIUM 80 MG: 40 INJECTION, POWDER, FOR SOLUTION INTRAVENOUS at 07:07

## 2021-07-16 RX ADMIN — ONDANSETRON 4 MG: 2 INJECTION INTRAMUSCULAR; INTRAVENOUS at 08:07

## 2021-07-16 RX ADMIN — HYDROMORPHONE HYDROCHLORIDE 1 MG: 2 INJECTION INTRAMUSCULAR; INTRAVENOUS; SUBCUTANEOUS at 06:07

## 2021-08-06 ENCOUNTER — NURSE TRIAGE (OUTPATIENT)
Dept: ADMINISTRATIVE | Facility: CLINIC | Age: 51
End: 2021-08-06

## 2021-08-06 PROCEDURE — 83690 ASSAY OF LIPASE: CPT | Performed by: PHYSICIAN ASSISTANT

## 2021-08-06 PROCEDURE — 99284 EMERGENCY DEPT VISIT MOD MDM: CPT | Mod: 25

## 2021-08-06 PROCEDURE — 96372 THER/PROPH/DIAG INJ SC/IM: CPT

## 2021-08-06 PROCEDURE — 85025 COMPLETE CBC W/AUTO DIFF WBC: CPT | Performed by: PHYSICIAN ASSISTANT

## 2021-08-06 PROCEDURE — 93005 ELECTROCARDIOGRAM TRACING: CPT

## 2021-08-06 PROCEDURE — 93010 EKG 12-LEAD: ICD-10-PCS | Mod: ,,, | Performed by: INTERNAL MEDICINE

## 2021-08-06 PROCEDURE — 80053 COMPREHEN METABOLIC PANEL: CPT | Performed by: PHYSICIAN ASSISTANT

## 2021-08-06 PROCEDURE — 93010 ELECTROCARDIOGRAM REPORT: CPT | Mod: ,,, | Performed by: INTERNAL MEDICINE

## 2021-08-07 ENCOUNTER — HOSPITAL ENCOUNTER (EMERGENCY)
Facility: HOSPITAL | Age: 51
Discharge: HOME | End: 2021-08-07
Attending: EMERGENCY MEDICINE
Payer: MEDICAID

## 2021-08-07 VITALS
OXYGEN SATURATION: 99 % | DIASTOLIC BLOOD PRESSURE: 72 MMHG | BODY MASS INDEX: 32.44 KG/M2 | HEART RATE: 82 BPM | HEIGHT: 64 IN | WEIGHT: 190 LBS | RESPIRATION RATE: 18 BRPM | SYSTOLIC BLOOD PRESSURE: 114 MMHG | TEMPERATURE: 98 F

## 2021-08-07 DIAGNOSIS — R10.9 ABDOMINAL PAIN: Primary | ICD-10-CM

## 2021-08-07 LAB
ALBUMIN SERPL BCP-MCNC: 4 G/DL (ref 3.5–5.2)
ALP SERPL-CCNC: 97 U/L (ref 55–135)
ALT SERPL W/O P-5'-P-CCNC: 15 U/L (ref 10–44)
ANION GAP SERPL CALC-SCNC: 9 MMOL/L (ref 8–16)
AST SERPL-CCNC: 15 U/L (ref 10–40)
BACTERIA #/AREA URNS HPF: ABNORMAL /HPF
BASOPHILS # BLD AUTO: 0.04 K/UL (ref 0–0.2)
BASOPHILS NFR BLD: 0.7 % (ref 0–1.9)
BILIRUB SERPL-MCNC: 0.3 MG/DL (ref 0.1–1)
BILIRUB UR QL STRIP: NEGATIVE
BUN SERPL-MCNC: 10 MG/DL (ref 6–20)
CALCIUM SERPL-MCNC: 9.7 MG/DL (ref 8.7–10.5)
CHLORIDE SERPL-SCNC: 113 MMOL/L (ref 95–110)
CLARITY UR: CLEAR
CO2 SERPL-SCNC: 22 MMOL/L (ref 23–29)
COLOR UR: YELLOW
CREAT SERPL-MCNC: 0.8 MG/DL (ref 0.5–1.4)
DIFFERENTIAL METHOD: ABNORMAL
EOSINOPHIL # BLD AUTO: 0.1 K/UL (ref 0–0.5)
EOSINOPHIL NFR BLD: 1.4 % (ref 0–8)
ERYTHROCYTE [DISTWIDTH] IN BLOOD BY AUTOMATED COUNT: 14.7 % (ref 11.5–14.5)
EST. GFR  (AFRICAN AMERICAN): >60 ML/MIN/1.73 M^2
EST. GFR  (NON AFRICAN AMERICAN): >60 ML/MIN/1.73 M^2
GLUCOSE SERPL-MCNC: 106 MG/DL (ref 70–110)
GLUCOSE UR QL STRIP: NEGATIVE
HCT VFR BLD AUTO: 40.7 % (ref 37–48.5)
HGB BLD-MCNC: 13.2 G/DL (ref 12–16)
HGB UR QL STRIP: NEGATIVE
HYALINE CASTS #/AREA URNS LPF: 2 /LPF
IMM GRANULOCYTES # BLD AUTO: 0.02 K/UL (ref 0–0.04)
IMM GRANULOCYTES NFR BLD AUTO: 0.3 % (ref 0–0.5)
KETONES UR QL STRIP: NEGATIVE
LEUKOCYTE ESTERASE UR QL STRIP: NEGATIVE
LIPASE SERPL-CCNC: 31 U/L (ref 4–60)
LYMPHOCYTES # BLD AUTO: 2.3 K/UL (ref 1–4.8)
LYMPHOCYTES NFR BLD: 39.2 % (ref 18–48)
MCH RBC QN AUTO: 30.2 PG (ref 27–31)
MCHC RBC AUTO-ENTMCNC: 32.4 G/DL (ref 32–36)
MCV RBC AUTO: 93 FL (ref 82–98)
MICROSCOPIC COMMENT: ABNORMAL
MONOCYTES # BLD AUTO: 0.4 K/UL (ref 0.3–1)
MONOCYTES NFR BLD: 6.5 % (ref 4–15)
NEUTROPHILS # BLD AUTO: 3 K/UL (ref 1.8–7.7)
NEUTROPHILS NFR BLD: 51.9 % (ref 38–73)
NITRITE UR QL STRIP: NEGATIVE
NRBC BLD-RTO: 0 /100 WBC
PH UR STRIP: 6 [PH] (ref 5–8)
PLATELET # BLD AUTO: 386 K/UL (ref 150–450)
PMV BLD AUTO: 10.4 FL (ref 9.2–12.9)
POTASSIUM SERPL-SCNC: 3.8 MMOL/L (ref 3.5–5.1)
PROT SERPL-MCNC: 7.5 G/DL (ref 6–8.4)
PROT UR QL STRIP: ABNORMAL
RBC # BLD AUTO: 4.37 M/UL (ref 4–5.4)
RBC #/AREA URNS HPF: 2 /HPF (ref 0–4)
SODIUM SERPL-SCNC: 144 MMOL/L (ref 136–145)
SP GR UR STRIP: >1.03 (ref 1–1.03)
SQUAMOUS #/AREA URNS HPF: 3 /HPF
URN SPEC COLLECT METH UR: ABNORMAL
UROBILINOGEN UR STRIP-ACNC: ABNORMAL EU/DL
WBC # BLD AUTO: 5.86 K/UL (ref 3.9–12.7)
WBC #/AREA URNS HPF: 4 /HPF (ref 0–5)

## 2021-08-07 PROCEDURE — 63600175 PHARM REV CODE 636 W HCPCS: Performed by: EMERGENCY MEDICINE

## 2021-08-07 PROCEDURE — 81000 URINALYSIS NONAUTO W/SCOPE: CPT | Performed by: PHYSICIAN ASSISTANT

## 2021-08-07 RX ORDER — DROPERIDOL 2.5 MG/ML
1.25 INJECTION, SOLUTION INTRAMUSCULAR; INTRAVENOUS ONCE
Status: DISCONTINUED | OUTPATIENT
Start: 2021-08-07 | End: 2021-08-07

## 2021-08-07 RX ORDER — DROPERIDOL 2.5 MG/ML
2.5 INJECTION, SOLUTION INTRAMUSCULAR; INTRAVENOUS ONCE
Status: COMPLETED | OUTPATIENT
Start: 2021-08-07 | End: 2021-08-07

## 2021-08-07 RX ADMIN — DROPERIDOL 2.5 MG: 2.5 INJECTION, SOLUTION INTRAMUSCULAR; INTRAVENOUS at 01:08

## 2021-08-15 ENCOUNTER — NURSE TRIAGE (OUTPATIENT)
Dept: ADMINISTRATIVE | Facility: CLINIC | Age: 51
End: 2021-08-15

## 2021-08-15 PROCEDURE — 99283 EMERGENCY DEPT VISIT LOW MDM: CPT

## 2021-08-15 PROCEDURE — U0002 COVID-19 LAB TEST NON-CDC: HCPCS | Performed by: PHYSICIAN ASSISTANT

## 2021-08-16 ENCOUNTER — HOSPITAL ENCOUNTER (EMERGENCY)
Facility: HOSPITAL | Age: 51
Discharge: HOME | End: 2021-08-16
Attending: EMERGENCY MEDICINE
Payer: MEDICAID

## 2021-08-16 VITALS
HEART RATE: 64 BPM | WEIGHT: 190 LBS | RESPIRATION RATE: 18 BRPM | TEMPERATURE: 99 F | SYSTOLIC BLOOD PRESSURE: 116 MMHG | DIASTOLIC BLOOD PRESSURE: 80 MMHG | BODY MASS INDEX: 31.65 KG/M2 | OXYGEN SATURATION: 98 % | HEIGHT: 65 IN

## 2021-08-16 DIAGNOSIS — R06.02 SOB (SHORTNESS OF BREATH): ICD-10-CM

## 2021-08-16 DIAGNOSIS — B34.9 VIRAL SYNDROME: Primary | ICD-10-CM

## 2021-08-16 LAB
CTP QC/QA: YES
CTP QC/QA: YES
MOLECULAR STREP A: NEGATIVE
SARS-COV-2 RDRP RESP QL NAA+PROBE: NEGATIVE

## 2021-08-16 PROCEDURE — 93010 ELECTROCARDIOGRAM REPORT: CPT | Mod: ,,, | Performed by: INTERNAL MEDICINE

## 2021-08-16 PROCEDURE — 93005 ELECTROCARDIOGRAM TRACING: CPT

## 2021-08-16 PROCEDURE — 93010 EKG 12-LEAD: ICD-10-PCS | Mod: ,,, | Performed by: INTERNAL MEDICINE

## 2021-08-16 RX ORDER — ALBUTEROL SULFATE 90 UG/1
1-2 AEROSOL, METERED RESPIRATORY (INHALATION) EVERY 6 HOURS PRN
Qty: 6.7 G | Refills: 0 | Status: SHIPPED | OUTPATIENT
Start: 2021-08-16 | End: 2022-08-16

## 2021-08-18 ENCOUNTER — HOSPITAL ENCOUNTER (EMERGENCY)
Facility: HOSPITAL | Age: 51
Discharge: HOME | End: 2021-08-18
Attending: EMERGENCY MEDICINE
Payer: MEDICAID

## 2021-08-18 ENCOUNTER — NURSE TRIAGE (OUTPATIENT)
Dept: ADMINISTRATIVE | Facility: CLINIC | Age: 51
End: 2021-08-18

## 2021-08-18 VITALS
HEIGHT: 65 IN | HEART RATE: 88 BPM | OXYGEN SATURATION: 97 % | RESPIRATION RATE: 14 BRPM | DIASTOLIC BLOOD PRESSURE: 58 MMHG | SYSTOLIC BLOOD PRESSURE: 107 MMHG | TEMPERATURE: 98 F | WEIGHT: 190 LBS | BODY MASS INDEX: 31.65 KG/M2

## 2021-08-18 DIAGNOSIS — R06.02 SOB (SHORTNESS OF BREATH): Primary | ICD-10-CM

## 2021-08-18 DIAGNOSIS — R11.0 NAUSEA: ICD-10-CM

## 2021-08-18 LAB
ALBUMIN SERPL BCP-MCNC: 3.7 G/DL (ref 3.5–5.2)
ALP SERPL-CCNC: 97 U/L (ref 55–135)
ALT SERPL W/O P-5'-P-CCNC: 31 U/L (ref 10–44)
ANION GAP SERPL CALC-SCNC: 10 MMOL/L (ref 8–16)
AST SERPL-CCNC: 26 U/L (ref 10–40)
BASOPHILS # BLD AUTO: 0.04 K/UL (ref 0–0.2)
BASOPHILS NFR BLD: 0.5 % (ref 0–1.9)
BILIRUB SERPL-MCNC: 0.3 MG/DL (ref 0.1–1)
BNP SERPL-MCNC: <10 PG/ML (ref 0–99)
BUN SERPL-MCNC: 12 MG/DL (ref 6–20)
CALCIUM SERPL-MCNC: 9.6 MG/DL (ref 8.7–10.5)
CHLORIDE SERPL-SCNC: 109 MMOL/L (ref 95–110)
CO2 SERPL-SCNC: 21 MMOL/L (ref 23–29)
CREAT SERPL-MCNC: 0.8 MG/DL (ref 0.5–1.4)
CTP QC/QA: YES
D DIMER PPP IA.FEU-MCNC: 0.26 MG/L FEU
DIFFERENTIAL METHOD: ABNORMAL
EOSINOPHIL # BLD AUTO: 0.1 K/UL (ref 0–0.5)
EOSINOPHIL NFR BLD: 0.8 % (ref 0–8)
ERYTHROCYTE [DISTWIDTH] IN BLOOD BY AUTOMATED COUNT: 15.2 % (ref 11.5–14.5)
EST. GFR  (AFRICAN AMERICAN): >60 ML/MIN/1.73 M^2
EST. GFR  (NON AFRICAN AMERICAN): >60 ML/MIN/1.73 M^2
GLUCOSE SERPL-MCNC: 106 MG/DL (ref 70–110)
HCT VFR BLD AUTO: 38.5 % (ref 37–48.5)
HGB BLD-MCNC: 12.4 G/DL (ref 12–16)
IMM GRANULOCYTES # BLD AUTO: 0.04 K/UL (ref 0–0.04)
IMM GRANULOCYTES NFR BLD AUTO: 0.5 % (ref 0–0.5)
INR PPP: 0.9 (ref 0.8–1.2)
INR PPP: 0.9 (ref 0.8–1.2)
LYMPHOCYTES # BLD AUTO: 1.7 K/UL (ref 1–4.8)
LYMPHOCYTES NFR BLD: 20.3 % (ref 18–48)
MCH RBC QN AUTO: 29.9 PG (ref 27–31)
MCHC RBC AUTO-ENTMCNC: 32.2 G/DL (ref 32–36)
MCV RBC AUTO: 93 FL (ref 82–98)
MONOCYTES # BLD AUTO: 0.5 K/UL (ref 0.3–1)
MONOCYTES NFR BLD: 6.4 % (ref 4–15)
NEUTROPHILS # BLD AUTO: 6 K/UL (ref 1.8–7.7)
NEUTROPHILS NFR BLD: 71.5 % (ref 38–73)
NRBC BLD-RTO: 0 /100 WBC
PLATELET # BLD AUTO: ABNORMAL K/UL (ref 150–450)
PMV BLD AUTO: ABNORMAL FL (ref 9.2–12.9)
POTASSIUM SERPL-SCNC: 4.2 MMOL/L (ref 3.5–5.1)
PROT SERPL-MCNC: 7.3 G/DL (ref 6–8.4)
PROTHROMBIN TIME: 9.6 SEC (ref 9–12.5)
PROTHROMBIN TIME: 9.8 SEC (ref 9–12.5)
RBC # BLD AUTO: 4.15 M/UL (ref 4–5.4)
SARS-COV-2 RDRP RESP QL NAA+PROBE: NEGATIVE
SODIUM SERPL-SCNC: 140 MMOL/L (ref 136–145)
TROPONIN I SERPL DL<=0.01 NG/ML-MCNC: <0.006 NG/ML (ref 0–0.03)
WBC # BLD AUTO: 8.41 K/UL (ref 3.9–12.7)

## 2021-08-18 PROCEDURE — 99285 EMERGENCY DEPT VISIT HI MDM: CPT | Mod: 25

## 2021-08-18 PROCEDURE — 85379 FIBRIN DEGRADATION QUANT: CPT | Performed by: EMERGENCY MEDICINE

## 2021-08-18 PROCEDURE — 85610 PROTHROMBIN TIME: CPT | Performed by: EMERGENCY MEDICINE

## 2021-08-18 PROCEDURE — 80053 COMPREHEN METABOLIC PANEL: CPT | Performed by: EMERGENCY MEDICINE

## 2021-08-18 PROCEDURE — 85610 PROTHROMBIN TIME: CPT | Mod: 91 | Performed by: EMERGENCY MEDICINE

## 2021-08-18 PROCEDURE — 93005 ELECTROCARDIOGRAM TRACING: CPT

## 2021-08-18 PROCEDURE — 85025 COMPLETE CBC W/AUTO DIFF WBC: CPT | Performed by: EMERGENCY MEDICINE

## 2021-08-18 PROCEDURE — 93010 EKG 12-LEAD: ICD-10-PCS | Mod: ,,, | Performed by: INTERNAL MEDICINE

## 2021-08-18 PROCEDURE — 25500020 PHARM REV CODE 255: Performed by: EMERGENCY MEDICINE

## 2021-08-18 PROCEDURE — 96366 THER/PROPH/DIAG IV INF ADDON: CPT

## 2021-08-18 PROCEDURE — 96365 THER/PROPH/DIAG IV INF INIT: CPT

## 2021-08-18 PROCEDURE — 93010 ELECTROCARDIOGRAM REPORT: CPT | Mod: ,,, | Performed by: INTERNAL MEDICINE

## 2021-08-18 PROCEDURE — 96375 TX/PRO/DX INJ NEW DRUG ADDON: CPT

## 2021-08-18 PROCEDURE — 83880 ASSAY OF NATRIURETIC PEPTIDE: CPT | Performed by: EMERGENCY MEDICINE

## 2021-08-18 PROCEDURE — 84484 ASSAY OF TROPONIN QUANT: CPT | Performed by: EMERGENCY MEDICINE

## 2021-08-18 PROCEDURE — 63600175 PHARM REV CODE 636 W HCPCS: Performed by: EMERGENCY MEDICINE

## 2021-08-18 PROCEDURE — U0002 COVID-19 LAB TEST NON-CDC: HCPCS | Performed by: EMERGENCY MEDICINE

## 2021-08-18 RX ORDER — MAGNESIUM SULFATE 1 G/100ML
1 INJECTION INTRAVENOUS
Status: COMPLETED | OUTPATIENT
Start: 2021-08-18 | End: 2021-08-18

## 2021-08-18 RX ORDER — DROPERIDOL 2.5 MG/ML
0.62 INJECTION, SOLUTION INTRAMUSCULAR; INTRAVENOUS ONCE
Status: COMPLETED | OUTPATIENT
Start: 2021-08-18 | End: 2021-08-18

## 2021-08-18 RX ADMIN — DROPERIDOL 0.62 MG: 2.5 INJECTION, SOLUTION INTRAMUSCULAR; INTRAVENOUS at 09:08

## 2021-08-18 RX ADMIN — MAGNESIUM SULFATE 1 G: 1 INJECTION INTRAVENOUS at 12:08

## 2021-08-18 RX ADMIN — IOHEXOL 100 ML: 350 INJECTION, SOLUTION INTRAVENOUS at 12:08

## 2021-08-19 ENCOUNTER — HOSPITAL ENCOUNTER (EMERGENCY)
Facility: HOSPITAL | Age: 51
Discharge: LEFT BEFORE DISCHARGE | End: 2021-08-19
Attending: EMERGENCY MEDICINE
Payer: MEDICAID

## 2021-08-19 ENCOUNTER — HOSPITAL ENCOUNTER (EMERGENCY)
Facility: HOSPITAL | Age: 51
Discharge: HOME | End: 2021-08-19
Attending: EMERGENCY MEDICINE
Payer: MEDICAID

## 2021-08-19 VITALS
DIASTOLIC BLOOD PRESSURE: 88 MMHG | BODY MASS INDEX: 33.32 KG/M2 | SYSTOLIC BLOOD PRESSURE: 135 MMHG | OXYGEN SATURATION: 98 % | WEIGHT: 200 LBS | HEIGHT: 65 IN | TEMPERATURE: 98 F | RESPIRATION RATE: 16 BRPM | HEART RATE: 87 BPM

## 2021-08-19 VITALS
HEART RATE: 88 BPM | DIASTOLIC BLOOD PRESSURE: 65 MMHG | RESPIRATION RATE: 20 BRPM | HEIGHT: 65 IN | TEMPERATURE: 99 F | BODY MASS INDEX: 31.65 KG/M2 | WEIGHT: 190 LBS | SYSTOLIC BLOOD PRESSURE: 120 MMHG | OXYGEN SATURATION: 99 %

## 2021-08-19 DIAGNOSIS — Z53.29 LEFT AGAINST MEDICAL ADVICE: Primary | ICD-10-CM
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
## 2021-08-19 DIAGNOSIS — R07.9 CHEST PAIN: Primary | ICD-10-CM

## 2021-08-19 DIAGNOSIS — R11.2 NON-INTRACTABLE VOMITING WITH NAUSEA, UNSPECIFIED VOMITING TYPE: ICD-10-CM

## 2021-08-19 DIAGNOSIS — M25.521 RIGHT ELBOW PAIN: ICD-10-CM

## 2021-08-19 DIAGNOSIS — S09.90XA INJURY OF HEAD, INITIAL ENCOUNTER: ICD-10-CM

## 2021-08-19 LAB
ALBUMIN SERPL BCP-MCNC: 3.8 G/DL (ref 3.5–5.2)
ALP SERPL-CCNC: 101 U/L (ref 55–135)
ALT SERPL W/O P-5'-P-CCNC: 42 U/L (ref 10–44)
ANION GAP SERPL CALC-SCNC: 9 MMOL/L (ref 8–16)
AST SERPL-CCNC: 41 U/L (ref 10–40)
BASOPHILS # BLD AUTO: 0.05 K/UL (ref 0–0.2)
BASOPHILS NFR BLD: 0.7 % (ref 0–1.9)
BILIRUB SERPL-MCNC: 0.3 MG/DL (ref 0.1–1)
BUN SERPL-MCNC: 10 MG/DL (ref 6–20)
CALCIUM SERPL-MCNC: 9.6 MG/DL (ref 8.7–10.5)
CHLORIDE SERPL-SCNC: 108 MMOL/L (ref 95–110)
CO2 SERPL-SCNC: 23 MMOL/L (ref 23–29)
CREAT SERPL-MCNC: 1 MG/DL (ref 0.5–1.4)
DIFFERENTIAL METHOD: ABNORMAL
EOSINOPHIL # BLD AUTO: 0.1 K/UL (ref 0–0.5)
EOSINOPHIL NFR BLD: 1.2 % (ref 0–8)
ERYTHROCYTE [DISTWIDTH] IN BLOOD BY AUTOMATED COUNT: 15.1 % (ref 11.5–14.5)
EST. GFR  (AFRICAN AMERICAN): >60 ML/MIN/1.73 M^2
EST. GFR  (NON AFRICAN AMERICAN): >60 ML/MIN/1.73 M^2
GLUCOSE SERPL-MCNC: 114 MG/DL (ref 70–110)
HCT VFR BLD AUTO: 39 % (ref 37–48.5)
HGB BLD-MCNC: 12.8 G/DL (ref 12–16)
IMM GRANULOCYTES # BLD AUTO: 0.03 K/UL (ref 0–0.04)
IMM GRANULOCYTES NFR BLD AUTO: 0.4 % (ref 0–0.5)
LYMPHOCYTES # BLD AUTO: 2.6 K/UL (ref 1–4.8)
LYMPHOCYTES NFR BLD: 37.4 % (ref 18–48)
MCH RBC QN AUTO: 30.1 PG (ref 27–31)
MCHC RBC AUTO-ENTMCNC: 32.8 G/DL (ref 32–36)
MCV RBC AUTO: 92 FL (ref 82–98)
MONOCYTES # BLD AUTO: 0.4 K/UL (ref 0.3–1)
MONOCYTES NFR BLD: 6 % (ref 4–15)
NEUTROPHILS # BLD AUTO: 3.7 K/UL (ref 1.8–7.7)
NEUTROPHILS NFR BLD: 54.3 % (ref 38–73)
NRBC BLD-RTO: 0 /100 WBC
PLATELET # BLD AUTO: 362 K/UL (ref 150–450)
PMV BLD AUTO: 9.9 FL (ref 9.2–12.9)
POTASSIUM SERPL-SCNC: 4.7 MMOL/L (ref 3.5–5.1)
PROT SERPL-MCNC: 7.5 G/DL (ref 6–8.4)
RBC # BLD AUTO: 4.25 M/UL (ref 4–5.4)
SODIUM SERPL-SCNC: 140 MMOL/L (ref 136–145)
TROPONIN I SERPL DL<=0.01 NG/ML-MCNC: <0.006 NG/ML (ref 0–0.03)
WBC # BLD AUTO: 6.84 K/UL (ref 3.9–12.7)

## 2021-08-19 PROCEDURE — 85025 COMPLETE CBC W/AUTO DIFF WBC: CPT | Performed by: PHYSICIAN ASSISTANT

## 2021-08-19 PROCEDURE — 96372 THER/PROPH/DIAG INJ SC/IM: CPT

## 2021-08-19 PROCEDURE — 99284 EMERGENCY DEPT VISIT MOD MDM: CPT | Mod: 25,27,ER

## 2021-08-19 PROCEDURE — 99284 EMERGENCY DEPT VISIT MOD MDM: CPT | Mod: 25

## 2021-08-19 PROCEDURE — 96374 THER/PROPH/DIAG INJ IV PUSH: CPT

## 2021-08-19 PROCEDURE — 25000003 PHARM REV CODE 250: Performed by: PHYSICIAN ASSISTANT

## 2021-08-19 PROCEDURE — 63600175 PHARM REV CODE 636 W HCPCS: Performed by: PHYSICIAN ASSISTANT

## 2021-08-19 PROCEDURE — 25000003 PHARM REV CODE 250: Mod: ER | Performed by: NURSE PRACTITIONER

## 2021-08-19 PROCEDURE — 93010 EKG 12-LEAD: ICD-10-PCS | Mod: ,,, | Performed by: INTERNAL MEDICINE

## 2021-08-19 PROCEDURE — 80053 COMPREHEN METABOLIC PANEL: CPT | Performed by: PHYSICIAN ASSISTANT

## 2021-08-19 PROCEDURE — 84484 ASSAY OF TROPONIN QUANT: CPT | Performed by: PHYSICIAN ASSISTANT

## 2021-08-19 PROCEDURE — 93010 ELECTROCARDIOGRAM REPORT: CPT | Mod: ,,, | Performed by: INTERNAL MEDICINE

## 2021-08-19 PROCEDURE — 93005 ELECTROCARDIOGRAM TRACING: CPT

## 2021-08-19 RX ORDER — ONDANSETRON 4 MG/1
4 TABLET, ORALLY DISINTEGRATING ORAL
Status: COMPLETED | OUTPATIENT
Start: 2021-08-19 | End: 2021-08-19

## 2021-08-19 RX ORDER — ONDANSETRON 4 MG/1
4 TABLET, ORALLY DISINTEGRATING ORAL EVERY 12 HOURS PRN
Qty: 10 TABLET | Refills: 0 | Status: SHIPPED | OUTPATIENT
Start: 2021-08-19 | End: 2021-11-04

## 2021-08-19 RX ORDER — ACETAMINOPHEN 325 MG/1
325 TABLET ORAL
Status: COMPLETED | OUTPATIENT
Start: 2021-08-19 | End: 2021-08-19

## 2021-08-19 RX ORDER — OXYCODONE AND ACETAMINOPHEN 5; 325 MG/1; MG/1
1 TABLET ORAL
Status: COMPLETED | OUTPATIENT
Start: 2021-08-19 | End: 2021-08-19

## 2021-08-19 RX ORDER — DROPERIDOL 2.5 MG/ML
0.62 INJECTION, SOLUTION INTRAMUSCULAR; INTRAVENOUS
Status: COMPLETED | OUTPATIENT
Start: 2021-08-19 | End: 2021-08-19

## 2021-08-19 RX ORDER — ONDANSETRON 2 MG/ML
4 INJECTION INTRAMUSCULAR; INTRAVENOUS
Status: COMPLETED | OUTPATIENT
Start: 2021-08-19 | End: 2021-08-19

## 2021-08-19 RX ORDER — HYDROCODONE BITARTRATE AND ACETAMINOPHEN 5; 325 MG/1; MG/1
1 TABLET ORAL EVERY 6 HOURS PRN
Qty: 8 TABLET | Refills: 0 | Status: SHIPPED | OUTPATIENT
Start: 2021-08-19 | End: 2021-11-04

## 2021-08-19 RX ORDER — BUTALBITAL, ACETAMINOPHEN AND CAFFEINE 50; 325; 40 MG/1; MG/1; MG/1
1 TABLET ORAL
Status: COMPLETED | OUTPATIENT
Start: 2021-08-19 | End: 2021-08-19

## 2021-08-19 RX ADMIN — OXYCODONE HYDROCHLORIDE AND ACETAMINOPHEN 1 TABLET: 5; 325 TABLET ORAL at 02:08

## 2021-08-19 RX ADMIN — DROPERIDOL 0.62 MG: 2.5 INJECTION, SOLUTION INTRAMUSCULAR; INTRAVENOUS at 01:08

## 2021-08-19 RX ADMIN — BUTALBITAL, ACETAMINOPHEN, AND CAFFEINE 1 TABLET: 50; 325; 40 TABLET ORAL at 06:08

## 2021-08-19 RX ADMIN — ACETAMINOPHEN 325 MG: 325 TABLET ORAL at 01:08

## 2021-08-19 RX ADMIN — ONDANSETRON 4 MG: 4 TABLET, ORALLY DISINTEGRATING ORAL at 06:08

## 2021-08-19 RX ADMIN — ONDANSETRON 4 MG: 2 INJECTION INTRAMUSCULAR; INTRAVENOUS at 01:08

## 2021-08-22 ENCOUNTER — HOSPITAL ENCOUNTER (EMERGENCY)
Facility: HOSPITAL | Age: 51
Discharge: HOME | End: 2021-08-22
Attending: EMERGENCY MEDICINE
Payer: MEDICAID

## 2021-08-22 VITALS
HEIGHT: 65 IN | DIASTOLIC BLOOD PRESSURE: 77 MMHG | BODY MASS INDEX: 31.65 KG/M2 | RESPIRATION RATE: 17 BRPM | HEART RATE: 81 BPM | WEIGHT: 190 LBS | SYSTOLIC BLOOD PRESSURE: 130 MMHG | OXYGEN SATURATION: 98 % | TEMPERATURE: 99 F

## 2021-08-22 DIAGNOSIS — S50.01XD CONTUSION OF RIGHT ELBOW, SUBSEQUENT ENCOUNTER: Primary | ICD-10-CM

## 2021-08-22 DIAGNOSIS — S20.222D: ICD-10-CM

## 2021-08-22 DIAGNOSIS — M25.521 RIGHT ELBOW PAIN: ICD-10-CM

## 2021-08-22 PROCEDURE — 99282 EMERGENCY DEPT VISIT SF MDM: CPT | Mod: ER

## 2021-08-24 ENCOUNTER — PATIENT OUTREACH (OUTPATIENT)
Dept: EMERGENCY MEDICINE | Facility: HOSPITAL | Age: 51
End: 2021-08-24

## 2021-08-24 ENCOUNTER — NURSE TRIAGE (OUTPATIENT)
Dept: ADMINISTRATIVE | Facility: CLINIC | Age: 51
End: 2021-08-24

## 2021-10-09 ENCOUNTER — HOSPITAL ENCOUNTER (EMERGENCY)
Facility: HOSPITAL | Age: 51
Discharge: HOME | End: 2021-10-09
Attending: EMERGENCY MEDICINE
Payer: MEDICAID

## 2021-10-09 VITALS
WEIGHT: 190 LBS | TEMPERATURE: 98 F | DIASTOLIC BLOOD PRESSURE: 83 MMHG | BODY MASS INDEX: 31.62 KG/M2 | SYSTOLIC BLOOD PRESSURE: 131 MMHG | HEART RATE: 83 BPM | RESPIRATION RATE: 16 BRPM | OXYGEN SATURATION: 100 %

## 2021-10-09 DIAGNOSIS — F41.9 ANXIETY: Primary | ICD-10-CM

## 2021-10-09 PROCEDURE — 25000003 PHARM REV CODE 250: Performed by: EMERGENCY MEDICINE

## 2021-10-09 PROCEDURE — 99283 EMERGENCY DEPT VISIT LOW MDM: CPT

## 2021-10-09 RX ORDER — DIAZEPAM 2 MG/1
2 TABLET ORAL
Status: COMPLETED | OUTPATIENT
Start: 2021-10-09 | End: 2021-10-09

## 2021-10-09 RX ORDER — DIAZEPAM 2 MG/1
2 TABLET ORAL EVERY 8 HOURS PRN
Qty: 10 TABLET | Refills: 0 | Status: SHIPPED | OUTPATIENT
Start: 2021-10-09 | End: 2021-11-04

## 2021-10-09 RX ADMIN — DIAZEPAM 2 MG: 2 TABLET ORAL at 04:10

## 2021-10-14 ENCOUNTER — TELEPHONE (OUTPATIENT)
Dept: ORTHOPEDICS | Facility: CLINIC | Age: 51
End: 2021-10-14

## 2021-10-28 ENCOUNTER — TELEPHONE (OUTPATIENT)
Dept: ORTHOPEDICS | Facility: CLINIC | Age: 51
End: 2021-10-28

## 2021-11-03 ENCOUNTER — TELEPHONE (OUTPATIENT)
Dept: ORTHOPEDICS | Facility: CLINIC | Age: 51
End: 2021-11-03

## 2021-11-04 ENCOUNTER — HOSPITAL ENCOUNTER (EMERGENCY)
Facility: HOSPITAL | Age: 51
Discharge: HOME | End: 2021-11-04
Attending: EMERGENCY MEDICINE
Payer: MEDICAID

## 2021-11-04 ENCOUNTER — NURSE TRIAGE (OUTPATIENT)
Dept: ADMINISTRATIVE | Facility: CLINIC | Age: 51
End: 2021-11-04

## 2021-11-04 VITALS
DIASTOLIC BLOOD PRESSURE: 82 MMHG | RESPIRATION RATE: 18 BRPM | WEIGHT: 190 LBS | SYSTOLIC BLOOD PRESSURE: 117 MMHG | TEMPERATURE: 99 F | HEIGHT: 65 IN | HEART RATE: 96 BPM | OXYGEN SATURATION: 96 % | BODY MASS INDEX: 31.65 KG/M2
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
## 2021-11-04 PROCEDURE — 25000003 PHARM REV CODE 250: Performed by: PHYSICIAN ASSISTANT

## 2021-11-04 PROCEDURE — 99283 EMERGENCY DEPT VISIT LOW MDM: CPT

## 2021-11-04 RX ORDER — METHOCARBAMOL 500 MG/1
1000 TABLET, FILM COATED ORAL
Status: COMPLETED | OUTPATIENT
Start: 2021-11-04 | End: 2021-11-04

## 2021-11-04 RX ORDER — METHOCARBAMOL 500 MG/1
1000 TABLET, FILM COATED ORAL 3 TIMES DAILY PRN
Qty: 20 TABLET | Refills: 0 | Status: SHIPPED | OUTPATIENT
Start: 2021-11-04 | End: 2021-11-09

## 2021-11-04 RX ORDER — ACETAMINOPHEN 500 MG
1000 TABLET ORAL
Status: COMPLETED | OUTPATIENT
Start: 2021-11-04 | End: 2021-11-04

## 2021-11-04 RX ADMIN — ACETAMINOPHEN 1000 MG: 500 TABLET ORAL at 11:11

## 2021-11-04 RX ADMIN — METHOCARBAMOL 1000 MG: 500 TABLET ORAL at 11:11

## 2021-11-06 ENCOUNTER — NURSE TRIAGE (OUTPATIENT)
Dept: ADMINISTRATIVE | Facility: CLINIC | Age: 51
End: 2021-11-06

## 2021-11-06 ENCOUNTER — HOSPITAL ENCOUNTER (EMERGENCY)
Facility: HOSPITAL | Age: 51
Discharge: HOME | End: 2021-11-06
Attending: EMERGENCY MEDICINE
Payer: MEDICAID

## 2021-11-06 VITALS
RESPIRATION RATE: 18 BRPM | SYSTOLIC BLOOD PRESSURE: 136 MMHG | HEIGHT: 65 IN | WEIGHT: 190 LBS | BODY MASS INDEX: 31.65 KG/M2 | DIASTOLIC BLOOD PRESSURE: 66 MMHG | OXYGEN SATURATION: 97 % | TEMPERATURE: 98 F | HEART RATE: 104 BPM
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
## 2021-11-06 PROCEDURE — 25000003 PHARM REV CODE 250: Performed by: NURSE PRACTITIONER

## 2021-11-06 PROCEDURE — 99283 EMERGENCY DEPT VISIT LOW MDM: CPT

## 2021-11-06 RX ORDER — HYDROCODONE BITARTRATE AND ACETAMINOPHEN 5; 325 MG/1; MG/1
1 TABLET ORAL
Status: COMPLETED | OUTPATIENT
Start: 2021-11-06 | End: 2021-11-06

## 2021-11-06 RX ORDER — ACETAMINOPHEN 500 MG
500 TABLET ORAL EVERY 4 HOURS PRN
Qty: 20 TABLET | Refills: 0 | Status: SHIPPED | OUTPATIENT
Start: 2021-11-06 | End: 2022-07-19

## 2021-11-06 RX ADMIN — HYDROCODONE BITARTRATE AND ACETAMINOPHEN 1 TABLET: 5; 325 TABLET ORAL at 03:11

## 2021-11-07 ENCOUNTER — HOSPITAL ENCOUNTER (EMERGENCY)
Facility: HOSPITAL | Age: 51
Discharge: LEFT BEFORE DISCHARGE | End: 2021-11-07
Attending: EMERGENCY MEDICINE
Payer: MEDICAID

## 2021-11-07 VITALS
HEART RATE: 98 BPM | DIASTOLIC BLOOD PRESSURE: 77 MMHG | TEMPERATURE: 99 F | SYSTOLIC BLOOD PRESSURE: 131 MMHG | RESPIRATION RATE: 17 BRPM | OXYGEN SATURATION: 98 % | WEIGHT: 195 LBS | BODY MASS INDEX: 32.49 KG/M2 | HEIGHT: 65 IN

## 2021-11-07 DIAGNOSIS — M25.571 RIGHT ANKLE PAIN: ICD-10-CM

## 2021-11-07 DIAGNOSIS — S89.91XA RIGHT LEG INJURY, INITIAL ENCOUNTER: ICD-10-CM

## 2021-11-07 PROCEDURE — 96372 THER/PROPH/DIAG INJ SC/IM: CPT | Mod: ER

## 2021-11-07 PROCEDURE — 63600175 PHARM REV CODE 636 W HCPCS: Mod: ER | Performed by: NURSE PRACTITIONER

## 2021-11-07 PROCEDURE — 99284 EMERGENCY DEPT VISIT MOD MDM: CPT | Mod: 25,ER

## 2021-11-07 RX ORDER — KETOROLAC TROMETHAMINE 30 MG/ML
15 INJECTION, SOLUTION INTRAMUSCULAR; INTRAVENOUS
Status: COMPLETED | OUTPATIENT
Start: 2021-11-07 | End: 2021-11-07

## 2021-11-07 RX ORDER — SULINDAC 150 MG/1
150 TABLET ORAL 2 TIMES DAILY
Qty: 10 TABLET | Refills: 0 | Status: SHIPPED | OUTPATIENT
Start: 2021-11-07 | End: 2022-07-19

## 2021-11-07 RX ADMIN — KETOROLAC TROMETHAMINE 15 MG: 30 INJECTION, SOLUTION INTRAMUSCULAR; INTRAVENOUS at 07:11

## 2021-11-08 ENCOUNTER — TELEPHONE (OUTPATIENT)
Dept: ORTHOPEDICS | Facility: CLINIC | Age: 51
End: 2021-11-08

## 2021-11-29 ENCOUNTER — DOCUMENTATION ONLY (OUTPATIENT)
Dept: ORTHOPEDICS | Facility: CLINIC | Age: 51
End: 2021-11-29

## 2021-12-07 ENCOUNTER — HOSPITAL ENCOUNTER (OUTPATIENT)
Dept: RADIOLOGY | Facility: HOSPITAL | Age: 51
Discharge: HOME | End: 2021-12-07
Attending: ORTHOPAEDIC SURGERY
Payer: MEDICAID

## 2021-12-07 ENCOUNTER — OFFICE VISIT (OUTPATIENT)
Dept: ORTHOPEDICS | Facility: CLINIC | Age: 51
End: 2021-12-07
Payer: MEDICAID

## 2021-12-07 ENCOUNTER — TELEPHONE (OUTPATIENT)
Dept: ORTHOPEDICS | Facility: CLINIC | Age: 51
End: 2021-12-07

## 2021-12-07 DIAGNOSIS — R52 PAIN: ICD-10-CM

## 2021-12-07 DIAGNOSIS — R52 PAIN: Primary | ICD-10-CM

## 2021-12-07 DIAGNOSIS — M25.571 SINUS TARSI SYNDROME, RIGHT: Primary | ICD-10-CM

## 2021-12-07 PROCEDURE — 73610 X-RAY EXAM OF ANKLE: CPT | Mod: TC,PO,RT

## 2021-12-07 PROCEDURE — 73610 XR ANKLE COMPLETE 3 VIEW RIGHT: ICD-10-PCS | Mod: 26,RT,GC, | Performed by: RADIOLOGY

## 2021-12-07 PROCEDURE — 73610 X-RAY EXAM OF ANKLE: CPT | Mod: 26,RT,GC, | Performed by: RADIOLOGY

## 2021-12-07 PROCEDURE — 99204 OFFICE O/P NEW MOD 45 MIN: CPT | Mod: 25,S$PBB,, | Performed by: ORTHOPAEDIC SURGERY

## 2021-12-07 PROCEDURE — 99999 PR PBB SHADOW E&M-EST. PATIENT-LVL I: ICD-10-PCS | Mod: PBBFAC,,, | Performed by: ORTHOPAEDIC SURGERY

## 2021-12-07 PROCEDURE — 99204 PR OFFICE/OUTPT VISIT, NEW, LEVL IV, 45-59 MIN: ICD-10-PCS | Mod: 25,S$PBB,, | Performed by: ORTHOPAEDIC SURGERY

## 2021-12-07 PROCEDURE — 20605 INTERMEDIATE JOINT ASPIRATION/INJECTION: ICD-10-PCS | Mod: S$PBB,RT,, | Performed by: ORTHOPAEDIC SURGERY

## 2021-12-07 PROCEDURE — 99211 OFF/OP EST MAY X REQ PHY/QHP: CPT | Mod: PBBFAC,PO,25 | Performed by: ORTHOPAEDIC SURGERY

## 2021-12-07 PROCEDURE — 20605 DRAIN/INJ JOINT/BURSA W/O US: CPT | Mod: PBBFAC,PO | Performed by: ORTHOPAEDIC SURGERY

## 2021-12-07 PROCEDURE — 99999 PR PBB SHADOW E&M-EST. PATIENT-LVL I: CPT | Mod: PBBFAC,,, | Performed by: ORTHOPAEDIC SURGERY

## 2021-12-07 RX ORDER — TRIAMCINOLONE ACETONIDE 40 MG/ML
40 INJECTION, SUSPENSION INTRA-ARTICULAR; INTRAMUSCULAR
Status: DISCONTINUED | OUTPATIENT
Start: 2021-12-07 | End: 2021-12-07 | Stop reason: HOSPADM

## 2021-12-07 RX ADMIN — TRIAMCINOLONE ACETONIDE 40 MG: 40 INJECTION, SUSPENSION INTRA-ARTICULAR; INTRAMUSCULAR at 09:12

## 2021-12-14 ENCOUNTER — HOSPITAL ENCOUNTER (EMERGENCY)
Facility: HOSPITAL | Age: 51
Discharge: HOME | End: 2021-12-14
Attending: EMERGENCY MEDICINE
Payer: MEDICAID

## 2021-12-14 VITALS
DIASTOLIC BLOOD PRESSURE: 72 MMHG | TEMPERATURE: 99 F | OXYGEN SATURATION: 97 % | BODY MASS INDEX: 32.44 KG/M2 | RESPIRATION RATE: 20 BRPM | SYSTOLIC BLOOD PRESSURE: 136 MMHG | WEIGHT: 190 LBS | HEART RATE: 84 BPM | HEIGHT: 64 IN

## 2021-12-14 DIAGNOSIS — K59.00 CONSTIPATION, UNSPECIFIED CONSTIPATION TYPE: Primary | ICD-10-CM

## 2021-12-14 DIAGNOSIS — R10.13 EPIGASTRIC ABDOMINAL PAIN: ICD-10-CM

## 2021-12-14 LAB
ALBUMIN SERPL BCP-MCNC: 4.2 G/DL (ref 3.5–5.2)
ALP SERPL-CCNC: 97 U/L (ref 55–135)
ALT SERPL W/O P-5'-P-CCNC: 43 U/L (ref 10–44)
ANION GAP SERPL CALC-SCNC: 11 MMOL/L (ref 8–16)
AST SERPL-CCNC: 37 U/L (ref 10–40)
BASOPHILS # BLD AUTO: 0.03 K/UL (ref 0–0.2)
BASOPHILS NFR BLD: 0.5 % (ref 0–1.9)
BILIRUB SERPL-MCNC: 0.2 MG/DL (ref 0.1–1)
BILIRUB UR QL STRIP: NEGATIVE
BUN SERPL-MCNC: 14 MG/DL (ref 6–20)
CALCIUM SERPL-MCNC: 9.4 MG/DL (ref 8.7–10.5)
CHLORIDE SERPL-SCNC: 106 MMOL/L (ref 95–110)
CLARITY UR: CLEAR
CO2 SERPL-SCNC: 26 MMOL/L (ref 23–29)
COLOR UR: YELLOW
CREAT SERPL-MCNC: 0.9 MG/DL (ref 0.5–1.4)
DIFFERENTIAL METHOD: ABNORMAL
EOSINOPHIL # BLD AUTO: 0.1 K/UL (ref 0–0.5)
EOSINOPHIL NFR BLD: 1.5 % (ref 0–8)
ERYTHROCYTE [DISTWIDTH] IN BLOOD BY AUTOMATED COUNT: 15.3 % (ref 11.5–14.5)
EST. GFR  (AFRICAN AMERICAN): >60 ML/MIN/1.73 M^2
EST. GFR  (NON AFRICAN AMERICAN): >60 ML/MIN/1.73 M^2
GLUCOSE SERPL-MCNC: 98 MG/DL (ref 70–110)
GLUCOSE UR QL STRIP: NEGATIVE
HCT VFR BLD AUTO: 42.1 % (ref 37–48.5)
HGB BLD-MCNC: 13.1 G/DL (ref 12–16)
HGB UR QL STRIP: NEGATIVE
IMM GRANULOCYTES # BLD AUTO: 0.02 K/UL (ref 0–0.04)
IMM GRANULOCYTES NFR BLD AUTO: 0.4 % (ref 0–0.5)
KETONES UR QL STRIP: NEGATIVE
LEUKOCYTE ESTERASE UR QL STRIP: NEGATIVE
LIPASE SERPL-CCNC: 12 U/L (ref 4–60)
LYMPHOCYTES # BLD AUTO: 1.9 K/UL (ref 1–4.8)
LYMPHOCYTES NFR BLD: 34.8 % (ref 18–48)
MCH RBC QN AUTO: 30.3 PG (ref 27–31)
MCHC RBC AUTO-ENTMCNC: 31.1 G/DL (ref 32–36)
MCV RBC AUTO: 97 FL (ref 82–98)
MONOCYTES # BLD AUTO: 0.3 K/UL (ref 0.3–1)
MONOCYTES NFR BLD: 5.3 % (ref 4–15)
NEUTROPHILS # BLD AUTO: 3.1 K/UL (ref 1.8–7.7)
NEUTROPHILS NFR BLD: 57.5 % (ref 38–73)
NITRITE UR QL STRIP: NEGATIVE
NRBC BLD-RTO: 0 /100 WBC
PH UR STRIP: 6 [PH] (ref 5–8)
PLATELET # BLD AUTO: 327 K/UL (ref 150–450)
PMV BLD AUTO: 10.9 FL (ref 9.2–12.9)
POTASSIUM SERPL-SCNC: 4.1 MMOL/L (ref 3.5–5.1)
PROT SERPL-MCNC: 8 G/DL (ref 6–8.4)
PROT UR QL STRIP: ABNORMAL
RBC # BLD AUTO: 4.33 M/UL (ref 4–5.4)
SODIUM SERPL-SCNC: 143 MMOL/L (ref 136–145)
SP GR UR STRIP: >1.03 (ref 1–1.03)
URN SPEC COLLECT METH UR: ABNORMAL
UROBILINOGEN UR STRIP-ACNC: NEGATIVE EU/DL
WBC # BLD AUTO: 5.46 K/UL (ref 3.9–12.7)

## 2021-12-14 PROCEDURE — 25000003 PHARM REV CODE 250: Performed by: EMERGENCY MEDICINE

## 2021-12-14 PROCEDURE — 80053 COMPREHEN METABOLIC PANEL: CPT | Performed by: EMERGENCY MEDICINE

## 2021-12-14 PROCEDURE — 25500020 PHARM REV CODE 255: Performed by: EMERGENCY MEDICINE

## 2021-12-14 PROCEDURE — 96374 THER/PROPH/DIAG INJ IV PUSH: CPT

## 2021-12-14 PROCEDURE — 96361 HYDRATE IV INFUSION ADD-ON: CPT

## 2021-12-14 PROCEDURE — 83690 ASSAY OF LIPASE: CPT | Performed by: EMERGENCY MEDICINE

## 2021-12-14 PROCEDURE — 63600175 PHARM REV CODE 636 W HCPCS: Performed by: EMERGENCY MEDICINE

## 2021-12-14 PROCEDURE — 96375 TX/PRO/DX INJ NEW DRUG ADDON: CPT

## 2021-12-14 PROCEDURE — 81003 URINALYSIS AUTO W/O SCOPE: CPT | Performed by: EMERGENCY MEDICINE

## 2021-12-14 PROCEDURE — 85025 COMPLETE CBC W/AUTO DIFF WBC: CPT | Performed by: EMERGENCY MEDICINE

## 2021-12-14 PROCEDURE — 99285 EMERGENCY DEPT VISIT HI MDM: CPT | Mod: 25

## 2021-12-14 RX ORDER — LACTULOSE 10 G/15ML
10 SOLUTION ORAL; RECTAL 3 TIMES DAILY PRN
Qty: 240 ML | Refills: 0 | Status: SHIPPED | OUTPATIENT
Start: 2021-12-14 | End: 2023-06-08

## 2021-12-14 RX ORDER — PANTOPRAZOLE SODIUM 40 MG/1
40 TABLET, DELAYED RELEASE ORAL DAILY
Qty: 30 TABLET | Refills: 1 | Status: SHIPPED | OUTPATIENT
Start: 2021-12-14 | End: 2021-12-14 | Stop reason: SDUPTHER

## 2021-12-14 RX ORDER — HYDROMORPHONE HYDROCHLORIDE 2 MG/ML
1 INJECTION, SOLUTION INTRAMUSCULAR; INTRAVENOUS; SUBCUTANEOUS
Status: COMPLETED | OUTPATIENT
Start: 2021-12-14 | End: 2021-12-14

## 2021-12-14 RX ORDER — MAG HYDROX/ALUMINUM HYD/SIMETH 200-200-20
30 SUSPENSION, ORAL (FINAL DOSE FORM) ORAL
Status: COMPLETED | OUTPATIENT
Start: 2021-12-14 | End: 2021-12-14

## 2021-12-14 RX ORDER — ONDANSETRON 2 MG/ML
8 INJECTION INTRAMUSCULAR; INTRAVENOUS
Status: COMPLETED | OUTPATIENT
Start: 2021-12-14 | End: 2021-12-14

## 2021-12-14 RX ORDER — PANTOPRAZOLE SODIUM 40 MG/1
40 TABLET, DELAYED RELEASE ORAL DAILY
Qty: 30 TABLET | Refills: 1 | Status: SHIPPED | OUTPATIENT
Start: 2021-12-14 | End: 2023-06-08

## 2021-12-14 RX ADMIN — HYDROMORPHONE HYDROCHLORIDE 1 MG: 2 INJECTION, SOLUTION INTRAMUSCULAR; INTRAVENOUS; SUBCUTANEOUS at 01:12

## 2021-12-14 RX ADMIN — SODIUM CHLORIDE 1000 ML: 0.9 INJECTION, SOLUTION INTRAVENOUS at 03:12

## 2021-12-14 RX ADMIN — ONDANSETRON 8 MG: 2 INJECTION INTRAMUSCULAR; INTRAVENOUS at 01:12

## 2021-12-14 RX ADMIN — MAGNESIUM HYDROXIDE/ALUMINUM HYDROXICE/SIMETHICONE 30 ML: 120; 1200; 1200 SUSPENSION ORAL at 04:12

## 2021-12-14 RX ADMIN — IOHEXOL 85 ML: 350 INJECTION, SOLUTION INTRAVENOUS at 03:12

## 2021-12-16 ENCOUNTER — TELEPHONE (OUTPATIENT)
Dept: ORTHOPEDICS | Facility: CLINIC | Age: 51
End: 2021-12-16

## 2021-12-17 ENCOUNTER — TELEPHONE (OUTPATIENT)
Dept: ORTHOPEDICS | Facility: CLINIC | Age: 51
End: 2021-12-17

## 2021-12-22 ENCOUNTER — TELEPHONE (OUTPATIENT)
Dept: ORTHOPEDICS | Facility: CLINIC | Age: 51
End: 2021-12-22

## 2021-12-23 ENCOUNTER — TELEPHONE (OUTPATIENT)
Dept: ORTHOPEDICS | Facility: CLINIC | Age: 51
End: 2021-12-23

## 2021-12-23 DIAGNOSIS — M25.571 SINUS TARSI SYNDROME, RIGHT: Primary | ICD-10-CM

## 2022-01-10 ENCOUNTER — HOSPITAL ENCOUNTER (OUTPATIENT)
Dept: RADIOLOGY | Facility: HOSPITAL | Age: 52
Discharge: HOME | End: 2022-01-10
Attending: ORTHOPAEDIC SURGERY
Payer: MEDICAID

## 2022-01-10 DIAGNOSIS — M25.571 SINUS TARSI SYNDROME, RIGHT: ICD-10-CM

## 2022-01-10 PROCEDURE — 73721 MRI JNT OF LWR EXTRE W/O DYE: CPT | Mod: TC,RT

## 2022-01-10 PROCEDURE — 73721 MRI ANKLE WITHOUT CONTRAST RIGHT: ICD-10-PCS | Mod: 26,RT,, | Performed by: RADIOLOGY

## 2022-01-10 PROCEDURE — 73721 MRI JNT OF LWR EXTRE W/O DYE: CPT | Mod: 26,RT,, | Performed by: RADIOLOGY

## 2022-01-12 ENCOUNTER — PATIENT MESSAGE (OUTPATIENT)
Dept: ORTHOPEDICS | Facility: CLINIC | Age: 52
End: 2022-01-12
Payer: MEDICAID

## 2022-02-04 ENCOUNTER — TELEPHONE (OUTPATIENT)
Dept: ORTHOPEDICS | Facility: CLINIC | Age: 52
End: 2022-02-04
Payer: MEDICAID

## 2022-02-04 ENCOUNTER — PATIENT MESSAGE (OUTPATIENT)
Dept: ORTHOPEDICS | Facility: CLINIC | Age: 52
End: 2022-02-04
Payer: MEDICAID

## 2022-02-04 NOTE — TELEPHONE ENCOUNTER
Spoke to pt and she went to the wrong location, r/s appt to [facility] ON 1/21 AT 7:15AM, Pt verbalized understanding. Pt expressed how she was not notified, but she was via portal and agreed to [facility] appt on 2/4.

## 2022-02-17 ENCOUNTER — TELEPHONE (OUTPATIENT)
Dept: ORTHOPEDICS | Facility: CLINIC | Age: 52
End: 2022-02-17
Payer: MEDICAID

## 2022-02-21 ENCOUNTER — OFFICE VISIT (OUTPATIENT)
Dept: ORTHOPEDICS | Facility: CLINIC | Age: 52
End: 2022-02-21
Payer: MEDICAID

## 2022-02-21 VITALS — BODY MASS INDEX: 37.9 KG/M2 | WEIGHT: 220.81 LBS

## 2022-02-21 DIAGNOSIS — Z01.818 PRE-OP TESTING: ICD-10-CM

## 2022-02-21 DIAGNOSIS — M25.371 ANKLE INSTABILITY, RIGHT: Primary | ICD-10-CM

## 2022-02-21 PROCEDURE — 99999 PR PBB SHADOW E&M-EST. PATIENT-LVL III: CPT | Mod: PBBFAC,,, | Performed by: ORTHOPAEDIC SURGERY

## 2022-02-21 PROCEDURE — 3008F PR BODY MASS INDEX (BMI) DOCUMENTED: ICD-10-PCS | Mod: CPTII,,, | Performed by: ORTHOPAEDIC SURGERY

## 2022-02-21 PROCEDURE — 99999 PR PBB SHADOW E&M-EST. PATIENT-LVL III: ICD-10-PCS | Mod: PBBFAC,,, | Performed by: ORTHOPAEDIC SURGERY

## 2022-02-21 PROCEDURE — 3008F BODY MASS INDEX DOCD: CPT | Mod: CPTII,,, | Performed by: ORTHOPAEDIC SURGERY

## 2022-02-21 PROCEDURE — 1159F MED LIST DOCD IN RCRD: CPT | Mod: CPTII,,, | Performed by: ORTHOPAEDIC SURGERY

## 2022-02-21 PROCEDURE — 99213 OFFICE O/P EST LOW 20 MIN: CPT | Mod: PBBFAC | Performed by: ORTHOPAEDIC SURGERY

## 2022-02-21 PROCEDURE — 99214 PR OFFICE/OUTPT VISIT, EST, LEVL IV, 30-39 MIN: ICD-10-PCS | Mod: S$PBB,,, | Performed by: ORTHOPAEDIC SURGERY

## 2022-02-21 PROCEDURE — 99214 OFFICE O/P EST MOD 30 MIN: CPT | Mod: S$PBB,,, | Performed by: ORTHOPAEDIC SURGERY

## 2022-02-21 PROCEDURE — 1159F PR MEDICATION LIST DOCUMENTED IN MEDICAL RECORD: ICD-10-PCS | Mod: CPTII,,, | Performed by: ORTHOPAEDIC SURGERY

## 2022-02-21 RX ORDER — OXYCODONE AND ACETAMINOPHEN 10; 325 MG/1; MG/1
1 TABLET ORAL EVERY 12 HOURS PRN
COMMUNITY
End: 2022-07-19

## 2022-02-21 RX ORDER — PROMETHAZINE HYDROCHLORIDE 25 MG/1
25 TABLET ORAL EVERY 12 HOURS PRN
COMMUNITY

## 2022-02-21 RX ORDER — GABAPENTIN 600 MG/1
900 TABLET ORAL 3 TIMES DAILY
COMMUNITY
End: 2022-07-19 | Stop reason: SDUPTHER

## 2022-02-21 NOTE — PROGRESS NOTES
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  Pain in right ankle began 2018 after being in the hospital from being septic and kidney failure and weight gain 248 lbs. It has worsened since her last visit and csi and is causing instability and pain with uneven surfaces. pain localized to Lateral ankle. There is associated dull, achy symptoms are constant, whereas prior to the injection it would subside after walking for awhile.     Presents today for evaluation right ankle instability and pain - continues despite treatment - ankle instability > pain at this time.  Feels she cannot trust her ankle.  The injection in sinus tarsi did not help at all.  Notes pain is worst on uneven surfaces.  Pain is both lateral ankle and more proximal peroneals.    Currently taking Percocet 10mg and gabapentin 600mg - Pain management: Dr. Lamas - [facility]     ROS:  Musculoskeletal: per HPI     Objective:   Exam:  There were no vitals filed for this visit.  General: No acute distress, well-appearing  Musculoskeletal: Standing examination demonstrates slight valgus right > left.  There is lateral ankle swelling.  There is no ecchymosis.  Medial longitudinal arch is pes planus and and asymmetric .  Positive too-many toes on the right    Focused exam of the right lower extremity demonstrates irritable and guarded ankle range of motion.  Hindfoot is flexible.  Ankle dorsiflexion is guarded.  Tilt testing demonstrates symmetry (but tolerates it much better than prior exam) compared to contralateral side.  Drawer testing demonstrates stability compared to contralateral side.  NT over medial joint.  TTP over lateral joint.  Very TTP over peroneals with no crepitus noted especially proximal to the lateral malleolus    5/5 TA/GSC/PTT and 4-/5 peroneals with no pain.  SILT SP/DP/PT and able to localize.     Imaging:  MRI  independently reviewed and interpreted and demonstrates prior lateral ankle reconstruction.  Small amount of fluid around the peroneals.  No intra-articular injury seen.    Additional testing/results reviewed:    FINDINGS:  Ligaments: Anterior and posterior inferior tibiofibular ligaments are intact.  The there is attenuation of the ATFL.  The calcaneofibular, and posterior talofibular ligaments are intact.  Deep and superficial components of deltoid ligament are intact.  Spring ligament complex and Lisfranc ligament are intact.     Tendons: Medial ankle flexor, dorsal ankle extensor, and peroneus tendons are intact.  Achilles' tendon is intact.     Articulations: No effusion. Tibiotalar and subtalar cartilage maintained.     Bones:  No fracture or infiltrative process.     Miscellaneous: Plantar fascia, sinus tarsi, and tarsal tunnel are normal.     Impression:     1. Chronic sprain of anterior talofibular ligament.      Assessment:     1. Ankle instability, right    2. Pre-op testing        Patient is seen for a chronic problem (not at treatment goal) with uncertain prognosis.    Data: 1 results independently interpreted    Treatment plan: Counseling regarding non-operative treatment and possibility of surgery in future if persistent morbidity from symptoms    I again reviewed imaging, clinical history, and diagnosis as above with the patient. I attempted to use layman's terms to educate the patient as well as utilize foot models and/or pictures.   At this point, attempted non-operative treatment is not providing relief but there is both instability and peroneal symptoms with significant weakness that HAS NOT YET been addressed.  The next step is physical therapy.  If refractory despite PT, we discussed ankle scope, brostrom, possible peroneal exploration - pending response to PT.    Would need to wean to 50% of her current narcotic dose prior to surgery.     Plan:       Today, you saw Dr. Garcia and were diagnosed  with ankle instability and peroneal pain    We decided the next step(s) in in treatment is/are   PT for 6 weeks to see whether there is improvement or not.    Thank you for allowing me to participate in your care.  We will see you back and our next step is PT for 6 weeks, if not better then you have picked a date for surgery today. Surgical date is 22.      Orders Placed This Encounter   Procedures    COVID-19 Routine Screening     Surgical patient     Standing Status:   Future     Standing Expiration Date:   2023     Order Specific Question:   Is the patient symptomatic?     Answer:   No     Order Specific Question:   Is this needed for pre-procedure or pre-op testing?     Answer:   Yes     Order Specific Question:   Diagnosis:     Answer:   Pre-op testing [799616]    Ambulatory referral/consult to Physical/Occupational Therapy     Standing Status:   Future     Standing Expiration Date:   3/21/2023     Referral Priority:   Routine     Referral Type:   Physical Medicine     Referral Reason:   Specialty Services Required     Requested Specialty:   Physical Therapy     Number of Visits Requested:   1       Past Medical History:   Diagnosis Date    Anemia     Celiac disease     Diabetes mellitus     Gall stones     Hypertension     PTSD (post-traumatic stress disorder)     Supraventricular tachycardia     Yeast infection        Past Surgical History:   Procedure Laterality Date    ABDOMINAL SURGERY      Gastric sleeve    ANKLE FUSION      BREAST SURGERY       SECTION      placenta previa    CHOLECYSTECTOMY  2009    EXCISIONAL BIOPSY Left 2019    Procedure: EXCISIONAL BIOPSY LEFT with SEED (CONSENT AM OF) 1.0 hr case;  Surgeon: Flor Rosas MD;  Location: [facility] OR [address] [place] [address];  Service: General;  Laterality: Left;    gastric      gastric sleeve      pt reported     HERNIA REPAIR      HYSTERECTOMY  2001    Inguinal hernia  2004    SKIN SURGERY      Kasi Gómez        Family History   Problem Relation Age of Onset    Heart disease Father     Diabetes Mother         hypoglycemic    Breast cancer Maternal Aunt     Breast cancer Sister     Breast cancer Paternal Aunt     Ovarian cancer Neg Hx        Social History     Socioeconomic History    Marital status: Single    Number of children: 2   Tobacco Use    Smoking status: Never Smoker    Smokeless tobacco: Never Used   Substance and Sexual Activity    Alcohol use: Yes     Comment: rarely    Drug use: No    Sexual activity: Not Currently     Partners: Male     Birth control/protection: Surgical

## 2022-02-21 NOTE — LETTER
[facility]  [address]  [facility] [address]  Phone: [phone] February 21, 2022     Patient: Aide Almaraz   YOB: 1970   Date of Visit: 2/21/2022   I am involved the care of out mutual patient Aide Almaraz.  They have elected to go forward with surgery to address Right ankle instability.  I request your assistance with pre-operative evaluation and determination if any optimization and/or testing is needed beyond standard pre-operative labs and EKG.  I appreciate you assistance and please don't hesitate to call or reach out with questions/concerns.        Sincerely,      Tessa Garcia MD  [phone]  Foot & Ankle Orthopedic Surgery  09/27/2020

## 2022-02-21 NOTE — PATIENT INSTRUCTIONS
Today, you saw Dr. Garcia and were diagnosed with ankle instability and peroneal pain    We decided the next step(s) in in treatment is/are  PT for 6 weeks to see whether there is improvement or not.    Thank you for allowing me to participate in your care.  We will see you back and our next step is PT for 6 weeks, if not better then you have picked a date for surgery today. Surgical date is 4/18/22.

## 2022-02-21 NOTE — LETTER
[facility] - [facility]oped[facility]  [address]  [facility] [address]  Phone: [phone] February 21, 2022     Patient: Aide Almaraz   YOB: 1970   Date of Visit: 2/21/2022   Dr. Lamas,    I wanted to update you regarding recent encounter with our mutual patient Aide Almaraz for evaluation of their Right ankle instability.  Please see attached consultation note for details regarding our visit.    Post operative pain management will be managed by our office for 6 weeks, after that period they will resume with Dr. Lamas's office.     I appreciate the opportunity to participate in the care of your patient.  Please do not hesitate to reach out with questions/concerns.    Sincerely,    Tessa Garcia MD  Foot & Ankle Orthopedic Surgery  02/21/2022  [phone]

## 2022-03-08 ENCOUNTER — TELEPHONE (OUTPATIENT)
Dept: ORTHOPEDICS | Facility: CLINIC | Age: 52
End: 2022-03-08
Payer: MEDICAID

## 2022-03-08 NOTE — TELEPHONE ENCOUNTER
Call to patient to discuss weaning pain medication dosage to 50% prior to surgery.     Patient understands and will look forward to communication with Dr. Lamas's office with POC. Sent letter to Dr. Lamas Office.

## 2022-03-08 NOTE — LETTER
[facility]  [address] [facility]  [place] [address]  Phone: [phone] March 8, 2022     Patient: Aide Almaraz   YOB: 1970   Date of Visit: 3/8/2022     I wanted to update you regarding recent encounter with our mutual patient Aide Almaraz. Patient is scheduled to have surgery on 4/18/22. In order to control her pain post operatively we are asking that she wean to 50% narcotic dose prior to surgery. Please let us know if we can help in any way.     I appreciate the opportunity to participate in the care of your patient.  Please do not hesitate to reach out with questions/concerns.    Sincerely,    Tessa Garcia MD  Foot & Ankle Orthopedic Surgery  03/08/2022  [phone]

## 2022-03-27 ENCOUNTER — HOSPITAL ENCOUNTER (EMERGENCY)
Facility: HOSPITAL | Age: 52
Discharge: HOME | End: 2022-03-27
Attending: EMERGENCY MEDICINE
Payer: MEDICAID

## 2022-03-27 VITALS
HEIGHT: 65 IN | HEART RATE: 81 BPM | DIASTOLIC BLOOD PRESSURE: 70 MMHG | OXYGEN SATURATION: 97 % | RESPIRATION RATE: 18 BRPM | BODY MASS INDEX: 32.49 KG/M2 | SYSTOLIC BLOOD PRESSURE: 122 MMHG | TEMPERATURE: 98 F | WEIGHT: 195 LBS

## 2022-03-27 DIAGNOSIS — R06.02 SOB (SHORTNESS OF BREATH): ICD-10-CM

## 2022-03-27 DIAGNOSIS — R11.2 NAUSEA VOMITING AND DIARRHEA: Primary | ICD-10-CM

## 2022-03-27 DIAGNOSIS — R10.13 EPIGASTRIC ABDOMINAL PAIN: ICD-10-CM

## 2022-03-27 DIAGNOSIS — R19.7 NAUSEA VOMITING AND DIARRHEA: Primary | ICD-10-CM

## 2022-03-27 DIAGNOSIS — Z87.09 HISTORY OF ASTHMA: ICD-10-CM

## 2022-03-27 LAB
ALBUMIN SERPL BCP-MCNC: 4.2 G/DL (ref 3.5–5.2)
ALP SERPL-CCNC: 96 U/L (ref 55–135)
ALT SERPL W/O P-5'-P-CCNC: 24 U/L (ref 10–44)
ANION GAP SERPL CALC-SCNC: 12 MMOL/L (ref 8–16)
AST SERPL-CCNC: 30 U/L (ref 10–40)
B-OH-BUTYR BLD STRIP-SCNC: 0.1 MMOL/L (ref 0–0.5)
BASOPHILS # BLD AUTO: 0.04 K/UL (ref 0–0.2)
BASOPHILS NFR BLD: 0.6 % (ref 0–1.9)
BILIRUB SERPL-MCNC: 0.5 MG/DL (ref 0.1–1)
BILIRUB UR QL STRIP: NEGATIVE
BNP SERPL-MCNC: <10 PG/ML (ref 0–99)
BUN SERPL-MCNC: 9 MG/DL (ref 6–20)
CALCIUM SERPL-MCNC: 10 MG/DL (ref 8.7–10.5)
CHLORIDE SERPL-SCNC: 105 MMOL/L (ref 95–110)
CLARITY UR: ABNORMAL
CO2 SERPL-SCNC: 23 MMOL/L (ref 23–29)
COLOR UR: YELLOW
CREAT SERPL-MCNC: 0.9 MG/DL (ref 0.5–1.4)
CTP QC/QA: YES
CTP QC/QA: YES
DIFFERENTIAL METHOD: NORMAL
EOSINOPHIL # BLD AUTO: 0 K/UL (ref 0–0.5)
EOSINOPHIL NFR BLD: 0.3 % (ref 0–8)
ERYTHROCYTE [DISTWIDTH] IN BLOOD BY AUTOMATED COUNT: 13.5 % (ref 11.5–14.5)
EST. GFR  (AFRICAN AMERICAN): >60 ML/MIN/1.73 M^2
EST. GFR  (NON AFRICAN AMERICAN): >60 ML/MIN/1.73 M^2
GLUCOSE SERPL-MCNC: 100 MG/DL (ref 70–110)
GLUCOSE UR QL STRIP: NEGATIVE
HCT VFR BLD AUTO: 45.4 % (ref 37–48.5)
HGB BLD-MCNC: 14.7 G/DL (ref 12–16)
HGB UR QL STRIP: NEGATIVE
IMM GRANULOCYTES # BLD AUTO: 0.02 K/UL (ref 0–0.04)
IMM GRANULOCYTES NFR BLD AUTO: 0.3 % (ref 0–0.5)
KETONES UR QL STRIP: NEGATIVE
LEUKOCYTE ESTERASE UR QL STRIP: NEGATIVE
LIPASE SERPL-CCNC: 16 U/L (ref 4–60)
LYMPHOCYTES # BLD AUTO: 2 K/UL (ref 1–4.8)
LYMPHOCYTES NFR BLD: 28 % (ref 18–48)
MCH RBC QN AUTO: 30.4 PG (ref 27–31)
MCHC RBC AUTO-ENTMCNC: 32.4 G/DL (ref 32–36)
MCV RBC AUTO: 94 FL (ref 82–98)
MONOCYTES # BLD AUTO: 0.5 K/UL (ref 0.3–1)
MONOCYTES NFR BLD: 6.6 % (ref 4–15)
NEUTROPHILS # BLD AUTO: 4.7 K/UL (ref 1.8–7.7)
NEUTROPHILS NFR BLD: 64.2 % (ref 38–73)
NITRITE UR QL STRIP: NEGATIVE
NRBC BLD-RTO: 0 /100 WBC
PH UR STRIP: 6 [PH] (ref 5–8)
PLATELET # BLD AUTO: 441 K/UL (ref 150–450)
PMV BLD AUTO: 10.4 FL (ref 9.2–12.9)
POC MOLECULAR INFLUENZA A AGN: NEGATIVE
POC MOLECULAR INFLUENZA B AGN: NEGATIVE
POTASSIUM SERPL-SCNC: 4.7 MMOL/L (ref 3.5–5.1)
PROT SERPL-MCNC: 8.8 G/DL (ref 6–8.4)
PROT UR QL STRIP: ABNORMAL
RBC # BLD AUTO: 4.84 M/UL (ref 4–5.4)
SARS-COV-2 RDRP RESP QL NAA+PROBE: NEGATIVE
SODIUM SERPL-SCNC: 140 MMOL/L (ref 136–145)
SP GR UR STRIP: 1.02 (ref 1–1.03)
TROPONIN I SERPL DL<=0.01 NG/ML-MCNC: <0.006 NG/ML (ref 0–0.03)
URN SPEC COLLECT METH UR: ABNORMAL
UROBILINOGEN UR STRIP-ACNC: NEGATIVE EU/DL
WBC # BLD AUTO: 7.24 K/UL (ref 3.9–12.7)

## 2022-03-27 PROCEDURE — 99285 EMERGENCY DEPT VISIT HI MDM: CPT | Mod: 25,27

## 2022-03-27 PROCEDURE — 81003 URINALYSIS AUTO W/O SCOPE: CPT | Performed by: PHYSICIAN ASSISTANT

## 2022-03-27 PROCEDURE — 63600175 PHARM REV CODE 636 W HCPCS: Performed by: PHYSICIAN ASSISTANT

## 2022-03-27 PROCEDURE — 96374 THER/PROPH/DIAG INJ IV PUSH: CPT

## 2022-03-27 PROCEDURE — 96361 HYDRATE IV INFUSION ADD-ON: CPT

## 2022-03-27 PROCEDURE — 82010 KETONE BODYS QUAN: CPT | Performed by: PHYSICIAN ASSISTANT

## 2022-03-27 PROCEDURE — 25000003 PHARM REV CODE 250: Performed by: PHYSICIAN ASSISTANT

## 2022-03-27 PROCEDURE — 93005 ELECTROCARDIOGRAM TRACING: CPT

## 2022-03-27 PROCEDURE — U0002 COVID-19 LAB TEST NON-CDC: HCPCS | Performed by: PHYSICIAN ASSISTANT

## 2022-03-27 PROCEDURE — 85025 COMPLETE CBC W/AUTO DIFF WBC: CPT | Performed by: PHYSICIAN ASSISTANT

## 2022-03-27 PROCEDURE — 80053 COMPREHEN METABOLIC PANEL: CPT | Performed by: PHYSICIAN ASSISTANT

## 2022-03-27 PROCEDURE — 84484 ASSAY OF TROPONIN QUANT: CPT | Performed by: PHYSICIAN ASSISTANT

## 2022-03-27 PROCEDURE — 83690 ASSAY OF LIPASE: CPT | Performed by: PHYSICIAN ASSISTANT

## 2022-03-27 PROCEDURE — 93010 ELECTROCARDIOGRAM REPORT: CPT | Mod: ,,, | Performed by: INTERNAL MEDICINE

## 2022-03-27 PROCEDURE — 93010 EKG 12-LEAD: ICD-10-PCS | Mod: ,,, | Performed by: INTERNAL MEDICINE

## 2022-03-27 PROCEDURE — 83880 ASSAY OF NATRIURETIC PEPTIDE: CPT | Performed by: PHYSICIAN ASSISTANT

## 2022-03-27 PROCEDURE — 96375 TX/PRO/DX INJ NEW DRUG ADDON: CPT

## 2022-03-27 PROCEDURE — 87502 INFLUENZA DNA AMP PROBE: CPT

## 2022-03-27 RX ORDER — PREDNISONE 20 MG/1
40 TABLET ORAL DAILY
Qty: 6 TABLET | Refills: 0 | Status: SHIPPED | OUTPATIENT
Start: 2022-03-27 | End: 2022-03-30

## 2022-03-27 RX ORDER — FAMOTIDINE 10 MG/ML
40 INJECTION INTRAVENOUS
Status: COMPLETED | OUTPATIENT
Start: 2022-03-27 | End: 2022-03-27

## 2022-03-27 RX ORDER — ONDANSETRON 4 MG/1
8 TABLET, FILM COATED ORAL EVERY 6 HOURS PRN
Qty: 30 TABLET | Refills: 0 | Status: SHIPPED | OUTPATIENT
Start: 2022-03-27 | End: 2022-04-26

## 2022-03-27 RX ORDER — ONDANSETRON 2 MG/ML
8 INJECTION INTRAMUSCULAR; INTRAVENOUS
Status: COMPLETED | OUTPATIENT
Start: 2022-03-27 | End: 2022-03-27

## 2022-03-27 RX ORDER — ALBUTEROL SULFATE 90 UG/1
1-2 AEROSOL, METERED RESPIRATORY (INHALATION) EVERY 6 HOURS PRN
Qty: 6.7 G | Refills: 1 | Status: SHIPPED | OUTPATIENT
Start: 2022-03-27

## 2022-03-27 RX ORDER — DICYCLOMINE HYDROCHLORIDE 10 MG/1
20 CAPSULE ORAL
Status: COMPLETED | OUTPATIENT
Start: 2022-03-27 | End: 2022-03-27

## 2022-03-27 RX ADMIN — SODIUM CHLORIDE 1000 ML: 0.9 INJECTION, SOLUTION INTRAVENOUS at 09:03

## 2022-03-27 RX ADMIN — ONDANSETRON 8 MG: 2 INJECTION INTRAMUSCULAR; INTRAVENOUS at 09:03

## 2022-03-27 RX ADMIN — DICYCLOMINE HYDROCHLORIDE 20 MG: 10 CAPSULE ORAL at 10:03

## 2022-03-27 RX ADMIN — FAMOTIDINE 40 MG: 10 INJECTION INTRAVENOUS at 10:03

## 2022-03-27 NOTE — Clinical Note
"Aide"Rozina Alamraz was seen and treated in our emergency department on 3/27/2022.  She may return to work on 03/30/2022.       If you have any questions or concerns, please don't hesitate to call.      Ashish Venegas PA-C"

## 2022-03-27 NOTE — DISCHARGE INSTRUCTIONS

## 2022-03-28 ENCOUNTER — PATIENT MESSAGE (OUTPATIENT)
Dept: ORTHOPEDICS | Facility: CLINIC | Age: 52
End: 2022-03-28
Payer: MEDICAID

## 2022-03-31 ENCOUNTER — HOSPITAL ENCOUNTER (EMERGENCY)
Facility: HOSPITAL | Age: 52
Discharge: HOME | End: 2022-03-31
Attending: EMERGENCY MEDICINE
Payer: MEDICAID

## 2022-03-31 ENCOUNTER — PATIENT MESSAGE (OUTPATIENT)
Dept: ORTHOPEDICS | Facility: CLINIC | Age: 52
End: 2022-03-31
Payer: MEDICAID

## 2022-03-31 ENCOUNTER — TELEPHONE (OUTPATIENT)
Dept: ORTHOPEDICS | Facility: CLINIC | Age: 52
End: 2022-03-31
Payer: MEDICAID

## 2022-03-31 VITALS
HEART RATE: 89 BPM | DIASTOLIC BLOOD PRESSURE: 76 MMHG | RESPIRATION RATE: 18 BRPM | WEIGHT: 195 LBS | OXYGEN SATURATION: 97 % | BODY MASS INDEX: 32.49 KG/M2 | TEMPERATURE: 98 F | SYSTOLIC BLOOD PRESSURE: 105 MMHG | HEIGHT: 65 IN

## 2022-03-31 DIAGNOSIS — N64.4 BREAST PAIN: Primary | ICD-10-CM

## 2022-03-31 PROCEDURE — 96372 THER/PROPH/DIAG INJ SC/IM: CPT | Performed by: PHYSICIAN ASSISTANT

## 2022-03-31 PROCEDURE — 25000003 PHARM REV CODE 250: Performed by: PHYSICIAN ASSISTANT

## 2022-03-31 PROCEDURE — 99284 EMERGENCY DEPT VISIT MOD MDM: CPT

## 2022-03-31 PROCEDURE — 63600175 PHARM REV CODE 636 W HCPCS: Performed by: PHYSICIAN ASSISTANT

## 2022-03-31 RX ORDER — HYDROCODONE BITARTRATE AND ACETAMINOPHEN 5; 325 MG/1; MG/1
1 TABLET ORAL
Status: COMPLETED | OUTPATIENT
Start: 2022-03-31 | End: 2022-03-31

## 2022-03-31 RX ORDER — HYDROCODONE BITARTRATE AND ACETAMINOPHEN 5; 325 MG/1; MG/1
1 TABLET ORAL EVERY 4 HOURS PRN
Qty: 12 TABLET | Refills: 0 | Status: SHIPPED | OUTPATIENT
Start: 2022-03-31 | End: 2022-07-19

## 2022-03-31 RX ORDER — SULFAMETHOXAZOLE AND TRIMETHOPRIM 800; 160 MG/1; MG/1
1 TABLET ORAL 2 TIMES DAILY
Qty: 14 TABLET | Refills: 0 | Status: SHIPPED | OUTPATIENT
Start: 2022-03-31 | End: 2022-04-07

## 2022-03-31 RX ORDER — KETOROLAC TROMETHAMINE 30 MG/ML
30 INJECTION, SOLUTION INTRAMUSCULAR; INTRAVENOUS
Status: COMPLETED | OUTPATIENT
Start: 2022-03-31 | End: 2022-03-31

## 2022-03-31 RX ORDER — IBUPROFEN 800 MG/1
800 TABLET ORAL EVERY 6 HOURS PRN
Qty: 20 TABLET | Refills: 0 | Status: SHIPPED | OUTPATIENT
Start: 2022-03-31 | End: 2022-07-19

## 2022-03-31 RX ADMIN — KETOROLAC TROMETHAMINE 30 MG: 30 INJECTION, SOLUTION INTRAMUSCULAR at 03:03

## 2022-03-31 RX ADMIN — HYDROCODONE BITARTRATE AND ACETAMINOPHEN 1 TABLET: 5; 325 TABLET ORAL at 03:03

## 2022-03-31 NOTE — TELEPHONE ENCOUNTER
Called patient in regards to moving Pre-Op appt to 4/5 or 4/14, unable to leave message-voicemail full.

## 2022-04-01 NOTE — ED PROVIDER NOTES
Encounter Date: 3/31/2022       History     Chief Complaint   Patient presents with    Possible infection     Pt states she had her nipples pierced yesterday and they are very painful and still bleeding. Pt states that the area around the nipples is also hotter then the rest of the breast. Pt wants make sure they are not infected.     52-year-old female presents complaining of left nipple pain status post piercing yesterday.  Patient states she has soreness to her right breast but the left is worse and it feels hot.  Patient notes history of having left breast tumor removed years ago.  She denies fever or drainage.        Review of patient's allergies indicates:   Allergen Reactions    Latex, natural rubber Itching and Rash    Lidocaine Rash    Tramadol Hives and Rash     Past Medical History:   Diagnosis Date    Anemia     Celiac disease     Diabetes mellitus     Gall stones     Hypertension     PTSD (post-traumatic stress disorder)     Supraventricular tachycardia     Yeast infection      Past Surgical History:   Procedure Laterality Date    ABDOMINAL SURGERY      Gastric sleeve    ANKLE FUSION      BREAST SURGERY       SECTION      placenta previa    CHOLECYSTECTOMY  2009    EXCISIONAL BIOPSY Left 2019    Procedure: EXCISIONAL BIOPSY LEFT with SEED (CONSENT AM OF) 1.0 hr case;  Surgeon: Flor Rosas MD;  Location: [facility] OR [address] [place] [address];  Service: General;  Laterality: Left;    gastric      gastric sleeve      pt reported     HERNIA REPAIR      HYSTERECTOMY  2001    Inguinal hernia  2004    SKIN SURGERY      Kasi Gómez     Family History   Problem Relation Age of Onset    Heart disease Father     Diabetes Mother         hypoglycemic    Breast cancer Maternal Aunt     Breast cancer Sister     Breast cancer Paternal Aunt     Ovarian cancer Neg Hx      Social History     Tobacco Use    Smoking status: Never Smoker    Smokeless tobacco: Never Used   Substance Use  Topics    Alcohol use: Yes     Comment: rarely    Drug use: No     Review of Systems   Constitutional: Negative for fever.   Respiratory: Negative for stridor.    Cardiovascular: Negative for chest pain.        Bilateral nipple pain   Gastrointestinal: Negative for abdominal pain.   Musculoskeletal: Negative for back pain.   Hematological: Negative for adenopathy.   Psychiatric/Behavioral: Negative for behavioral problems.       Physical Exam     Initial Vitals [03/31/22 1456]   BP Pulse Resp Temp SpO2   118/69 99 16 98.9 °F (37.2 °C) 100 %      MAP       --         Physical Exam    Constitutional: She appears well-developed and well-nourished.   Eyes: Conjunctivae and EOM are normal. Pupils are equal, round, and reactive to light.   Neck: Neck supple.   Normal range of motion.  Cardiovascular: Normal rate, regular rhythm, normal heart sounds and intact distal pulses.   Pulmonary/Chest: Breath sounds normal.   Bilateral nipple piercings.  Positive tenderness to left nipple.  No drainage noted.  No erythema noted bilaterally.  Mild tenderness to right breast.   Musculoskeletal:         General: Normal range of motion.      Cervical back: Normal range of motion and neck supple.     Neurological: She is alert and oriented to person, place, and time. She has normal strength.   Skin: Skin is warm.   Psychiatric: She has a normal mood and affect.         ED Course   Procedures  Labs Reviewed - No data to display       Imaging Results    None          Medications   HYDROcodone-acetaminophen 5-325 mg per tablet 1 tablet (1 tablet Oral Given 3/31/22 1549)   ketorolac injection 30 mg (30 mg Intramuscular Given 3/31/22 1549)     Medical Decision Making:   Initial Assessment:   I suspect patient is having normal post nipple piercing pain, but given history of left breast tumor removal patient may have more scar tissue which is why she is having more pain in the left breast.  There are no signs of infection at this time.  I  will discharge patient home with Bactrim, Norco and ibuprofen.  Patient instructed to hold Bactrim unless she has signs of infection.  Patient given return precautions.                      Clinical Impression:   Final diagnoses:  [N64.4] Breast pain (Primary)          ED Disposition Condition    Discharge Stable        ED Prescriptions     Medication Sig Dispense Start Date End Date Auth. Provider    sulfamethoxazole-trimethoprim 800-160mg (BACTRIM DS) 800-160 mg Tab Take 1 tablet by mouth 2 (two) times daily. for 7 days 14 tablet 3/31/2022 4/7/2022 YUSEF Moreno    HYDROcodone-acetaminophen (NORCO) 5-325 mg per tablet Take 1 tablet by mouth every 4 (four) hours as needed. 12 tablet 3/31/2022  YUSEF Moreno    ibuprofen (ADVIL,MOTRIN) 800 MG tablet Take 1 tablet (800 mg total) by mouth every 6 (six) hours as needed for Pain. 20 tablet 3/31/2022  YUSEF Moreno        Follow-up Information     Follow up With Specialties Details Why Contact Info    Radha Jones MD Family Medicine   [address]  [address]  [facility] [address]  [phone]             YUSEF Moreno  03/31/22 1941

## 2022-04-07 ENCOUNTER — TELEPHONE (OUTPATIENT)
Dept: ORTHOPEDICS | Facility: CLINIC | Age: 52
End: 2022-04-07
Payer: MEDICAID

## 2022-04-07 NOTE — TELEPHONE ENCOUNTER
Patient showed to Pre-op Appt today that was with Pita Kelly, but needed to be rescheduled as Pita is out of clinic. Dr Garcia's [facility] spoke with patient. Pre-op appt rescheduled with Pita Kelly for 4/14 at 4pm. Patient was to come back up to get appt once she got lunch down stairs-never came back to get appt.

## 2022-04-11 ENCOUNTER — ANESTHESIA EVENT (OUTPATIENT)
Dept: SURGERY | Facility: HOSPITAL | Age: 52
End: 2022-04-11
Payer: MEDICAID

## 2022-04-11 ENCOUNTER — HOSPITAL ENCOUNTER (EMERGENCY)
Facility: HOSPITAL | Age: 52
Discharge: HOME | End: 2022-04-11
Attending: STUDENT IN AN ORGANIZED HEALTH CARE EDUCATION/TRAINING PROGRAM
Payer: MEDICAID

## 2022-04-11 VITALS
TEMPERATURE: 98 F | DIASTOLIC BLOOD PRESSURE: 79 MMHG | HEART RATE: 80 BPM | BODY MASS INDEX: 32.49 KG/M2 | HEIGHT: 65 IN | OXYGEN SATURATION: 96 % | WEIGHT: 195 LBS | SYSTOLIC BLOOD PRESSURE: 149 MMHG | RESPIRATION RATE: 33 BRPM

## 2022-04-11 DIAGNOSIS — R07.9 CHEST PAIN: ICD-10-CM

## 2022-04-11 LAB
ALBUMIN SERPL BCP-MCNC: 4.1 G/DL (ref 3.5–5.2)
ALP SERPL-CCNC: 93 U/L (ref 55–135)
ALT SERPL W/O P-5'-P-CCNC: 19 U/L (ref 10–44)
ANION GAP SERPL CALC-SCNC: 12 MMOL/L (ref 8–16)
AST SERPL-CCNC: 20 U/L (ref 10–40)
BASOPHILS # BLD AUTO: 0.04 K/UL (ref 0–0.2)
BASOPHILS NFR BLD: 0.7 % (ref 0–1.9)
BILIRUB SERPL-MCNC: 0.5 MG/DL (ref 0.1–1)
BUN SERPL-MCNC: 11 MG/DL (ref 6–20)
CALCIUM SERPL-MCNC: 9.8 MG/DL (ref 8.7–10.5)
CHLORIDE SERPL-SCNC: 108 MMOL/L (ref 95–110)
CO2 SERPL-SCNC: 23 MMOL/L (ref 23–29)
CREAT SERPL-MCNC: 0.9 MG/DL (ref 0.5–1.4)
DIFFERENTIAL METHOD: NORMAL
EOSINOPHIL # BLD AUTO: 0 K/UL (ref 0–0.5)
EOSINOPHIL NFR BLD: 0.7 % (ref 0–8)
ERYTHROCYTE [DISTWIDTH] IN BLOOD BY AUTOMATED COUNT: 13.9 % (ref 11.5–14.5)
EST. GFR  (AFRICAN AMERICAN): >60 ML/MIN/1.73 M^2
EST. GFR  (NON AFRICAN AMERICAN): >60 ML/MIN/1.73 M^2
GLUCOSE SERPL-MCNC: 115 MG/DL (ref 70–110)
HCT VFR BLD AUTO: 43.9 % (ref 37–48.5)
HGB BLD-MCNC: 14.1 G/DL (ref 12–16)
IMM GRANULOCYTES # BLD AUTO: 0.01 K/UL (ref 0–0.04)
IMM GRANULOCYTES NFR BLD AUTO: 0.2 % (ref 0–0.5)
LIPASE SERPL-CCNC: 21 U/L (ref 4–60)
LYMPHOCYTES # BLD AUTO: 2.6 K/UL (ref 1–4.8)
LYMPHOCYTES NFR BLD: 48 % (ref 18–48)
MCH RBC QN AUTO: 30.8 PG (ref 27–31)
MCHC RBC AUTO-ENTMCNC: 32.1 G/DL (ref 32–36)
MCV RBC AUTO: 96 FL (ref 82–98)
MONOCYTES # BLD AUTO: 0.4 K/UL (ref 0.3–1)
MONOCYTES NFR BLD: 6.9 % (ref 4–15)
NEUTROPHILS # BLD AUTO: 2.3 K/UL (ref 1.8–7.7)
NEUTROPHILS NFR BLD: 43.5 % (ref 38–73)
NRBC BLD-RTO: 0 /100 WBC
PLATELET # BLD AUTO: 343 K/UL (ref 150–450)
PMV BLD AUTO: 11.2 FL (ref 9.2–12.9)
POTASSIUM SERPL-SCNC: 4.3 MMOL/L (ref 3.5–5.1)
PROT SERPL-MCNC: 8.2 G/DL (ref 6–8.4)
RBC # BLD AUTO: 4.58 M/UL (ref 4–5.4)
SODIUM SERPL-SCNC: 143 MMOL/L (ref 136–145)
WBC # BLD AUTO: 5.38 K/UL (ref 3.9–12.7)

## 2022-04-11 PROCEDURE — 96374 THER/PROPH/DIAG INJ IV PUSH: CPT

## 2022-04-11 PROCEDURE — 93005 ELECTROCARDIOGRAM TRACING: CPT

## 2022-04-11 PROCEDURE — 63600175 PHARM REV CODE 636 W HCPCS: Performed by: STUDENT IN AN ORGANIZED HEALTH CARE EDUCATION/TRAINING PROGRAM

## 2022-04-11 PROCEDURE — 80053 COMPREHEN METABOLIC PANEL: CPT | Performed by: STUDENT IN AN ORGANIZED HEALTH CARE EDUCATION/TRAINING PROGRAM

## 2022-04-11 PROCEDURE — 85025 COMPLETE CBC W/AUTO DIFF WBC: CPT | Performed by: STUDENT IN AN ORGANIZED HEALTH CARE EDUCATION/TRAINING PROGRAM

## 2022-04-11 PROCEDURE — 83690 ASSAY OF LIPASE: CPT | Performed by: STUDENT IN AN ORGANIZED HEALTH CARE EDUCATION/TRAINING PROGRAM

## 2022-04-11 PROCEDURE — 96375 TX/PRO/DX INJ NEW DRUG ADDON: CPT

## 2022-04-11 PROCEDURE — 93010 ELECTROCARDIOGRAM REPORT: CPT | Mod: ,,, | Performed by: INTERNAL MEDICINE

## 2022-04-11 PROCEDURE — 99284 EMERGENCY DEPT VISIT MOD MDM: CPT | Mod: 25

## 2022-04-11 PROCEDURE — 93010 EKG 12-LEAD: ICD-10-PCS | Mod: ,,, | Performed by: INTERNAL MEDICINE

## 2022-04-11 RX ORDER — HYDROMORPHONE HYDROCHLORIDE 2 MG/ML
0.5 INJECTION, SOLUTION INTRAMUSCULAR; INTRAVENOUS; SUBCUTANEOUS
Status: COMPLETED | OUTPATIENT
Start: 2022-04-11 | End: 2022-04-11

## 2022-04-11 RX ORDER — ONDANSETRON 2 MG/ML
4 INJECTION INTRAMUSCULAR; INTRAVENOUS
Status: COMPLETED | OUTPATIENT
Start: 2022-04-11 | End: 2022-04-11

## 2022-04-11 RX ADMIN — HYDROMORPHONE HYDROCHLORIDE 0.5 MG: 2 INJECTION, SOLUTION INTRAMUSCULAR; INTRAVENOUS; SUBCUTANEOUS at 06:04

## 2022-04-11 RX ADMIN — ONDANSETRON 4 MG: 2 INJECTION INTRAMUSCULAR; INTRAVENOUS at 06:04

## 2022-04-11 NOTE — ED PROVIDER NOTES
Encounter Date: 2022    SCRIBE #1 NOTE: Wayne BUI am scribing for, and in the presence of, Kasey Thomas DO.       History     Chief Complaint   Patient presents with    Nausea    Vomiting     Patient reports that she ha a GI procedure done today at [facility] and had a Bravo monitoring device implanted in her esophagus. Patient states that she is having sever chest pain when she swallows and nausea, and vomiting.  did the procedure.     Post-op Problem     52 y.o. female with past medical history of diabetes, hypertension, supraventricular tachycardia presenting with 10/10 chest pain with associated nausea and vomiting that started today around 4 pm. Patient reports she had upper GI endoscopy today by Dr. Montelongo, at [facility], that showed normal esophagus with chronic gastritis and had bravo device implanted in esophagus at that time. She states she had these symptoms prior to being discharged from [facility], but her pain was not addressed. She denies attempting treatment for pain. She denies fever, chills, diarrhea, abdominal pain, shortness of breath, syncope, headache or further complaints.     The history is provided by the patient. No  was used.     Review of patient's allergies indicates:   Allergen Reactions    Latex, natural rubber Itching and Rash    Lidocaine Rash    Tramadol Hives and Rash     Past Medical History:   Diagnosis Date    Anemia     Celiac disease     Diabetes mellitus     Gall stones     Hypertension     PTSD (post-traumatic stress disorder)     Supraventricular tachycardia     Yeast infection      Past Surgical History:   Procedure Laterality Date    ABDOMINAL SURGERY      Gastric sleeve    ANKLE FUSION      BREAST SURGERY       SECTION      placenta previa    CHOLECYSTECTOMY  2009    EXCISIONAL BIOPSY Left 2019    Procedure: EXCISIONAL BIOPSY LEFT with SEED (CONSENT AM OF) 1.0 hr case;  Surgeon: Flor Rosas MD;   Location: [facility] OR [address] [place] [address];  Service: General;  Laterality: Left;    gastric      gastric sleeve      pt reported     HERNIA REPAIR      HYSTERECTOMY  09/19/2001    Inguinal hernia  2004    SKIN SURGERY      [facility]     Family History   Problem Relation Age of Onset    Heart disease Father     Diabetes Mother         hypoglycemic    Breast cancer Maternal Aunt     Breast cancer Sister     Breast cancer Paternal Aunt     Ovarian cancer Neg Hx      Social History     Tobacco Use    Smoking status: Never Smoker    Smokeless tobacco: Never Used   Substance Use Topics    Alcohol use: Yes     Comment: rarely    Drug use: No     Review of Systems   Constitutional: Negative for chills and fever.   HENT: Negative for congestion and sore throat.    Eyes: Negative for visual disturbance.   Respiratory: Negative for cough and shortness of breath.    Cardiovascular: Positive for chest pain.   Gastrointestinal: Positive for nausea and vomiting. Negative for abdominal pain and diarrhea.   Genitourinary: Negative for dysuria and vaginal discharge.   Musculoskeletal: Negative for back pain and neck pain.   Skin: Negative for rash.   Neurological: Negative for syncope and headaches.   Psychiatric/Behavioral: Negative for decreased concentration.       Physical Exam     Initial Vitals [04/11/22 1735]   BP Pulse Resp Temp SpO2   119/68 (!) 118 20 98.1 °F (36.7 °C) 100 %      MAP       --         Physical Exam    Nursing note and vitals reviewed.  Constitutional: She appears well-developed and well-nourished. She is not diaphoretic.  Non-toxic appearance. She does not have a sickly appearance. She does not appear ill.   Patient is anxious appearing, screaming in pain.    HENT:   Mouth/Throat: Oropharynx is clear and moist.   Eyes: Conjunctivae are normal. No scleral icterus.   Neck: Neck supple. No JVD present.   Normal range of motion.  Cardiovascular: Regular rhythm, normal heart sounds and intact distal pulses.  Tachycardia present.    Pulmonary/Chest: No respiratory distress.   Decreased breath sound diffusely.    Abdominal: Abdomen is soft. She exhibits no distension. There is no abdominal tenderness.   Musculoskeletal:         General: No tenderness or edema. Normal range of motion.      Cervical back: Normal range of motion and neck supple.     Neurological: She is alert. She has normal strength.   Skin: Skin is warm and dry. No rash noted. No erythema.   Psychiatric: Her mood appears anxious.         ED Course   Procedures  Labs Reviewed   COMPREHENSIVE METABOLIC PANEL - Abnormal; Notable for the following components:       Result Value    Glucose 115 (*)     All other components within normal limits   CBC W/ AUTO DIFFERENTIAL   LIPASE     EKG Readings: (Independently Interpreted)   Normal sinus rhythm with a rate of 100 bpm, normal axis and intervals, Q wave in leads V2, no obvious acute ST segment changes. Q wave new when compared to previous EKG from March 2022.      ECG Results          EKG 12-lead (Final result)  Result time 04/12/22 17:37:20    Final result by Interface, Lab In [facility] (04/12/22 17:37:20)                 Narrative:    Test Reason : R07.9,    Vent. Rate : 100 BPM     Atrial Rate : 100 BPM     P-R Int : 152 ms          QRS Dur : 070 ms      QT Int : 342 ms       P-R-T Axes : 064 058 018 degrees     QTc Int : 441 ms    Normal sinus rhythm  Septal infarct ,age undetermined  Abnormal ECG  When compared with ECG of 27-MAR-2022 19:43,  Significant changes have occurred  Confirmed by Chip Sinha MD ([id number]) on 4/12/2022 5:37:13 PM    Referred By: [id number]   SELF           Confirmed By:Chip Sinha MD                            Imaging Results          X-Ray Chest AP Portable (Final result)  Result time 04/11/22 19:14:01    Final result by Yovany Allen MD (04/11/22 19:14:01)                 Impression:      No acute cardiopulmonary process identified.      Electronically signed by: Yovany Allen  MD  Date:    04/11/2022  Time:    19:14             Narrative:    EXAMINATION:  XR CHEST AP PORTABLE    CLINICAL HISTORY:  chest pain;    TECHNIQUE:  Single frontal view of the chest was performed.    COMPARISON:  03/27/2022.    FINDINGS:  Cardiac silhouette is normal in size.  Lungs are symmetrically expanded.  No evidence of new focal consolidative process, pneumothorax, or significant pleural effusion.  No acute osseous abnormality identified.                                 Medications   ondansetron injection 4 mg (4 mg Intravenous Given 4/11/22 1846)   HYDROmorphone (PF) injection 0.5 mg (0.5 mg Intravenous Given 4/11/22 1845)     Medical Decision Making:   Clinical Tests:   Lab Tests: Ordered and Reviewed  Radiological Study: Ordered and Reviewed  ED Management:   [facility]  This is an emergent evaluation of a 52 y.o. female with past medical history of diabetes, hypertension, supraventricular tachycardia presenting with 10/10 chest pain with associated nausea and vomiting that started today around 4 pm. Initial vitals in the ED with a pulse of 118 bpm with remainder of vitals unremarkable. Physical exam noted above. DDx includes but is not limited to post-op pain, esophageal spasm, gastritis. Also considered but clinically less likely to be ACS, dissection, pneumothorax, pancreatitis, dehydration, electrolyte abnormality. Labs and imaging including CBC, CMP, lpase, Chest xray and EKG was obtained. Work-up benign. Pain was initially treated with IV pain medication and IV zofran. On reassessment, the patient symptoms were improved, however, she stated that she felt like the pain was increasing again. Discussed case with the patient's GI specialist, Dr. Montelongo, who recommended that patient be treated with oral viscous Lidocaine for esophageal spasm. However, patient noted to have an allergy to Lidocaine. I discussed Dr. Montelongo recommendations with the patient and offered to treat with Lidocaine and benadryl or Carafate.  Patient became more and more upset stating Dr. Montelongo should have known better than to recommend Lidocaine and she requested that I remove the bravo or consult another GI specialist to remove it. I informed her that this was not a reasonable request given that she is under the care of Dr. Montelongo and there is no evidence of an acute indication to consult the GI specialist on call. I offered to all Dr. Montelongo again for further recommendations. He requested to speak to the patient personally and he was provided her cell phone number. Prior to hearing from Dr. Montelongo again or the patient regarding the outcome of their conversation, I was notified by nursing staff that the patient left the ED with her . Patient is aware of plan and is amenable.     Kasey Thomas D.O  EMERGENCY MEDICINE  9:30 PM 04/11/2022          Scribe Attestation:   Scribe #1: I performed the above scribed service and the documentation accurately describes the services I performed. I attest to the accuracy of the note.                 Clinical Impression:   Final diagnoses:  [R07.9] Chest pain          ED Disposition Condition    Discharge Stable        ED Prescriptions     None        Follow-up Information    None        I, Kasey Thomas DO, personally performed the services described in this documentation. All medical record entries made by the scribe were at my direction and in my presence. I have reviewed the chart and agree that the record reflects my personal performance and is accurate and complete.     Kasey Thomas DO  04/14/22 1943       Kasey Thomas,   04/16/22 0018

## 2022-04-11 NOTE — ED TRIAGE NOTES
Pt reports midsternal chest pain, SOB, and n/v after GI procedure earlier today. Pt reports that's she has a bravo put in during the GI procedure at [facility]. Pt hyperventilating and appears very anxious.

## 2022-04-11 NOTE — ANESTHESIA PREPROCEDURE EVALUATION
04/11/2022  Aide Almaraz is a 52 y.o., female.  Pre-operative evaluation for Procedure(s) (LRB):  REPAIR, LIGAMENT, ANKLE (Right)  ARTHROSCOPY, ANKLE (Right)    Aide Almaraz is a 52 y.o. female     Patient Active Problem List   Diagnosis    Essential hypertension, benign    Anxiety disorder    History of depression    PTSD (post-traumatic stress disorder)    Insomnia    History of supraventricular tachycardia    BMI 30.0-30.9,adult    Non morbid obesity due to excess calories    Hypercholesterolemia    Fatigue    Epigastric pain    Headache    Idiopathic acute pancreatitis without infection or necrosis    Grief reaction    Left breast mass       Review of patient's allergies indicates:   Allergen Reactions    Latex, natural rubber Itching and Rash    Lidocaine Rash    Tramadol Hives and Rash       No current facility-administered medications on file prior to encounter.     Current Outpatient Medications on File Prior to Encounter   Medication Sig Dispense Refill    acetaminophen (TYLENOL) 500 MG tablet Take 1 tablet (500 mg total) by mouth every 4 (four) hours as needed for Pain. 20 tablet 0    albuterol (PROVENTIL/VENTOLIN HFA) 90 mcg/actuation inhaler Inhale 1-2 puffs into the lungs every 6 (six) hours as needed for Wheezing or Shortness of Breath. Rescue 6.7 g 0    eszopiclone (LUNESTA) 3 mg Tab TK 1 T PO QD HS  0    gabapentin (NEURONTIN) 600 MG tablet Take 600 mg by mouth 3 (three) times daily.      lactulose (CHRONULAC) 10 gram/15 mL solution Take 15 mLs (10 g total) by mouth 3 (three) times daily as needed (connstipation). 240 mL 0    metoclopramide HCl (REGLAN) 10 MG tablet Take 1 tablet (10 mg total) by mouth every 6 (six) hours as needed (nausea/vomiting). 30 tablet 1    oxyCODONE-acetaminophen (PERCOCET)  mg per tablet Take 1 tablet by mouth every 12  (twelve) hours as needed for Pain.      pantoprazole (PROTONIX) 40 MG tablet Take 1 tablet (40 mg total) by mouth once daily. 30 tablet 1    polyethylene glycol (GLYCOLAX) 17 gram PwPk Take 17 g by mouth 2 (two) times daily as needed (constipation). 24 each 1    promethazine (PHENERGAN) 25 MG tablet Take 25 mg by mouth every 12 (twelve) hours as needed for Nausea.      ranitidine (ZANTAC) 150 MG capsule Take 1 capsule (150 mg total) by mouth 2 (two) times daily. 60 capsule 1    sulindac (CLINORIL) 150 MG tablet Take 1 tablet (150 mg total) by mouth 2 (two) times daily. 10 tablet 0       Past Surgical History:   Procedure Laterality Date    ABDOMINAL SURGERY      Gastric sleeve    ANKLE FUSION      BREAST SURGERY       SECTION      placenta previa    CHOLECYSTECTOMY  2009    EXCISIONAL BIOPSY Left 2019    Procedure: EXCISIONAL BIOPSY LEFT with SEED (CONSENT AM OF) 1.0 hr case;  Surgeon: Flor Rosas MD;  [facility] OR [address] [place] [address];  Service: General;  Laterality: Left;    gastric      gastric sleeve      pt reported     HERNIA REPAIR      HYSTERECTOMY  2001    Inguinal hernia  2004    SKIN SURGERY      [facility]       Social History     Socioeconomic History    Marital status: Single    Number of children: 2   Tobacco Use    Smoking status: Never Smoker    Smokeless tobacco: Never Used   Substance and Sexual Activity    Alcohol use: Yes     Comment: rarely    Drug use: No    Sexual activity: Not Currently     Partners: Male     Birth control/protection: Surgical         CBC: No results for input(s): WBC, RBC, HGB, HCT, PLT, MCV, MCH, MCHC in the last 72 hours.    CMP: No results for input(s): NA, K, CL, CO2, BUN, CREATININE, GLU, MG, PHOS, CALCIUM, ALBUMIN, PROT, ALKPHOS, ALT, AST, BILITOT in the last 72 hours.    INR  No results for input(s): PT, INR, PROTIME, APTT in the last 72 hours.        Diagnostic Studies:      EKD Echo:  No results found for this or any  previous visit.      Pt's chart reviewed.  May proceed with scheduled procedure at [facility].    Issac Palafox M.D.      Pre-op Assessment    I have reviewed the Patient Summary Reports.     I have reviewed the Nursing Notes. I have reviewed the NPO Status.   I have reviewed the Medications.     Review of Systems      Physical Exam    Airway:  Mallampati: II   Mouth Opening: Normal  TM Distance: Normal  Tongue: Normal  Neck ROM: Normal ROM        Anesthesia Plan  Type of Anesthesia, risks & benefits discussed:    Anesthesia Type: Gen ETT, Gen Supraglottic Airway, Gen Natural Airway, Regional  Intra-op Monitoring Plan: Standard ASA Monitors  Post Op Pain Control Plan: multimodal analgesia  Informed Consent: Informed consent signed with the Patient and all parties understand the risks and agree with anesthesia plan.  All questions answered.   ASA Score: 3    Ready For Surgery From Anesthesia Perspective.     .

## 2022-04-12 NOTE — PLAN OF CARE
Contacted [facility] with  concerning pt with recent bilat nipple piercing with questionable infection ~and whether or not she will be able(and willing) to remove them for surgery.  If not willing she may sign a wavier to the effect of possible concerns with burning wtc, if piercings remain in.    Has been to ER x4 episodes since 3/27.  Last pm, seen for CP after her EGD procedure where she had a bravo placed at [facility] yesterday morning.      , anesthesia request we just inform the surgical team of these issues to be sure there were aware.  Case may proceed at [facility], per anesthesia.

## 2022-04-12 NOTE — ED NOTES
"Pt presented in hallway with her  stating that she did not need discharge papers and that she was leaving.  Ivonne Stratton RN left nurses station to find Dr Fisher, I then ask patient to please have a seat back in her room and out of the hallway.  Pt appeared angry and stated "I do not need anything that doctor has for me" then pt walked back to her room.    "

## 2022-04-13 ENCOUNTER — TELEPHONE (OUTPATIENT)
Dept: ORTHOPEDICS | Facility: CLINIC | Age: 52
End: 2022-04-13
Payer: MEDICAID

## 2022-04-13 NOTE — TELEPHONE ENCOUNTER
Contacted by anesthesia regarding concerns that patient has been in the ED 4x in last couple weeks.  Unable to view entire record, but appears these visits related to a couple issues: primarily GI but also some issues related to an recent piercing.      I expressed concern that patient was ready for surgery and also ready/able to fully participate in recovery.      First, she stated she is 100% ready for surgery.  Her  has taken off work for 2 weeks to be home (and can take longer if needed).  She has weaned off pain medication completely as well.    Second, regarding the ED visits, she states one related to breast piercing issue which has resolved.  The remainder related to dyspepsia/N/V symptoms that are being worked up/treated by Dr. Montelongo at [facility].  She recently completed a pH (Bravo) study with him.  She will obtain a clearance letter from him for surgery.    I gave patient opportunity to postpone surgery, but she declined.     She will keep her preop appointment as scheduled tomorrow.

## 2022-04-14 ENCOUNTER — OFFICE VISIT (OUTPATIENT)
Dept: ORTHOPEDICS | Facility: CLINIC | Age: 52
End: 2022-04-14
Payer: MEDICAID

## 2022-04-14 VITALS — WEIGHT: 195.13 LBS | HEIGHT: 65 IN | BODY MASS INDEX: 32.51 KG/M2

## 2022-04-14 DIAGNOSIS — G89.18 POST-OP PAIN: ICD-10-CM

## 2022-04-14 DIAGNOSIS — Z01.818 PRE-OP EVALUATION: Primary | ICD-10-CM

## 2022-04-14 DIAGNOSIS — M25.371 ANKLE INSTABILITY, RIGHT: ICD-10-CM

## 2022-04-14 PROCEDURE — 99499 UNLISTED E&M SERVICE: CPT | Mod: S$PBB,,, | Performed by: PHYSICIAN ASSISTANT

## 2022-04-14 PROCEDURE — 3008F PR BODY MASS INDEX (BMI) DOCUMENTED: ICD-10-PCS | Mod: CPTII,,, | Performed by: PHYSICIAN ASSISTANT

## 2022-04-14 PROCEDURE — 1159F MED LIST DOCD IN RCRD: CPT | Mod: CPTII,,, | Performed by: PHYSICIAN ASSISTANT

## 2022-04-14 PROCEDURE — 99499 NO LOS: ICD-10-PCS | Mod: S$PBB,,, | Performed by: PHYSICIAN ASSISTANT

## 2022-04-14 PROCEDURE — 1160F RVW MEDS BY RX/DR IN RCRD: CPT | Mod: CPTII,,, | Performed by: PHYSICIAN ASSISTANT

## 2022-04-14 PROCEDURE — 99999 PR PBB SHADOW E&M-EST. PATIENT-LVL III: CPT | Mod: PBBFAC,,, | Performed by: PHYSICIAN ASSISTANT

## 2022-04-14 PROCEDURE — 3008F BODY MASS INDEX DOCD: CPT | Mod: CPTII,,, | Performed by: PHYSICIAN ASSISTANT

## 2022-04-14 PROCEDURE — 99999 PR PBB SHADOW E&M-EST. PATIENT-LVL III: ICD-10-PCS | Mod: PBBFAC,,, | Performed by: PHYSICIAN ASSISTANT

## 2022-04-14 PROCEDURE — 1160F PR REVIEW ALL MEDS BY PRESCRIBER/CLIN PHARMACIST DOCUMENTED: ICD-10-PCS | Mod: CPTII,,, | Performed by: PHYSICIAN ASSISTANT

## 2022-04-14 PROCEDURE — 99213 OFFICE O/P EST LOW 20 MIN: CPT | Mod: PBBFAC | Performed by: PHYSICIAN ASSISTANT

## 2022-04-14 PROCEDURE — 1159F PR MEDICATION LIST DOCUMENTED IN MEDICAL RECORD: ICD-10-PCS | Mod: CPTII,,, | Performed by: PHYSICIAN ASSISTANT

## 2022-04-14 RX ORDER — ACETAMINOPHEN 500 MG
1000 TABLET ORAL EVERY 8 HOURS
Qty: 90 TABLET | Refills: 0 | Status: SHIPPED | OUTPATIENT
Start: 2022-04-14 | End: 2022-05-16 | Stop reason: SDUPTHER

## 2022-04-14 RX ORDER — GABAPENTIN 600 MG/1
600 TABLET ORAL 3 TIMES DAILY PRN
Qty: 90 TABLET | Refills: 0 | Status: SHIPPED | OUTPATIENT
Start: 2022-04-14 | End: 2022-05-16 | Stop reason: SDUPTHER

## 2022-04-14 RX ORDER — METHOCARBAMOL 750 MG/1
750 TABLET, FILM COATED ORAL 3 TIMES DAILY PRN
Qty: 30 TABLET | Refills: 0 | Status: SHIPPED | OUTPATIENT
Start: 2022-04-14 | End: 2022-04-28

## 2022-04-14 RX ORDER — OXYCODONE HYDROCHLORIDE 5 MG/1
5 TABLET ORAL EVERY 4 HOURS PRN
Qty: 30 TABLET | Refills: 0 | Status: SHIPPED | OUTPATIENT
Start: 2022-04-14 | End: 2022-07-19

## 2022-04-14 RX ORDER — NAPROXEN 500 MG/1
500 TABLET ORAL 2 TIMES DAILY WITH MEALS
Qty: 60 TABLET | Refills: 0 | Status: SHIPPED | OUTPATIENT
Start: 2022-04-14 | End: 2022-05-16 | Stop reason: SDUPTHER

## 2022-04-14 NOTE — H&P (VIEW-ONLY)
"Subjective:      Patient ID: Aide Almaraz is a 52 y.o. female.    Chief Complaint: Pre-op Exam  Aide Almaraz is a 52 y.o. female who presents today for evaluation of right ankle pain.  Rates pain as 5/10.  Pain has been ongoing for a year.  Inciting event: injury, fx and torn ligaments.  Treatments tried: surgery , PT.     Underwent right ankle surgery for "loose ankles" in  with initial improvement in pain.  Pain in right ankle began  after being in the hospital from being septic and kidney failure and weight gain 248 lbs. It has worsened since her last visit and csi and is causing instability and pain with uneven surfaces. pain localized to Lateral ankle. There is associated dull, achy symptoms are constant, whereas prior to the injection it would subside after walking for awhile.      Presents today for evaluation right ankle instability and pain - continues despite treatment - ankle instability > pain at this time.  Feels she cannot trust her ankle.  The injection in sinus tarsi did not help at all.  Notes pain is worst on uneven surfaces.  Pain is both lateral ankle and more proximal peroneals.     Currently taking Percocet 10mg and gabapentin 600mg - Pain management: Dr. Lamas - [facility]     PMH significant for chronic pain (sees [facility] 1-2 percocet daily), abdominoplasty complicated by infection/sepsis.     Does the patient use tobacco products? No  If so, what and how often? N/A     Past Medical History:   Diagnosis Date    Anemia     Celiac disease     Diabetes mellitus     Gall stones     Hypertension     PTSD (post-traumatic stress disorder)     Supraventricular tachycardia     Yeast infection      Past Surgical History:   Procedure Laterality Date    ABDOMINAL SURGERY      Gastric sleeve    ANKLE FUSION      BREAST SURGERY       SECTION      placenta previa    CHOLECYSTECTOMY  2009    EXCISIONAL BIOPSY Left 2019    " Procedure: EXCISIONAL BIOPSY LEFT with SEED (CONSENT AM OF) 1.0 hr case;  Surgeon: Flor Rosas MD;  Location: [facility] OR [address] [place] [address];  Service: General;  Laterality: Left;    gastric      gastric sleeve      pt reported     HERNIA REPAIR      HYSTERECTOMY  09/19/2001    Inguinal hernia  2004    SKIN SURGERY      [facility]     Social History     Occupational History    Not on file   Tobacco Use    Smoking status: Never Smoker    Smokeless tobacco: Never Used   Substance and Sexual Activity    Alcohol use: Yes     Comment: rarely    Drug use: No    Sexual activity: Not Currently     Partners: Male     Birth control/protection: Surgical      ROS   Musculoskeletal: per HPI  Constitutional: Negative for fever  Cardiovascular: Negative for chest pain  Respiratory: Negative for shortness of breath  Skin: Negative for ulcers or lesions  Neurological: Positive for burning, tingling and numbness  Vascular: Negative for peripheral edema  Heme: Negative for blood thinners; Negative for history of blood clot  Endocrine: Positive for diabetes  Immune: Negative for inflammatory arthritis  /Nephrology: Negative for ESRD-on hemodialysis   Current Outpatient Medications on File Prior to Visit   Medication Sig Dispense Refill    acetaminophen (TYLENOL) 500 MG tablet Take 1 tablet (500 mg total) by mouth every 4 (four) hours as needed for Pain. 20 tablet 0    albuterol (PROVENTIL/VENTOLIN HFA) 90 mcg/actuation inhaler Inhale 1-2 puffs into the lungs every 6 (six) hours as needed for Wheezing or Shortness of Breath. Rescue 6.7 g 0    albuterol (PROVENTIL/VENTOLIN HFA) 90 mcg/actuation inhaler Inhale 1-2 puffs into the lungs every 6 (six) hours as needed for Wheezing. Rescue 6.7 g 1    dicyclomine (BENTYL) 20 mg tablet Take 1 tablet (20 mg total) by mouth 2 (two) times daily as needed (Abdominal Pain/Cramping). 30 tablet 0    eszopiclone (LUNESTA) 3 mg Tab TK 1 T PO QD HS  0    gabapentin (NEURONTIN) 600 MG tablet Take 600  "mg by mouth 3 (three) times daily.      HYDROcodone-acetaminophen (NORCO) 5-325 mg per tablet Take 1 tablet by mouth every 4 (four) hours as needed. 12 tablet 0    ibuprofen (ADVIL,MOTRIN) 800 MG tablet Take 1 tablet (800 mg total) by mouth every 6 (six) hours as needed for Pain. 20 tablet 0    lactulose (CHRONULAC) 10 gram/15 mL solution Take 15 mLs (10 g total) by mouth 3 (three) times daily as needed (connstipation). 240 mL 0    metoclopramide HCl (REGLAN) 10 MG tablet Take 1 tablet (10 mg total) by mouth every 6 (six) hours as needed (nausea/vomiting). 30 tablet 1    ondansetron (ZOFRAN) 4 MG tablet Take 2 tablets (8 mg total) by mouth every 6 (six) hours as needed for Nausea. 30 tablet 0    oxyCODONE-acetaminophen (PERCOCET)  mg per tablet Take 1 tablet by mouth every 12 (twelve) hours as needed for Pain.      pantoprazole (PROTONIX) 40 MG tablet Take 1 tablet (40 mg total) by mouth once daily. 30 tablet 1    polyethylene glycol (GLYCOLAX) 17 gram PwPk Take 17 g by mouth 2 (two) times daily as needed (constipation). 24 each 1    promethazine (PHENERGAN) 25 MG tablet Take 25 mg by mouth every 12 (twelve) hours as needed for Nausea.      promethazine (PHENERGAN) 25 MG tablet Take 1 tablet (25 mg total) by mouth every 6 (six) hours as needed for Nausea. 30 tablet 0    ranitidine (ZANTAC) 150 MG capsule Take 1 capsule (150 mg total) by mouth 2 (two) times daily. 60 capsule 1    sulindac (CLINORIL) 150 MG tablet Take 1 tablet (150 mg total) by mouth 2 (two) times daily. 10 tablet 0     No current facility-administered medications on file prior to visit.     Review of patient's allergies indicates:   Allergen Reactions    Latex, natural rubber Itching and Rash    Lidocaine Rash    Tramadol Hives and Rash         Objective:      Vitals:    04/14/22 1606   Weight: 88.5 kg (195 lb 1.7 oz)   Height: 5' 5" (1.651 m)     Ortho Exam   General: No acute distress, well-appearing  Neurologic: Alert and " oriented x3  Psychiatric: Appropriate mood and affect, cooperative  Cardiovascular: Regular pulse  Respiratory: Breathing on room air  Skin: No rashes or ulcers  Vascular: Palpable DP/PT  Musculoskeletal: Standing examination demonstrates slight valgus right > left.  There is lateral ankle swelling.  There is no ecchymosis.  Medial longitudinal arch is pes planus and and asymmetric .  Positive too-many toes on the right     Focused exam of the right lower extremity demonstrates irritable and guarded ankle range of motion.  Hindfoot is flexible.  Ankle dorsiflexion is guarded.  Tilt testing demonstrates symmetry (but tolerates it much better than prior exam) compared to contralateral side.  Drawer testing demonstrates stability compared to contralateral side.  NT over medial joint.  TTP over lateral joint.  Very TTP over peroneals with no crepitus noted especially proximal to the lateral malleolus     5/5 TA/GSC/PTT and 4-/5 peroneals with no pain.  SILT SP/DP/PT and able to localize.      Assessment:       1. Pre-op evaluation    2. Ankle instability, right    3. Post-op pain          Plan:       Surgery per Dr. Garcia.

## 2022-04-14 NOTE — H&P
"Subjective:      Patient ID: Aide Almaraz is a 52 y.o. female.    Chief Complaint: Pre-op Exam  Aide Almaraz is a 52 y.o. female who presents today for evaluation of right ankle pain.  Rates pain as 5/10.  Pain has been ongoing for a year.  Inciting event: injury, fx and torn ligaments.  Treatments tried: surgery , PT.     Underwent right ankle surgery for "loose ankles" in  with initial improvement in pain.  Pain in right ankle began  after being in the hospital from being septic and kidney failure and weight gain 248 lbs. It has worsened since her last visit and csi and is causing instability and pain with uneven surfaces. pain localized to Lateral ankle. There is associated dull, achy symptoms are constant, whereas prior to the injection it would subside after walking for awhile.      Presents today for evaluation right ankle instability and pain - continues despite treatment - ankle instability > pain at this time.  Feels she cannot trust her ankle.  The injection in sinus tarsi did not help at all.  Notes pain is worst on uneven surfaces.  Pain is both lateral ankle and more proximal peroneals.     Currently taking Percocet 10mg and gabapentin 600mg - Pain management: Dr. Lamas - [facility]     PMH significant for chronic pain (sees [facility] 1-2 percocet daily), abdominoplasty complicated by infection/sepsis.     Does the patient use tobacco products? No  If so, what and how often? N/A     Past Medical History:   Diagnosis Date    Anemia     Celiac disease     Diabetes mellitus     Gall stones     Hypertension     PTSD (post-traumatic stress disorder)     Supraventricular tachycardia     Yeast infection      Past Surgical History:   Procedure Laterality Date    ABDOMINAL SURGERY      Gastric sleeve    ANKLE FUSION      BREAST SURGERY       SECTION      placenta previa    CHOLECYSTECTOMY  2009    EXCISIONAL BIOPSY Left 2019    " Procedure: EXCISIONAL BIOPSY LEFT with SEED (CONSENT AM OF) 1.0 hr case;  Surgeon: Flor Rosas MD;  Location: [facility] OR [address] [place] [address];  Service: General;  Laterality: Left;    gastric      gastric sleeve      pt reported     HERNIA REPAIR      HYSTERECTOMY  09/19/2001    Inguinal hernia  2004    SKIN SURGERY      Kasi Gómez     Social History     Occupational History    Not on file   Tobacco Use    Smoking status: Never Smoker    Smokeless tobacco: Never Used   Substance and Sexual Activity    Alcohol use: Yes     Comment: rarely    Drug use: No    Sexual activity: Not Currently     Partners: Male     Birth control/protection: Surgical      ROS   Musculoskeletal: per HPI  Constitutional: Negative for fever  Cardiovascular: Negative for chest pain  Respiratory: Negative for shortness of breath  Skin: Negative for ulcers or lesions  Neurological: Positive for burning, tingling and numbness  Vascular: Negative for peripheral edema  Heme: Negative for blood thinners; Negative for history of blood clot  Endocrine: Positive for diabetes  Immune: Negative for inflammatory arthritis  /Nephrology: Negative for ESRD-on hemodialysis   Current Outpatient Medications on File Prior to Visit   Medication Sig Dispense Refill    acetaminophen (TYLENOL) 500 MG tablet Take 1 tablet (500 mg total) by mouth every 4 (four) hours as needed for Pain. 20 tablet 0    albuterol (PROVENTIL/VENTOLIN HFA) 90 mcg/actuation inhaler Inhale 1-2 puffs into the lungs every 6 (six) hours as needed for Wheezing or Shortness of Breath. Rescue 6.7 g 0    albuterol (PROVENTIL/VENTOLIN HFA) 90 mcg/actuation inhaler Inhale 1-2 puffs into the lungs every 6 (six) hours as needed for Wheezing. Rescue 6.7 g 1    dicyclomine (BENTYL) 20 mg tablet Take 1 tablet (20 mg total) by mouth 2 (two) times daily as needed (Abdominal Pain/Cramping). 30 tablet 0    eszopiclone (LUNESTA) 3 mg Tab TK 1 T PO QD HS  0    gabapentin (NEURONTIN) 600 MG tablet Take 600  "mg by mouth 3 (three) times daily.      HYDROcodone-acetaminophen (NORCO) 5-325 mg per tablet Take 1 tablet by mouth every 4 (four) hours as needed. 12 tablet 0    ibuprofen (ADVIL,MOTRIN) 800 MG tablet Take 1 tablet (800 mg total) by mouth every 6 (six) hours as needed for Pain. 20 tablet 0    lactulose (CHRONULAC) 10 gram/15 mL solution Take 15 mLs (10 g total) by mouth 3 (three) times daily as needed (connstipation). 240 mL 0    metoclopramide HCl (REGLAN) 10 MG tablet Take 1 tablet (10 mg total) by mouth every 6 (six) hours as needed (nausea/vomiting). 30 tablet 1    ondansetron (ZOFRAN) 4 MG tablet Take 2 tablets (8 mg total) by mouth every 6 (six) hours as needed for Nausea. 30 tablet 0    oxyCODONE-acetaminophen (PERCOCET)  mg per tablet Take 1 tablet by mouth every 12 (twelve) hours as needed for Pain.      pantoprazole (PROTONIX) 40 MG tablet Take 1 tablet (40 mg total) by mouth once daily. 30 tablet 1    polyethylene glycol (GLYCOLAX) 17 gram PwPk Take 17 g by mouth 2 (two) times daily as needed (constipation). 24 each 1    promethazine (PHENERGAN) 25 MG tablet Take 25 mg by mouth every 12 (twelve) hours as needed for Nausea.      promethazine (PHENERGAN) 25 MG tablet Take 1 tablet (25 mg total) by mouth every 6 (six) hours as needed for Nausea. 30 tablet 0    ranitidine (ZANTAC) 150 MG capsule Take 1 capsule (150 mg total) by mouth 2 (two) times daily. 60 capsule 1    sulindac (CLINORIL) 150 MG tablet Take 1 tablet (150 mg total) by mouth 2 (two) times daily. 10 tablet 0     No current facility-administered medications on file prior to visit.     Review of patient's allergies indicates:   Allergen Reactions    Latex, natural rubber Itching and Rash    Lidocaine Rash    Tramadol Hives and Rash         Objective:      Vitals:    04/14/22 1606   Weight: 88.5 kg (195 lb 1.7 oz)   Height: 5' 5" (1.651 m)     Ortho Exam   General: No acute distress, well-appearing  Neurologic: Alert and " oriented x3  Psychiatric: Appropriate mood and affect, cooperative  Cardiovascular: Regular pulse  Respiratory: Breathing on room air  Skin: No rashes or ulcers  Vascular: Palpable DP/PT  Musculoskeletal: Standing examination demonstrates slight valgus right > left.  There is lateral ankle swelling.  There is no ecchymosis.  Medial longitudinal arch is pes planus and and asymmetric .  Positive too-many toes on the right     Focused exam of the right lower extremity demonstrates irritable and guarded ankle range of motion.  Hindfoot is flexible.  Ankle dorsiflexion is guarded.  Tilt testing demonstrates symmetry (but tolerates it much better than prior exam) compared to contralateral side.  Drawer testing demonstrates stability compared to contralateral side.  NT over medial joint.  TTP over lateral joint.  Very TTP over peroneals with no crepitus noted especially proximal to the lateral malleolus     5/5 TA/GSC/PTT and 4-/5 peroneals with no pain.  SILT SP/DP/PT and able to localize.      Assessment:       1. Pre-op evaluation    2. Ankle instability, right    3. Post-op pain          Plan:       Surgery per Dr. Garcia.

## 2022-04-14 NOTE — PROGRESS NOTES
Aide is a 52 y.o. female here today for a pre-operative visit in preparation for a Right ankle brostrum (lateral ligament), ankle scope, peroneal exploration to be performed by Dr. Garcia on 4-18-22.  she was last seen and treated in the clinic on 2-21-22.  she has since seen their primary care for optimization of the chronic health concerns.  There has been no significant change in their past medical or orthopedic status since the previous visit.  No fever, chills, malaise or unexplained weight change.     Allergies, medications, past medical history and past surgical history were reviewed with the patient.     Physical examination was performed.     Consents were signed.   Patient has crutches and scooter and will bring with them the day of surgery. They have been counseled on proper use of the assistive device.     Discussed COVID19 with the patient, they are aware of our current policies and procedures, were given the option of delaying surgery, and they elect to proceed. Covid testing to be done 48 hours prior to surgery.    Pre, siddharth, and post operative procedure and expectations were discussed.  Questions were answered. The patient has been educated and is ready to proceed with surgery.  Approximately 30 minutes was spent discussing surgical outcomes, plans, procedures, pre, siddharth, and post operative expectations and care. The risks, benefits and alternatives to surgery were discussed with the patient at great length.  These include bleeding, infection, vessel/nerve damage, pain, numbness, tingling, complex regional pain syndrome, hardware/surgical failure, need for further surgery, malunion, nonunion, DVT, PE, arthritis and death. He also understands that the risks of surgery may be greater for some patients due to health or lifestyle issues, such as a current condition or a history of heart disease, obesity, clotting disorders, recurrent infections, smoking, sedentary lifestyle, or noncompliance with  medications, therapy, or followup. The degree of the increased risk is hard to estimate with any degree of precision.  Patient states an understanding and wishes to proceed with surgery.   All questions were answered.  No guarantees were implied or stated.  Informed consent was obtained  The patient will contact us if the have any questions, concerns, and changes in their medical condition prior to surgery.

## 2022-04-17 ENCOUNTER — NURSE TRIAGE (OUTPATIENT)
Dept: ADMINISTRATIVE | Facility: CLINIC | Age: 52
End: 2022-04-17
Payer: MEDICAID

## 2022-04-18 ENCOUNTER — HOSPITAL ENCOUNTER (OUTPATIENT)
Facility: HOSPITAL | Age: 52
Discharge: HOME | End: 2022-04-18
Attending: ORTHOPAEDIC SURGERY | Admitting: ORTHOPAEDIC SURGERY
Payer: MEDICAID

## 2022-04-18 ENCOUNTER — ANESTHESIA (OUTPATIENT)
Dept: SURGERY | Facility: HOSPITAL | Age: 52
End: 2022-04-18
Payer: MEDICAID

## 2022-04-18 ENCOUNTER — NURSE TRIAGE (OUTPATIENT)
Dept: ADMINISTRATIVE | Facility: CLINIC | Age: 52
End: 2022-04-18
Payer: MEDICAID

## 2022-04-18 DIAGNOSIS — M24.271: ICD-10-CM

## 2022-04-18 PROCEDURE — 29898 PR ANKLE SCOPE,EXTENS DEBRIDEMNT: ICD-10-PCS | Mod: 51,RT,, | Performed by: ORTHOPAEDIC SURGERY

## 2022-04-18 PROCEDURE — D9220A PRA ANESTHESIA: Mod: CRNA,,, | Performed by: NURSE ANESTHETIST, CERTIFIED REGISTERED

## 2022-04-18 PROCEDURE — C1713 ANCHOR/SCREW BN/BN,TIS/BN: HCPCS | Performed by: ORTHOPAEDIC SURGERY

## 2022-04-18 PROCEDURE — 25000003 PHARM REV CODE 250: Performed by: STUDENT IN AN ORGANIZED HEALTH CARE EDUCATION/TRAINING PROGRAM

## 2022-04-18 PROCEDURE — 71000015 HC POSTOP RECOV 1ST HR: Performed by: ORTHOPAEDIC SURGERY

## 2022-04-18 PROCEDURE — 64445 PERIPHERAL BLOCK: ICD-10-PCS | Mod: 59,RT,, | Performed by: STUDENT IN AN ORGANIZED HEALTH CARE EDUCATION/TRAINING PROGRAM

## 2022-04-18 PROCEDURE — D9220A PRA ANESTHESIA: ICD-10-PCS | Mod: ANES,,, | Performed by: ANESTHESIOLOGY

## 2022-04-18 PROCEDURE — 63600175 PHARM REV CODE 636 W HCPCS: Performed by: STUDENT IN AN ORGANIZED HEALTH CARE EDUCATION/TRAINING PROGRAM

## 2022-04-18 PROCEDURE — 36000708 HC OR TIME LEV III 1ST 15 MIN: Performed by: ORTHOPAEDIC SURGERY

## 2022-04-18 PROCEDURE — 27800903 OPTIME MED/SURG SUP & DEVICES OTHER IMPLANTS: Performed by: ORTHOPAEDIC SURGERY

## 2022-04-18 PROCEDURE — 25000003 PHARM REV CODE 250: Performed by: NURSE ANESTHETIST, CERTIFIED REGISTERED

## 2022-04-18 PROCEDURE — 63600175 PHARM REV CODE 636 W HCPCS: Performed by: NURSE ANESTHETIST, CERTIFIED REGISTERED

## 2022-04-18 PROCEDURE — 64445 NJX AA&/STRD SCIATIC NRV IMG: CPT | Mod: 59,RT,, | Performed by: STUDENT IN AN ORGANIZED HEALTH CARE EDUCATION/TRAINING PROGRAM

## 2022-04-18 PROCEDURE — 27201423 OPTIME MED/SURG SUP & DEVICES STERILE SUPPLY: Performed by: ORTHOPAEDIC SURGERY

## 2022-04-18 PROCEDURE — 01470 ANES PX NRV MSC LW L/A/F NOS: CPT | Performed by: ORTHOPAEDIC SURGERY

## 2022-04-18 PROCEDURE — 36000709 HC OR TIME LEV III EA ADD 15 MIN: Performed by: ORTHOPAEDIC SURGERY

## 2022-04-18 PROCEDURE — 76942 PERIPHERAL BLOCK: ICD-10-PCS | Mod: 26,,, | Performed by: STUDENT IN AN ORGANIZED HEALTH CARE EDUCATION/TRAINING PROGRAM

## 2022-04-18 PROCEDURE — 27698 PR REPAIR COLLAT ANKLE LIGMNT,SECONDARY: ICD-10-PCS | Mod: RT,,, | Performed by: ORTHOPAEDIC SURGERY

## 2022-04-18 PROCEDURE — 76942 ECHO GUIDE FOR BIOPSY: CPT | Performed by: STUDENT IN AN ORGANIZED HEALTH CARE EDUCATION/TRAINING PROGRAM

## 2022-04-18 PROCEDURE — 29898 ANKLE ARTHROSCOPY/SURGERY: CPT | Mod: 51,RT,, | Performed by: ORTHOPAEDIC SURGERY

## 2022-04-18 PROCEDURE — 99900035 HC TECH TIME PER 15 MIN (STAT)

## 2022-04-18 PROCEDURE — 94761 N-INVAS EAR/PLS OXIMETRY MLT: CPT

## 2022-04-18 PROCEDURE — 37000009 HC ANESTHESIA EA ADD 15 MINS: Performed by: ORTHOPAEDIC SURGERY

## 2022-04-18 PROCEDURE — 37000008 HC ANESTHESIA 1ST 15 MINUTES: Performed by: ORTHOPAEDIC SURGERY

## 2022-04-18 PROCEDURE — 71000033 HC RECOVERY, INTIAL HOUR: Performed by: ORTHOPAEDIC SURGERY

## 2022-04-18 PROCEDURE — 64447 PERIPHERAL BLOCK: ICD-10-PCS | Mod: 59,RT,, | Performed by: STUDENT IN AN ORGANIZED HEALTH CARE EDUCATION/TRAINING PROGRAM

## 2022-04-18 PROCEDURE — D9220A PRA ANESTHESIA: ICD-10-PCS | Mod: CRNA,,, | Performed by: NURSE ANESTHETIST, CERTIFIED REGISTERED

## 2022-04-18 PROCEDURE — D9220A PRA ANESTHESIA: Mod: ANES,,, | Performed by: ANESTHESIOLOGY

## 2022-04-18 PROCEDURE — 64447 NJX AA&/STRD FEMORAL NRV IMG: CPT | Mod: 59,RT,, | Performed by: STUDENT IN AN ORGANIZED HEALTH CARE EDUCATION/TRAINING PROGRAM

## 2022-04-18 PROCEDURE — 27698 REPAIR OF ANKLE LIGAMENT: CPT | Mod: RT,,, | Performed by: ORTHOPAEDIC SURGERY

## 2022-04-18 DEVICE — ANCHOR TRUSHOT SUTURE 1.8X20MM: Type: IMPLANTABLE DEVICE | Site: ANKLE | Status: FUNCTIONAL

## 2022-04-18 DEVICE — IMPLANTABLE DEVICE: Type: IMPLANTABLE DEVICE | Site: FOOT | Status: FUNCTIONAL

## 2022-04-18 RX ORDER — BUPIVACAINE HYDROCHLORIDE 5 MG/ML
INJECTION, SOLUTION EPIDURAL; INTRACAUDAL
Status: COMPLETED | OUTPATIENT
Start: 2022-04-18 | End: 2022-04-18

## 2022-04-18 RX ORDER — HALOPERIDOL 5 MG/ML
0.5 INJECTION INTRAMUSCULAR EVERY 10 MIN PRN
Status: DISCONTINUED | OUTPATIENT
Start: 2022-04-18 | End: 2022-04-18 | Stop reason: HOSPADM

## 2022-04-18 RX ORDER — PROPOFOL 10 MG/ML
VIAL (ML) INTRAVENOUS
Status: DISCONTINUED | OUTPATIENT
Start: 2022-04-18 | End: 2022-04-18

## 2022-04-18 RX ORDER — LIDOCAINE HYDROCHLORIDE 20 MG/ML
INJECTION INTRAVENOUS
Status: DISCONTINUED | OUTPATIENT
Start: 2022-04-18 | End: 2022-04-18

## 2022-04-18 RX ORDER — MUPIROCIN 20 MG/G
OINTMENT TOPICAL
Status: DISCONTINUED | OUTPATIENT
Start: 2022-04-18 | End: 2022-04-18 | Stop reason: HOSPADM

## 2022-04-18 RX ORDER — CELECOXIB 200 MG/1
400 CAPSULE ORAL ONCE
Status: COMPLETED | OUTPATIENT
Start: 2022-04-18 | End: 2022-04-18

## 2022-04-18 RX ORDER — FENTANYL CITRATE 50 UG/ML
25-200 INJECTION, SOLUTION INTRAMUSCULAR; INTRAVENOUS EVERY 5 MIN PRN
Status: COMPLETED | OUTPATIENT
Start: 2022-04-18 | End: 2022-04-18

## 2022-04-18 RX ORDER — FENTANYL CITRATE 50 UG/ML
25 INJECTION, SOLUTION INTRAMUSCULAR; INTRAVENOUS EVERY 5 MIN PRN
Status: DISCONTINUED | OUTPATIENT
Start: 2022-04-18 | End: 2022-04-18 | Stop reason: HOSPADM

## 2022-04-18 RX ORDER — DEXAMETHASONE SODIUM PHOSPHATE 4 MG/ML
INJECTION, SOLUTION INTRA-ARTICULAR; INTRALESIONAL; INTRAMUSCULAR; INTRAVENOUS; SOFT TISSUE
Status: DISCONTINUED | OUTPATIENT
Start: 2022-04-18 | End: 2022-04-18

## 2022-04-18 RX ORDER — MIDAZOLAM HYDROCHLORIDE 1 MG/ML
.5-4 INJECTION INTRAMUSCULAR; INTRAVENOUS
Status: DISCONTINUED | OUTPATIENT
Start: 2022-04-18 | End: 2022-04-18 | Stop reason: HOSPADM

## 2022-04-18 RX ORDER — PROPOFOL 10 MG/ML
VIAL (ML) INTRAVENOUS CONTINUOUS PRN
Status: DISCONTINUED | OUTPATIENT
Start: 2022-04-18 | End: 2022-04-18

## 2022-04-18 RX ORDER — PHENYLEPHRINE HYDROCHLORIDE 10 MG/ML
INJECTION INTRAVENOUS
Status: DISCONTINUED | OUTPATIENT
Start: 2022-04-18 | End: 2022-04-18

## 2022-04-18 RX ORDER — KETAMINE HCL IN 0.9 % NACL 50 MG/5 ML
SYRINGE (ML) INTRAVENOUS
Status: DISCONTINUED | OUTPATIENT
Start: 2022-04-18 | End: 2022-04-18

## 2022-04-18 RX ORDER — SODIUM CHLORIDE 0.9 % (FLUSH) 0.9 %
10 SYRINGE (ML) INJECTION
Status: DISCONTINUED | OUTPATIENT
Start: 2022-04-18 | End: 2022-04-18 | Stop reason: HOSPADM

## 2022-04-18 RX ORDER — CEFAZOLIN SODIUM/WATER 2 G/20 ML
2 SYRINGE (ML) INTRAVENOUS
Status: COMPLETED | OUTPATIENT
Start: 2022-04-18 | End: 2022-04-18

## 2022-04-18 RX ORDER — FAMOTIDINE 10 MG/ML
INJECTION INTRAVENOUS
Status: DISCONTINUED | OUTPATIENT
Start: 2022-04-18 | End: 2022-04-18

## 2022-04-18 RX ORDER — CARBOXYMETHYLCELLULOSE SODIUM 10 MG/ML
GEL OPHTHALMIC
Status: DISCONTINUED | OUTPATIENT
Start: 2022-04-18 | End: 2022-04-18

## 2022-04-18 RX ORDER — ONDANSETRON 2 MG/ML
INJECTION INTRAMUSCULAR; INTRAVENOUS
Status: DISCONTINUED | OUTPATIENT
Start: 2022-04-18 | End: 2022-04-18

## 2022-04-18 RX ORDER — DEXMEDETOMIDINE HYDROCHLORIDE 100 UG/ML
INJECTION, SOLUTION INTRAVENOUS
Status: DISCONTINUED | OUTPATIENT
Start: 2022-04-18 | End: 2022-04-18

## 2022-04-18 RX ORDER — ACETAMINOPHEN 500 MG
1000 TABLET ORAL
Status: COMPLETED | OUTPATIENT
Start: 2022-04-18 | End: 2022-04-18

## 2022-04-18 RX ADMIN — BUPIVACAINE HYDROCHLORIDE 30 ML: 5 INJECTION, SOLUTION EPIDURAL; INTRACAUDAL at 09:04

## 2022-04-18 RX ADMIN — BUPIVACAINE HYDROCHLORIDE 10 ML: 5 INJECTION, SOLUTION EPIDURAL; INTRACAUDAL; PERINEURAL at 09:04

## 2022-04-18 RX ADMIN — DEXAMETHASONE SODIUM PHOSPHATE 8 MG: 4 INJECTION, SOLUTION INTRAMUSCULAR; INTRAVENOUS at 11:04

## 2022-04-18 RX ADMIN — ONDANSETRON 4 MG: 2 INJECTION, SOLUTION INTRAMUSCULAR; INTRAVENOUS at 11:04

## 2022-04-18 RX ADMIN — FAMOTIDINE 20 MG: 10 INJECTION, SOLUTION INTRAVENOUS at 10:04

## 2022-04-18 RX ADMIN — SODIUM CHLORIDE, SODIUM GLUCONATE, SODIUM ACETATE, POTASSIUM CHLORIDE, MAGNESIUM CHLORIDE, SODIUM PHOSPHATE, DIBASIC, AND POTASSIUM PHOSPHATE: .53; .5; .37; .037; .03; .012; .00082 INJECTION, SOLUTION INTRAVENOUS at 11:04

## 2022-04-18 RX ADMIN — Medication 2 G: at 10:04

## 2022-04-18 RX ADMIN — GLYCOPYRROLATE 0.2 MG: 0.2 INJECTION, SOLUTION INTRAMUSCULAR; INTRAVITREAL at 10:04

## 2022-04-18 RX ADMIN — DEXMEDETOMIDINE HYDROCHLORIDE 12 MCG: 100 INJECTION, SOLUTION, CONCENTRATE INTRAVENOUS at 10:04

## 2022-04-18 RX ADMIN — CELECOXIB 400 MG: 200 CAPSULE ORAL at 08:04

## 2022-04-18 RX ADMIN — CARBOXYMETHYLCELLULOSE SODIUM 4 DROP: 10 GEL OPHTHALMIC at 10:04

## 2022-04-18 RX ADMIN — PROPOFOL 50 MG: 10 INJECTION, EMULSION INTRAVENOUS at 10:04

## 2022-04-18 RX ADMIN — ACETAMINOPHEN 1000 MG: 500 TABLET ORAL at 08:04

## 2022-04-18 RX ADMIN — FENTANYL CITRATE 100 MCG: 50 INJECTION INTRAMUSCULAR; INTRAVENOUS at 09:04

## 2022-04-18 RX ADMIN — MUPIROCIN: 20 OINTMENT TOPICAL at 08:04

## 2022-04-18 RX ADMIN — Medication 10 MG: at 10:04

## 2022-04-18 RX ADMIN — PHENYLEPHRINE HYDROCHLORIDE 100 MCG: 10 INJECTION INTRAVENOUS at 11:04

## 2022-04-18 RX ADMIN — LIDOCAINE HYDROCHLORIDE 60 MG: 20 INJECTION, SOLUTION INTRAVENOUS at 10:04

## 2022-04-18 RX ADMIN — Medication 20 MG: at 10:04

## 2022-04-18 RX ADMIN — SODIUM CHLORIDE: 0.9 INJECTION, SOLUTION INTRAVENOUS at 08:04

## 2022-04-18 RX ADMIN — MIDAZOLAM HYDROCHLORIDE 2 MG: 1 INJECTION, SOLUTION INTRAMUSCULAR; INTRAVENOUS at 08:04

## 2022-04-18 RX ADMIN — FENTANYL CITRATE 100 MCG: 50 INJECTION INTRAMUSCULAR; INTRAVENOUS at 08:04

## 2022-04-18 RX ADMIN — PROPOFOL 150 MCG/KG/MIN: 10 INJECTION, EMULSION INTRAVENOUS at 10:04

## 2022-04-18 RX ADMIN — HALOPERIDOL LACTATE 0.5 MG: 5 INJECTION, SOLUTION INTRAMUSCULAR at 02:04

## 2022-04-18 NOTE — INTERVAL H&P NOTE
The patient has been examined and the H&P has been reviewed:    I concur with the findings and changes have been noted since the H&P was written: Consent reviewed, site affirmed and marked, questions answered.    Surgery risks, benefits and alternative options discussed and understood by patient/family.          There are no hospital problems to display for this patient.

## 2022-04-18 NOTE — PATIENT INSTRUCTIONS
Dr. Tessa Garcia Postoperative Instructions  We are here for you before and after surgery.  Please review the below instructions.  If you have questions or concerns, contact our office ([phone]).  ACTIVITY: Your weight-bearing status indicates how much weight you may (or may not) put on leg after surgery. These guidelines should be strictly followed in order to protect the surgical site.  It is your responsibility to get ready for these restrictions, which is why I talk with you about them before surgery.  Remember, no matter what option is selected below - you just had surgery and healing takes time and rest - PLEASE limit how much you are out and about for first 2 weeks to help decrease pain and swelling.  []  Weight Bearing as tolerated - Safe to put as much weight as you feel comfortable doing on the involved leg.  [x] Non-Weight Bearing - No weight or standing on the involved leg which means the leg should NOT touch ground when you walk.  [] Toe-Touch Weight Bearing - You may touch foot on floor for balance only, but should not walk using the involved leg.  [] Heel-Weight Bearing - Full weight bearing on heel only; no weight on toes.    ELEVATION: It is important that you elevate your foot during the first 48 hours and as much as possible for the first week after surgery! Most foot and ankle surgical procedures are associated with swelling around the surgical site. Elevating your foot is one of the most important ways to limit the swelling and ultimately shorten your recovery time. The easiest technique is to lie down and elevate your foot on pillows or blankets. Your foot should at least be above the level of your heart. It is okay to get up for brief periods of time, but the majority of time the foot should be at rest and elevated.  ICE: Ice, similar to elevation, helps control the swelling and also provides pain relief. Typically, alternating twenty minutes on, twenty minutes off a couple times per  day for first 2-3 days. Use a barrier between the ice and your skin/dressing to make sure dressing does not get wet and skin does not get too cold.  In rare cases, I will tell you not to use ice to prevent making blood vessels constrict.  [x] ICE OK WITH BARRIER LAYER  [] POLARCARE (START POD#1, MAX 20MIN/HOUR)  DRESSING: Most dressings are not changed until your first postoperative visit. If we want you to change the dressing on your own, specific instructions and the appropriate supplies will be given to you in recovery before discharge. All dressings should be kept clean and dry. If you have problems with your dressing, please contact our office ([phone]) and you can be seen sooner for a new dressing.    [x] DO NOT CHANGE DRESSING           [] CHANGE DRESSING IN ___ DAYS  OTHER POSTOPERATIVE EQUIPMENT/INSTRUCTIONS:  []  POSTOPERATIVE SHOE: A postoperative hard-soled shoe has been provided to you to help protect your foot. This is particularly important for you to wear when you are up and walking, you should follow the weight bearing instructions you were given. The postoperative shoe does not need to be worn when you are in bed or resting.  [] POSTOPERATIVE BOOT: A boot has been placed to protect your ankle.  This is particularly important for you to wear, and you should follow the weight bearing instructions you were given.   []  May remove boot to sleep    [] Must keep boot on at all times  [x] POSTOPERATIVE SPLINT: A well-padded dressing with plaster (hard cast material) has been applied to your lower leg. This protects the surgery and helps control swelling. Do not remove it.  Do not bear weight it. It needs to remain dry. Do not attempt to scratch underneath it. If it feels too tight, elevation will usually decrease the swelling and improve your comfort. If the splint becomes soiled, wet or feels excessively tight even after elevation, contact our office and it can be changed if necessary.  [] PINS:  Pins have been used to hold your toes in the appropriate position during the healing process. You may have pin balls on the ends to help prevent snagging the pins on clothing or other objects. It is not unusual for pins to back out or protrude more than their original position. Do not attempt to push the pins back in. If a pin backs or seems loose, call our office. If a pin comes all the way out, this is not an emergency (and do not try to replace it). Please simply call our office during business hours to make us aware if this occurs and we will give you further instructions.  DISCOLORATION: It is common for the toes to swell after surgery and turn a bluish color, especially if the foot is in a position below the heart. If the toes turn dark blue, dark black or completely white, please call our office immediately ([phone]) or come to the emergency room. This is an emergency!  INFECTION: Infection is uncommon, especially the first week after surgery. A low grade fever (less than 101°F) is very common after surgery and is not a sign of infection. Call our office if you experience streaking redness up the leg, foul smell, excessive drainage and fever greater than 101° ([phone]).  DRIVING: Do not resume driving until you have permission from your surgeon.  Even if you have a small surgery and you feel ok to drive, it is important that you follow instructions given by your doctor for your own and others safety.      ANESTHESIA:    There are many ways that surgery can be safely performed without you experiencing pain during the procedure.  You will meet our anesthesia team on the day of surgery and they will discuss those options with you.      After surgery they will provide you with information about the type of anesthesia that you chose and what to expect as well as numbers to call.          MEDICATIONS:   BLOOD CLOTS: It is rare, but blood clots (also known as DVTs) can happen after foot and ankle  surgery.  Please make sure to discuss family or personal history of this with me.  For certain surgeries and higher risk patients, I will add a medication to prevent blood clots.   [x] Aspirin 81mg twice daily or 325 mg daily   []  Other medication - a separate prescription will be provided if needed   [] No medication needed - you should make sure to move around and move your ankle a couple times per day to keep blood circulating  NAUSEA: Nausea after surgery is common, due to lots of things related to your surgery, anesthesia and pain medications.  Let me or Pita know if you would like a prescription for this.    CONSTIPATION: Constipation is also common, due to narcotic pain medications that slow down normal movement of your intestinal system.  I recommend you  an over the counter stool softener (Colace, Sennosides, Docusate, etc) and take it according to the instructions on the bottle/packaging while you are taking pain medications.  BONE HEALTH: For healing broken bones or bone fusions, I will often recommend supplementation with medications that help provide your body with building blocks of healing bone (calcium and vitamin D).  You can get these from any pharmacy or drug store and any formulation is fine.  [] Calcium 600 mg twice daily and Vitamin D 4000 IU once daily  [x] Recommend daily multivitamin and balanced diet  PAIN MEDICATIONS  Most people require pain medication after foot and ankle surgery. I understand this and want you to feel as comfortable as possible. I believe in multimodal pain treatment.  What that means is, our team will treat your pain using several different strategies and types of medications that target different ways the body feels pain.    In most cases, you will have regional anesthesia (a block) for your foot/ankle procedure. For many cases, regional anesthesia (numbing the nerves to the surgical area) avoids the need for a general anesthetic (going completely to  sleep with a breathing tube or mask). The other major advantage of regional anesthesia is the ability to provide excellent (and sometimes complete) pain relief for a long period of time after surgery. The anesthesiologist will talk to you about this your day of surgery.  Narcotics: You will be given a prescription for a narcotic pain medication.  Narcotic prescriptions are limited depending on the type of surgical procedure.  Our goal is for narcotics to be your second line medication, meaning you should only use it if the other pain management methods are not working for you.  I hope we can treat your pain through alternate strategies that you no longer need them (or at least are needing much less) 2 weeks after surgery.  Prior to surgery or the day of surgery, you will be given these prescriptions.  Refills for pain pills are best called into the clinic during office hours 1-2 days BEFORE you anticipate running out. As a rule, pain medications are not refilled after hours or on weekends.    Anti-inflammatories: These medications, also known as NSAIDs (i.e. Ibuprofen, Motrin, Naproxen, Aleve, Advil, Celebrex, etc) may be used in combination with pain medications (narcotics) and should be 1st line.  For certain surgeries, you may be given a prescription-strength NSAID, which should be taken as prescribed, but cannot be combined with other NSAID medications.  NSAIDs should be taken with food, and if you have concerns about these medications, consult your doctor prior to surgery.  For certain surgeries, I will tell you not to take NSAIDs as they can interfere with healing of fractures or fusions.   [] NSAIDS (except Aspirin and Celebrex) should not be taken until 4-6 weeks after surgery    [x] NSAIDs encouraged; take according to bottle instructions  Tylenol (aka Acetaminophen): This is a powerful pain reliever and may be taken as alternative to narcotics and NSAIDs (does not affect bone healing).  You should take  according to bottle instructions and not take more than a total of 3 grams (3,000 milligrams) per day. Keep in mind that the pain medication that was prescribed to you may have acetaminophen as part of it.  Consult your doctor or the pharmacy if you have any questions or concerns.  Gabapentin (aka Neurontin):  This is a nerve calming medication, which has been shown to improve pain after orthopedic surgery.  You may be given a prescription for this as it can help treat the nerve irritation and pain that is common after foot and ankle surgery.  It can make you sleepy so be careful when/if you take it during daytime.  Methocarbomol (aka Robaxin):  This is a muscle relaxer, which can help with muscle spasm and cramping pain after surgery.  You may be given a prescription for this as it can help treat muscle pain.  If you already take a muscle relaxer, please let Pita or I know so as not to duplicate.  CONTACT INFO: A member of our clinical staff can be reached by calling [phone] during business hours (8 AM - 5 PM). We make every effort to return phone calls in a timely manner. If an emergency occurs after hours, an on-call provider can be reached at [phone] or you can go to a nearby urgent care or emergency room for evaluation.  WHEN TO CALL: If you are concerned, we are concerned! Do not hesitate to contact our office at any time.    MD Darrius Germain, Medical Assistant  [phone]  [facility] [place] [address] Pita Kelly, Physician Assistant    Inga Estrada, Medical Assistant  [phone]  [address] [place] [id number]

## 2022-04-18 NOTE — TELEPHONE ENCOUNTER
Pt. Reports recently getting belly button pierced. States that [facility] [employer] [facility] and now notices slight pain and itching to the area. Home care advise given. OAC offered to pt as well.    Reason for Disposition   Mild localized rash    Additional Information   Negative: [1] Sudden onset of rash (within last 2 hours) AND [2] difficulty breathing or swallowing   Negative: [1] Localized purple or blood-colored spots or dots AND [2] not from injury or friction AND [3] fever   Negative: Patient sounds very sick or weak to the triager   Negative: [1] Red area or streak AND [2] fever   Negative: [1] Rash is painful to touch AND [2] fever   Negative: [1] Looks infected (spreading redness, pus) AND [2] large red area (> 2 in. or 5 cm)   Negative: [1] Looks infected (spreading redness, pus) AND [2] diabetes mellitus or weak immune system (e.g., HIV positive, cancer chemo, splenectomy, organ transplant, chronic steroids)   Negative: [1] Localized purple or blood-colored spots or dots AND [2] not from injury or friction AND [3] no fever   Negative: [1] Looks infected (spreading redness, pus) AND [2] no fever   Negative: Looks like a boil, infected sore, deep ulcer or other infected rash   Negative: [1] Localized rash is very painful AND [2] no fever   Negative: Genital area rash   Negative: Lyme disease suspected (e.g., bull's eye rash or tick bite / exposure)   Negative: [1] Applying cream or ointment AND [2] causes severe itch, burning or pain   Negative: Medication patch causing local rash or itching   Negative: [1] Pimples (localized) AND [2 ] no improvement after using CARE ADVICE   Negative: Tender bumps in armpits   Negative: [1] Severe localized itching AND [2] after 2 days of steroid cream   Negative: Localized rash present > 7 days   Negative: Red, moist, irritated area between skin folds (or under larger breasts)   Negative: [1] Localized area of skin darkening or thickening on  lower legs or ankles AND [2] has NOT been evaluated by a doctor (or NP/PA)    Protocols used: RASH OR REDNESS - [id number]

## 2022-04-18 NOTE — PROGRESS NOTES
Pre op completed. Anesthesia notified of patient's piercings. All concerns addressed. Patient belongings to be placed in locker. Bed in lowest position. Call light within reach. Family at beside.

## 2022-04-18 NOTE — ANESTHESIA PROCEDURE NOTES
Peripheral Block    Patient location during procedure: pre-op   Block not for primary anesthetic.  Reason for block: at surgeon's request and post-op pain management   Post-op Pain Location: right foot   Start time: 4/18/2022 9:10 AM  Timeout: 4/18/2022 9:10 AM   End time: 4/18/2022 9:17 AM    Staffing  Authorizing Provider: Bal Cabrera MD  Performing Provider: Bal Cabrera MD    Preanesthetic Checklist  Completed: patient identified, IV checked, site marked, risks and benefits discussed, surgical consent, monitors and equipment checked, pre-op evaluation and timeout performed  Peripheral Block  Patient position: supine  Prep: ChloraPrep  Patient monitoring: heart rate, cardiac monitor, continuous pulse ox, continuous capnometry and frequent blood pressure checks  Block type: adductor canal  Laterality: right  Injection technique: single shot  Needle  Needle type: Stimuplex   Needle gauge: 21 G  Needle length: 4 in  Needle localization: anatomical landmarks and ultrasound guidance   -ultrasound image captured on disc.  Assessment  Injection assessment: negative aspiration, negative parasthesia and local visualized surrounding nerve  Paresthesia pain: none  Heart rate change: no  Slow fractionated injection: yes    Medications:    Medications: bupivacaine (pf) (MARCAINE) injection 0.5% - Perineural   10 mL - 4/18/2022 9:16:00 AM    Additional Notes  VSS.  [facility] RN monitoring vitals throughout procedure.  Patient tolerated procedure well.

## 2022-04-18 NOTE — BRIEF OP NOTE
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    DISPOSITION: Home or Self Care    FINAL DIAGNOSIS:    Problem List Items Addressed This Visit    None     Visit Diagnoses     Disorder of ligament of ankle, right        Relevant Orders    Diet general    Call MD for:  temperature >100.4    Call MD for:  persistent nausea and vomiting    Call MD for:  severe uncontrolled pain    Call MD for:  difficulty breathing, headache or visual disturbances    Call MD for:  redness, tenderness, or signs of infection (pain, swelling, redness, odor or green/yellow discharge around incision site)    Call MD for:  hives    Call MD for:  persistent dizziness or light-headedness    Call MD for:  extreme fatigue    Non weight bearing    Leave dressing on - Keep it clean, dry, and intact until clinic visit            FOLLOWUP: In clinic    DISCHARGE INSTRUCTIONS:    Discharge Procedure Orders   Diet general     Call MD for:  temperature >100.4     Call MD for:  persistent nausea and vomiting     Call MD for:  severe uncontrolled pain     Call MD for:  difficulty breathing, headache or visual disturbances     Call MD for:  redness, tenderness, or signs of infection (pain, swelling, redness, odor or green/yellow discharge around incision site)     Call  MD for:  hives     Call MD for:  persistent dizziness or light-headedness     Call MD for:  extreme fatigue     Leave dressing on - Keep it clean, dry, and intact until clinic visit     Non weight bearing

## 2022-04-18 NOTE — PLAN OF CARE
VSS. Patient able to tolerate oral liquids.  Patient denies c/o pain. Patient/family received home medication per bedside delivery. Dressing intact. Crutches at bedside for home use. No distress noted. Patient states she is ready for discharge. Discharge instructions reviewed with patient and family, verbalized understanding. IV discontinued with catheter tip intact. Family at bedside to help patient dress. Patient wheeled to lobby via staff.

## 2022-04-18 NOTE — OP NOTE
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    Description of procedure:     Patient was seen identified in the preoperative holding area.  Consent was reviewed and surgical site was marked.  The patient also met our anesthesia colleagues who have their own discussion about risks benefits and alternatives to anesthesia options.      Patient was then brought to the operating room.  Anesthesia: MAC + regional.  Positioning: Supine with the thigh in the leg morse and bump under operative extremity.  We ensured that all bony prominences were well padded especially the thigh and fibular head.  A well padded thigh tourniquet was applied.    The right lower extremity was then prepped and draped routine sterile fashion.  A time-out was then performed confirming patient identification, procedure to be performed, laterality and site, proper equipment, and 2g ancef antibiotic infusion.    The foot was placed in the Acufex distractor and joint distracted.  Medial portal was localized with 18 gauge needle and joint filled with 10cc sterile saline.  Portal was established.  Under direct visualization the lateral portal was localized with 18 gauze needle.  Skin was incised sharply and careful blunt dissection carried down to capsule prior to establishing portal.  Probe then  introduced into the joint.  Upon entering joint shaver was used to established anterior viewing room.    Findings: There was extensive synovitis and scar tissue of anterior ankle.  No loose bodies.  Grade 1 tibial change throughout.  Small area (0.5x0.5cm) of anterocentral talus (corresponding with impingement) with fissuring and superficial cartilage loss.    Debridement and procedures performed: Extensive debridement performed of all anterior scar tissue/synovitis.  Cautery used for hemostasis.  Cautery also used to smooth the anterior distal tibia.  Shaver used to smooth out talus cartilage defect.    Following this, the joint was drained of all fluid.  Portals were then closed with 3-0 nylon.  Leg was then removed from distraction and thigh morse.  Gloves were then changed.    At this point, leg was then elevated, exsanguinated with an esmarch and tourniquet inflated.    Prior anterior curved incision not used due to needing access to peroneals and more distal talus.  Curvilinear incision marked extending along distal fibula and over anterolateral joint.  Skin incised.  Blunt dissection carried down.  Tissue with thin and friable with fatty infiltrate.  ATFL and CFL elevated as sleeve off the distal fibula.  Lateral gutter inspected and clear of debris and irrigated.    An anchor for the Artelon flexband solo kit was placed in the center of the anterior distal fibula and placed using fluoroscopic guidance.    I then placed 2 Conmed Trushot anchors above and below and Artelon.  I then opened the peroneal sheath and directly inspected the peroneals.  They were pristine and without tenosynovitis or fluid and gliding nicely through full arch of motion.  Ragnel retractor placed to protect them.  Following this the sutures from the anchor were advanced in mattress fashion to reconstruct the ATFL and CFL respectively.  The foot was held neutral dorsiflexion, posterior drawer and slight eversion while tensioning the  repair.    The Artelon was then brought through the native sleeve of ATFL/CFL.  Using fluoroscopy the talar site was drilled.  Holding the ankle slightly everted, the graft was then secured to the talus with the second anchor.  Stress testing confirmed ankle stability.    The distal fibular periosteal sleeve was oversewn with 0 vicryl.  Wound was then irrigated and closed in layered fashion with 3-0 monocryl and 3-0 nylon.     At the conclusion of procedure, all needle and sponge counts were correct.  The case postoperative de-briefing was held confirming the procedure performed, any relevant specimens and/or postop concerns, and patient disposition.      Complications: No    Estimated Blood Loss (EBL): Minimal           Implants: Artelon flexband solo; Conmed trushot x2    Specimens: None           Condition: Good    Disposition: PACU - hemodynamically stable.    Postop plan:   1. Weight bearing status: NWB LLE x4 weeks  2. Immobilization: Splint x 2 weeks and then boot x3 months postop  3. DVT prophylaxis: aspirin daily  4. Follow-up: 2 weeks with PA; X-Rays: none, place PT orders  --recheck with Dr. Garcia in 6 weeks; X-Rays: none  5. PT: Start date: 4 weeks postop  Rx/protocol and restrictions:   --Gait training, edema control/desensitization modalities  --ADAT WBAT RLE, immobilization: boot; may initiate boot weaning 3 months postop   --AROM/AAROM and gentle strengthening/theraband work, but no PROM/manipulation until 3 months postop

## 2022-04-18 NOTE — ANESTHESIA PROCEDURE NOTES
Peripheral Block    Patient location during procedure: pre-op   Block not for primary anesthetic.  Reason for block: at surgeon's request and post-op pain management   Post-op Pain Location: right fot   Start time: 4/18/2022 9:10 AM  Timeout: 4/18/2022 9:10 AM   End time: 4/18/2022 9:15 AM    Staffing  Authorizing Provider: Bal Cabrera MD  Performing Provider: Bal Cabrera MD    Preanesthetic Checklist  Completed: patient identified, IV checked, site marked, risks and benefits discussed, surgical consent, monitors and equipment checked, pre-op evaluation and timeout performed  Peripheral Block  Patient position: supine  Prep: ChloraPrep  Patient monitoring: heart rate, cardiac monitor, continuous pulse ox, continuous capnometry and frequent blood pressure checks  Block type: popliteal  Laterality: right  Injection technique: single shot  Needle  Needle type: Stimuplex   Needle gauge: 21 G  Needle length: 4 in  Needle localization: anatomical landmarks and ultrasound guidance   -ultrasound image captured on disc.  Assessment  Injection assessment: negative aspiration, negative parasthesia and local visualized surrounding nerve  Paresthesia pain: none  Heart rate change: no  Slow fractionated injection: yes  Pain Tolerance: comfortable throughout block and no complaints  Medications:    Medications: bupivacaine (pf) (MARCAINE) injection 0.5% - Perineural   30 mL - 4/18/2022 9:15:00 AM    Additional Notes  VSS.  [facility] RN monitoring vitals throughout procedure.  Patient tolerated procedure well.

## 2022-04-18 NOTE — TRANSFER OF CARE
"Anesthesia Transfer of Care Note    Patient: Aide Almaraz    Procedure(s) Performed: Procedure(s) (LRB):  REPAIR, LIGAMENT, ANKLE (Right)  ARTHROSCOPY, ANKLE (Right)    Patient location: PACU    Anesthesia Type: regional    Transport from OR: Transported from OR on 6-10 L/min O2 by face mask with adequate spontaneous ventilation    Post pain: adequate analgesia    Post vital signs: stable    Level of consciousness: awake    Nausea/Vomiting: no nausea/vomiting    Complications: none    Transfer of care protocol was followed      Last vitals:   Visit Vitals  /68 (BP Location: Right arm, Patient Position: Lying)   Pulse 84   Temp 36.7 °C (98 °F) (Oral)   Resp 20   Ht 5' 5" (1.651 m)   Wt 99.8 kg (220 lb)   SpO2 99%   Breastfeeding No   BMI 36.61 kg/m²     "

## 2022-04-18 NOTE — DISCHARGE INSTRUCTIONS
FOOT SURGERY  After surgery:    DOS:  Keep leg elevated until first post operative visit  Keep ice pack on foot for 20 minutes each hour while awake for next 24-48 hours.  Keep dressing clean and dry DO NOT CHANGE BANDAGES  Advance diet as tolerated.   Check circulation frequently in toes by pressing down on toenail. Nail should turn white and then pink WITHIN 3 SECONDS when released.  Wear surgical shoe/boot. May remove to shower.  Do not walk without shoe/boot.  Resume home medications    DONT:  Do not remove your dressing  Do not get dressing wet.  No driving until released by MD  DO NOT TAKE ADDITIONAL TYLENOL/ACETAMINOPHEN WHILE TAKING NARCOTIC PAIN MEDICATION THAT CONTAINS TYLENOL/ACETAMINOPHEN.    CALL PHYSICIAN FOR:  Burning, or numbness of the toes not relieved by elevation of the leg.  Pale or cold toes; bluish nail beds.  Redness, swelling, or bleeding.  Fever> 101  Drainage (pus) from the operative sites  Pain unrelieved by pain medication    FOR EMERGENCIES CONTACT YOUR PHYSICIAN'S OFFICE

## 2022-04-19 ENCOUNTER — HOSPITAL ENCOUNTER (EMERGENCY)
Facility: HOSPITAL | Age: 52
Discharge: HOME | End: 2022-04-19
Attending: EMERGENCY MEDICINE
Payer: MEDICAID

## 2022-04-19 ENCOUNTER — NURSE TRIAGE (OUTPATIENT)
Dept: ADMINISTRATIVE | Facility: CLINIC | Age: 52
End: 2022-04-19
Payer: MEDICAID

## 2022-04-19 ENCOUNTER — TELEPHONE (OUTPATIENT)
Dept: ORTHOPEDICS | Facility: CLINIC | Age: 52
End: 2022-04-19
Payer: MEDICAID

## 2022-04-19 VITALS
DIASTOLIC BLOOD PRESSURE: 72 MMHG | HEIGHT: 65 IN | RESPIRATION RATE: 18 BRPM | HEART RATE: 100 BPM | TEMPERATURE: 98 F | SYSTOLIC BLOOD PRESSURE: 145 MMHG | WEIGHT: 210 LBS | BODY MASS INDEX: 34.99 KG/M2 | OXYGEN SATURATION: 97 %

## 2022-04-19 VITALS
DIASTOLIC BLOOD PRESSURE: 81 MMHG | SYSTOLIC BLOOD PRESSURE: 110 MMHG | BODY MASS INDEX: 36.65 KG/M2 | WEIGHT: 220 LBS | TEMPERATURE: 97 F | RESPIRATION RATE: 16 BRPM | HEIGHT: 65 IN | HEART RATE: 67 BPM | OXYGEN SATURATION: 96 %

## 2022-04-19 DIAGNOSIS — Z78.9 BODY PIERCING: Primary | ICD-10-CM

## 2022-04-19 DIAGNOSIS — Z47.89 AFTERCARE FOR CAST OR SPLINT CHECK OR CHANGE: Primary | ICD-10-CM

## 2022-04-19 DIAGNOSIS — Z98.890 HISTORY OF ANKLE SURGERY: ICD-10-CM

## 2022-04-19 PROCEDURE — 99282 EMERGENCY DEPT VISIT SF MDM: CPT | Mod: 25

## 2022-04-19 PROCEDURE — 29515 APPLICATION SHORT LEG SPLINT: CPT | Mod: RT

## 2022-04-19 RX ORDER — DOXYCYCLINE HYCLATE 100 MG
100 TABLET ORAL 2 TIMES DAILY
Qty: 14 TABLET | Refills: 0 | Status: SHIPPED | OUTPATIENT
Start: 2022-04-19 | End: 2022-04-26

## 2022-04-19 NOTE — TELEPHONE ENCOUNTER
Spoke to pt and scheduled an appt tomorrow to be placed in a cast, she also mentioned a nurse removed her piercing and it is now painful with yellow discharge, informed her I will speak to  to see if she wants to put her on antibiotics and give her a call back. Pt verbalized understanding.----- Message from Tessa Garcia MD sent at 4/18/2022  6:29 PM CDT -----  Regarding: scooter update  Patient struggling with crutches- shocker and truth comes out, her  isn't actually home the whole time !    A couple things:  1.) What is status of her scooter order?  2.) Needs appt Thursday for dressing change to fiberglass cast.    Thanks!    Billy

## 2022-04-19 NOTE — ED PROVIDER NOTES
Encounter Date: 2022       History     Chief Complaint   Patient presents with    Splint Check     Pt here for splint check, states she had ligament repair yesterday, states the splint feels tight on her foot but loose on her leg     Chief Complaint: Splint check  History of  Present Illness: History obtained from patient. This 52 y.o. female who has past medical history of anemia, diabetes, hypertension, PTSD presents to the ED to check splint to the right ankle.  Patient states that she had ankle surgery yesterday 2022 to repair ligament in her right ankle.  Patient states she has a follow-up appointment with her orthopedist, Dr. Gracia, on 2022.  Patient states that the splint felt tight on her foot the loose on her leg.  Denies fever.  Patient reports associated numbness sensation in her toes.        Review of patient's allergies indicates:   Allergen Reactions    Latex, natural rubber Itching and Rash    Lidocaine Rash    Tramadol Hives and Rash     Past Medical History:   Diagnosis Date    Anemia     Celiac disease     Diabetes mellitus     Gall stones     Hypertension     PTSD (post-traumatic stress disorder)     Supraventricular tachycardia     Yeast infection      Past Surgical History:   Procedure Laterality Date    ABDOMINAL SURGERY      Gastric sleeve    ANKLE FUSION      BREAST SURGERY       SECTION      placenta previa    CHOLECYSTECTOMY  2009    EXCISIONAL BIOPSY Left 2019    Procedure: EXCISIONAL BIOPSY LEFT with SEED (CONSENT AM OF) 1.0 hr case;  Surgeon: Flor Rosas MD;  Location: [facility] OR [address] [place] [address];  Service: General;  Laterality: Left;    gastric      gastric sleeve      pt reported     HERNIA REPAIR      HYSTERECTOMY  2001    Inguinal hernia  2004    SKIN SURGERY      Kasi Gómez     Family History   Problem Relation Age of Onset    Heart disease Father     Diabetes Mother         hypoglycemic    Breast cancer Maternal  Aunt     Breast cancer Sister     Breast cancer Paternal Aunt     Ovarian cancer Neg Hx      Social History     Tobacco Use    Smoking status: Never Smoker    Smokeless tobacco: Never Used   Substance Use Topics    Alcohol use: Yes     Comment: rarely    Drug use: No     Review of Systems   Constitutional: Negative for fever.   Gastrointestinal: Negative for nausea and vomiting.   Musculoskeletal: Positive for arthralgias.   Skin: Negative for wound.   Neurological: Positive for numbness. Negative for weakness.       Physical Exam     Initial Vitals [04/19/22 0342]   BP Pulse Resp Temp SpO2   137/70 (!) 115 18 97.8 °F (36.6 °C) 97 %      MAP       --         Physical Exam    Nursing note and vitals reviewed.  Constitutional: She appears well-developed and well-nourished. She is active.  Non-toxic appearance. She does not have a sickly appearance. She does not appear ill.   HENT:   Head: Normocephalic and atraumatic.   Nose: Nose normal.   Mouth/Throat: Uvula is midline, oropharynx is clear and moist and mucous membranes are normal.   Eyes: Conjunctivae, EOM and lids are normal. Pupils are equal, round, and reactive to light.   Neck: Neck supple.   Normal range of motion.   Full passive range of motion without pain.     Cardiovascular: Normal rate and regular rhythm.   Pulses:       Dorsalis pedis pulses are 2+ on the right side and 2+ on the left side.   Musculoskeletal:      Cervical back: Full passive range of motion without pain, normal range of motion and neck supple.      Comments: There is a splint to the right lower leg.  The splint was removed.  There are sutures in place to the lateral malleolus of the right ankle.  No erythema, induration or purulent drainage.  All compartments are soft.  No significant tenderness palpation.     Neurological: She is alert and oriented to person, place, and time.   Skin: Skin is warm. Capillary refill takes less than 2 seconds.         ED Course   Splint  Application    Date/Time: 4/19/2022 4:14 AM  Performed by: Garret Caldera PA-C  Authorized by: Lynn Aleman MD   Location details: right ankle  Splint type: short leg  Supplies used: Ortho-Glass  Post-procedure: The splinted body part was neurovascularly unchanged following the procedure.  Patient tolerance: Patient tolerated the procedure well with no immediate complications        Labs Reviewed - No data to display       Imaging Results    None          Medications - No data to display  Medical Decision Making:   ED Management:  This is an evaluation of a 52 y.o. female who presents to the ED for splint check.  Vital signs are stable.   Afebrile.  Patient is nontoxic appearing and in no acute distress.  Splint was removed.  The ankle is without evidence of infection.  All compartments are soft.  Neurovascularly intact.    Splint was replaced.  Encourage follow-up with Orthopedics.  No indication for acute orthopedic intervention at this time    Patient given return precautions and instructed to return to the emergency department for any new or worsening symptoms. Patient verbalized understanding and agreed with plan. Encouraged follow-up with PCP.                        Clinical Impression:   Final diagnoses:  [Z47.89] Aftercare for cast or splint check or change (Primary)  [Z98.890] History of ankle surgery          ED Disposition Condition    Discharge Stable        ED Prescriptions     None        Follow-up Information     Follow up With Specialties Details Why Contact Info    Tessa Garcia MD Orthopedic Surgery   [facility] [facility] [address]  [phone]      [facility] Emergency Dept Emergency Medicine Go in 1 day If symptoms worsen [address] [facility] [id number] [phone]           Garret Caldera PA-C  04/19/22 0414       Garret Caldera PA-C  04/19/22 0424

## 2022-04-19 NOTE — TELEPHONE ENCOUNTER
Called Aide Almaraz to discuss her scooter and possible rental options.  I was unable to reach the patient, I left patient a voicemail to return my call.

## 2022-04-19 NOTE — TELEPHONE ENCOUNTER
Patient s/p ligament repair. States her splint is very loose and will not hold up. Wants to know if she can get a cast.    Reason for Disposition   [1] Caller has NON-URGENT question AND [2] triager unable to answer question    Additional Information   Negative: Chest pain   Negative: Difficulty breathing   Negative: [1] Numbness (loss of sensation) of fingers or toes AND [2] persists > 1 hour after loosening elastic bandage or Velcro   Negative: [1] Tingling (pins and needles sensation) of fingers or toes AND [2] persists > 1 hour after loosening elastic bandage or Velcro   Negative: [1] Blueness or pallor of fingers or toes (compared to noninjured side) AND [2] persists > 1 hour after loosening elastic bandage or Velcro   Negative: Patient sounds very sick or weak to the triager   Negative: [1] SEVERE pain AND [2] not improved one hour after pain medicine, elevation, and loosening elastic bandage or Velcro   Negative: [1] Increasing pain under splint AND [2] splint put on > 72 hours ago   Negative: [1] Caller has URGENT question AND [2] triager unable to answer question   Negative: Splint breaks or cracks   Negative: Edge of splint is causing a sore   Negative: [1] Plaster splint gets wet AND [2] is soft after attempted drying   Negative: [1] Splint lining gets wet AND [2] metal pins (sticking out from skin) are touching wet area    Protocols used: SPLINT SYMPTOMS AND [id number]

## 2022-04-19 NOTE — TELEPHONE ENCOUNTER
Spoke to pt and kimberly, told her the insurance won't approve it but they will call her with rental options, pt order was placed last Thursday and Friday was a holiday so I tried to explain to her its a process but she was still very upset and wants a call from .pt was also was also given number to [facility] [phone]----- Message from Yvonne Young sent at 4/19/2022 11:15 AM CDT -----  Contact: [phone]  Pt states she was suppose to have a scooter at her home when she arrived home from surgery and there was no scooter. Pt is very upset and requesting to speak with someone in office.   [phone]

## 2022-04-19 NOTE — ED NOTES
Pt is alert and oriented x 4, pt states her leg feels better after splint change. Pt complained about navel itching and discomfort, upon assessment no sign of infection noted, pt educated to look out for any infection signs, pt advised to follow up with ortho.

## 2022-04-20 ENCOUNTER — CLINICAL SUPPORT (OUTPATIENT)
Dept: ORTHOPEDICS | Facility: CLINIC | Age: 52
End: 2022-04-20
Payer: MEDICAID

## 2022-04-20 ENCOUNTER — TELEPHONE (OUTPATIENT)
Dept: ORTHOPEDICS | Facility: CLINIC | Age: 52
End: 2022-04-20
Payer: MEDICAID

## 2022-04-20 NOTE — ANESTHESIA POSTPROCEDURE EVALUATION
Anesthesia Post Evaluation    Patient: Aide Almaraz    Procedure(s) Performed: Procedure(s) (LRB):  REPAIR, LIGAMENT, ANKLE (Right)  ARTHROSCOPY, ANKLE (Right)    Final Anesthesia Type: general      Patient location during evaluation: PACU  Patient participation: Yes- Able to Participate  Level of consciousness: awake and alert and oriented  Post-procedure vital signs: reviewed and stable  Pain management: adequate  Airway patency: patent    PONV status at discharge: No PONV  Anesthetic complications: no      Cardiovascular status: blood pressure returned to baseline and hemodynamically stable  Respiratory status: unassisted, room air and spontaneous ventilation  Hydration status: euvolemic  Follow-up not needed.          Vitals Value Taken Time   /81 04/18/22 1417   Temp 36.2 °C (97.2 °F) 04/18/22 1430   Pulse 67 04/18/22 1430   Resp 46 04/18/22 1430   SpO2 96 % 04/18/22 1430   Vitals shown include unvalidated device data.      Event Time   Out of Recovery 14:30:00         Pain/Estefanía Score: No data recorded

## 2022-04-20 NOTE — TELEPHONE ENCOUNTER
Cast change. Patient arrived for appt.    ----- Message from Ximena Munoz sent at 4/20/2022  8:34 AM CDT -----  running late 10-15 min , please call if unable to see @# [phone]

## 2022-04-25 ENCOUNTER — NURSE TRIAGE (OUTPATIENT)
Dept: ADMINISTRATIVE | Facility: CLINIC | Age: 52
End: 2022-04-25
Payer: MEDICAID

## 2022-04-26 NOTE — TELEPHONE ENCOUNTER
Patient c/o increased right leg swelling, numbness in right foot and pain after orthopedic surgery and hard cast placement on her right lower extremity. Patient states her cast is fitting tighter and is painful in different places. Patient states her hard cast is from her knee to her foot.     Care Advice given per Leg Swelling and Edema (Adult) Guideline. Patient states understanding of Care Advice provided and cites no additional needs at this time.    Reason for Disposition   [1] Cast on leg or ankle AND [2] now increased pain    Additional Information   Negative: SEVERE difficulty breathing (e.g., struggling for each breath, speaks in single words)   Negative: Looks like a broken bone or dislocated joint (e.g., crooked or deformed)   Negative: Sounds like a life-threatening emergency to the triager   Negative: Chest pain   Negative: Followed a leg injury   Negative: [1] Small area of swelling AND [2] followed an insect bite to the area   Negative: Swelling of one ankle joint   Negative: Swelling of knee is main symptom   Negative: Pregnant   Negative: Postpartum (from 0 to 6 weeks after delivery)   Negative: Difficulty breathing at rest   Negative: Entire foot is cool or blue in comparison to other side   Negative: [1] Can't walk or can barely walk AND [2] new-onset   Negative: [1] Difficulty breathing with exertion (e.g., walking) AND [2] new-onset or worsening   Negative: [1] Red area or streak AND [2] fever   Negative: [1] Swelling is painful to touch AND [2] fever    Protocols used: LEG SWELLING AND EDEMA-A-AH

## 2022-05-02 ENCOUNTER — OFFICE VISIT (OUTPATIENT)
Dept: ORTHOPEDICS | Facility: CLINIC | Age: 52
End: 2022-05-02
Payer: MEDICAID

## 2022-05-02 VITALS — BODY MASS INDEX: 34.95 KG/M2 | HEIGHT: 65 IN

## 2022-05-02 DIAGNOSIS — M25.371 ANKLE INSTABILITY, RIGHT: ICD-10-CM

## 2022-05-02 DIAGNOSIS — Z98.890 HISTORY OF ANKLE SURGERY: ICD-10-CM

## 2022-05-02 DIAGNOSIS — Z09 FOLLOW-UP EXAMINATION AFTER ORTHOPEDIC SURGERY: Primary | ICD-10-CM

## 2022-05-02 PROCEDURE — 1159F PR MEDICATION LIST DOCUMENTED IN MEDICAL RECORD: ICD-10-PCS | Mod: CPTII,,, | Performed by: PHYSICIAN ASSISTANT

## 2022-05-02 PROCEDURE — 99214 OFFICE O/P EST MOD 30 MIN: CPT | Mod: PBBFAC | Performed by: PHYSICIAN ASSISTANT

## 2022-05-02 PROCEDURE — 99024 POSTOP FOLLOW-UP VISIT: CPT | Mod: ,,, | Performed by: PHYSICIAN ASSISTANT

## 2022-05-02 PROCEDURE — 1160F PR REVIEW ALL MEDS BY PRESCRIBER/CLIN PHARMACIST DOCUMENTED: ICD-10-PCS | Mod: CPTII,,, | Performed by: PHYSICIAN ASSISTANT

## 2022-05-02 PROCEDURE — 1160F RVW MEDS BY RX/DR IN RCRD: CPT | Mod: CPTII,,, | Performed by: PHYSICIAN ASSISTANT

## 2022-05-02 PROCEDURE — 3008F PR BODY MASS INDEX (BMI) DOCUMENTED: ICD-10-PCS | Mod: CPTII,,, | Performed by: PHYSICIAN ASSISTANT

## 2022-05-02 PROCEDURE — 1159F MED LIST DOCD IN RCRD: CPT | Mod: CPTII,,, | Performed by: PHYSICIAN ASSISTANT

## 2022-05-02 PROCEDURE — 99999 PR PBB SHADOW E&M-EST. PATIENT-LVL IV: ICD-10-PCS | Mod: PBBFAC,,, | Performed by: PHYSICIAN ASSISTANT

## 2022-05-02 PROCEDURE — 99024 PR POST-OP FOLLOW-UP VISIT: ICD-10-PCS | Mod: ,,, | Performed by: PHYSICIAN ASSISTANT

## 2022-05-02 PROCEDURE — 99999 PR PBB SHADOW E&M-EST. PATIENT-LVL IV: CPT | Mod: PBBFAC,,, | Performed by: PHYSICIAN ASSISTANT

## 2022-05-02 PROCEDURE — 3008F BODY MASS INDEX DOCD: CPT | Mod: CPTII,,, | Performed by: PHYSICIAN ASSISTANT

## 2022-05-02 NOTE — PROGRESS NOTES
Ms. Almaraz is here today for a post-operative visit after a   Right ankle arthroscopy and extensive debridment  Right Brostrom lateral ligament reconstruction with Artelon  Intraoperative use of fluoroscopy <1hr by Dr. Garcia on 4-18-22.  she reports that she is doing okay.  Pain is 4/10.  she is taking pain medication Oxycodone 5 mg BID.  she denies fever, chills, and sweats since the time of the surgery. She has been NWB as best as she can but she did have a fall.     Physical exam:  Post op dressing taken down.  Incision is clean, dry and intact. No warmth, erythema, drainage, or signs of infection. Sutures removed without difficulty.  Limited ROM at the ankle due to pain. Some soft tissue swelling at the lateral ankle.     Assessment:  Post-op visit 2 weeks    Plan: Patient wants to go back into a cast. She feels more secure in the cast and does not feel ready to be placed into a boot yet. SLC placed. Remain NWB RLE. Order placed for PT to start around 4 weeks po. RTC in 2 weeks for transition to tall boot.

## 2022-05-04 NOTE — PROGRESS NOTES
PT taken out of a RT short leg cast. PT was put back into a RT short leg cast without complication or complaint. Ordered by JETHRO Kelly PA-C

## 2022-05-16 ENCOUNTER — OFFICE VISIT (OUTPATIENT)
Dept: ORTHOPEDICS | Facility: CLINIC | Age: 52
End: 2022-05-16
Payer: MEDICAID

## 2022-05-16 VITALS — HEIGHT: 65 IN | WEIGHT: 210.13 LBS | BODY MASS INDEX: 35.01 KG/M2

## 2022-05-16 DIAGNOSIS — M25.371 ANKLE INSTABILITY, RIGHT: ICD-10-CM

## 2022-05-16 DIAGNOSIS — Z98.890 HISTORY OF ANKLE SURGERY: ICD-10-CM

## 2022-05-16 DIAGNOSIS — G89.18 POST-OP PAIN: ICD-10-CM

## 2022-05-16 DIAGNOSIS — Z09 FOLLOW-UP EXAMINATION AFTER ORTHOPEDIC SURGERY: Primary | ICD-10-CM

## 2022-05-16 PROCEDURE — 3008F BODY MASS INDEX DOCD: CPT | Mod: CPTII,,, | Performed by: PHYSICIAN ASSISTANT

## 2022-05-16 PROCEDURE — 1160F RVW MEDS BY RX/DR IN RCRD: CPT | Mod: CPTII,,, | Performed by: PHYSICIAN ASSISTANT

## 2022-05-16 PROCEDURE — 99024 PR POST-OP FOLLOW-UP VISIT: ICD-10-PCS | Mod: ,,, | Performed by: PHYSICIAN ASSISTANT

## 2022-05-16 PROCEDURE — 3008F PR BODY MASS INDEX (BMI) DOCUMENTED: ICD-10-PCS | Mod: CPTII,,, | Performed by: PHYSICIAN ASSISTANT

## 2022-05-16 PROCEDURE — 1159F MED LIST DOCD IN RCRD: CPT | Mod: CPTII,,, | Performed by: PHYSICIAN ASSISTANT

## 2022-05-16 PROCEDURE — 1160F PR REVIEW ALL MEDS BY PRESCRIBER/CLIN PHARMACIST DOCUMENTED: ICD-10-PCS | Mod: CPTII,,, | Performed by: PHYSICIAN ASSISTANT

## 2022-05-16 PROCEDURE — 99213 OFFICE O/P EST LOW 20 MIN: CPT | Mod: PBBFAC | Performed by: PHYSICIAN ASSISTANT

## 2022-05-16 PROCEDURE — 99999 PR PBB SHADOW E&M-EST. PATIENT-LVL III: CPT | Mod: PBBFAC,,, | Performed by: PHYSICIAN ASSISTANT

## 2022-05-16 PROCEDURE — 1159F PR MEDICATION LIST DOCUMENTED IN MEDICAL RECORD: ICD-10-PCS | Mod: CPTII,,, | Performed by: PHYSICIAN ASSISTANT

## 2022-05-16 PROCEDURE — 99999 PR PBB SHADOW E&M-EST. PATIENT-LVL III: ICD-10-PCS | Mod: PBBFAC,,, | Performed by: PHYSICIAN ASSISTANT

## 2022-05-16 PROCEDURE — 99024 POSTOP FOLLOW-UP VISIT: CPT | Mod: ,,, | Performed by: PHYSICIAN ASSISTANT

## 2022-05-16 RX ORDER — GABAPENTIN 600 MG/1
600 TABLET ORAL 3 TIMES DAILY PRN
Qty: 90 TABLET | Refills: 0 | Status: SHIPPED | OUTPATIENT
Start: 2022-05-16 | End: 2022-07-19 | Stop reason: SDUPTHER

## 2022-05-16 RX ORDER — NAPROXEN 500 MG/1
500 TABLET ORAL 2 TIMES DAILY WITH MEALS
Qty: 60 TABLET | Refills: 0 | Status: SHIPPED | OUTPATIENT
Start: 2022-05-16 | End: 2022-06-15

## 2022-05-16 RX ORDER — METHOCARBAMOL 750 MG/1
750 TABLET, FILM COATED ORAL 3 TIMES DAILY PRN
Qty: 30 TABLET | Refills: 0 | Status: SHIPPED | OUTPATIENT
Start: 2022-05-16 | End: 2022-05-26

## 2022-05-16 RX ORDER — ACETAMINOPHEN 500 MG
1000 TABLET ORAL EVERY 8 HOURS
Qty: 90 TABLET | Refills: 0 | Status: SHIPPED | OUTPATIENT
Start: 2022-05-16 | End: 2023-06-08

## 2022-05-16 NOTE — PROGRESS NOTES
Ms. Almaraz is here today for a post-operative visit after a   Right ankle arthroscopy and extensive debridment  Right Brostrom lateral ligament reconstruction with Artelon  Intraoperative use of fluoroscopy <1hr by Dr. Garcia on 4-18-22.  she reports that she is doing okay.  She has some pain at the lateral foot and ankle as well as some numbness.  she is taking pain medication Oxycodone 5 mg prn but not everyday.  she denies fever, chills, and sweats since the time of the surgery. She has been putting weight on it at times. PT called her but she has not been scheduled yet.     Physical exam:  Cast taken down.  Incision is clean, dry and intact. They are healing well with no signs of infection. Swelling at the lateral ankle but it has decreased. ROM intact but limited.     Assessment:  Post-op visit 4 weeks    Plan: She was placed in a boot. She can WBAT in boot. She will call PT to get scheduled asap. Naproxen, tylenol, gabapentin, and robaxin refilled. RTC in about 2 weeks with Dr. Garcia.

## 2022-05-26 ENCOUNTER — CLINICAL SUPPORT (OUTPATIENT)
Dept: REHABILITATION | Facility: HOSPITAL | Age: 52
End: 2022-05-26
Attending: PHYSICIAN ASSISTANT
Payer: MEDICAID

## 2022-05-26 DIAGNOSIS — R26.2 DIFFICULTY WALKING: ICD-10-CM

## 2022-05-26 DIAGNOSIS — M25.371 ANKLE INSTABILITY, RIGHT: ICD-10-CM

## 2022-05-26 DIAGNOSIS — Z98.890 HISTORY OF ANKLE SURGERY: ICD-10-CM

## 2022-05-26 DIAGNOSIS — M25.671 DECREASED RANGE OF MOTION OF RIGHT ANKLE: ICD-10-CM

## 2022-05-26 DIAGNOSIS — M25.571 RIGHT ANKLE PAIN, UNSPECIFIED CHRONICITY: ICD-10-CM

## 2022-05-26 PROCEDURE — 97162 PT EVAL MOD COMPLEX 30 MIN: CPT | Mod: PN

## 2022-05-26 PROCEDURE — 97110 THERAPEUTIC EXERCISES: CPT | Mod: PN

## 2022-05-26 NOTE — PLAN OF CARE
"[facility]   Physical Therapy Initial Evaluation     Date: 5/26/2022   Name: Aide Almaraz  Clinic Number: [id number]    Therapy Diagnosis:   Encounter Diagnoses   Name Primary?    History of ankle surgery     Ankle instability, right     Right ankle pain, unspecified chronicity     Difficulty walking     Decreased range of motion of right ankle      Physician: Pita Kelly PA-C    Physician Orders: PT Eval and Treat   Medical Diagnosis from Referral: History of ankle surgery [Z98.890], Ankle instability, right [M25.371]  Evaluation Date: 5/26/2022  Authorization Period Expiration: 05/03/2023  Plan of Care Expiration: 08/26/2022  Progress Note Due: 06/26/2022  Visit # / Visits authorized: 1/ pending   FOTO: Not performed     Precautions: Diabetes, Partial WB, s/p Right ankle arthroscopy and extensive debridment;  Right Brostrom lateral ligament reconstruction with Artelon    From MD op note:   "Postop plan:   1. Weight bearing status: NWB LLE x4 weeks  2. Immobilization: Splint x 2 weeks and then boot x3 months postop  3. DVT prophylaxis: aspirin daily  4. Follow-up: 2 weeks with PA; X-Rays: none, place PT orders  --recheck with Dr. Garcia in 6 weeks; X-Rays: none  5. PT: Start date: 4 weeks postop  Rx/protocol and restrictions:   --Gait training, edema control/desensitization modalities  --ADAT WBAT RLE, immobilization: boot; may initiate boot weaning 3 months postop   --AROM/AAROM and gentle strengthening/theraband work, but no PROM/manipulation until 3 months postop"     Time In: 1345  Time Out: 1430  Total Appointment Time (timed & untimed codes): 45 minutes      SUBJECTIVE   Date of onset: April 18th, 2022    History of current condition - Aide reports: had Right ankle for a ligament repair surgery (Right Brostrom lateral ligament reconstruction with Artelon) on April 18th, 2022. Pt reports that she suffered from chronic ankle sprains, and resulted in her Right ankle " ligament tearing. States that her Right ankle feels achy when she walks, and was told that she has partial WBing precautions with her boot on.     Falls: None    Imaging, none:    Prior Therapy: Yes  Social History: 2-story house w/ bedroom on 2nd floor w/ 2 steps to enter. lives with their daughter  Occupation: Not currently working  Prior Level of Function: Independent  Current Level of Function: Difficulty walking, ambulating stairs, sleeping, preparing meals    Pain:  Current 3/10, worst 6/10, best 2/10   Location: R ankle   Description: Aching, pins/needles  Aggravating Factors: Standing and Walking  Easing Factors: rest and elevation    Patients goals: Return to PLOF     Medical History:   Past Medical History:   Diagnosis Date    Anemia     Celiac disease     Diabetes mellitus     Gall stones     Hypertension     PTSD (post-traumatic stress disorder)     Supraventricular tachycardia     Yeast infection        Surgical History:   Aide Almaraz  has a past surgical history that includes Hysterectomy (2001); Inguinal hernia ();  section; Ankle Fusion (); Cholecystectomy (); gastric; Abdominal surgery; Hernia repair; gastric sleeve; Breast surgery; Excisional biopsy (Left, 2019); Skin surgery; Repair of ligament of ankle (Right, 2022); and Arthroscopy of ankle (Right, 2022).    Medications:   Aide has a current medication list which includes the following prescription(s): acetaminophen, acetaminophen, albuterol, albuterol, eszopiclone, gabapentin, gabapentin, hydrocodone-acetaminophen, ibuprofen, lactulose, methocarbamol, metoclopramide hcl, naproxen, oxycodone, oxycodone-acetaminophen, pantoprazole, polyethylene glycol, promethazine, promethazine, ranitidine, and sulindac.    Allergies:   Review of patient's allergies indicates:   Allergen Reactions    Latex, natural rubber Itching and Rash    Lidocaine Rash    Tramadol Hives and Rash           OBJECTIVE     Observation: Pt entered clinic w/ Right ankle boot and single crutch      Range of Motion: AROM (PROM):    Ankle Right Left   Dorsiflexion 0  degrees 10  degrees   Plantarflexion 32  degrees 40  degrees   Inversion 18  degrees 35  degrees   Eversion 12  degrees 15  degrees       Strength:  Hip Right Left   Flexion 3+/5 4+/5   Abduction 3+/5 4+/5   Adduction 3+/5 4/5   Extension 4/5 4/5   External Rot NT NT   Internal Rot NT NT     Knee Right Left   Extension 4-/5 5/5   Flexion 3+/5 4+/5     Ankle Right Left   Dorsiflexion 2-/5 4+/5   Plantarflexion 2-/5 4+/5   Inversion 2-/5 4+/5   Eversion 2-/5 4+/5         Joint Mobility: hypomobility of Right ankle    Palpation: TTP about surgical site on Right ankle      TREATMENT     Total Treatment time (time-based codes) separate from Evaluation: 24 minutes      Aide received the treatments listed below:      Aide received therapeutic exercises to develop strength, endurance, ROM and flexibility for 15 minutes including:  Ankle Alphabet x1  Ankle pumps 2x15  Ankle inv iso 2x10  Education - precautions, healing, expectations        PATIENT EDUCATION AND HOME EXERCISES     Education provided:   - HEP, form, frequency    Written Home Exercises Provided: yes. Exercises were reviewed and Aide was able to demonstrate them prior to the end of the session.  Aide demonstrated good  understanding of the education provided. See EMR under Patient Instructions for exercises provided during therapy sessions.    ASSESSMENT     Aide is a 52 y.o. female referred to outpatient Physical Therapy with a medical diagnosis of History of ankle surgery [Z98.890], Ankle instability, right [M25.371].   Patient presents with signs and symptoms consistent with diagnosis above of s/p Right Brostrom lateral ligament reconstruction with Artelon. The patient's clinical presentation as described in this evaluation has resulted in limitations of ROM, strength, gait, pain, and  functional mobility which impact the patient's ability to perform functional activities such as walking, standing, bending, lifting, squatting, performing transfers, and performing ADLs.        Patient prognosis is Good.   Patientt will benefit from skilled outpatient Physical Therapy to address the deficits stated above and in the chart below, provide patient /family education, and to maximize patientt's level of independence.     Plan of care discussed with patient: Yes  Patient's spiritual, cultural and educational needs considered and patient is agreeable to the plan of care and goals as stated below:     Anticipated Barriers for therapy: s/p Right Brostrom lateral ligament reconstruction with Artelon    Medical Necessity is demonstrated by the following  History  Co-morbidities and personal factors that may impact the plan of care Co-morbidities:   Anemia   Celiac disease   Diabetes mellitus   Gall stones   Hypertension   PTSD (post-traumatic stress disorder)   Supraventricular tachycardia   Yeast infection       Personal Factors:   lifestyle     high   Examination  Body Structures and Functions, activity limitations and participation restrictions that may impact the plan of care Body Regions:   lower extremities    Body Systems:    gross symmetry  ROM  strength  gross coordinated movement  balance  gait  transfers  motor control    Participation Restrictions:   PWB    Activity limitations:   Learning and applying knowledge  no deficits    General Tasks and Commands  no deficits    Communication  no deficits    Mobility  lifting and carrying objects  walking  driving (bike, car, motorcycle)    Self care  washing oneself (bathing, drying, washing hands)  caring for body parts (brushing teeth, shaving, grooming)  dressing  looking after one's health    Domestic Life  doing house work (cleaning house, washing dishes, laundry)    Interactions/Relationships  no deficits    Life Areas  no deficits    Community and  Social Life  community life  recreation and leisure         high   Clinical Presentation evolving clinical presentation with changing clinical characteristics moderate   Decision Making/ Complexity Score: moderate     Goals:  Short Term Goals: 4 weeks   1. The patient with report compliance with HEP to maximize functional outcomes.  2. The patient will demonstrate increase in BLE MMT by 1/2 grade to improve pt's functional mobility  3. The patient will decrease pain level by 50% in order to improve QOL.    Long Term Goals: 12 weeks   1. The patient will demonstrate understanding and performance of advanced HEP to allow for independence once discharged from therapy.   2. 2. The patient will demonstrate increase in BLE MMT by 1 grade to improve pt's functional mobility  3. The patient will have Right ankle range of motion WFL in order to improve her functional mobility.  4. The patient will be able to walk for 15' w/o AD in order to improve pt's ability to perform ADLs.  5. Pt will be able to sleep through the night w/o waking from pain in order to improve her QOL.      PLAN   Plan of care Certification: 5/26/2022 to 08/26/2022    Outpatient Physical Therapy 3 times weekly for 10 weeks to include the following interventions: Electrical Stimulation Cupping/Dry Needling, Gait Training, Manual Therapy, Moist Heat/ Ice, Neuromuscular Re-ed, Patient Education, Self Care, Therapeutic Activities and Therapeutic Exercise.     Charles Barnhart, PT      I CERTIFY THE NEED FOR THESE SERVICES FURNISHED UNDER THIS PLAN OF TREATMENT AND WHILE UNDER MY CARE   Physician's comments:     Physician's Signature: ___________________________________________________

## 2022-05-31 ENCOUNTER — DOCUMENTATION ONLY (OUTPATIENT)
Dept: REHABILITATION | Facility: HOSPITAL | Age: 52
End: 2022-05-31
Payer: MEDICAID

## 2022-05-31 NOTE — PROGRESS NOTES
Therapist called patient about her visit, and she stated that was under quarantine for COVID-19. She stated that she called, but no one picked up at the clinic.    Charles Barnhart PT, DPT

## 2022-06-02 ENCOUNTER — OFFICE VISIT (OUTPATIENT)
Dept: ORTHOPEDICS | Facility: CLINIC | Age: 52
End: 2022-06-02
Payer: MEDICAID

## 2022-06-02 DIAGNOSIS — Z98.890 HISTORY OF ANKLE SURGERY: Primary | ICD-10-CM

## 2022-06-02 PROCEDURE — 99213 OFFICE O/P EST LOW 20 MIN: CPT | Mod: PBBFAC | Performed by: ORTHOPAEDIC SURGERY

## 2022-06-02 PROCEDURE — 1159F MED LIST DOCD IN RCRD: CPT | Mod: CPTII,,, | Performed by: ORTHOPAEDIC SURGERY

## 2022-06-02 PROCEDURE — 1159F PR MEDICATION LIST DOCUMENTED IN MEDICAL RECORD: ICD-10-PCS | Mod: CPTII,,, | Performed by: ORTHOPAEDIC SURGERY

## 2022-06-02 PROCEDURE — 99999 PR PBB SHADOW E&M-EST. PATIENT-LVL III: CPT | Mod: PBBFAC,,, | Performed by: ORTHOPAEDIC SURGERY

## 2022-06-02 PROCEDURE — 99024 PR POST-OP FOLLOW-UP VISIT: ICD-10-PCS | Mod: ,,, | Performed by: ORTHOPAEDIC SURGERY

## 2022-06-02 PROCEDURE — 99024 POSTOP FOLLOW-UP VISIT: CPT | Mod: ,,, | Performed by: ORTHOPAEDIC SURGERY

## 2022-06-02 PROCEDURE — 99999 PR PBB SHADOW E&M-EST. PATIENT-LVL III: ICD-10-PCS | Mod: PBBFAC,,, | Performed by: ORTHOPAEDIC SURGERY

## 2022-06-02 NOTE — PROGRESS NOTES
Subjective:   Chief complaint: Follow-up right ankle.    4/18/22 Right ankle arthroscopy and extensive debridment  Right Brostrom lateral ligament reconstruction with Artelon    HPI:   Aide Almaraz is a 52 y.o. female who presents today for follow-up.  Pain is 4/10.  Overall notes getting better daily.  Tolerating boot well.    ROS:  Musculoskeletal: per HPI     Objective:   Exam:  There were no vitals filed for this visit.  General: No acute distress, well-appearing  Musculoskeletal: Standing examination deferred.      Incision benign.  No scabbing present.  No drainage present.  There is minimal postop swelling.    Fires TA/GSC/PTT/peroneals but guarded.  SILT SP/DP/PT with  Slight paresthesias.     Imaging:  None    Additional testing/results reviewed:  None      Assessment:     1. History of ankle surgery        Patient is seen for a postop visit (<90 days postop) without complications expected from treatment.    Data: none    Treatment plan: Low risk of morbidity from treatment plan    6 weeks postop     Plan:        Weight bearing: Weight bearing as tolerated right leg   Immobilization: Tall boot when mobilizing; may remove for sleep and hygiene and and then wean out of boot 10 weeks postop   Therapy: Continue PT- note sent to PT with updates   Blood clot prevention: None needed; encouraged mobilization   Wound care: None   Showering: No restrictions.   Pain meds: No refills needed   Nutrition: Recommend daily multivitamin and balanced diet   Follow-up: 6 weeks with no x-rays needed       No orders of the defined types were placed in this encounter.      Past Medical History:   Diagnosis Date    Anemia     Celiac disease     Diabetes mellitus     Gall stones     Hypertension     PTSD (post-traumatic stress disorder)     Supraventricular tachycardia 2001    Yeast infection        Past Surgical History:   Procedure Laterality Date    ABDOMINAL SURGERY      Gastric sleeve    ANKLE FUSION       ARTHROSCOPY OF ANKLE Right 2022    Procedure: ARTHROSCOPY, ANKLE;  Surgeon: Tessa Garcia MD;  Location: [facility] OR;  Service: Orthopedics;  Laterality: Right;    BREAST SURGERY       SECTION      placenta previa    CHOLECYSTECTOMY      EXCISIONAL BIOPSY Left 2019    Procedure: EXCISIONAL BIOPSY LEFT with SEED (CONSENT AM OF) 1.0 hr case;  Surgeon: Flor Rosas MD;  Location: [facility] OR [address] [place] [address];  Service: General;  Laterality: Left;    gastric      gastric sleeve      pt reported     HERNIA REPAIR      HYSTERECTOMY  2001    Inguinal hernia      REPAIR OF LIGAMENT OF ANKLE Right 2022    Procedure: REPAIR, LIGAMENT, ANKLE;  Surgeon: Tessa Garcia MD;  Location: [facility] OR;  Service: Orthopedics;  Laterality: Right;  Single Shot    SKIN SURGERY      Tumy Nixonck       Family History   Problem Relation Age of Onset    Heart disease Father     Diabetes Mother         hypoglycemic    Breast cancer Maternal Aunt     Breast cancer Sister     Breast cancer Paternal Aunt     Ovarian cancer Neg Hx        Social History     Socioeconomic History    Marital status: Single    Number of children: 2   Tobacco Use    Smoking status: Never Smoker    Smokeless tobacco: Never Used   Substance and Sexual Activity    Alcohol use: Yes     Comment: rarely    Drug use: No    Sexual activity: Not Currently     Partners: Male     Birth control/protection: Surgical

## 2022-06-02 NOTE — LETTER
June 4, 2022        Charles Barnhart, PT             [facility] [address]  [address]  [facility] [address]  Phone: [phone]   Patient: Aide Almaarz   MR Number: [id number]   YOB: 1970   Date of Visit: 6/2/2022     Dear Dr. Barnhart,     Aide Almaraz was seen in clinic 06/04/2022.  The following updated PT orders apply to their ongoing care.    WBAT in boot.  Boot weaning can start 10 weeks postop.    I appreciate your assistance in their rehabilitation.  Please don't hesitate to call or reach out with questions/concerns.    Sincerely,    Tessa Garcia MD  Foot & Ankle Orthopedic Surgery  06/04/2022

## 2022-06-06 ENCOUNTER — TELEPHONE (OUTPATIENT)
Dept: REHABILITATION | Facility: HOSPITAL | Age: 52
End: 2022-06-06
Payer: MEDICAID

## 2022-06-06 ENCOUNTER — HOSPITAL ENCOUNTER (EMERGENCY)
Facility: HOSPITAL | Age: 52
Discharge: HOME | End: 2022-06-06
Attending: EMERGENCY MEDICINE
Payer: MEDICAID

## 2022-06-06 VITALS
TEMPERATURE: 101 F | DIASTOLIC BLOOD PRESSURE: 71 MMHG | HEIGHT: 65 IN | OXYGEN SATURATION: 95 % | WEIGHT: 208 LBS | HEART RATE: 111 BPM | SYSTOLIC BLOOD PRESSURE: 135 MMHG | BODY MASS INDEX: 34.66 KG/M2 | RESPIRATION RATE: 18 BRPM

## 2022-06-06 DIAGNOSIS — R07.9 CHEST PAIN: ICD-10-CM

## 2022-06-06 DIAGNOSIS — M25.671 DECREASED RANGE OF MOTION OF RIGHT ANKLE: ICD-10-CM

## 2022-06-06 DIAGNOSIS — U07.1 COVID-19 VIRUS DETECTED: ICD-10-CM

## 2022-06-06 DIAGNOSIS — R26.2 DIFFICULTY WALKING: ICD-10-CM

## 2022-06-06 DIAGNOSIS — U07.1 COVID-19: Primary | ICD-10-CM

## 2022-06-06 DIAGNOSIS — M25.571 ACUTE RIGHT ANKLE PAIN: Primary | ICD-10-CM

## 2022-06-06 LAB
CTP QC/QA: YES
CTP QC/QA: YES
POC MOLECULAR INFLUENZA A AGN: NEGATIVE
POC MOLECULAR INFLUENZA B AGN: NEGATIVE
SARS-COV-2 RDRP RESP QL NAA+PROBE: POSITIVE

## 2022-06-06 PROCEDURE — 25000003 PHARM REV CODE 250: Performed by: EMERGENCY MEDICINE

## 2022-06-06 PROCEDURE — 99283 EMERGENCY DEPT VISIT LOW MDM: CPT | Mod: 25

## 2022-06-06 PROCEDURE — 93010 EKG 12-LEAD: ICD-10-PCS | Mod: ,,, | Performed by: INTERNAL MEDICINE

## 2022-06-06 PROCEDURE — U0002 COVID-19 LAB TEST NON-CDC: HCPCS | Performed by: EMERGENCY MEDICINE

## 2022-06-06 PROCEDURE — 87502 INFLUENZA DNA AMP PROBE: CPT

## 2022-06-06 PROCEDURE — 93005 ELECTROCARDIOGRAM TRACING: CPT

## 2022-06-06 PROCEDURE — 93010 ELECTROCARDIOGRAM REPORT: CPT | Mod: ,,, | Performed by: INTERNAL MEDICINE

## 2022-06-06 RX ORDER — ACETAMINOPHEN 500 MG
1000 TABLET ORAL
Status: COMPLETED | OUTPATIENT
Start: 2022-06-06 | End: 2022-06-06

## 2022-06-06 RX ORDER — IBUPROFEN 600 MG/1
600 TABLET ORAL
Status: COMPLETED | OUTPATIENT
Start: 2022-06-06 | End: 2022-06-06

## 2022-06-06 RX ADMIN — ACETAMINOPHEN 1000 MG: 500 TABLET ORAL at 04:06

## 2022-06-06 RX ADMIN — IBUPROFEN 600 MG: 600 TABLET ORAL at 04:06

## 2022-06-06 NOTE — ED PROVIDER NOTES
Encounter Date: 2022    SCRIBE #1 NOTE: IDain, am scribing for, and in the presence of, Alberto Duffy MD.       History     Chief Complaint   Patient presents with    COVID-19 Concerns     Patient presents to ED secondary to generalized body aches, headache and shortness of breath. Endorses recent +covid exposure. States symptoms began shortly pta. States took percocet at 0200 with no relief.      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 Patient expresses concerns she has contracted COVID, as her daughter and  tested positive for COVID last week at Urgent Care. She states that she is COVID vaccinate. Patient notes that she is not vaccinated for the flu. She denies any other complaints at this time.    The history is provided by the patient.     Review of patient's allergies indicates:   Allergen Reactions    Latex, natural rubber Itching and Rash    Lidocaine Rash    Tramadol Hives and Rash     Past Medical History:   Diagnosis Date    Anemia     Celiac disease     Diabetes mellitus     Gall stones     Hypertension     PTSD (post-traumatic stress disorder)     Supraventricular tachycardia     Yeast infection      Past Surgical History:   Procedure Laterality Date    ABDOMINAL SURGERY      Gastric sleeve    ANKLE FUSION      ARTHROSCOPY OF ANKLE Right 2022    Procedure: ARTHROSCOPY, ANKLE;  Surgeon: Tessa Garcia MD;  Location: [facility] OR;  Service: Orthopedics;  Laterality: Right;    BREAST SURGERY       SECTION      placenta previa    CHOLECYSTECTOMY  2009    EXCISIONAL BIOPSY Left 2019    Procedure: EXCISIONAL BIOPSY LEFT with SEED (CONSENT AM OF) 1.0 hr case;  Surgeon: Flor Rosas MD;  Location: [address];  Service: General;  Laterality: Left;    gastric       gastric sleeve      pt reported     HERNIA REPAIR      HYSTERECTOMY  09/19/2001    Inguinal hernia  2004    REPAIR OF LIGAMENT OF ANKLE Right 4/18/2022    Procedure: REPAIR, LIGAMENT, ANKLE;  Surgeon: Tessa Garcia MD;  Location: [facility] OR;  Service: Orthopedics;  Laterality: Right;  Single Shot    SKIN SURGERY      Kasi Gómez     Family History   Problem Relation Age of Onset    Heart disease Father     Diabetes Mother         hypoglycemic    Breast cancer Maternal Aunt     Breast cancer Sister     Breast cancer Paternal Aunt     Ovarian cancer Neg Hx      Social History     Tobacco Use    Smoking status: Never Smoker    Smokeless tobacco: Never Used   Substance Use Topics    Alcohol use: Yes     Comment: rarely    Drug use: No     Review of Systems   Constitutional: Positive for chills and fever.   HENT: Negative.    Eyes: Negative.    Respiratory: Positive for shortness of breath.    Cardiovascular: Negative.    Gastrointestinal: Negative.    Endocrine: Negative.    Genitourinary: Negative.    Musculoskeletal: Negative.    Skin: Negative.    Allergic/Immunologic: Negative.    Neurological: Positive for headaches.   Hematological: Negative.    Psychiatric/Behavioral: Negative.    All other systems reviewed and are negative.      Physical Exam     Initial Vitals [06/06/22 0357]   BP Pulse Resp Temp SpO2   119/72 (!) 125 20 (!) 100.8 °F (38.2 °C) 95 %      MAP       --         Physical Exam    Nursing note and vitals reviewed.  Constitutional: Vital signs are normal. She appears well-developed and well-nourished. She is not diaphoretic. She is active and cooperative.   General malaise.   HENT:   Head: Normocephalic and atraumatic.   Right Ear: Tympanic membrane normal.   Left Ear: Tympanic membrane normal.   Nose: Nose normal.   Mouth/Throat: Oropharynx is clear and moist.   Eyes: Conjunctivae, EOM and lids are normal. Pupils are equal, round, and reactive to light.   Neck: Trachea normal. Neck supple.  No thyroid mass present.    Full passive range of motion without pain.     Cardiovascular: Normal rate, regular rhythm, S1 normal, S2 normal, normal heart sounds, intact distal pulses and normal pulses.   Pulmonary/Chest: Breath sounds normal. No respiratory distress.   Abdominal: Abdomen is soft. Bowel sounds are normal.   Musculoskeletal:         General: Normal range of motion.      Cervical back: Full passive range of motion without pain and neck supple.     Lymphadenopathy:     She has no axillary adenopathy.   Neurological: She is alert and oriented to person, place, and time.   Skin: Skin is warm, dry and intact.   Psychiatric: She has a normal mood and affect. Her speech is normal and behavior is normal. Judgment and thought content normal. Cognition and memory are normal.         ED Course   Procedures  Labs Reviewed   SARS-COV-2 RDRP GENE - Abnormal; Notable for the following components:       Result Value    POC Rapid COVID Positive (*)     All other components within normal limits   POCT INFLUENZA A/B MOLECULAR     EKG Readings: (Independently Interpreted)   Initial Reading: No STEMI. Rhythm: Sinus Tachycardia. Heart Rate: 116. Ectopy: No Ectopy. Conduction: Normal. ST Segments: Normal ST Segments. T Waves: Normal. Axis: Normal.       Imaging Results    None          Medications   acetaminophen tablet 1,000 mg (1,000 mg Oral Given 6/6/22 0441)   ibuprofen tablet 600 mg (600 mg Oral Given 6/6/22 0441)     Medical Decision Making:   History:   Old Medical Records: I decided to obtain old medical records.  Clinical Tests:   Lab Tests: Reviewed and Ordered  Radiological Study: Reviewed and Ordered  Medical Tests: Reviewed and Ordered          Scribe Attestation:   Scribe #1: I performed the above scribed service and the documentation accurately describes the services I performed. I attest to the accuracy of the note.                 Clinical Impression:   Final diagnoses:  [U07.1] COVID-19 (Primary)  [R07.9]  Chest pain          ED Disposition Condition    Discharge Stable        ED Prescriptions     Medication Sig Dispense Start Date End Date Auth. Provider    nirmatrelvir-ritonavir 150 mg x 2- 100 mg copackaged tablets (EUA) Take 3 tablets by mouth 2 (two) times daily for 5 days. Each dose contains 2 nirmatrelvir (pink tablets) and 1 ritonavir (white tablet). Take all 3 tablets together 30 tablet 6/6/2022 6/11/2022 Alberto Duffy MD        Follow-up Information     Follow up With Specialties Details Why Contact Info    Radha Jones MD Family Medicine Schedule an appointment as soon as possible for a visit   [address]  [address]  [facility] [id number] [phone]           I, Alberto Duffy MD, personally performed the services described in this documentation. All medical record entries made by the scribe were at my direction and in my presence.  I have reviewed the chart and agree that the record reflects my personal performance and is accurate and complete.     Alberto Duffy MD  06/06/22 0602

## 2022-06-06 NOTE — Clinical Note
"Aide"Rozina Almaraz was seen and treated in our emergency department on 6/6/2022.  She may return to work on 06/11/2022.       If you have any questions or concerns, please don't hesitate to call.      Dayna Atkinson RN    "

## 2022-06-06 NOTE — TELEPHONE ENCOUNTER
Left voicemail with patient about canceling appointments for this week and next week due to recent positive COVID-19 diagnosis. Will attempt to get into contact with patient again later this week.

## 2022-06-06 NOTE — DISCHARGE INSTRUCTIONS
Graciela6 Mike at [facility].    Tylenol and Motrin for fever.  Drink plenty of fluids.  Quarantine  for 5 days.

## 2022-06-06 NOTE — ED NOTES
Pt reports headache, body aches, back pain since last night. Daughter and spouse had Covid last week. Pt was not aware that she had fever. CP with coughing.

## 2022-06-07 ENCOUNTER — NURSE TRIAGE (OUTPATIENT)
Dept: ADMINISTRATIVE | Facility: CLINIC | Age: 52
End: 2022-06-07
Payer: MEDICAID

## 2022-06-07 NOTE — TELEPHONE ENCOUNTER
Pt called for [facility] response to text message and she said that she started with covid last night and pt sounds very short of breath. Pt told to hang up with me and call 911 which se said that she would. Pt triaged and call 911 per care advice  Unable to route to staff  Reason for Disposition   SEVERE difficulty breathing (e.g., struggling for each breath, speaks in single words)    Protocols used: CORONAVIRUS (COVID-19) DIAGNOSED OR [id number]-MP

## 2022-06-08 ENCOUNTER — NURSE TRIAGE (OUTPATIENT)
Dept: ADMINISTRATIVE | Facility: CLINIC | Age: 52
End: 2022-06-08
Payer: MEDICAID

## 2022-06-08 ENCOUNTER — HOSPITAL ENCOUNTER (EMERGENCY)
Facility: HOSPITAL | Age: 52
Discharge: HOME | End: 2022-06-09
Attending: EMERGENCY MEDICINE
Payer: MEDICAID

## 2022-06-08 DIAGNOSIS — B34.9 VIRAL SYNDROME: ICD-10-CM

## 2022-06-08 DIAGNOSIS — R06.02 SOB (SHORTNESS OF BREATH): ICD-10-CM

## 2022-06-08 DIAGNOSIS — U07.1 COVID-19: Primary | ICD-10-CM

## 2022-06-08 LAB
ALBUMIN SERPL BCP-MCNC: 4 G/DL (ref 3.5–5.2)
ALP SERPL-CCNC: 76 U/L (ref 55–135)
ALT SERPL W/O P-5'-P-CCNC: 16 U/L (ref 10–44)
ANION GAP SERPL CALC-SCNC: 12 MMOL/L (ref 8–16)
AST SERPL-CCNC: 17 U/L (ref 10–40)
BASOPHILS # BLD AUTO: 0.04 K/UL (ref 0–0.2)
BASOPHILS NFR BLD: 1.2 % (ref 0–1.9)
BILIRUB SERPL-MCNC: 0.3 MG/DL (ref 0.1–1)
BNP SERPL-MCNC: <10 PG/ML (ref 0–99)
BUN SERPL-MCNC: 8 MG/DL (ref 6–20)
CALCIUM SERPL-MCNC: 10 MG/DL (ref 8.7–10.5)
CHLORIDE SERPL-SCNC: 105 MMOL/L (ref 95–110)
CO2 SERPL-SCNC: 26 MMOL/L (ref 23–29)
CREAT SERPL-MCNC: 0.9 MG/DL (ref 0.5–1.4)
D DIMER PPP IA.FEU-MCNC: 0.35 MG/L FEU
DIFFERENTIAL METHOD: ABNORMAL
EOSINOPHIL # BLD AUTO: 0 K/UL (ref 0–0.5)
EOSINOPHIL NFR BLD: 0 % (ref 0–8)
ERYTHROCYTE [DISTWIDTH] IN BLOOD BY AUTOMATED COUNT: 13.9 % (ref 11.5–14.5)
EST. GFR  (AFRICAN AMERICAN): >60 ML/MIN/1.73 M^2
EST. GFR  (NON AFRICAN AMERICAN): >60 ML/MIN/1.73 M^2
GLUCOSE SERPL-MCNC: 103 MG/DL (ref 70–110)
HCT VFR BLD AUTO: 45.3 % (ref 37–48.5)
HGB BLD-MCNC: 14.4 G/DL (ref 12–16)
IMM GRANULOCYTES # BLD AUTO: 0.01 K/UL (ref 0–0.04)
IMM GRANULOCYTES NFR BLD AUTO: 0.3 % (ref 0–0.5)
LYMPHOCYTES # BLD AUTO: 1.7 K/UL (ref 1–4.8)
LYMPHOCYTES NFR BLD: 50.7 % (ref 18–48)
MCH RBC QN AUTO: 30 PG (ref 27–31)
MCHC RBC AUTO-ENTMCNC: 31.8 G/DL (ref 32–36)
MCV RBC AUTO: 94 FL (ref 82–98)
MONOCYTES # BLD AUTO: 0.4 K/UL (ref 0.3–1)
MONOCYTES NFR BLD: 12.5 % (ref 4–15)
NEUTROPHILS # BLD AUTO: 1.2 K/UL (ref 1.8–7.7)
NEUTROPHILS NFR BLD: 35.3 % (ref 38–73)
NRBC BLD-RTO: 0 /100 WBC
PLATELET # BLD AUTO: 400 K/UL (ref 150–450)
PMV BLD AUTO: 10.3 FL (ref 9.2–12.9)
POTASSIUM SERPL-SCNC: 4.5 MMOL/L (ref 3.5–5.1)
PROT SERPL-MCNC: 8 G/DL (ref 6–8.4)
RBC # BLD AUTO: 4.8 M/UL (ref 4–5.4)
SODIUM SERPL-SCNC: 143 MMOL/L (ref 136–145)
TROPONIN I SERPL DL<=0.01 NG/ML-MCNC: <0.006 NG/ML (ref 0–0.03)
WBC # BLD AUTO: 3.37 K/UL (ref 3.9–12.7)

## 2022-06-08 PROCEDURE — 80053 COMPREHEN METABOLIC PANEL: CPT | Performed by: EMERGENCY MEDICINE

## 2022-06-08 PROCEDURE — 99285 EMERGENCY DEPT VISIT HI MDM: CPT | Mod: 25

## 2022-06-08 PROCEDURE — 83880 ASSAY OF NATRIURETIC PEPTIDE: CPT | Performed by: EMERGENCY MEDICINE

## 2022-06-08 PROCEDURE — 84484 ASSAY OF TROPONIN QUANT: CPT | Performed by: EMERGENCY MEDICINE

## 2022-06-08 PROCEDURE — 93010 ELECTROCARDIOGRAM REPORT: CPT | Mod: ,,, | Performed by: INTERNAL MEDICINE

## 2022-06-08 PROCEDURE — 63600175 PHARM REV CODE 636 W HCPCS: Performed by: EMERGENCY MEDICINE

## 2022-06-08 PROCEDURE — 93010 EKG 12-LEAD: ICD-10-PCS | Mod: ,,, | Performed by: INTERNAL MEDICINE

## 2022-06-08 PROCEDURE — 85025 COMPLETE CBC W/AUTO DIFF WBC: CPT | Performed by: EMERGENCY MEDICINE

## 2022-06-08 PROCEDURE — 93005 ELECTROCARDIOGRAM TRACING: CPT

## 2022-06-08 PROCEDURE — 85379 FIBRIN DEGRADATION QUANT: CPT | Performed by: EMERGENCY MEDICINE

## 2022-06-08 PROCEDURE — 96361 HYDRATE IV INFUSION ADD-ON: CPT

## 2022-06-08 PROCEDURE — 96374 THER/PROPH/DIAG INJ IV PUSH: CPT

## 2022-06-08 RX ORDER — DROPERIDOL 2.5 MG/ML
0.62 INJECTION, SOLUTION INTRAMUSCULAR; INTRAVENOUS
Status: COMPLETED | OUTPATIENT
Start: 2022-06-08 | End: 2022-06-08

## 2022-06-08 RX ADMIN — DROPERIDOL 0.62 MG: 2.5 INJECTION, SOLUTION INTRAMUSCULAR; INTRAVENOUS at 10:06

## 2022-06-08 RX ADMIN — SODIUM CHLORIDE, SODIUM LACTATE, POTASSIUM CHLORIDE, AND CALCIUM CHLORIDE 1000 ML: .6; .31; .03; .02 INJECTION, SOLUTION INTRAVENOUS at 10:06

## 2022-06-08 NOTE — TELEPHONE ENCOUNTER
[facility] pt with c/o SOB, fever, cough, muscle aches. Pt sounds really short of breath.  Pt stated they wouldn't let her call 911 yesterday when triaged by Kaylee Silver RN.   RN was not really able to understand pt as to who wouldn't let her call 911 as pt is having trouble breathing.     Pt was advised to call 911 per triage protocol. Pt stated that she would and hung up the phone.    RN called pt back a few minutes later to make sure she had called 911, pt stated spouse would take her to hospital.  RN offered to call 911 for pt but pt declined stating that she was afraid.     Per supervisor's instruction RN called 911 and spoke with  26 for wellness check on pt. RN explained situation to  and  stated they would send someone out as quickly as he could.    Unable to route to MD.    Reason for Disposition   SEVERE difficulty breathing (e.g., struggling for each breath, speaks in single words, pulse > 120)    Protocols used: BREATHING DIFFICULTY-A-OH

## 2022-06-09 VITALS
RESPIRATION RATE: 18 BRPM | DIASTOLIC BLOOD PRESSURE: 68 MMHG | SYSTOLIC BLOOD PRESSURE: 105 MMHG | HEIGHT: 65 IN | WEIGHT: 210 LBS | HEART RATE: 77 BPM | TEMPERATURE: 99 F | BODY MASS INDEX: 34.99 KG/M2 | OXYGEN SATURATION: 98 %

## 2022-06-09 PROBLEM — R06.02 SOB (SHORTNESS OF BREATH): Status: ACTIVE | Noted: 2022-06-09

## 2022-06-09 NOTE — ED TRIAGE NOTES
53 yo female presents to ED with c/o SOB, N/V/D, denies fever, but does c/o chills. Pt reports that she came into the ED on Monday,6/6/22 and was diagnosed with COVID -19. Pt reports that she was taking the prescribed medications but also took something that was not prescribed to her(doesn't know the name of it) and started feeling worse.

## 2022-06-09 NOTE — ED PROVIDER NOTES
Encounter Date: 2022       History     Chief Complaint   Patient presents with    Shortness of Breath    COVID-19 Concerns     Pt here with reports of testing positive for 2 days. Pts  reports pt began have SOB and weakness today. Pt slumped over in wheel chair moaning during triage.       Patient presenting with shortness of breath and general move eyes malaise after being diagnosed with COVID 2 days ago at this facility.  On my initial evaluation the patient is moaning throughout the exam and stating that she feels bad.  When asked when she feels short of breath she says all the time.  However she is able to ambulate without getting significantly short of breath.  She denies any associated chest pain with her shortness of breath.  She has been taking Paxil of id and Motrin but does not feel like this is improved.  She also has a left lower extremity walking boot in place due to an orthopedic injury for which she is following as an outpatient with her doctor.  She denies any history of DVTs.    The history is provided by the patient, medical records and the spouse.     Review of patient's allergies indicates:   Allergen Reactions    Latex, natural rubber Itching and Rash    Lidocaine Rash    Tramadol Hives and Rash     Past Medical History:   Diagnosis Date    Anemia     Celiac disease     Diabetes mellitus     Gall stones     Hypertension     PTSD (post-traumatic stress disorder)     Supraventricular tachycardia     Yeast infection      Past Surgical History:   Procedure Laterality Date    ABDOMINAL SURGERY      Gastric sleeve    ANKLE FUSION      ARTHROSCOPY OF ANKLE Right 2022    Procedure: ARTHROSCOPY, ANKLE;  Surgeon: Tessa Garcia MD;  Location: [facility];  Service: Orthopedics;  Laterality: Right;    BREAST SURGERY       SECTION      placenta previa    CHOLECYSTECTOMY  2009    EXCISIONAL BIOPSY Left 2019    Procedure: EXCISIONAL BIOPSY LEFT with SEED  (CONSENT AM OF) 1.0 hr case;  Surgeon: Flor Rosas MD;  [facility];  Service: General;  Laterality: Left;    gastric      gastric sleeve      pt reported     HERNIA REPAIR      HYSTERECTOMY  09/19/2001    Inguinal hernia  2004    REPAIR OF LIGAMENT OF ANKLE Right 4/18/2022    Procedure: REPAIR, LIGAMENT, ANKLE;  Surgeon: Tessa Garcia MD;  Location: [facility] OR;  Service: Orthopedics;  Laterality: Right;  Single Shot    SKIN SURGERY      Kasi Gómez     Family History   Problem Relation Age of Onset    Heart disease Father     Diabetes Mother         hypoglycemic    Breast cancer Maternal Aunt     Breast cancer Sister     Breast cancer Paternal Aunt     Ovarian cancer Neg Hx      Social History     Tobacco Use    Smoking status: Never Smoker    Smokeless tobacco: Never Used   Substance Use Topics    Alcohol use: Yes     Comment: rarely    Drug use: No     Review of Systems   Constitutional: Positive for chills, diaphoresis, fatigue and fever.   HENT: Positive for sinus pressure and sinus pain.    Respiratory: Positive for cough and shortness of breath.    Gastrointestinal: Positive for nausea.   Genitourinary: Negative for dysuria.   Neurological: Positive for headaches.   Psychiatric/Behavioral: The patient is nervous/anxious.        Physical Exam     Initial Vitals [06/08/22 2138]   BP Pulse Resp Temp SpO2   122/68 96 18 98.4 °F (36.9 °C) 97 %      MAP       --         Physical Exam    Nursing note and vitals reviewed.  Constitutional: She is not diaphoretic. No distress.   Normotensive black female lying in a fetal position actively moaning   HENT:   Head: Normocephalic.   Eyes: EOM are normal.   Neck:   Normal range of motion.  Cardiovascular: Normal rate and regular rhythm.   Pulmonary/Chest: Breath sounds normal. No respiratory distress. She has no wheezes.   Abdominal: She exhibits no distension.   Musculoskeletal:         General: Normal range of motion.      Cervical back:  Normal range of motion.     Neurological: She is alert and oriented to person, place, and time.   Skin: Skin is warm and dry.   Psychiatric: She has a normal mood and affect.         ED Course   Procedures  Labs Reviewed   CBC W/ AUTO DIFFERENTIAL - Abnormal; Notable for the following components:       Result Value    WBC 3.37 (*)     MCHC 31.8 (*)     Gran # (ANC) 1.2 (*)     Gran % 35.3 (*)     Lymph % 50.7 (*)     All other components within normal limits   COMPREHENSIVE METABOLIC PANEL   TROPONIN I   B-TYPE NATRIURETIC PEPTIDE   D DIMER, QUANTITATIVE          Imaging Results          X-Ray Chest PA And Lateral (Final result)  Result time 06/08/22 22:53:10    Final result by Lisa Estrada MD (06/08/22 22:53:10)                 Impression:      No acute intrathoracic abnormality.  Mild linear plates of atelectasis in the lower lung fields.      Electronically signed by: Lisa Estrada  Date:    06/08/2022  Time:    22:53             Narrative:    EXAMINATION:  CHEST PA AND LATERAL    CLINICAL HISTORY:  Shortness of breath    TECHNIQUE:  PA and lateral chest radiograph    COMPARISON:  04/11/2022    FINDINGS:  The cardiac silhouette is within normal limits.   There is no focal consolidation, pneumothorax, or pleural effusion.  Mild linear plates of atelectasis are seen in bilateral lower lung fields.  There are nipple piercings.                                 Medications   lactated ringers bolus 1,000 mL (0 mLs Intravenous Stopped 6/9/22 0100)   droperidoL injection 0.625 mg (0.625 mg Intravenous Given 6/8/22 8423)     Medical Decision Making:   Initial Assessment:   Patient presenting with general malaise and worsening shortness of breath with a known diagnosis of coronavirus, on Paxlovid  Differential Diagnosis:     COVID-19, myocarditis, pulmonary embolism, superimposed bacterial pneumonia, dehydration, viral syndrome.  ED Management:    Patient's ED workup was reassuring for pain normal troponin, D-dimer  and chest x-ray.  She had improvement of her symptoms with IV fluids.  She was counseled at length on the actual course of COVID-19 and on symptomatic treatment of her illness.  She is also given strict return precaution, such as worsening shortness of breath or chest pain.  She stated understanding and was discharged to the care of her  with outpatient follow-up.    Michael Forrest MD,   Emergency Medicine  06/09/2022 3:42 AM    (This note was written with voice recognition software.  Please excuse any grammatical errors.)              ED Course as of 06/09/22 0342   Thu Jun 09, 2022 0341 Troponin I: <0.006  No evidence of myocarditis [CH]   0341 D-Dimer: 0.35  No indication for further workup for DVT or PE [CH]   0341 X-Ray Chest PA And Lateral  No evidence of developing pneumonia or severe pulmonary COVID [CH]      ED Course User Index  [CH] Michael Forrest MD             Clinical Impression:   Final diagnoses:  [R06.02] SOB (shortness of breath)  [U07.1] COVID-19 (Primary)  [B34.9] Viral syndrome          ED Disposition Condition    Discharge Stable        ED Prescriptions     None        Follow-up Information     Follow up With Specialties Details Why Contact Info    Radha Jones MD Family Medicine Schedule an appointment as soon as possible for a visit  to follow up ED visit [address]  [address] [address] [phone]      [facility] Emergency Dept Emergency Medicine Go to  If symptoms worsen or fail to improve within 48 hours. [address] [place], [place] [address] [address] [phone]           Michael Forrest MD  06/09/22 2811

## 2022-06-09 NOTE — FIRST PROVIDER EVALUATION
"Medical screening exam completed.  I have conducted a focused provider triage encounter, findings are as follows:    Brief history of present illness:  52 y.o. female presents to the ED c/o fatigue and SOB after being diagnosed with COVID on 6/6/22.     Vitals:    06/08/22 2138   BP: 122/68   BP Location: Left arm   Patient Position: Sitting   Pulse: 96   Resp: 18   Temp: 98.4 °F (36.9 °C)   TempSrc: Oral   SpO2: 97%   Weight: 95.3 kg (210 lb)   Height: 5' 5" (1.651 m)       Pertinent physical exam:  Patient is nontoxic appearing and in no acute distress.      Brief workup plan:  CXR. Ambulatory O2    Preliminary workup initiated; this workup will be continued and followed by the physician or advanced practice provider that is assigned to the patient when roomed.  "

## 2022-06-13 ENCOUNTER — NURSE TRIAGE (OUTPATIENT)
Dept: ADMINISTRATIVE | Facility: CLINIC | Age: 52
End: 2022-06-13
Payer: MEDICAID

## 2022-06-13 ENCOUNTER — TELEPHONE (OUTPATIENT)
Dept: REHABILITATION | Facility: HOSPITAL | Age: 52
End: 2022-06-13
Payer: MEDICAID

## 2022-06-13 DIAGNOSIS — M25.671 DECREASED RANGE OF MOTION OF RIGHT ANKLE: ICD-10-CM

## 2022-06-13 DIAGNOSIS — M25.571 ACUTE RIGHT ANKLE PAIN: Primary | ICD-10-CM

## 2022-06-13 DIAGNOSIS — R26.2 DIFFICULTY WALKING: ICD-10-CM

## 2022-06-13 NOTE — TELEPHONE ENCOUNTER
Pt called and c/o diarrhea and unable to keep anything down. Pt said that she feel light headed and dizzy as well. Pt triaged and care advice is to go to the ED Pt said that she was going to try to get a ride. Pt encouraged to go to the ED per care advice. Pt said that her PCP was at [facility] and that if she could get an appt now she could do that v/s the ED   Reason for Disposition   Drinking very little and has signs of dehydration (e.g., no urine > 12 hours, very dry mouth, very lightheaded)    Additional Information   Negative: Shock suspected (e.g., cold/pale/clammy skin, too weak to stand, low BP, rapid pulse)   Negative: Difficult to awaken or acting confused (e.g., disoriented, slurred speech)   Negative: Sounds like a life-threatening emergency to the triager   Negative: SEVERE abdominal pain (e.g., excruciating) and present > 1 hour   Negative: SEVERE abdominal pain and age > 60 years   Negative: Bloody, black, or tarry bowel movements (Exception: chronic-unchanged black-grey bowel movements and is taking iron pills or Pepto-Bismol)   Negative: SEVERE diarrhea (e.g., 7 or more times / day more than normal) and age > 60 years   Negative: Constant abdominal pain lasting > 2 hours    Protocols used: DIARRHEA-A-OH

## 2022-06-13 NOTE — TELEPHONE ENCOUNTER
Spoke with patient about change in policy regarding COVID-19 status and returning to outpatient clinic. Patient and therapist in agreement to re-schedule previously canceled appointments for next week, with patient set to return to clinic on Monday, June 13th at 10am.

## 2022-06-13 NOTE — TELEPHONE ENCOUNTER
Spoke with patient about missed appointment earlier this morning. Patient states she was still feeling sick and weak from previous COVID-19 diagnosis therefore did not attend today's appointment. Patient placed therapist on hold for >3 minutes and did not return to the phone. Hung up and attempted to call patient back with no answer and voice mailbox was full. Unable to confirm attendance for next appointment or discuss with patient calling to cancel if she is continuing to feel bad. Will attempt to call patient again tomorrow.

## 2022-06-13 NOTE — TELEPHONE ENCOUNTER
"Pt escalated due to replying "yes" to worsening symptoms in the [facility] program. Pt reports she just left the ER, unable to continue to wait to be seen for severe vomiting and diarrhea. Pt states she is currently in her car driving, but feels weak, states she could hardly keep her eyes open in waiting room earlier, Pt advised to pull over and call 911 now per protocol for stating she does feel confused at times as well. Pt verbalized understanding and plans to do so.       Reason for Disposition   Difficult to awaken or acting confused (e.g., disoriented, slurred speech)    Additional Information   Negative: Shock suspected (e.g., cold/pale/clammy skin, too weak to stand)    Protocols used: ST VOMITING-A-OH      "

## 2022-06-20 ENCOUNTER — DOCUMENTATION ONLY (OUTPATIENT)
Dept: REHABILITATION | Facility: HOSPITAL | Age: 52
End: 2022-06-20
Payer: MEDICAID

## 2022-06-20 NOTE — PROGRESS NOTES
Called pt in regards to 10AM session, and pt stated she had been trying to call since Friday in order to cancel appt. She is recovering from Covid and the Flu, and wanted to attend follow up with MD for clearance to return to daily activities. Pt reminded of attendance policy once she is feeling well enough to attend therapy, and she gave verbal understanding. Pt plans to be in attendance for next scheduled session pending results from MD appt late today.     Darlene Steward, PTA  06/20/2022

## 2022-06-21 ENCOUNTER — CLINICAL SUPPORT (OUTPATIENT)
Dept: REHABILITATION | Facility: HOSPITAL | Age: 52
End: 2022-06-21
Payer: MEDICAID

## 2022-06-21 DIAGNOSIS — M25.571 ACUTE RIGHT ANKLE PAIN: Primary | ICD-10-CM

## 2022-06-21 DIAGNOSIS — R26.2 DIFFICULTY WALKING: ICD-10-CM

## 2022-06-21 DIAGNOSIS — M25.671 DECREASED RANGE OF MOTION OF RIGHT ANKLE: ICD-10-CM

## 2022-06-21 PROCEDURE — 97110 THERAPEUTIC EXERCISES: CPT | Mod: PN

## 2022-06-21 NOTE — PROGRESS NOTES
"[facility]   Physical Therapy Treatment Note     Name: Aide Almaraz  [facility] Number: [id number]    Therapy Diagnosis:   Encounter Diagnoses   Name Primary?    Acute right ankle pain Yes    Difficulty walking     Decreased range of motion of right ankle      Physician: Pita Kelly PA-C    Visit Date: 6/21/2022    Physician Orders: PT Eval and Treat   Medical Diagnosis from Referral: History of ankle surgery [Z98.890], Ankle instability, right [M25.371]  Evaluation Date: 5/26/2022  Authorization Period Expiration: 12/31/2022  Plan of Care Expiration: 08/26/2022  Progress Note Due: 07/26/2022  Visit # / Visits authorized: 1/20 (+Evaluation)    PTA Visit #: 0/5     Time In: 11:00 AM  Time Out: 12:00 PM  Total Billable Time: 50 minutes      Precautions: Diabetes, Partial WB, s/p Right ankle arthroscopy and extensive debridment;  Right Brostrom lateral ligament reconstruction with Artelon     From MD op note:   "Postop plan:   1. Weight bearing status: NWB LLE x4 weeks  2. Immobilization: Splint x 2 weeks and then boot x3 months postop  3. DVT prophylaxis: aspirin daily  4. Follow-up: 2 weeks with PA; X-Rays: none, place PT orders  --recheck with Dr. Garcia in 6 weeks; X-Rays: none  5. PT: Start date: 4 weeks postop  Rx/protocol and restrictions:   --Gait training, edema control/desensitization modalities  --ADAT WBAT RLE, immobilization: boot; may initiate boot weaning 3 months postop   --AROM/AAROM and gentle strengthening/theraband work, but no PROM/manipulation until 3 months postop" \    MD Note 6/2/2022:  · Weight bearing: Weight bearing as tolerated right leg  · Immobilization: Tall boot when mobilizing; may remove for sleep and hygiene and and then wean out of boot 10 weeks postop  · Therapy: Continue PT- note sent to PT with updates      SUBJECTIVE     Pt reports: that it hurts a lot especially after she does her HEP. It's painful for a couple of hours and the pain goes " "away by morning. Walking is okay it bothers her more with the boot off and moving it around.   She was compliant with home exercise program.  Response to previous treatment: Evaluation last session  Functional change: Ongoin    Pain: 2/10  Location: right lateral ankle      OBJECTIVE     DOS: 4/18/2022. Patient is currently 9 weeks and 2 day(s) post-op.     Objective measurements taken on 6/21/2022. Improvements bolded below:     Range of Motion: AROM:     Ankle Right Left   Dorsiflexion 5 degrees 10  degrees   Plantarflexion 35  degrees 40  degrees   Inversion 25  degrees 35  degrees   Eversion 30  degrees 15  degrees      Joint Mobility: hypomobile medial and lateral subtalar glide     Palpation: (+) tender to moderate palpation along surgical incision       Treatment     Aide received the treatments listed below:      Therapeutic exercises to develop strength, endurance and ROM for 47 minutes including:  Ankle pumps: 40x  Ankle ABC's: 2x through  Ankle 4-way: 15x, GTB  Supine SLR's: 3x10  Seated Towel Scrunches: 3x10, 5" holds  Seated Heel Raises: 3x10  Towel Eversion: 3 rounds  Patient education      Manual therapy techniques: Joint mobilizations were applied to the: R ankle for 3 minutes, including:  Gentle TCJ distractions      Aide received a cold pack for 10 minutes to R ankle at end of session.        Patient Education and Home Exercises     Home Exercises Provided and Patient Education Provided     Education provided:   - Importance of continuing HEP to supplement therapy sessions.    Written Home Exercises Provided: yes. Exercises were reviewed and Aide was able to demonstrate them prior to the end of the session.  Aide demonstrated good  understanding of the education provided. See EMR under Patient Instructions for exercises provided during therapy sessions    ASSESSMENT     Aide presented to therapy ambulating with tall CAM boot donned and 1 axillary crutch. Reassessed AROM this session " with patient displaying improvements in all motions as bolded above, however continues to be limited with DF and inversion. Tolerated session well with implementation of ROM exercises and light strengthening of ankle. Only one instance of increased pain with performance of towel eversion, but pain improved after limiting range. Educated patient on the importance of performing HEP to supplement therapy sessions. Will continue to progress as tolerated, progressing to boot weaning next week.     Aide Is progressing well towards her goals.   Pt prognosis is Good.     Pt will continue to benefit from skilled outpatient physical therapy to address the deficits listed in the problem list box on initial evaluation, provide pt/family education and to maximize pt's level of independence in the home and community environment.     Pt's spiritual, cultural and educational needs considered and pt agreeable to plan of care and goals.     Anticipated barriers to physical therapy: : s/p Right Brostrom lateral ligament reconstruction with Artelon    Goals:   Short Term Goals: 4 weeks   1. The patient with report compliance with HEP to maximize functional outcomes. Progressing, Not Met  2. The patient will demonstrate increase in BLE MMT by 1/2 grade to improve pt's functional mobility. Progressing, Not Met  3. The patient will decrease pain level by 50% in order to improve QOL. Progressing, Not Met     Long Term Goals: 12 weeks   1. The patient will demonstrate understanding and performance of advanced HEP to allow for independence once discharged from therapy. Progressing, Not Met  2. 2. The patient will demonstrate increase in BLE MMT by 1 grade to improve pt's functional mobility. Progressing, Not Met  3. The patient will have Right ankle range of motion WFL in order to improve her functional mobility. Progressing, Not Met  4. The patient will be able to walk for 15' w/o AD in order to improve pt's ability to perform ADLs.  Progressing, Not Met  5. Pt will be able to sleep through the night w/o waking from pain in order to improve her QOL. Progressing, Not Met    PLAN     Continue to progress ROM and strength as tolerated.  Begin weaning from boot next week.     Jada Phan, PT

## 2022-06-21 NOTE — PATIENT INSTRUCTIONS
Ankle Pumps      Bend your foot up and down at your ankle joint as shown. Perform 40-50 repetitions.     Ankle ABC's    While in a seated position, write out the alphabet in the air with your big toe.    Your ankle should be moving as you perform this.     Perform 2-3 times all the way from A to Z.    Supine Straight Leg Raises    While lying on your back, raise up your leg with a straight knee.  Keep the opposite knee bent with the foot planted on the ground. Perform 3 sets of 10 repetitions for a total of 30 times.    Ankle Dorsiflexion    Begin in a long sitting position with both legs supported. Tie one end of the theraband around your foot and anchor the other end around a stable surface (table leg, free weight, etc.). Pull your toes up towards your body, then slowly lower back to starting position.     Ankle Plantarflexion    Begin in long sitting position with legs supported. Tie one end of the theraband around the foot and hold the other end in your hand. Press your foot forward as if you were pressing a pedal. Slowly allow the foot to return to starting position.         Ankle Inversion    Begin in a long sitting position with legs supported. Tie theraband around affected foot and create resistance by crossing opposite leg and bringing the theraband around the opposite foot (as shown in photo). While keeping the leg still, move the foot inward and then slowly bring back to starting position.     Ankle Eversion    Begin in a long sitting position with both legs supported. Tie the theraband around affected foot and create tension using the other foot. While keeping the leg still, slowly bring foot out to the side and then back to starting position.      Towel Scrunches    While seated, use a towel and draw it back towards you using your toes. Curl your toes inward.      Be sure to keep your heel in contact with the floor the entire time.      Towel Slides into Eversion/Inversion    While seated, use a towel  and slide it with your foot across the floor in an outward direction.      Be sure to keep your heel in contact with the floor the entire time.      Seated Heel Raises    Start with your entire foot on the ground.     Next, raise up your heel as you press your toes down.  Keep your toes on the ground the entire time.

## 2022-06-28 ENCOUNTER — CLINICAL SUPPORT (OUTPATIENT)
Dept: REHABILITATION | Facility: HOSPITAL | Age: 52
End: 2022-06-28
Attending: PHYSICIAN ASSISTANT
Payer: MEDICAID

## 2022-06-28 DIAGNOSIS — M25.571 RIGHT ANKLE PAIN, UNSPECIFIED CHRONICITY: Primary | ICD-10-CM

## 2022-06-28 DIAGNOSIS — M25.671 DECREASED RANGE OF MOTION OF RIGHT ANKLE: ICD-10-CM

## 2022-06-28 DIAGNOSIS — R26.2 DIFFICULTY WALKING: ICD-10-CM

## 2022-06-28 PROCEDURE — 97110 THERAPEUTIC EXERCISES: CPT | Mod: PN

## 2022-06-28 NOTE — PROGRESS NOTES
"[facility]   Physical Therapy Treatment Note     Name: Aide Almaraz  [facility] Number: [id number]    Therapy Diagnosis:   Encounter Diagnoses   Name Primary?    Right ankle pain, unspecified chronicity Yes    Difficulty walking     Decreased range of motion of right ankle      Physician: Pita Kelly PAFcoC    Visit Date: 6/28/2022    Physician Orders: PT Eval and Treat   Medical Diagnosis from Referral: History of ankle surgery [Z98.890], Ankle instability, right [M25.371]  Evaluation Date: 5/26/2022  Authorization Period Expiration: 12/31/2022  Plan of Care Expiration: 08/26/2022  Progress Note Due: 07/26/2022  Visit # / Visits authorized: 2/20 (+Evaluation)    PTA Visit #: 0/5     Time In: 0924 - late  Time Out: 1012  Total Billable Time: 38 minutes      Precautions: Diabetes, Partial WB, s/p Right ankle arthroscopy and extensive debridment;  Right Brostrom lateral ligament reconstruction with Artelon     From MD op note:   "Postop plan:   1. Weight bearing status: NWB LLE x4 weeks  2. Immobilization: Splint x 2 weeks and then boot x3 months postop  3. DVT prophylaxis: aspirin daily  4. Follow-up: 2 weeks with PA; X-Rays: none, place PT orders   --recheck with Dr. Garcia in 6 weeks; X-Rays: none  5. PT: Start date: 4 weeks postop  Rx/protocol and restrictions:   --Gait training, edema control/desensitization modalities  --ADAT WBAT RLE, immobilization: boot; may initiate boot weaning 3 months postop   --AROM/AAROM and gentle strengthening/theraband work, but no PROM/manipulation until 3 months postop" \    MD Note 6/2/2022:  · Weight bearing: Weight bearing as tolerated right leg  · Immobilization: Tall boot when mobilizing; may remove for sleep and hygiene and and then wean out of boot 10 weeks postop  · Therapy: Continue PT- note sent to PT with updates      SUBJECTIVE     Pt reports: that she was sore from her last session, and has difficulty performing her HEP everyday  She " "was compliant with home exercise program.  Response to previous treatment: Evaluation last session  Functional change: Ongoin    Pain: 3/10  Location: right lateral ankle      OBJECTIVE     DOS: 4/18/2022. Patient is currently 10 weeks and 1 day(s) post-op.     Objective measurements taken on 6/21/2022. Improvements bolded below:     Range of Motion: AROM:     Ankle Right Left   Dorsiflexion 5 degrees 10  degrees   Plantarflexion 35  degrees 40  degrees   Inversion 25  degrees 35  degrees   Eversion 30  degrees 15  degrees      Joint Mobility: hypomobile medial and lateral subtalar glide     Palpation: (+) tender to moderate palpation along surgical incision       Treatment     Aide received the treatments listed below:      Therapeutic exercises to develop strength, endurance and ROM for 38 minutes including:  Ankle pumps: 40x  Ankle ABC's: 2x through  Ankle 4-way: 15x, GTB  Supine SLR's: 2x15  Seated Towel Scrunches: 3x10, 5" holds  Seated Heel Raises: 3x10  Towel Eversion: 3 rounds  Patient education      Manual therapy techniques: Joint mobilizations were applied to the: R ankle for 00 minutes, including:  Gentle TCJ distractions      Aide received a cold pack for 10 minutes to R ankle at end of session.        Patient Education and Home Exercises     Home Exercises Provided and Patient Education Provided     Education provided:   - Importance of continuing HEP to supplement therapy sessions.    Written Home Exercises Provided: yes. Exercises were reviewed and Aide was able to demonstrate them prior to the end of the session.  Aide demonstrated good  understanding of the education provided. See EMR under Patient Instructions for exercises provided during therapy sessions    ASSESSMENT     Aide presented to therapy ambulating with tall CAM boot donned and 1 axillary crutch. Pt tolerated tx well. Therapist educated patient that she can take a day off from her HEP if she is feeling very sore, and " should not push herself through sharp or stabbing pain. Pt continues to improve on her ankle stability, and had no bouts of pain during exercises today. Therapist provided verbal/tactile cues to maintain proper form. Pt reported no adverse effects to exercises. Pt would benefit from continued skilled physical therapy in order to reach pt's goals.     Aide Is progressing well towards her goals.   Pt prognosis is Good.     Pt will continue to benefit from skilled outpatient physical therapy to address the deficits listed in the problem list box on initial evaluation, provide pt/family education and to maximize pt's level of independence in the home and community environment.     Pt's spiritual, cultural and educational needs considered and pt agreeable to plan of care and goals.     Anticipated barriers to physical therapy: : s/p Right Brostrom lateral ligament reconstruction with Artelon    Goals:   Short Term Goals: 4 weeks   1. The patient with report compliance with HEP to maximize functional outcomes. Progressing, Not Met  2. The patient will demonstrate increase in BLE MMT by 1/2 grade to improve pt's functional mobility. Progressing, Not Met  3. The patient will decrease pain level by 50% in order to improve QOL. Progressing, Not Met     Long Term Goals: 12 weeks   1. The patient will demonstrate understanding and performance of advanced HEP to allow for independence once discharged from therapy. Progressing, Not Met  2. 2. The patient will demonstrate increase in BLE MMT by 1 grade to improve pt's functional mobility. Progressing, Not Met  3. The patient will have Right ankle range of motion WFL in order to improve her functional mobility. Progressing, Not Met  4. The patient will be able to walk for 15' w/o AD in order to improve pt's ability to perform ADLs. Progressing, Not Met  5. Pt will be able to sleep through the night w/o waking from pain in order to improve her QOL. Progressing, Not Met    PLAN      Continue to progress ROM and strength as tolerated.  Begin weaning from boot next week.     Charles Barnhart, PT

## 2022-07-01 ENCOUNTER — CLINICAL SUPPORT (OUTPATIENT)
Dept: REHABILITATION | Facility: HOSPITAL | Age: 52
End: 2022-07-01
Attending: PHYSICIAN ASSISTANT
Payer: MEDICAID

## 2022-07-01 DIAGNOSIS — R26.2 DIFFICULTY WALKING: ICD-10-CM

## 2022-07-01 DIAGNOSIS — M25.571 ACUTE RIGHT ANKLE PAIN: Primary | ICD-10-CM

## 2022-07-01 DIAGNOSIS — M25.671 DECREASED RANGE OF MOTION OF RIGHT ANKLE: ICD-10-CM

## 2022-07-01 PROCEDURE — 97110 THERAPEUTIC EXERCISES: CPT | Mod: PN

## 2022-07-01 NOTE — PATIENT INSTRUCTIONS
Lateral Weight Shifts      While in a standing position and knees partially bent, slowly shift your body weight side-to-side.     Forward Weight Shifts       While standing and holding onto something sturdy for support, step forward and weight shift onto your front foot. Then lift your back heel up. Return to starting position. Perform on both sides or as directed.

## 2022-07-01 NOTE — PROGRESS NOTES
"[facility]   Physical Therapy Treatment Note     Name: Aide Almaraz  [facility] Number: [id number]    Therapy Diagnosis:   Encounter Diagnoses   Name Primary?    Acute right ankle pain Yes    Difficulty walking     Decreased range of motion of right ankle      Physician: Pita Kelly PA-C    Visit Date: 7/1/2022    Physician Orders: PT Eval and Treat   Medical Diagnosis from Referral: History of ankle surgery [Z98.890], Ankle instability, right [M25.371]  Evaluation Date: 5/26/2022  Authorization Period Expiration: 12/31/2022  Plan of Care Expiration: 08/26/2022  Progress Note Due: 07/26/2022  Visit # / Visits authorized: 3/20 (+Evaluation)    PTA Visit #: 0/5     Time In: 10:20 AM  Time Out: 11:00 AM  Total Billable Time: 40 minutes      Precautions: Diabetes, Partial WB, s/p Right ankle arthroscopy and extensive debridment;  Right Brostrom lateral ligament reconstruction with Artelon     From MD op note:   "Postop plan:   1. Weight bearing status: NWB LLE x4 weeks  2. Immobilization: Splint x 2 weeks and then boot x3 months postop  3. DVT prophylaxis: aspirin daily  4. Follow-up: 2 weeks with PA; X-Rays: none, place PT orders   --recheck with Dr. Garcia in 6 weeks; X-Rays: none  5. PT: Start date: 4 weeks postop  Rx/protocol and restrictions:   --Gait training, edema control/desensitization modalities  --ADAT WBAT RLE, immobilization: boot; may initiate boot weaning 3 months postop   --AROM/AAROM and gentle strengthening/theraband work, but no PROM/manipulation until 3 months postop" \    MD Note 6/2/2022:  · Weight bearing: Weight bearing as tolerated right leg  · Immobilization: Tall boot when mobilizing; may remove for sleep and hygiene and and then wean out of boot 10 weeks postop  · Therapy: Continue PT- note sent to PT with updates      SUBJECTIVE     Pt reports: She's been experiencing increased throbbing pain on the lateral portion of her ankle along her incision. " "States she also has some tenderness along the incision and feels numbness along her 4th and 5th toe.   She was compliant with home exercise program.  Response to previous treatment: No adverse reactions.   Functional change: Ongoin    Pain: 3/10  Location: right lateral ankle      OBJECTIVE     DOS: 4/18/2022. Patient is currently 10 weeks and 5 day(s) post-op.     Objective measurements taken on 6/21/2022. Improvements bolded below:     Range of Motion: AROM:     Ankle Right Left   Dorsiflexion 5 degrees 10  degrees   Plantarflexion 35  degrees 40  degrees   Inversion 25  degrees 35  degrees   Eversion 30  degrees 15  degrees      Joint Mobility: hypomobile medial and lateral subtalar glide     Palpation: (+) tender to moderate palpation along surgical incision       Treatment     Aide received the treatments listed below:      Manual therapy techniques: Joint mobilizations were applied to the: R ankle for 3 minutes, including:  Gentle TCJ distractions      Therapeutic exercises to develop strength, endurance and ROM for 37 minutes including:  Ankle pumps: 40x  Ankle ABC's: 2x through  Ankle 4-way: 20x, GTB  Upright SLR's: 3x10, 3" holds  Lateral Weight Shifts: 1 minute x 3 rounds  Forward Weight Shifts: 1 minute x 2 rounds  Gait training with 1 axillary crutch in appropriate UE  Seated Towel Scrunches: 3x10, 5" holds  Seated Heel Raises: 3x10  Towel Eversion: 3 rounds  Patient education            Patient Education and Home Exercises     Home Exercises Provided and Patient Education Provided     Education provided:   - Importance of continuing HEP to supplement therapy sessions.    Written Home Exercises Provided: yes. Exercises were reviewed and Aide was able to demonstrate them prior to the end of the session.  Aide demonstrated good  understanding of the education provided. See EMR under Patient Instructions for exercises provided during therapy sessions    ASSESSMENT     Aide presented to therapy " ambulating with tall CAM boot donned and 1 axillary crutch. Patient reported increased lateral ankle pain and tenderness near surgical incision as well as continued numbness in 4th and 5th toes. Tolerated session fairly with continued focus on improving ankle ROM and strength. Introduced forward and lateral weight shifting this session as patient is just past her 10 week mac and can begin weaning out of her boot. Educated patient on proper weaning process and to begin with 30 minutes a day and progressing to 1 hour, 2 hours, etc. Pt would benefit from continued skilled physical therapy in order to reach pt's goals.     Aide Is progressing well towards her goals.   Pt prognosis is Good.     Pt will continue to benefit from skilled outpatient physical therapy to address the deficits listed in the problem list box on initial evaluation, provide pt/family education and to maximize pt's level of independence in the home and community environment.     Pt's spiritual, cultural and educational needs considered and pt agreeable to plan of care and goals.     Anticipated barriers to physical therapy: : s/p Right Brostrom lateral ligament reconstruction with Artelon    Goals:   Short Term Goals: 4 weeks   1. The patient with report compliance with HEP to maximize functional outcomes. Progressing, Not Met  2. The patient will demonstrate increase in BLE MMT by 1/2 grade to improve pt's functional mobility. Progressing, Not Met  3. The patient will decrease pain level by 50% in order to improve QOL. Progressing, Not Met     Long Term Goals: 12 weeks   1. The patient will demonstrate understanding and performance of advanced HEP to allow for independence once discharged from therapy. Progressing, Not Met  2. 2. The patient will demonstrate increase in BLE MMT by 1 grade to improve pt's functional mobility. Progressing, Not Met  3. The patient will have Right ankle range of motion WFL in order to improve her functional  mobility. Progressing, Not Met  4. The patient will be able to walk for 15' w/o AD in order to improve pt's ability to perform ADLs. Progressing, Not Met  5. Pt will be able to sleep through the night w/o waking from pain in order to improve her QOL. Progressing, Not Met    PLAN     Continue to progress ROM and strength as tolerated.  Begin weaning from boot next week.     Jada Phan, PT

## 2022-07-09 ENCOUNTER — HOSPITAL ENCOUNTER (EMERGENCY)
Facility: HOSPITAL | Age: 52
Discharge: HOME | End: 2022-07-09
Attending: EMERGENCY MEDICINE
Payer: MEDICAID

## 2022-07-09 VITALS
BODY MASS INDEX: 32.54 KG/M2 | HEART RATE: 90 BPM | WEIGHT: 195.31 LBS | OXYGEN SATURATION: 98 % | HEIGHT: 65 IN | SYSTOLIC BLOOD PRESSURE: 145 MMHG | DIASTOLIC BLOOD PRESSURE: 87 MMHG | TEMPERATURE: 98 F | RESPIRATION RATE: 20 BRPM

## 2022-07-09 DIAGNOSIS — R19.7 DIARRHEA, UNSPECIFIED TYPE: Primary | ICD-10-CM

## 2022-07-09 LAB
ALBUMIN SERPL BCP-MCNC: 4.1 G/DL (ref 3.5–5.2)
ALP SERPL-CCNC: 78 U/L (ref 55–135)
ALT SERPL W/O P-5'-P-CCNC: 31 U/L (ref 10–44)
ANION GAP SERPL CALC-SCNC: 11 MMOL/L (ref 8–16)
APTT BLDCRRT: 24.5 SEC (ref 21–32)
AST SERPL-CCNC: 26 U/L (ref 10–40)
BACTERIA #/AREA URNS HPF: ABNORMAL /HPF
BILIRUB SERPL-MCNC: 0.7 MG/DL (ref 0.1–1)
BILIRUB UR QL STRIP: NEGATIVE
BUN SERPL-MCNC: 9 MG/DL (ref 6–20)
CALCIUM SERPL-MCNC: 10 MG/DL (ref 8.7–10.5)
CHLORIDE SERPL-SCNC: 107 MMOL/L (ref 95–110)
CLARITY UR: ABNORMAL
CO2 SERPL-SCNC: 26 MMOL/L (ref 23–29)
COLOR UR: ABNORMAL
CREAT SERPL-MCNC: 0.9 MG/DL (ref 0.5–1.4)
ERYTHROCYTE [DISTWIDTH] IN BLOOD BY AUTOMATED COUNT: 13.9 % (ref 11.5–14.5)
EST. GFR  (AFRICAN AMERICAN): >60 ML/MIN/1.73 M^2
EST. GFR  (NON AFRICAN AMERICAN): >60 ML/MIN/1.73 M^2
GLUCOSE SERPL-MCNC: 103 MG/DL (ref 70–110)
GLUCOSE UR QL STRIP: NEGATIVE
HCT VFR BLD AUTO: 41.7 % (ref 37–48.5)
HGB BLD-MCNC: 13.8 G/DL (ref 12–16)
HGB UR QL STRIP: NEGATIVE
HYALINE CASTS #/AREA URNS LPF: 0 /LPF
INR PPP: 1 (ref 0.8–1.2)
KETONES UR QL STRIP: NEGATIVE
LEUKOCYTE ESTERASE UR QL STRIP: NEGATIVE
MCH RBC QN AUTO: 30.6 PG (ref 27–31)
MCHC RBC AUTO-ENTMCNC: 33.1 G/DL (ref 32–36)
MCV RBC AUTO: 93 FL (ref 82–98)
MICROSCOPIC COMMENT: ABNORMAL
NITRITE UR QL STRIP: NEGATIVE
PH UR STRIP: 6 [PH] (ref 5–8)
PLATELET # BLD AUTO: 399 K/UL (ref 150–450)
PMV BLD AUTO: 10.4 FL (ref 9.2–12.9)
POTASSIUM SERPL-SCNC: 3.5 MMOL/L (ref 3.5–5.1)
PROT SERPL-MCNC: 8.3 G/DL (ref 6–8.4)
PROT UR QL STRIP: ABNORMAL
PROTHROMBIN TIME: 10.5 SEC (ref 9–12.5)
RBC # BLD AUTO: 4.51 M/UL (ref 4–5.4)
RBC #/AREA URNS HPF: 0 /HPF (ref 0–4)
SODIUM SERPL-SCNC: 144 MMOL/L (ref 136–145)
SP GR UR STRIP: >1.03 (ref 1–1.03)
SQUAMOUS #/AREA URNS HPF: 14 /HPF
URN SPEC COLLECT METH UR: ABNORMAL
UROBILINOGEN UR STRIP-ACNC: NEGATIVE EU/DL
WBC # BLD AUTO: 5.75 K/UL (ref 3.9–12.7)
WBC #/AREA URNS HPF: 14 /HPF (ref 0–5)

## 2022-07-09 PROCEDURE — 85610 PROTHROMBIN TIME: CPT | Performed by: EMERGENCY MEDICINE

## 2022-07-09 PROCEDURE — 80053 COMPREHEN METABOLIC PANEL: CPT | Performed by: EMERGENCY MEDICINE

## 2022-07-09 PROCEDURE — 85027 COMPLETE CBC AUTOMATED: CPT | Performed by: EMERGENCY MEDICINE

## 2022-07-09 PROCEDURE — 85730 THROMBOPLASTIN TIME PARTIAL: CPT | Performed by: EMERGENCY MEDICINE

## 2022-07-09 PROCEDURE — 81000 URINALYSIS NONAUTO W/SCOPE: CPT | Performed by: EMERGENCY MEDICINE

## 2022-07-09 PROCEDURE — 99283 EMERGENCY DEPT VISIT LOW MDM: CPT

## 2022-07-09 RX ORDER — METOCLOPRAMIDE HYDROCHLORIDE 5 MG/ML
INJECTION INTRAMUSCULAR; INTRAVENOUS
Status: DISCONTINUED
Start: 2022-07-09 | End: 2022-07-09 | Stop reason: HOSPADM

## 2022-07-09 RX ORDER — METOCLOPRAMIDE HYDROCHLORIDE 5 MG/ML
10 INJECTION INTRAMUSCULAR; INTRAVENOUS
Status: DISCONTINUED | OUTPATIENT
Start: 2022-07-09 | End: 2022-07-09 | Stop reason: HOSPADM

## 2022-07-09 NOTE — ED PROVIDER NOTES
Encounter Date: 2022       History     Chief Complaint   Patient presents with    Diarrhea     Pt presents to ED secondary to diarrhea, n/v, abdominal pain, weight loss and decreased appetite x2 months since having Covid. Also reports bloody stools beginning yesterday. States unable to get an appt with GI Dr Montelongo.      This patient was diagnosed with COVID approximately 2 months ago presents to the emergency department with continued intermittent bouts of diarrhea.  Patient became concerned today when her stool became dark and.  Patient has had follow-up with GI in the past with no etiology identified.  Patient feels that with the onset of her COVID symptomatology that the diarrhea has not quit so subsequently she feels that they are related to 1 another.    The history is provided by the patient.   Diarrhea       Review of patient's allergies indicates:   Allergen Reactions    Latex, natural rubber Itching and Rash    Lidocaine Rash    Tramadol Hives and Rash     Past Medical History:   Diagnosis Date    Anemia     Celiac disease     Diabetes mellitus     Gall stones     Hypertension     PTSD (post-traumatic stress disorder)     Supraventricular tachycardia     Yeast infection      Past Surgical History:   Procedure Laterality Date    ABDOMINAL SURGERY      Gastric sleeve    ANKLE FUSION      ARTHROSCOPY OF ANKLE Right 2022    Procedure: ARTHROSCOPY, ANKLE;  Surgeon: Tessa Garcia MD;  Location: [facility];  Service: Orthopedics;  Laterality: Right;    BREAST SURGERY       SECTION      placenta previa    CHOLECYSTECTOMY  2009    EXCISIONAL BIOPSY Left 2019    Procedure: EXCISIONAL BIOPSY LEFT with SEED (CONSENT AM OF) 1.0 hr case;  Surgeon: Flor Rosas MD;  Location: [address];  Service: General;  Laterality: Left;    gastric      gastric sleeve      pt reported     HERNIA REPAIR      HYSTERECTOMY  2001    Inguinal hernia  2004    REPAIR OF  LIGAMENT OF ANKLE Right 4/18/2022    Procedure: REPAIR, LIGAMENT, ANKLE;  Surgeon: Tessa Garcia MD;  Location: [facility];  Service: Orthopedics;  Laterality: Right;  Single Shot    SKIN SURGERY      Kasi Gómez     Family History   Problem Relation Age of Onset    Heart disease Father     Diabetes Mother         hypoglycemic    Breast cancer Maternal Aunt     Breast cancer Sister     Breast cancer Paternal Aunt     Ovarian cancer Neg Hx      Social History     Tobacco Use    Smoking status: Never Smoker    Smokeless tobacco: Never Used   Substance Use Topics    Alcohol use: Yes     Comment: rarely    Drug use: No     Review of Systems   Constitutional: Negative.    HENT: Negative.    Eyes: Negative.    Respiratory: Negative.    Cardiovascular: Negative.    Gastrointestinal: Positive for diarrhea.   Endocrine: Negative.    Genitourinary: Negative.    Musculoskeletal: Negative.    Skin: Negative.    Allergic/Immunologic: Negative.    Neurological: Negative.    Hematological: Negative.    Psychiatric/Behavioral: Negative.    All other systems reviewed and are negative.      Physical Exam     Initial Vitals [07/09/22 0443]   BP Pulse Resp Temp SpO2   (!) 149/85 91 14 98.2 °F (36.8 °C) 98 %      MAP       --         Physical Exam    Nursing note and vitals reviewed.  Constitutional: Vital signs are normal. She appears well-developed. She is active and cooperative.   HENT:   Head: Normocephalic and atraumatic.   Eyes: Conjunctivae, EOM and lids are normal. Pupils are equal, round, and reactive to light.   Neck: Trachea normal. Neck supple. No thyroid mass present.    Full passive range of motion without pain.     Cardiovascular: Normal rate, regular rhythm, S1 normal, S2 normal, normal heart sounds, intact distal pulses and normal pulses.   Pulmonary/Chest: Effort normal and breath sounds normal.   Abdominal: Abdomen is soft and protuberant. Bowel sounds are normal. There is abdominal tenderness in the left upper  quadrant.   Musculoskeletal:         General: Normal range of motion.      Cervical back: Full passive range of motion without pain and neck supple.     Lymphadenopathy:     She has no axillary adenopathy.   Neurological: She is alert and oriented to person, place, and time.   Skin: Skin is warm, dry and intact.   Psychiatric: She has a normal mood and affect. Her speech is normal and behavior is normal. Judgment and thought content normal. Cognition and memory are normal.         ED Course   Procedures  Labs Reviewed   URINALYSIS - Abnormal; Notable for the following components:       Result Value    Color, UA Orange (*)     Appearance, UA Cloudy (*)     Specific Gravity, UA >1.030 (*)     Protein, UA 1+ (*)     All other components within normal limits   URINALYSIS MICROSCOPIC - Abnormal; Notable for the following components:    WBC, UA 14 (*)     Bacteria Many (*)     All other components within normal limits   APTT   COMPREHENSIVE METABOLIC PANEL   PROTIME-INR   CBC WITHOUT DIFFERENTIAL          Imaging Results    None          Medications - No data to display                    Assumed care of patient from Dr. Duffy, pending lab work.  ED lab unremarkable, hemoglobin stable, CMP unremarkable, urinalysis unremarkable.  Patient reassessed after IV fluids and Reglan with complete symptomatic improvement.  At this point time based on physical exam evaluation not suspect any acute surgical or medical emergency or intra-abdominal pathology.  Will continue symptomatic care at this point, have her follow-up with Gastroenterology later this week.  All questions answered at this time, patient stable for discharge home improved and stable.         Clinical Impression:   Final diagnoses:  [R19.7] Diarrhea, unspecified type (Primary)          ED Disposition Condition    Discharge Stable        ED Prescriptions     None        Follow-up Information     Follow up With Specialties Details Why Contact [facility] -  Emergency Dept Emergency Medicine Go to  If symptoms worsen [address] [facility] [address] [phone]    Radha Jones MD Family Medicine Go in 1 week As needed [address]  [address]  [facility] [address]  [phone]             Td Valderrama MD  07/09/22 0860

## 2022-07-09 NOTE — ED PROVIDER NOTES
Encounter Date: 2022       History     Chief Complaint   Patient presents with    Diarrhea     Pt presents to ED secondary to diarrhea, n/v, abdominal pain, weight loss and decreased appetite x2 months since having Covid. Also reports bloody stools beginning yesterday. States unable to get an appt with GI Dr Montelongo.      This patient was diagnosed with COVID approximately 2 months ago presents to the emergency department with continued intermittent bouts of diarrhea.  Patient became concerned today when her stool became dark and.  Patient has had follow-up with GI in the past with no etiology identified.  Patient feels that with the onset of her COVID symptomatology that the diarrhea has not quit so subsequently she feels that they are related to 1 another.    The history is provided by the patient.     Review of patient's allergies indicates:   Allergen Reactions    Latex, natural rubber Itching and Rash    Lidocaine Rash    Tramadol Hives and Rash     Past Medical History:   Diagnosis Date    Anemia     Celiac disease     Diabetes mellitus     Gall stones     Hypertension     PTSD (post-traumatic stress disorder)     Supraventricular tachycardia     Yeast infection      Past Surgical History:   Procedure Laterality Date    ABDOMINAL SURGERY      Gastric sleeve    ANKLE FUSION      ARTHROSCOPY OF ANKLE Right 2022    Procedure: ARTHROSCOPY, ANKLE;  Surgeon: Tessa Garcia MD;  [facility]: [facility];  Service: Orthopedics;  Laterality: Right;    BREAST SURGERY       SECTION      placenta previa    CHOLECYSTECTOMY  2009    EXCISIONAL BIOPSY Left 2019    Procedure: EXCISIONAL BIOPSY LEFT with SEED (CONSENT AM OF) 1.0 hr case;  Surgeon: Flor Rosas MD;  Location: [address];  Service: General;  Laterality: Left;    gastric      gastric sleeve      pt reported     HERNIA REPAIR      HYSTERECTOMY  2001    Inguinal hernia  2004    REPAIR OF LIGAMENT OF ANKLE  Right 4/18/2022    Procedure: REPAIR, LIGAMENT, ANKLE;  Surgeon: Tessa Garcia MD;  Location: [facility] OR;  Service: Orthopedics;  Laterality: Right;  Single Shot    SKIN SURGERY      Kasi Gómez     Family History   Problem Relation Age of Onset    Heart disease Father     Diabetes Mother         hypoglycemic    Breast cancer Maternal Aunt     Breast cancer Sister     Breast cancer Paternal Aunt     Ovarian cancer Neg Hx      Social History     Tobacco Use    Smoking status: Never Smoker    Smokeless tobacco: Never Used   Substance Use Topics    Alcohol use: Yes     Comment: rarely    Drug use: No     Review of Systems   Constitutional: Negative.    HENT: Negative.    Eyes: Negative.    Respiratory: Negative.    Cardiovascular: Negative.    Gastrointestinal: Positive for diarrhea.   Endocrine: Negative.    Genitourinary: Negative.    Musculoskeletal: Negative.    Skin: Negative.    Allergic/Immunologic: Negative.    Neurological: Negative.    Hematological: Negative.    Psychiatric/Behavioral: Negative.    All other systems reviewed and are negative.      Physical Exam     Initial Vitals [07/09/22 0443]   BP Pulse Resp Temp SpO2   (!) 149/85 91 14 98.2 °F (36.8 °C) 98 %      MAP       --         Physical Exam    Nursing note and vitals reviewed.  Constitutional: Vital signs are normal. She appears well-developed. She is active and cooperative.   HENT:   Head: Normocephalic and atraumatic.   Eyes: Conjunctivae, EOM and lids are normal. Pupils are equal, round, and reactive to light.   Neck: Trachea normal. Neck supple. No thyroid mass present.    Full passive range of motion without pain.     Cardiovascular: Normal rate, regular rhythm, S1 normal, S2 normal, normal heart sounds, intact distal pulses and normal pulses.   Pulmonary/Chest: Effort normal and breath sounds normal.   Abdominal: Abdomen is soft and protuberant. Bowel sounds are normal. There is abdominal tenderness in the left upper quadrant.    Musculoskeletal:         General: Normal range of motion.      Cervical back: Full passive range of motion without pain and neck supple.     Lymphadenopathy:     She has no axillary adenopathy.   Neurological: She is alert and oriented to person, place, and time.   Skin: Skin is warm, dry and intact.   Psychiatric: She has a normal mood and affect. Her speech is normal and behavior is normal. Judgment and thought content normal. Cognition and memory are normal.         ED Course   Procedures  Labs Reviewed   URINALYSIS - Abnormal; Notable for the following components:       Result Value    Color, UA Orange (*)     Appearance, UA Cloudy (*)     Specific Gravity, UA >1.030 (*)     Protein, UA 1+ (*)     All other components within normal limits   URINALYSIS MICROSCOPIC - Abnormal; Notable for the following components:    WBC, UA 14 (*)     Bacteria Many (*)     All other components within normal limits   APTT   COMPREHENSIVE METABOLIC PANEL   PROTIME-INR   CBC WITHOUT DIFFERENTIAL          Imaging Results    None          Medications - No data to display                       Clinical Impression:   Final diagnoses:  [R19.7] Diarrhea, unspecified type (Primary)          ED Disposition Condition    Discharge Stable        ED Prescriptions     None        Follow-up Information     Follow up With Specialties Details Why Contact Info    [facility] - Emergency Dept Emergency Medicine Go to  If symptoms worsen [address]  [facility] [address] [phone]    Radha Jones MD Family Medicine Go in 1 week As needed [address]  [address]  [facility] [address] [phone]             Alberto Duffy MD  07/10/22 2697

## 2022-07-13 ENCOUNTER — TELEPHONE (OUTPATIENT)
Dept: ORTHOPEDICS | Facility: CLINIC | Age: 52
End: 2022-07-13
Payer: MEDICAID

## 2022-07-13 NOTE — TELEPHONE ENCOUNTER
Spoke to pt and she described a black sleeve with a R on it and informed her I will speak to  tomorrow and call her back.  Message from Pieroashleyandreadarell Jerry sent at 7/13/2022  3:19 PM CDT -----  Regarding: request for an order for new garment for her ankle  Type: Patient Call Back    Who called:Aide     What is the request in detail: the patient is requesting a call back to get another garment for her ankle. The patient stated that the 1 that she has is too snug. Patient stated that she spoke to her insurance company and was told that she could get another 1 if Dr. Garcia puts an order in. Please return call at earliest convenience.    Can the clinic reply by [facility]?no     Would the patient rather a call back or a response via [facility]? Call back     Best call back number:[phone]

## 2022-07-15 ENCOUNTER — TELEPHONE (OUTPATIENT)
Dept: ORTHOPEDICS | Facility: CLINIC | Age: 52
End: 2022-07-15
Payer: MEDICAID

## 2022-07-15 ENCOUNTER — DOCUMENTATION ONLY (OUTPATIENT)
Dept: REHABILITATION | Facility: HOSPITAL | Age: 52
End: 2022-07-15
Payer: MEDICAID

## 2022-07-15 NOTE — TELEPHONE ENCOUNTER
Spoke to pt in regards to her compression stocking and she feels as if 3 days was too long of a wait and stated her insurance stated she can get the stocking. Informed her per kimberly she can either pay for it now to get it asap and have her insurance reimburse her or wait 3-4 more days for approval through insurance. Pt was upset and felt as if that was not right, stated she will call her insurance to let them know and she wants a letter stating why from kimberly. Spoke to kimberly and he will have her contacted.

## 2022-07-15 NOTE — PROGRESS NOTES
Pt failed to show for today's physical therapy appointment. Spoke with patient. She states she is still battling covid but really needs therapy for her ankle  She was reminded of no show policy and final follow up appointment scheduled for 7/22.

## 2022-07-18 ENCOUNTER — PATIENT MESSAGE (OUTPATIENT)
Dept: ORTHOPEDICS | Facility: CLINIC | Age: 52
End: 2022-07-18
Payer: MEDICAID

## 2022-07-18 ENCOUNTER — TELEPHONE (OUTPATIENT)
Dept: ORTHOPEDICS | Facility: CLINIC | Age: 52
End: 2022-07-18
Payer: MEDICAID

## 2022-07-18 DIAGNOSIS — Z98.890 HISTORY OF ANKLE SURGERY: Primary | ICD-10-CM

## 2022-07-18 DIAGNOSIS — M25.571 SINUS TARSI SYNDROME, RIGHT: ICD-10-CM

## 2022-07-18 NOTE — TELEPHONE ENCOUNTER
"Pt is unhappy with the "unprofessionalism of Rao" and the lack of follow through. Pt feels she has been mistreated and is in immense pain, feels she is barely able to tolerate physical therapy.     Pt feels she has been deliberately put off and treated badly. Will resolve and follow up with pt around 3 pm.                 "

## 2022-07-18 NOTE — TELEPHONE ENCOUNTER
"Returned pt's call re: PA required for sleeve for boot. "Pt feels she is being forced to pay for this sleeve and Medicaid will not like it."  Pt is very angry and decided she wants to buy the sleeve at the case pay price.                         "

## 2022-07-19 ENCOUNTER — OFFICE VISIT (OUTPATIENT)
Dept: ORTHOPEDICS | Facility: CLINIC | Age: 52
End: 2022-07-19
Payer: MEDICAID

## 2022-07-19 VITALS — WEIGHT: 195 LBS | HEIGHT: 65 IN | BODY MASS INDEX: 32.49 KG/M2

## 2022-07-19 DIAGNOSIS — M25.571 SINUS TARSI SYNDROME, RIGHT: ICD-10-CM

## 2022-07-19 DIAGNOSIS — Z98.890 HISTORY OF ANKLE SURGERY: Primary | ICD-10-CM

## 2022-07-19 DIAGNOSIS — M25.371 ANKLE INSTABILITY, RIGHT: ICD-10-CM

## 2022-07-19 PROCEDURE — 99213 OFFICE O/P EST LOW 20 MIN: CPT | Mod: PBBFAC,PO | Performed by: ORTHOPAEDIC SURGERY

## 2022-07-19 PROCEDURE — 3008F BODY MASS INDEX DOCD: CPT | Mod: CPTII,,, | Performed by: ORTHOPAEDIC SURGERY

## 2022-07-19 PROCEDURE — 1159F MED LIST DOCD IN RCRD: CPT | Mod: CPTII,,, | Performed by: ORTHOPAEDIC SURGERY

## 2022-07-19 PROCEDURE — 1159F PR MEDICATION LIST DOCUMENTED IN MEDICAL RECORD: ICD-10-PCS | Mod: CPTII,,, | Performed by: ORTHOPAEDIC SURGERY

## 2022-07-19 PROCEDURE — 99999 PR PBB SHADOW E&M-EST. PATIENT-LVL III: ICD-10-PCS | Mod: PBBFAC,,, | Performed by: ORTHOPAEDIC SURGERY

## 2022-07-19 PROCEDURE — 99024 PR POST-OP FOLLOW-UP VISIT: ICD-10-PCS | Mod: ,,, | Performed by: ORTHOPAEDIC SURGERY

## 2022-07-19 PROCEDURE — 99024 POSTOP FOLLOW-UP VISIT: CPT | Mod: ,,, | Performed by: ORTHOPAEDIC SURGERY

## 2022-07-19 PROCEDURE — 3008F PR BODY MASS INDEX (BMI) DOCUMENTED: ICD-10-PCS | Mod: CPTII,,, | Performed by: ORTHOPAEDIC SURGERY

## 2022-07-19 PROCEDURE — 99999 PR PBB SHADOW E&M-EST. PATIENT-LVL III: CPT | Mod: PBBFAC,,, | Performed by: ORTHOPAEDIC SURGERY

## 2022-07-19 RX ORDER — GABAPENTIN 800 MG/1
800 TABLET ORAL 3 TIMES DAILY
Qty: 90 TABLET | Refills: 0 | Status: SHIPPED | OUTPATIENT
Start: 2022-07-19 | End: 2022-09-13 | Stop reason: SDUPTHER

## 2022-07-19 RX ORDER — NAPROXEN 500 MG/1
500 TABLET ORAL 2 TIMES DAILY
Qty: 60 TABLET | Refills: 0 | Status: SHIPPED | OUTPATIENT
Start: 2022-07-19 | End: 2022-07-19

## 2022-07-19 RX ORDER — GABAPENTIN 800 MG/1
800 TABLET ORAL 3 TIMES DAILY
Qty: 90 TABLET | Refills: 0 | Status: SHIPPED | OUTPATIENT
Start: 2022-07-19 | End: 2022-07-19

## 2022-07-19 RX ORDER — NAPROXEN 500 MG/1
500 TABLET ORAL 2 TIMES DAILY
Qty: 60 TABLET | Refills: 0 | Status: SHIPPED | OUTPATIENT
Start: 2022-07-19 | End: 2022-09-13 | Stop reason: SDUPTHER

## 2022-07-19 NOTE — PROGRESS NOTES
Subjective:   Chief complaint: Follow-up Right ankle     4/18/22 Right ankle arthroscopy and extensive debridment  Right Brostrom lateral ligament reconstruction with Artelon    HPI:   Aide Almaraz is a 52 y.o. female who presents today for follow-up.  Patient is 12 weeks post op. Pain is 7/10. Pain localized to incision/lateral ankle. Some residual swelling lateral ankle.  Overall notes ankle feels more stable.    ROS:  Musculoskeletal: per HPI     Objective:   Exam:  There were no vitals filed for this visit.  General: No acute distress, well-appearing  Musculoskeletal: Standing examination deferred.      Incision benign.  No scabbing present.  No drainage present.  There is minimal postop swelling.    Fires TA/GSC/PTT/peroneals but guarded.  ROM is remarkably restored.  SILT SP/DP/PT with no paresthesias.     Imaging:  None    Additional testing/results reviewed:  Previous treatment: Boot weaning to begin at 10 weeks      Assessment:     1. History of ankle surgery    2. Ankle instability, right    3. Sinus tarsi syndrome, right         Patient is seen for a postop visit (<90 days postop) with treatment complicated by slow postop recovery/advancing weight bearing.    Data: none    Treatment plan: Risk of morbidity from treatment plan secondary to revision surgery     3 months postop and objectively her ankle is stable but clinically and subjectively progress is slower than expected.  Provided reassurance and encouragement today.     Plan:        Weight bearing: Weight bearing as tolerated right leg   Immobilization: None needed and emphasized boot weaning   Therapy: Continue PT- note sent to PT with updates   Blood clot prevention: None needed; encouraged mobilization   Wound care: None   Showering: No restrictions.   Pain meds: No refills needed   Nutrition: Recommend balanced diet   Follow-up: 3 months with no x-rays needed       No orders of the defined types were placed in this  encounter.      Past Medical History:   Diagnosis Date    Anemia     Celiac disease     Diabetes mellitus     Gall stones     Hypertension     PTSD (post-traumatic stress disorder)     Supraventricular tachycardia     Yeast infection        Past Surgical History:   Procedure Laterality Date    ABDOMINAL SURGERY      Gastric sleeve    ANKLE FUSION  2011    ARTHROSCOPY OF ANKLE Right 2022    Procedure: ARTHROSCOPY, ANKLE;  Surgeon: Tessa Garcia MD;  Location: [facility] OR;  Service: Orthopedics;  Laterality: Right;    BREAST SURGERY       SECTION      placenta previa    CHOLECYSTECTOMY  2009    EXCISIONAL BIOPSY Left 2019    Procedure: EXCISIONAL BIOPSY LEFT with SEED (CONSENT AM OF) 1.0 hr case;  Surgeon: Flor Rosas MD;  Location: [address];  Service: General;  Laterality: Left;    gastric      gastric sleeve      pt reported     HERNIA REPAIR      HYSTERECTOMY  2001    Inguinal hernia  2004    REPAIR OF LIGAMENT OF ANKLE Right 2022    Procedure: REPAIR, LIGAMENT, ANKLE;  Surgeon: Tessa Garcia MD;  Location: [facility] OR;  Service: Orthopedics;  Laterality: Right;  Single Shot    SKIN SURGERY      Tumy Rico       Family History   Problem Relation Age of Onset    Heart disease Father     Diabetes Mother         hypoglycemic    Breast cancer Maternal Aunt     Breast cancer Sister     Breast cancer Paternal Aunt     Ovarian cancer Neg Hx        Social History     Socioeconomic History    Marital status: Single    Number of children: 2   Tobacco Use    Smoking status: Never Smoker    Smokeless tobacco: Never Used   Substance and Sexual Activity    Alcohol use: Yes     Comment: rarely    Drug use: No    Sexual activity: Not Currently     Partners: Male     Birth control/protection: Surgical

## 2022-07-19 NOTE — LETTER
July 21, 2022        Martín Hernandez, PT  [address]  [place] [address]             [facility]  [address]  [place] [address]  Phone: [phone]   Patient: Aide Almaraz   MR Number: [id number]   YOB: 1970   Date of Visit: 7/19/2022     Dear Dr. Hernandez,     Aide Almaraz was seen in clinic 07/21/2022.  The following updated PT orders apply to their ongoing care.    Thank you for your work and patience with her.  I emphasized boot weaning today and progressing as tolerated.    I appreciate your assistance in their rehabilitation.  Please don't hesitate to call or reach out with questions/concerns.    Sincerely,    Tessa Garcia MD  Foot & Ankle Orthopedic Surgery  07/21/2022

## 2022-07-22 ENCOUNTER — CLINICAL SUPPORT (OUTPATIENT)
Dept: REHABILITATION | Facility: HOSPITAL | Age: 52
End: 2022-07-22
Attending: PHYSICIAN ASSISTANT
Payer: MEDICAID

## 2022-07-22 ENCOUNTER — NURSE TRIAGE (OUTPATIENT)
Dept: ADMINISTRATIVE | Facility: CLINIC | Age: 52
End: 2022-07-22
Payer: MEDICAID

## 2022-07-22 DIAGNOSIS — M25.571 SINUS TARSI SYNDROME, RIGHT: Primary | ICD-10-CM

## 2022-07-22 DIAGNOSIS — M25.671 DECREASED RANGE OF MOTION OF RIGHT ANKLE: ICD-10-CM

## 2022-07-22 DIAGNOSIS — R26.2 DIFFICULTY WALKING: ICD-10-CM

## 2022-07-22 PROCEDURE — 97110 THERAPEUTIC EXERCISES: CPT | Mod: PN

## 2022-07-22 NOTE — PROGRESS NOTES
[facility]   Physical Therapy Treatment Note     Name: Aide Almaraz  Clinic Number: [id number]    Therapy Diagnosis:   Encounter Diagnoses   Name Primary?    Sinus tarsi syndrome, right Yes    Difficulty walking     Decreased range of motion of right ankle      Physician: Pita Kelly PA-C    Visit Date: 7/22/2022    Physician Orders: PT Eval and Treat   Medical Diagnosis from Referral: History of ankle surgery [Z98.890], Ankle instability, right [M25.371]  Evaluation Date: 5/26/2022  Authorization Period Expiration: 12/31/2022  Plan of Care Expiration: 08/26/2022  Progress Note Due: 07/26/2022  Visit # / Visits authorized: 3/20 (+Evaluation)    PTA Visit #: 0/5     Time In: 10:05  Time Out: 11:55 AM  Total Billable Time: 40 minutes      Precautions: Diabetes, Partial WB, s/p Right ankle arthroscopy and extensive debridment;  Right Brostrom lateral ligament reconstruction with Artelon     · Weight bearing: Weight bearing as tolerated right leg  · Immobilization: Tall boot when mobilizing; may remove for sleep and hygiene and and then wean out of boot 10 weeks postop  · Therapy: Continue PT- note sent to PT with updates      SUBJECTIVE     Pt reports;  Pt returns after several weeks of missed adue to other medical issues.  She returns from follow up with MD to continue therapy.  States the ankle is doing better.   :She was compliant with home exercise program.  Response to previous treatment: No adverse reactions.   Functional change: Ongoin    Pain: 3/10  Location: right lateral ankle      OBJECTIVE     DOS: 4/18/2022.    Objective measurements taken on 6/21/2022. Improvements bolded below:     Range of Motion: AROM:     Ankle Right Left   Dorsiflexion 5 degrees 10  degrees   Plantarflexion 35  degrees 40  degrees   Inversion 25  degrees 35  degrees   Eversion 30  degrees 15  degrees      Joint Mobility: hypomobile medial and lateral subtalar glide     Palpation: (+) tender  "to moderate palpation along surgical incision       Treatment     Aide received the treatments listed below:      Manual therapy techniques: Joint mobilizations were applied to the: R ankle for 10 minutes, including:  MTP mobilization, PROM      Therapeutic exercises to develop strength, endurance and ROM for 35 minutes including:  Ankle pumps: 40x  Ankle ABC's: 2x through  Ankle 4-way: 20x, GTB  Seated Towel Scrunches: 3x10, 5" holds  Seated Heel Raises: 8m0099vm kettle bell  Towel Eversion: 3 rounds  R-bike x 8 min    Patient Education and Home Exercises     Home Exercises Provided and Patient Education Provided     Education provided:   - Importance of continuing HEP to supplement therapy sessions.    Written Home Exercises Provided: yes. Exercises were reviewed and Aide was able to demonstrate them prior to the end of the session.  Aide demonstrated good  understanding of the education provided. See EMR under Patient Instructions for exercises provided during therapy sessions    ASSESSMENT     Pt presents out of cam walker in a sandels. Decreased stance on involved right LE.  Modified therex due to patient presentation.   continued therex consisting of ROM and stabilization activities with good response. Aide Is progressing well towards her goals.     Pt prognosis is Good.     Pt will continue to benefit from skilled outpatient physical therapy to address the deficits listed in the problem list box on initial evaluation, provide pt/family education and to maximize pt's level of independence in the home and community environment.     Pt's spiritual, cultural and educational needs considered and pt agreeable to plan of care and goals.     Anticipated barriers to physical therapy: : s/p Right Brostrom lateral ligament reconstruction with Artelon    Goals:   Short Term Goals: 4 weeks   1. The patient with report compliance with HEP to maximize functional outcomes. Progressing, Not Met  2. The patient " will demonstrate increase in BLE MMT by 1/2 grade to improve pt's functional mobility. Progressing, Not Met  3. The patient will decrease pain level by 50% in order to improve QOL. Progressing, Not Met     Long Term Goals: 12 weeks   1. The patient will demonstrate understanding and performance of advanced HEP to allow for independence once discharged from therapy. Progressing, Not Met  2. 2. The patient will demonstrate increase in BLE MMT by 1 grade to improve pt's functional mobility. Progressing, Not Met  3. The patient will have Right ankle range of motion WFL in order to improve her functional mobility. Progressing, Not Met  4. The patient will be able to walk for 15' w/o AD in order to improve pt's ability to perform ADLs. Progressing, Not Met  5. Pt will be able to sleep through the night w/o waking from pain in order to improve her QOL. Progressing, Not Met    PLAN     Continue to progress ROM and strength as tolerated. Recommended wearing tennis shoes for next visit for WB activities.       Martín Hernandez, PT

## 2022-07-23 NOTE — TELEPHONE ENCOUNTER
Pt reports she had an asthma attack and took 2 puffs of her inhaler, but has continued to have some wheezing, wanting to know if she can take more of her inhaler. Pt advised to go to the ED now (or PCP triage) per protocol, (Pt's PCP is not with [facility]) Pt encouraged to call back with any worsening symptoms or questions and verbalized understanding.    Reason for Disposition   [1] MODERATE asthma attack (e.g., SOB at rest, speaks in phrases, audible wheezes) AND [2] not resolved after 2 nebulizer or inhaler treatments given 20 minutes apart    Additional Information   Negative: Severe difficulty breathing (e.g., struggling for each breath, speak in single words, pulse > 120)   Negative: Bluish (or gray) lips or face now   Negative: Difficult to awaken or acting confused (e.g., disoriented, slurred speech)   Negative: Passed out (i.e., lost consciousness, collapsed and was not responding)   Negative: Wheezing started suddenly after medicine, an allergic food, or bee sting   Negative: Sounds like a life-threatening emergency to the triager   Negative: [1] SEVERE asthma attack (e.g., very SOB at rest, speaks in single words, loud wheezes) AND [2] not resolved after 2 nebulizer or inhaler treatments   Negative: Peak flow rate less than 50% of baseline level (RED zone)   Negative: [1] Severe wheezing or coughing AND [2] doesn't have neb or inhaler available   Negative: Chest pain   Negative: [1] Hospitalized before with asthma AND [2] feels the same now    Protocols used: ASTHMA ATTACK-A-

## 2022-07-24 ENCOUNTER — HOSPITAL ENCOUNTER (OUTPATIENT)
Facility: HOSPITAL | Age: 52
Discharge: HOME | End: 2022-07-25
Attending: INTERNAL MEDICINE | Admitting: STUDENT IN AN ORGANIZED HEALTH CARE EDUCATION/TRAINING PROGRAM
Payer: MEDICAID

## 2022-07-24 DIAGNOSIS — J45.901 ASTHMA EXACERBATION: ICD-10-CM

## 2022-07-24 DIAGNOSIS — J96.01 ACUTE HYPOXEMIC RESPIRATORY FAILURE: Primary | ICD-10-CM

## 2022-07-24 DIAGNOSIS — R06.02 SOB (SHORTNESS OF BREATH): ICD-10-CM

## 2022-07-24 PROBLEM — J96.02 ACUTE RESPIRATORY FAILURE WITH HYPOXIA AND HYPERCARBIA: Status: ACTIVE | Noted: 2022-07-24

## 2022-07-24 LAB
ALBUMIN SERPL BCP-MCNC: 3.7 G/DL (ref 3.5–5.2)
ALLENS TEST: ABNORMAL
ALP SERPL-CCNC: 94 U/L (ref 55–135)
ALT SERPL W/O P-5'-P-CCNC: 23 U/L (ref 10–44)
ANION GAP SERPL CALC-SCNC: 14 MMOL/L (ref 8–16)
AST SERPL-CCNC: 16 U/L (ref 10–40)
BASOPHILS # BLD AUTO: 0.02 K/UL (ref 0–0.2)
BASOPHILS NFR BLD: 0.2 % (ref 0–1.9)
BILIRUB SERPL-MCNC: 0.2 MG/DL (ref 0.1–1)
BNP SERPL-MCNC: 41 PG/ML (ref 0–99)
BUN SERPL-MCNC: 11 MG/DL (ref 6–20)
CALCIUM SERPL-MCNC: 9.1 MG/DL (ref 8.7–10.5)
CHLORIDE SERPL-SCNC: 106 MMOL/L (ref 95–110)
CO2 SERPL-SCNC: 22 MMOL/L (ref 23–29)
CREAT SERPL-MCNC: 0.8 MG/DL (ref 0.5–1.4)
CTP QC/QA: YES
D DIMER PPP IA.FEU-MCNC: 0.66 MG/L FEU
DELSYS: ABNORMAL
DIFFERENTIAL METHOD: ABNORMAL
EOSINOPHIL # BLD AUTO: 0 K/UL (ref 0–0.5)
EOSINOPHIL NFR BLD: 0.1 % (ref 0–8)
ERYTHROCYTE [DISTWIDTH] IN BLOOD BY AUTOMATED COUNT: 14.5 % (ref 11.5–14.5)
ERYTHROCYTE [SEDIMENTATION RATE] IN BLOOD BY WESTERGREN METHOD: 16 MM/H
EST. GFR  (AFRICAN AMERICAN): >60 ML/MIN/1.73 M^2
EST. GFR  (NON AFRICAN AMERICAN): >60 ML/MIN/1.73 M^2
FIO2: 28
FLOW: 2
GLUCOSE SERPL-MCNC: 212 MG/DL (ref 70–110)
HCO3 UR-SCNC: 22.2 MMOL/L (ref 24–28)
HCT VFR BLD AUTO: 40.5 % (ref 37–48.5)
HGB BLD-MCNC: 13 G/DL (ref 12–16)
IMM GRANULOCYTES # BLD AUTO: 0.03 K/UL (ref 0–0.04)
IMM GRANULOCYTES NFR BLD AUTO: 0.3 % (ref 0–0.5)
LYMPHOCYTES # BLD AUTO: 0.6 K/UL (ref 1–4.8)
LYMPHOCYTES NFR BLD: 6.3 % (ref 18–48)
MCH RBC QN AUTO: 30.4 PG (ref 27–31)
MCHC RBC AUTO-ENTMCNC: 32.1 G/DL (ref 32–36)
MCV RBC AUTO: 95 FL (ref 82–98)
MODE: ABNORMAL
MONOCYTES # BLD AUTO: 0.1 K/UL (ref 0.3–1)
MONOCYTES NFR BLD: 0.8 % (ref 4–15)
NEUTROPHILS # BLD AUTO: 8 K/UL (ref 1.8–7.7)
NEUTROPHILS NFR BLD: 92.3 % (ref 38–73)
NRBC BLD-RTO: 0 /100 WBC
PCO2 BLDA: 41.8 MMHG (ref 35–45)
PH SMN: 7.33 [PH] (ref 7.35–7.45)
PLATELET # BLD AUTO: 346 K/UL (ref 150–450)
PMV BLD AUTO: 11.4 FL (ref 9.2–12.9)
PO2 BLDA: 108 MMHG (ref 80–100)
POC BE: -4 MMOL/L
POC SATURATED O2: 98 % (ref 95–100)
POC TCO2: 23 MMOL/L (ref 23–27)
POTASSIUM SERPL-SCNC: 3.3 MMOL/L (ref 3.5–5.1)
PROCALCITONIN SERPL IA-MCNC: 0.06 NG/ML
PROT SERPL-MCNC: 7.5 G/DL (ref 6–8.4)
RBC # BLD AUTO: 4.27 M/UL (ref 4–5.4)
SAMPLE: ABNORMAL
SARS-COV-2 RDRP RESP QL NAA+PROBE: NEGATIVE
SITE: ABNORMAL
SODIUM SERPL-SCNC: 142 MMOL/L (ref 136–145)
SP02: 100
TROPONIN I SERPL DL<=0.01 NG/ML-MCNC: <0.006 NG/ML (ref 0–0.03)
WBC # BLD AUTO: 8.72 K/UL (ref 3.9–12.7)

## 2022-07-24 PROCEDURE — 25000003 PHARM REV CODE 250: Performed by: STUDENT IN AN ORGANIZED HEALTH CARE EDUCATION/TRAINING PROGRAM

## 2022-07-24 PROCEDURE — 87040 BLOOD CULTURE FOR BACTERIA: CPT | Performed by: INTERNAL MEDICINE

## 2022-07-24 PROCEDURE — 93010 EKG 12-LEAD: ICD-10-PCS | Mod: ,,, | Performed by: INTERNAL MEDICINE

## 2022-07-24 PROCEDURE — 96365 THER/PROPH/DIAG IV INF INIT: CPT

## 2022-07-24 PROCEDURE — U0002 COVID-19 LAB TEST NON-CDC: HCPCS | Performed by: INTERNAL MEDICINE

## 2022-07-24 PROCEDURE — 80053 COMPREHEN METABOLIC PANEL: CPT | Performed by: INTERNAL MEDICINE

## 2022-07-24 PROCEDURE — 93010 ELECTROCARDIOGRAM REPORT: CPT | Mod: ,,, | Performed by: INTERNAL MEDICINE

## 2022-07-24 PROCEDURE — 27000221 HC OXYGEN, UP TO 24 HOURS

## 2022-07-24 PROCEDURE — 96375 TX/PRO/DX INJ NEW DRUG ADDON: CPT

## 2022-07-24 PROCEDURE — 36600 WITHDRAWAL OF ARTERIAL BLOOD: CPT

## 2022-07-24 PROCEDURE — 96361 HYDRATE IV INFUSION ADD-ON: CPT

## 2022-07-24 PROCEDURE — 25000003 PHARM REV CODE 250: Performed by: INTERNAL MEDICINE

## 2022-07-24 PROCEDURE — 84145 PROCALCITONIN (PCT): CPT | Performed by: STUDENT IN AN ORGANIZED HEALTH CARE EDUCATION/TRAINING PROGRAM

## 2022-07-24 PROCEDURE — 96374 THER/PROPH/DIAG INJ IV PUSH: CPT | Mod: 59

## 2022-07-24 PROCEDURE — 85025 COMPLETE CBC W/AUTO DIFF WBC: CPT | Performed by: INTERNAL MEDICINE

## 2022-07-24 PROCEDURE — 85379 FIBRIN DEGRADATION QUANT: CPT | Performed by: EMERGENCY MEDICINE

## 2022-07-24 PROCEDURE — 84484 ASSAY OF TROPONIN QUANT: CPT | Performed by: EMERGENCY MEDICINE

## 2022-07-24 PROCEDURE — 63600175 PHARM REV CODE 636 W HCPCS: Performed by: INTERNAL MEDICINE

## 2022-07-24 PROCEDURE — G0378 HOSPITAL OBSERVATION PER HR: HCPCS

## 2022-07-24 PROCEDURE — 94760 N-INVAS EAR/PLS OXIMETRY 1: CPT

## 2022-07-24 PROCEDURE — 94761 N-INVAS EAR/PLS OXIMETRY MLT: CPT

## 2022-07-24 PROCEDURE — 96366 THER/PROPH/DIAG IV INF ADDON: CPT

## 2022-07-24 PROCEDURE — 25000242 PHARM REV CODE 250 ALT 637 W/ HCPCS: Performed by: STUDENT IN AN ORGANIZED HEALTH CARE EDUCATION/TRAINING PROGRAM

## 2022-07-24 PROCEDURE — 94640 AIRWAY INHALATION TREATMENT: CPT

## 2022-07-24 PROCEDURE — 25000242 PHARM REV CODE 250 ALT 637 W/ HCPCS: Performed by: INTERNAL MEDICINE

## 2022-07-24 PROCEDURE — 93005 ELECTROCARDIOGRAM TRACING: CPT

## 2022-07-24 PROCEDURE — 96372 THER/PROPH/DIAG INJ SC/IM: CPT | Mod: 59 | Performed by: INTERNAL MEDICINE

## 2022-07-24 PROCEDURE — 25000003 PHARM REV CODE 250: Performed by: NURSE PRACTITIONER

## 2022-07-24 PROCEDURE — 96368 THER/DIAG CONCURRENT INF: CPT

## 2022-07-24 PROCEDURE — 83880 ASSAY OF NATRIURETIC PEPTIDE: CPT | Performed by: EMERGENCY MEDICINE

## 2022-07-24 PROCEDURE — 82803 BLOOD GASES ANY COMBINATION: CPT

## 2022-07-24 PROCEDURE — 99900035 HC TECH TIME PER 15 MIN (STAT)

## 2022-07-24 PROCEDURE — 25500020 PHARM REV CODE 255: Performed by: INTERNAL MEDICINE

## 2022-07-24 PROCEDURE — 99285 EMERGENCY DEPT VISIT HI MDM: CPT | Mod: 25

## 2022-07-24 PROCEDURE — 94644 CONT INHLJ TX 1ST HOUR: CPT

## 2022-07-24 RX ORDER — IPRATROPIUM BROMIDE 0.5 MG/2.5ML
1 SOLUTION RESPIRATORY (INHALATION)
Status: COMPLETED | OUTPATIENT
Start: 2022-07-24 | End: 2022-07-24

## 2022-07-24 RX ORDER — KETOROLAC TROMETHAMINE 30 MG/ML
30 INJECTION, SOLUTION INTRAMUSCULAR; INTRAVENOUS
Status: COMPLETED | OUTPATIENT
Start: 2022-07-24 | End: 2022-07-24

## 2022-07-24 RX ORDER — OXYCODONE HYDROCHLORIDE 5 MG/1
10 TABLET ORAL EVERY 6 HOURS PRN
Status: DISCONTINUED | OUTPATIENT
Start: 2022-07-24 | End: 2022-07-25 | Stop reason: HOSPADM

## 2022-07-24 RX ORDER — METHYLPREDNISOLONE SOD SUCC 125 MG
VIAL (EA) INJECTION
Status: DISPENSED
Start: 2022-07-24 | End: 2022-07-24

## 2022-07-24 RX ORDER — FAMOTIDINE 20 MG/1
20 TABLET, FILM COATED ORAL 2 TIMES DAILY
Refills: 1 | Status: DISCONTINUED | OUTPATIENT
Start: 2022-07-24 | End: 2022-07-25 | Stop reason: HOSPADM

## 2022-07-24 RX ORDER — ACETAMINOPHEN 325 MG/1
650 TABLET ORAL EVERY 6 HOURS PRN
Status: DISCONTINUED | OUTPATIENT
Start: 2022-07-24 | End: 2022-07-25 | Stop reason: HOSPADM

## 2022-07-24 RX ORDER — METHYLPREDNISOLONE SOD SUCC 125 MG
125 VIAL (EA) INJECTION
Status: COMPLETED | OUTPATIENT
Start: 2022-07-24 | End: 2022-07-24

## 2022-07-24 RX ORDER — MAGNESIUM SULFATE HEPTAHYDRATE 40 MG/ML
2 INJECTION, SOLUTION INTRAVENOUS
Status: COMPLETED | OUTPATIENT
Start: 2022-07-24 | End: 2022-07-24

## 2022-07-24 RX ORDER — IPRATROPIUM BROMIDE AND ALBUTEROL SULFATE 2.5; .5 MG/3ML; MG/3ML
3 SOLUTION RESPIRATORY (INHALATION) EVERY 6 HOURS
Status: DISCONTINUED | OUTPATIENT
Start: 2022-07-24 | End: 2022-07-24

## 2022-07-24 RX ORDER — OXYCODONE AND ACETAMINOPHEN 10; 325 MG/1; MG/1
TABLET ORAL
COMMUNITY
Start: 2022-07-19 | End: 2023-01-19 | Stop reason: ALTCHOICE

## 2022-07-24 RX ORDER — AZITHROMYCIN 250 MG/1
500 TABLET, FILM COATED ORAL DAILY
Status: DISCONTINUED | OUTPATIENT
Start: 2022-07-25 | End: 2022-07-25 | Stop reason: HOSPADM

## 2022-07-24 RX ORDER — TALC
6 POWDER (GRAM) TOPICAL NIGHTLY PRN
Status: DISCONTINUED | OUTPATIENT
Start: 2022-07-24 | End: 2022-07-25 | Stop reason: HOSPADM

## 2022-07-24 RX ORDER — DULOXETIN HYDROCHLORIDE 30 MG/1
CAPSULE, DELAYED RELEASE ORAL
COMMUNITY
Start: 2022-06-17 | End: 2023-06-08

## 2022-07-24 RX ORDER — MAGNESIUM SULFATE HEPTAHYDRATE 40 MG/ML
INJECTION, SOLUTION INTRAVENOUS
Status: DISPENSED
Start: 2022-07-24 | End: 2022-07-24

## 2022-07-24 RX ORDER — IPRATROPIUM BROMIDE AND ALBUTEROL SULFATE 2.5; .5 MG/3ML; MG/3ML
3 SOLUTION RESPIRATORY (INHALATION) EVERY 8 HOURS PRN
Status: DISCONTINUED | OUTPATIENT
Start: 2022-07-24 | End: 2022-07-25 | Stop reason: HOSPADM

## 2022-07-24 RX ORDER — CLONAZEPAM 2 MG/1
2 TABLET ORAL 2 TIMES DAILY PRN
COMMUNITY
Start: 2022-07-02 | End: 2023-06-08

## 2022-07-24 RX ORDER — LORAZEPAM 2 MG/ML
1 INJECTION INTRAMUSCULAR
Status: COMPLETED | OUTPATIENT
Start: 2022-07-24 | End: 2022-07-24

## 2022-07-24 RX ORDER — POTASSIUM CHLORIDE 20 MEQ/1
40 TABLET, EXTENDED RELEASE ORAL ONCE
Status: COMPLETED | OUTPATIENT
Start: 2022-07-24 | End: 2022-07-24

## 2022-07-24 RX ORDER — ALBUTEROL SULFATE 2.5 MG/.5ML
15 SOLUTION RESPIRATORY (INHALATION)
Status: COMPLETED | OUTPATIENT
Start: 2022-07-24 | End: 2022-07-24

## 2022-07-24 RX ADMIN — LORAZEPAM 1 MG: 2 INJECTION INTRAMUSCULAR; INTRAVENOUS at 04:07

## 2022-07-24 RX ADMIN — FAMOTIDINE 20 MG: 20 TABLET ORAL at 11:07

## 2022-07-24 RX ADMIN — SODIUM CHLORIDE 1000 ML: 0.9 INJECTION, SOLUTION INTRAVENOUS at 02:07

## 2022-07-24 RX ADMIN — IPRATROPIUM BROMIDE AND ALBUTEROL SULFATE 3 ML: 2.5; .5 SOLUTION RESPIRATORY (INHALATION) at 01:07

## 2022-07-24 RX ADMIN — METHYLPREDNISOLONE SODIUM SUCCINATE 125 MG: 125 INJECTION, POWDER, FOR SOLUTION INTRAMUSCULAR; INTRAVENOUS at 04:07

## 2022-07-24 RX ADMIN — OXYCODONE 10 MG: 5 TABLET ORAL at 08:07

## 2022-07-24 RX ADMIN — AZITHROMYCIN MONOHYDRATE 500 MG: 500 INJECTION, POWDER, LYOPHILIZED, FOR SOLUTION INTRAVENOUS at 07:07

## 2022-07-24 RX ADMIN — IOHEXOL 75 ML: 350 INJECTION, SOLUTION INTRAVENOUS at 03:07

## 2022-07-24 RX ADMIN — POTASSIUM CHLORIDE 40 MEQ: 1500 TABLET, EXTENDED RELEASE ORAL at 05:07

## 2022-07-24 RX ADMIN — MAGNESIUM SULFATE HEPTAHYDRATE 2 G: 40 INJECTION, SOLUTION INTRAVENOUS at 04:07

## 2022-07-24 RX ADMIN — ALBUTEROL SULFATE 15 MG: 2.5 SOLUTION RESPIRATORY (INHALATION) at 04:07

## 2022-07-24 RX ADMIN — Medication 6 MG: at 08:07

## 2022-07-24 RX ADMIN — IPRATROPIUM BROMIDE 1 MG: 0.5 SOLUTION RESPIRATORY (INHALATION) at 04:07

## 2022-07-24 RX ADMIN — KETOROLAC TROMETHAMINE 30 MG: 30 INJECTION, SOLUTION INTRAMUSCULAR; INTRAVENOUS at 07:07

## 2022-07-24 RX ADMIN — FAMOTIDINE 20 MG: 20 TABLET ORAL at 08:07

## 2022-07-24 RX ADMIN — CEFTRIAXONE 2 G: 2 INJECTION, SOLUTION INTRAVENOUS at 06:07

## 2022-07-24 NOTE — SUBJECTIVE & OBJECTIVE
Past Medical History:   Diagnosis Date    Anemia     Celiac disease     Diabetes mellitus     Gall stones     Hypertension     PTSD (post-traumatic stress disorder)     Supraventricular tachycardia     Yeast infection        Past Surgical History:   Procedure Laterality Date    ABDOMINAL SURGERY      Gastric sleeve    ANKLE FUSION      ARTHROSCOPY OF ANKLE Right 2022    Procedure: ARTHROSCOPY, ANKLE;  Surgeon: Tessa Garcia MD;  Location: [facility] OR;  Service: Orthopedics;  Laterality: Right;    BREAST SURGERY       SECTION      placenta previa    CHOLECYSTECTOMY  2009    EXCISIONAL BIOPSY Left 2019    Procedure: EXCISIONAL BIOPSY LEFT with SEED (CONSENT AM OF) 1.0 hr case;  Surgeon: Flor Rosas MD;  Location: [address];  Service: General;  Laterality: Left;    gastric      gastric sleeve      pt reported     HERNIA REPAIR      HYSTERECTOMY  2001    Inguinal hernia  2004    REPAIR OF LIGAMENT OF ANKLE Right 2022    Procedure: REPAIR, LIGAMENT, ANKLE;  Surgeon: Tessa Garcia MD;  Location: [facility] OR;  Service: Orthopedics;  Laterality: Right;  Single Shot    SKIN SURGERY      Tumy Nixonck       Review of patient's allergies indicates:   Allergen Reactions    Latex, natural rubber Itching and Rash    Lidocaine Rash    Tramadol Hives and Rash       No current facility-administered medications on file prior to encounter.     Current Outpatient Medications on File Prior to Encounter   Medication Sig    acetaminophen (TYLENOL) 500 MG tablet Take 2 tablets (1,000 mg total) by mouth every 8 (eight) hours.    albuterol (PROVENTIL/VENTOLIN HFA) 90 mcg/actuation inhaler Inhale 1-2 puffs into the lungs every 6 (six) hours as needed for Wheezing or Shortness of Breath. Rescue    albuterol (PROVENTIL/VENTOLIN HFA) 90 mcg/actuation inhaler Inhale 1-2 puffs into the lungs every 6 (six) hours as needed for Wheezing. Rescue    eszopiclone (LUNESTA) 3 mg Tab TK 1 T PO QD HS    gabapentin  (NEURONTIN) 800 MG tablet Take 1 tablet (800 mg total) by mouth 3 (three) times daily.    lactulose (CHRONULAC) 10 gram/15 mL solution Take 15 mLs (10 g total) by mouth 3 (three) times daily as needed (connstipation).    metoclopramide HCl (REGLAN) 10 MG tablet Take 1 tablet (10 mg total) by mouth every 6 (six) hours as needed (nausea/vomiting).    naproxen (NAPROSYN) 500 MG tablet Take 1 tablet (500 mg total) by mouth 2 (two) times daily.    pantoprazole (PROTONIX) 40 MG tablet Take 1 tablet (40 mg total) by mouth once daily.    polyethylene glycol (GLYCOLAX) 17 gram PwPk Take 17 g by mouth 2 (two) times daily as needed (constipation).    promethazine (PHENERGAN) 25 MG tablet Take 25 mg by mouth every 12 (twelve) hours as needed for Nausea.    promethazine (PHENERGAN) 25 MG tablet Take 1 tablet (25 mg total) by mouth every 6 (six) hours as needed for Nausea.    ranitidine (ZANTAC) 150 MG capsule Take 1 capsule (150 mg total) by mouth 2 (two) times daily.     Family History       Problem Relation (Age of Onset)    Breast cancer Maternal Aunt, Sister, Paternal Aunt    Diabetes Mother    Heart disease Father          Tobacco Use    Smoking status: Never Smoker    Smokeless tobacco: Never Used   Substance and Sexual Activity    Alcohol use: Yes     Comment: rarely    Drug use: No    Sexual activity: Not Currently     Partners: Male     Birth control/protection: Surgical     Review of Systems   Constitutional:  Positive for fatigue.   Respiratory:  Positive for cough, chest tightness and shortness of breath.    All other systems reviewed and are negative.  Objective:     Vital Signs (Most Recent):  Temp: 98.8 °F (37.1 °C) (07/24/22 0359)  Pulse: 106 (07/24/22 0702)  Resp: 17 (07/24/22 0702)  BP: (!) 110/59 (07/24/22 0702)  SpO2: 95 % (07/24/22 0702)   Vital Signs (24h Range):  Temp:  [98.8 °F (37.1 °C)] 98.8 °F (37.1 °C)  Pulse:  [105-129] 106  Resp:  [10-65] 17  SpO2:  [94 %-100 %] 95 %  BP: (110-160)/() 110/59      Weight: 88.5 kg (195 lb 1.7 oz)  Body mass index is 32.47 kg/m².    Physical Exam  Constitutional:       General: She is in acute distress.   HENT:      Right Ear: Tympanic membrane and external ear normal.      Left Ear: Tympanic membrane and external ear normal.      Nose: Nose normal.      Mouth/Throat:      Mouth: Mucous membranes are moist.      Pharynx: Oropharynx is clear.   Cardiovascular:      Rate and Rhythm: Regular rhythm. Tachycardia present.   Pulmonary:      Effort: Respiratory distress present.   Abdominal:      General: Abdomen is flat.      Palpations: Abdomen is soft.   Skin:     General: Skin is warm.   Neurological:      General: No focal deficit present.      Mental Status: She is alert and oriented to person, place, and time.           Significant Labs: All pertinent labs within the past 24 hours have been reviewed.    Significant Imaging: I have reviewed all pertinent imaging results/findings within the past 24 hours.

## 2022-07-24 NOTE — ASSESSMENT & PLAN NOTE
Unclear etiology at this point.  Reactive airway disease versus pulmonary embolism versus pneumonia  Obtain D-dimer, if D-dimer is elevated she will need CT PE.  Chest x-ray remarkable for bilateral interstitial opacification.  Could be related to a new viral or bacterial pneumonia verses changes from prior COVID pneumonia from a month ago.  Obtain procalcitonin to assist differentiate.  Obtain troponin and BNP.  Continue oxygen supplementation and bronchodilator in the meantime.  Continue ceftriaxone azithromycin in the meantime

## 2022-07-24 NOTE — H&P
[facility] Emergency [facility]  [facility] Medicine  History & Physical    Patient Name: Aide Almaraz  MRN: [id number]  Patient Class: OP- Observation  Admission Date: 7/24/2022  Attending Physician: Lucas Fermin MD  Primary Care Provider: Radha Jones MD         Patient information was obtained from patient and ER records.     Subjective:     Principal Problem:<principal problem not specified>    Chief Complaint:   Chief Complaint   Patient presents with    Respiratory Distress     EMS called to pts residence by pt c/o SOB upon awakening. Pt used rescue inhaler with no relief. Upon arrival, EMS noted expiratory wheezing with low 90s SpO2 on room air and tachypneic. Administered duo-neb with no relief. Hx of asthma        HPI: 52 years old woman with past medical history of diabetes, SVT, chronic diarrhea, COVID infection in June 22, and ankle surgery in May 22. The patient presented due acute shortness of breath.  She stated that in the last 2 days she has been experiencing shortness of breath at rest and last night was suddenly worsened and woke her up from sleep.  She was found to be hypoxic, tachycardic, and tachypneic in the emergency department.  Her presentation was thought was secondary to asthma exacerbation and the patient received bronchodilators, oxygen supplement, and IV magnesium I was contacted for admission for observation.  During my encounter, the patient was mildly tachypneic on 4 L of nasal cannula with heart rate of 110-115 sinus tachycardia.  She is able to speak full sentences.  She stated that she has never been diagnosed with asthma she has never been in the hospital due to asthma exacerbation and she stated that she is experiencing mild left-sided chest pain in addition to the shortness of breath.  She stated that she has ankle surgery in May and she has not been moving around as usual.  There was no labs result by the time I was assessing the patient      Past Medical  History:   Diagnosis Date    Anemia     Celiac disease     Diabetes mellitus     Gall stones     Hypertension     PTSD (post-traumatic stress disorder)     Supraventricular tachycardia     Yeast infection        Past Surgical History:   Procedure Laterality Date    ABDOMINAL SURGERY      Gastric sleeve    ANKLE FUSION      ARTHROSCOPY OF ANKLE Right 2022    Procedure: ARTHROSCOPY, ANKLE;  Surgeon: Tessa Garcia MD;  Location: [facility] OR;  Service: Orthopedics;  Laterality: Right;    BREAST SURGERY       SECTION      placenta previa    CHOLECYSTECTOMY  2009    EXCISIONAL BIOPSY Left 2019    Procedure: EXCISIONAL BIOPSY LEFT with SEED (CONSENT AM OF) 1.0 hr case;  Surgeon: Flor Rosas MD;  Location: [address];  Service: General;  Laterality: Left;    gastric      gastric sleeve      pt reported     HERNIA REPAIR      HYSTERECTOMY  2001    Inguinal hernia  2004    REPAIR OF LIGAMENT OF ANKLE Right 2022    Procedure: REPAIR, LIGAMENT, ANKLE;  Surgeon: Tessa Garcia MD;  Location: [facility] OR;  Service: Orthopedics;  Laterality: Right;  Single Shot    SKIN SURGERY      Tumy Nixonck       Review of patient's allergies indicates:   Allergen Reactions    Latex, natural rubber Itching and Rash    Lidocaine Rash    Tramadol Hives and Rash       No current facility-administered medications on file prior to encounter.     Current Outpatient Medications on File Prior to Encounter   Medication Sig    acetaminophen (TYLENOL) 500 MG tablet Take 2 tablets (1,000 mg total) by mouth every 8 (eight) hours.    albuterol (PROVENTIL/VENTOLIN HFA) 90 mcg/actuation inhaler Inhale 1-2 puffs into the lungs every 6 (six) hours as needed for Wheezing or Shortness of Breath. Rescue    albuterol (PROVENTIL/VENTOLIN HFA) 90 mcg/actuation inhaler Inhale 1-2 puffs into the lungs every 6 (six) hours as needed for Wheezing. Rescue    eszopiclone (LUNESTA) 3 mg Tab TK 1 T PO QD HS     gabapentin (NEURONTIN) 800 MG tablet Take 1 tablet (800 mg total) by mouth 3 (three) times daily.    lactulose (CHRONULAC) 10 gram/15 mL solution Take 15 mLs (10 g total) by mouth 3 (three) times daily as needed (connstipation).    metoclopramide HCl (REGLAN) 10 MG tablet Take 1 tablet (10 mg total) by mouth every 6 (six) hours as needed (nausea/vomiting).    naproxen (NAPROSYN) 500 MG tablet Take 1 tablet (500 mg total) by mouth 2 (two) times daily.    pantoprazole (PROTONIX) 40 MG tablet Take 1 tablet (40 mg total) by mouth once daily.    polyethylene glycol (GLYCOLAX) 17 gram PwPk Take 17 g by mouth 2 (two) times daily as needed (constipation).    promethazine (PHENERGAN) 25 MG tablet Take 25 mg by mouth every 12 (twelve) hours as needed for Nausea.    promethazine (PHENERGAN) 25 MG tablet Take 1 tablet (25 mg total) by mouth every 6 (six) hours as needed for Nausea.    ranitidine (ZANTAC) 150 MG capsule Take 1 capsule (150 mg total) by mouth 2 (two) times daily.     Family History       Problem Relation (Age of Onset)    Breast cancer Maternal Aunt, Sister, Paternal Aunt    Diabetes Mother    Heart disease Father          Tobacco Use    Smoking status: Never Smoker    Smokeless tobacco: Never Used   Substance and Sexual Activity    Alcohol use: Yes     Comment: rarely    Drug use: No    Sexual activity: Not Currently     Partners: Male     Birth control/protection: Surgical     Review of Systems   Constitutional:  Positive for fatigue.   Respiratory:  Positive for cough, chest tightness and shortness of breath.    All other systems reviewed and are negative.  Objective:     Vital Signs (Most Recent):  Temp: 98.8 °F (37.1 °C) (07/24/22 0359)  Pulse: 106 (07/24/22 0702)  Resp: 17 (07/24/22 0702)  BP: (!) 110/59 (07/24/22 0702)  SpO2: 95 % (07/24/22 0702)   Vital Signs (24h Range):  Temp:  [98.8 °F (37.1 °C)] 98.8 °F (37.1 °C)  Pulse:  [105-129] 106  Resp:  [10-65] 17  SpO2:  [94 %-100 %] 95 %  BP:  (110-160)/() 110/59     Weight: 88.5 kg (195 lb 1.7 oz)  Body mass index is 32.47 kg/m².    Physical Exam  Constitutional:       General: She is in acute distress.   HENT:      Right Ear: Tympanic membrane and external ear normal.      Left Ear: Tympanic membrane and external ear normal.      Nose: Nose normal.      Mouth/Throat:      Mouth: Mucous membranes are moist.      Pharynx: Oropharynx is clear.   Cardiovascular:      Rate and Rhythm: Regular rhythm. Tachycardia present.   Pulmonary:      Effort: Respiratory distress present.   Abdominal:      General: Abdomen is flat.      Palpations: Abdomen is soft.   Skin:     General: Skin is warm.   Neurological:      General: No focal deficit present.      Mental Status: She is alert and oriented to person, place, and time.           Significant Labs: All pertinent labs within the past 24 hours have been reviewed.    Significant Imaging: I have reviewed all pertinent imaging results/findings within the past 24 hours.    Assessment/Plan:     Acute hypoxemic respiratory failure  Unclear etiology at this point.  Reactive airway disease versus pulmonary embolism versus pneumonia  Obtain D-dimer, if D-dimer is elevated she will need CT PE.  Chest x-ray remarkable for bilateral interstitial opacification.  Could be related to a new viral or bacterial pneumonia verses changes from prior COVID pneumonia from a month ago.  Obtain procalcitonin to assist differentiate.  Obtain troponin and BNP.  Continue oxygen supplementation and bronchodilator in the meantime.  Continue ceftriaxone azithromycin in the meantime        Headache  Tylenol as needed      History of supraventricular tachycardia  Keep patient on tele        VTE Risk Mitigation (From admission, onward)    None             Lucas Fermin MD  Department of Hospital Medicine   [facility] - Emergency Dept

## 2022-07-24 NOTE — LETTER
July 25, 2022         [place] [address]  Phone: [phone]  Fax: [phone]       Patient: Aide Almaraz   YOB: 1970  Date of Visit: 07/25/2022    To Whom It May Concern:    Adolfo Almaraz  was at [facility] on 7/24/2022-7/25/2022. She may return to work with no restrictions on 7/27/2022. If you have any questions or concerns, or if I can be of further assistance, please do not hesitate to contact me.    Sincerely,    Albin Paniagua RN

## 2022-07-24 NOTE — PHARMACY MED REC
"Admission Medication History     The home medication history was taken by Ankita Dior.    You may go to "Admission" then "Reconcile Home Medications" tabs to review and/or act upon these items.      The home medication list has been updated by the Pharmacy department.    Please read ALL comments highlighted in yellow.    Please address this information as you see fit.     Feel free to contact us if you have any questions or require assistance.    Medications listed below were obtained from: Patient/family and Analytic software- [employer] and added to home medication:   Clonazepam   Duloxetine   Percocet    The medication reconciliation was completed by the patient's bedside using [employer]; because spouse is unfamiliar with medication.    Ankita Dior  [phone]                    .          "

## 2022-07-24 NOTE — ED TRIAGE NOTES
Aide Almaraz, a 52 y.o. female presents to the ED w/ complaint of status asthmaticus.      Triage note:  Chief Complaint   Patient presents with    Respiratory Distress     EMS called to pts residence by pt c/o SOB upon awakening. Pt used rescue inhaler with no relief. Upon arrival, EMS noted expiratory wheezing with low 90s SpO2 on room air and tachypneic. Administered duo-neb with no relief. Hx of asthma     Review of patient's allergies indicates:   Allergen Reactions    Latex, natural rubber Itching and Rash    Lidocaine Rash    Tramadol Hives and Rash     Past Medical History:   Diagnosis Date    Anemia     Celiac disease     Diabetes mellitus     Gall stones     Hypertension     PTSD (post-traumatic stress disorder)     Supraventricular tachycardia 2001    Yeast infection

## 2022-07-24 NOTE — ED NOTES
Patient ambulated to restroom without assistance.  Hooked patient back up to pulse ox, BP cuff, cardiac monitoring, and 1 L of O2.   Patient's family in room visiting.

## 2022-07-24 NOTE — ED PROVIDER NOTES
Encounter Date: 2022       History     Chief Complaint   Patient presents with    Respiratory Distress     EMS called to pts residence by pt c/o SOB upon awakening. Pt used rescue inhaler with no relief. Upon arrival, EMS noted expiratory wheezing with low 90s SpO2 on room air and tachypneic. Administered duo-neb with no relief. Hx of asthma     52-year-old female presents to the emergency department via EMS who states they were called to the patient's residence secondary to shortness of breath upon awakening this morning.  Patient has a past medical history significant for anemia, diabetes mellitus type 2, hypertension and asthma.    The history is provided by the patient. No  was used.     Review of patient's allergies indicates:   Allergen Reactions    Latex, natural rubber Itching and Rash    Lidocaine Rash    Tramadol Hives and Rash     Past Medical History:   Diagnosis Date    Anemia     Celiac disease     Diabetes mellitus     Gall stones     Hypertension     PTSD (post-traumatic stress disorder)     Supraventricular tachycardia     Yeast infection      Past Surgical History:   Procedure Laterality Date    ABDOMINAL SURGERY      Gastric sleeve    ANKLE FUSION      ARTHROSCOPY OF ANKLE Right 2022    Procedure: ARTHROSCOPY, ANKLE;  Surgeon: Tessa Garcia MD;  Location: [facility];  Service: Orthopedics;  Laterality: Right;    BREAST SURGERY       SECTION      placenta previa    CHOLECYSTECTOMY  2009    EXCISIONAL BIOPSY Left 2019    Procedure: EXCISIONAL BIOPSY LEFT with SEED (CONSENT AM OF) 1.0 hr case;  Surgeon: Flor Rosas MD;  Location: [address];  Service: General;  Laterality: Left;    gastric      gastric sleeve      pt reported     HERNIA REPAIR      HYSTERECTOMY  2001    Inguinal hernia  2004    REPAIR OF LIGAMENT OF ANKLE Right 2022    Procedure: REPAIR, LIGAMENT, ANKLE;  Surgeon: Tessa Garcia MD;  Location:  [facility] OR;  Service: Orthopedics;  Laterality: Right;  Single Shot    SKIN SURGERY      Kasi Gómez     Family History   Problem Relation Age of Onset    Heart disease Father     Diabetes Mother         hypoglycemic    Breast cancer Maternal Aunt     Breast cancer Sister     Breast cancer Paternal Aunt     Ovarian cancer Neg Hx      Social History     Tobacco Use    Smoking status: Never Smoker    Smokeless tobacco: Never Used   Substance Use Topics    Alcohol use: Yes     Comment: rarely    Drug use: No     Review of Systems   Constitutional: Negative for chills and fever.   Respiratory: Positive for shortness of breath and wheezing.    Cardiovascular: Negative for chest pain and leg swelling.   Gastrointestinal: Negative for nausea and vomiting.   All other systems reviewed and are negative.      Physical Exam     Initial Vitals [07/24/22 0359]   BP Pulse Resp Temp SpO2   (!) 160/90 105 (!) 65 98.8 °F (37.1 °C) 98 %      MAP       --         Physical Exam    Nursing note and vitals reviewed.  Constitutional: She is not diaphoretic. No distress.   Obese   HENT:   Head: Normocephalic and atraumatic.   Right Ear: External ear normal.   Left Ear: External ear normal.   Mouth/Throat: Oropharynx is clear and moist.   Eyes: Conjunctivae are normal.   Neck: Neck supple.   Normal range of motion.  Cardiovascular: Intact distal pulses.   Tachycardic with regular rhythm   Pulmonary/Chest: She is in respiratory distress.   Abdominal: Abdomen is soft. Bowel sounds are normal.   Musculoskeletal:         General: Normal range of motion.      Cervical back: Normal range of motion and neck supple.     Neurological: She is alert and oriented to person, place, and time. She has normal strength. GCS score is 15. GCS eye subscore is 4. GCS verbal subscore is 5. GCS motor subscore is 6.   Skin: Skin is warm and dry. Capillary refill takes less than 2 seconds.   Psychiatric: She has a normal mood and affect.         ED Course    Procedures  Labs Reviewed   CBC W/ AUTO DIFFERENTIAL - Abnormal; Notable for the following components:       Result Value    Gran # (ANC) 8.0 (*)     Lymph # 0.6 (*)     Mono # 0.1 (*)     Gran % 92.3 (*)     Lymph % 6.3 (*)     Mono % 0.8 (*)     All other components within normal limits   COMPREHENSIVE METABOLIC PANEL - Abnormal; Notable for the following components:    Potassium 3.3 (*)     CO2 22 (*)     Glucose 212 (*)     All other components within normal limits   D DIMER, QUANTITATIVE - Abnormal; Notable for the following components:    D-Dimer 0.66 (*)     All other components within normal limits   ISTAT PROCEDURE - Abnormal; Notable for the following components:    POC PH 7.332 (*)     POC PO2 108 (*)     POC HCO3 22.2 (*)     All other components within normal limits   B-TYPE NATRIURETIC PEPTIDE   TROPONIN I   PROCALCITONIN   SARS-COV-2 RDRP GENE          Imaging Results          CTA Chest Non Coronary (Final result)  Result time 07/24/22 15:14:43    Final result by Jens Dallas MD (07/24/22 15:14:43)                 Impression:      No evidence of pulmonary thromboembolism to the proximal segmental level.  Evaluation of the distal segmental arteries and beyond is limited by poor opacification and artifact. No evidence of pulmonary infarction or right heart strain.    Patchy ground-glass opacities in the right upper lobe, concerning for a multifocal inflammatory or infectious process.      Electronically signed by: Jens Dallas  Date:    07/24/2022  Time:    15:14             Narrative:    EXAMINATION:  CTA CHEST NON CORONARY    CLINICAL HISTORY:  Pulmonary embolism (PE) suspected, positive D-dimer;    TECHNIQUE:  Low dose axial images, sagittal and coronal reformations were obtained from the thoracic inlet to the lung bases following the IV administration of 75 mL of Omnipaque 350.  Contrast timing was optimized to evaluate the pulmonary arteries.  MIP images were performed.    COMPARISON:  CT chest  08/18/2021    FINDINGS:  LINES/TUBES:  None.    SOFT TISSUES: No axillary or subpectoral lymphadenopathy. The visualized thyroid gland is unremarkable.    HEART AND MEDIASTINUM: No mediastinal or hilar lymphadenopathy. Heart is normal in size.  No CT evidence of right heart strain.  No pericardial fluid. The main pulmonary artery measures 3.0 cm in diameter.  No filling defects to the proximal segmental level to suggest pulmonary thromboembolism.  Evaluation of the distal segmental arteries and beyond is limited by poor opacification and artifact.    PLEURA: No pleural effusion or pneumothorax.    LUNGS AND AIRWAYS: Airways are patent. Patchy ground-glass opacities in the right upper lobe, for example on 3:123.  Bibasilar and dependent subsegmental atelectasis.  No evidence of pulmonary infarction.  No significant pulmonary nodules.    ABDOMEN: Partially imaged postoperative changes from sleeve gastrectomy.  Small hiatal hernia.  No other abnormality in the visualized upper abdomen.    BONES: No acute fractures. No suspicious lytic or sclerotic lesions.                               X-Ray Chest AP Portable (Final result)  Result time 07/24/22 04:42:04    Final result by Raghu Madsen MD (07/24/22 04:42:04)                 Impression:      Suspected mild perihilar and basilar infiltrates superimposed on diminished depth of inspiration and atelectatic change.      Electronically signed by: Raghu Madsen  Date:    07/24/2022  Time:    04:42             Narrative:    EXAMINATION:  XR CHEST AP PORTABLE    CLINICAL HISTORY:  Asthma;    TECHNIQUE:  Single frontal view of the chest was performed.    COMPARISON:  Chest radiograph June 8, 2022    FINDINGS:  Single portable chest view is submitted.  There is diminished depth of inspiration and minimal rotation, when accounting for these factors the cardiomediastinal silhouette appears stable.  Accentuation of pulmonary bronchovascular markings in part due to diminished  depth of inspiration noted.  Linear areas likely relate to areas of scarring and or atelectasis.    There is appearance thought to represent mild superimposed perihilar and basilar infiltrates, and suspected minimal pleural fluid at the right costophrenic angle.  There is no large pleural effusion.  There is no pneumothorax.  The osseous structures appear intact.                                 Medications   magnesium sulfate in water (MAGNESIUM SULFATE 2 GRAM/50 ML) 2 gram/50 mL IVPB (has no administration in time range)   methylPREDNISolone sodium succinate (SOLU-MEDROL) 125 mg injection (has no administration in time range)   ipratropium 0.02 % nebulizer solution 1 mg (1 mg Nebulization Given 7/24/22 0405)   albuterol sulfate nebulizer solution 15 mg (15 mg Nebulization Given 7/24/22 0405)   methylPREDNISolone sodium succinate injection 125 mg (125 mg Intramuscular Given 7/24/22 0400)   magnesium sulfate 2g in water 50mL IVPB (premix) (0 g Intravenous Stopped 7/24/22 0425)   lorazepam injection 1 mg (1 mg Intravenous Given 7/24/22 0410)   cefTRIAXone (ROCEPHIN) 2 g/50 mL D5W IVPB (0 g Intravenous Stopped 7/24/22 1120)   azithromycin 500 mg in dextrose 5 % 250 mL IVPB (ready to mix system) (0 mg Intravenous Stopped 7/24/22 1120)   ketorolac injection 30 mg (30 mg Intravenous Given 7/24/22 0745)   sodium chloride 0.9% bolus 1,000 mL (0 mLs Intravenous Stopped 7/24/22 1530)   iohexoL (OMNIPAQUE 350) injection 75 mL (75 mLs Intravenous Given 7/24/22 1500)   potassium chloride SA CR tablet 40 mEq (40 mEq Oral Given 7/24/22 1711)     Medical Decision Making:   Initial Assessment:   52-year-old female presents to the emergency department via EMS who states they were called to the patient's residence secondary to shortness of breath upon awakening this morning.  Patient has a past medical history significant for anemia, diabetes mellitus type 2, hypertension and asthma.  ED Management:  Course of ED stay:  1.  Cardiovascular-patient has been hemodynamically stable throughout ED stay with the exception of initial tachycardia.  2. Pulmonary-patient initially had decreased air movement bilaterally with inspiratory and expiratory wheezes.  She received DuoNeb 1 hour continuous treatment as well as Solu-Medrol and IV magnesium.  Wheezes have markedly decreased and O2 requirement has decreased to 4 L nasal cannula.  Plan is to admit to hospitalist service for continued O2 support with beta agonist/steroid treatment.  Chest x-ray reveals possible infiltrates versus atelectasis.  3. Hematology/infectious disease-Rocephin/Zithromax were given secondary to possible infiltrates on chest x-ray.  4. GI/nutrition-CMP revealed mild hypokalemia and hyperglycemia, but was otherwise grossly normal.  Patient was accepted to hospitalist service for asthma exacerbation.                      Clinical Impression:   Final diagnoses:  [J45.901] Asthma exacerbation  [R06.02] SOB (shortness of breath)          ED Disposition Condition    Observation               Juan F Cantrell MD  07/27/22 0625

## 2022-07-24 NOTE — HPI
52 years old woman with past medical history of diabetes, SVT, chronic diarrhea, COVID infection in June 22, and ankle surgery in May 22. The patient presented due acute shortness of breath.  She stated that in the last 2 days she has been experiencing shortness of breath at rest and last night was suddenly worsened and woke her up from sleep.  She was found to be hypoxic, tachycardic, and tachypneic in the emergency department.  Her presentation was thought was secondary to asthma exacerbation and the patient received bronchodilators, oxygen supplement, and IV magnesium I was contacted for admission for observation.  During my encounter, the patient was mildly tachypneic on 4 L of nasal cannula with heart rate of 110-115 sinus tachycardia.  She is able to speak full sentences.  She stated that she has never been diagnosed with asthma she has never been in the hospital due to asthma exacerbation and she stated that she is experiencing mild left-sided chest pain in addition to the shortness of breath.  She stated that she has ankle surgery in May and she has not been moving around as usual.  There was no labs result by the time I was assessing the patient

## 2022-07-25 VITALS
BODY MASS INDEX: 37.69 KG/M2 | DIASTOLIC BLOOD PRESSURE: 84 MMHG | TEMPERATURE: 99 F | HEART RATE: 85 BPM | RESPIRATION RATE: 17 BRPM | WEIGHT: 226.19 LBS | HEIGHT: 65 IN | OXYGEN SATURATION: 97 % | SYSTOLIC BLOOD PRESSURE: 122 MMHG

## 2022-07-25 PROCEDURE — 63700000 PHARM REV CODE 250 ALT 637 W/O HCPCS: Performed by: STUDENT IN AN ORGANIZED HEALTH CARE EDUCATION/TRAINING PROGRAM

## 2022-07-25 PROCEDURE — 63700000 PHARM REV CODE 250 ALT 637 W/O HCPCS: Performed by: HOSPITALIST

## 2022-07-25 PROCEDURE — G0378 HOSPITAL OBSERVATION PER HR: HCPCS

## 2022-07-25 PROCEDURE — 25000003 PHARM REV CODE 250: Performed by: STUDENT IN AN ORGANIZED HEALTH CARE EDUCATION/TRAINING PROGRAM

## 2022-07-25 PROCEDURE — 25000242 PHARM REV CODE 250 ALT 637 W/ HCPCS: Performed by: STUDENT IN AN ORGANIZED HEALTH CARE EDUCATION/TRAINING PROGRAM

## 2022-07-25 PROCEDURE — 94640 AIRWAY INHALATION TREATMENT: CPT | Mod: XB

## 2022-07-25 PROCEDURE — 96366 THER/PROPH/DIAG IV INF ADDON: CPT

## 2022-07-25 PROCEDURE — 63600175 PHARM REV CODE 636 W HCPCS: Performed by: STUDENT IN AN ORGANIZED HEALTH CARE EDUCATION/TRAINING PROGRAM

## 2022-07-25 PROCEDURE — 25000003 PHARM REV CODE 250: Performed by: HOSPITALIST

## 2022-07-25 PROCEDURE — 94640 AIRWAY INHALATION TREATMENT: CPT

## 2022-07-25 PROCEDURE — 94761 N-INVAS EAR/PLS OXIMETRY MLT: CPT

## 2022-07-25 PROCEDURE — 27000221 HC OXYGEN, UP TO 24 HOURS

## 2022-07-25 PROCEDURE — 25000003 PHARM REV CODE 250: Performed by: NURSE PRACTITIONER

## 2022-07-25 RX ORDER — CLONAZEPAM 0.5 MG/1
1 TABLET ORAL 2 TIMES DAILY PRN
Status: DISCONTINUED | OUTPATIENT
Start: 2022-07-25 | End: 2022-07-25 | Stop reason: HOSPADM

## 2022-07-25 RX ORDER — CLONAZEPAM 0.5 MG/1
1 TABLET ORAL NIGHTLY PRN
Status: DISCONTINUED | OUTPATIENT
Start: 2022-07-25 | End: 2022-07-25

## 2022-07-25 RX ORDER — NYSTATIN 100000 [USP'U]/ML
500000 SUSPENSION ORAL 4 TIMES DAILY
Status: DISCONTINUED | OUTPATIENT
Start: 2022-07-25 | End: 2022-07-25 | Stop reason: HOSPADM

## 2022-07-25 RX ORDER — DOXYCYCLINE 100 MG/1
100 CAPSULE ORAL EVERY 12 HOURS
Qty: 14 CAPSULE | Refills: 0 | Status: SHIPPED | OUTPATIENT
Start: 2022-07-25 | End: 2022-08-01

## 2022-07-25 RX ORDER — NYSTATIN 100000 [USP'U]/ML
500000 SUSPENSION ORAL 4 TIMES DAILY
Qty: 200 ML | Refills: 0 | Status: SHIPPED | OUTPATIENT
Start: 2022-07-25 | End: 2022-08-04

## 2022-07-25 RX ORDER — FLUCONAZOLE 100 MG/1
200 TABLET ORAL DAILY
Status: DISCONTINUED | OUTPATIENT
Start: 2022-07-25 | End: 2022-07-25 | Stop reason: HOSPADM

## 2022-07-25 RX ADMIN — IPRATROPIUM BROMIDE AND ALBUTEROL SULFATE 3 ML: .5; 3 SOLUTION RESPIRATORY (INHALATION) at 10:07

## 2022-07-25 RX ADMIN — NYSTATIN 500000 UNITS: 100000 SUSPENSION ORAL at 09:07

## 2022-07-25 RX ADMIN — CLONAZEPAM 1 MG: 0.5 TABLET ORAL at 05:07

## 2022-07-25 RX ADMIN — FAMOTIDINE 20 MG: 20 TABLET ORAL at 09:07

## 2022-07-25 RX ADMIN — IPRATROPIUM BROMIDE AND ALBUTEROL SULFATE 3 ML: .5; 3 SOLUTION RESPIRATORY (INHALATION) at 12:07

## 2022-07-25 RX ADMIN — AZITHROMYCIN MONOHYDRATE 500 MG: 250 TABLET ORAL at 09:07

## 2022-07-25 RX ADMIN — CEFTRIAXONE 1 G: 1 INJECTION, SOLUTION INTRAVENOUS at 12:07

## 2022-07-25 RX ADMIN — FLUCONAZOLE 200 MG: 100 TABLET ORAL at 09:07

## 2022-07-25 NOTE — DISCHARGE INSTRUCTIONS
Discharged pt per orders. Pt verbalized understanding of all discharge instructions as well as follow up appointments.

## 2022-07-25 NOTE — NURSING
Patient c/o white patches noted in mouth. Painful to touch. Notified NP on call. Day team will address this matter.

## 2022-07-25 NOTE — PLAN OF CARE
07/25/22 0930   Final Note   Assessment Type Final Discharge Note   Anticipated Discharge Disposition Home   Hospital Resources/Appts/Education Provided Appointments scheduled and added to AVS   Post-Acute Status   Post-Acute Authorization Other   Other Status No Post-Acute Service Needs   Pts nurse Albin notified that the pt can d/c from CM standpoint

## 2022-07-25 NOTE — DISCHARGE SUMMARY
[facility] Medicine  Discharge Summary      Patient Name: Aide Almaraz  MRN: [id number]  Patient Class: OP- Observation  Admission Date: 7/24/2022  Hospital Length of Stay: 1 day   Discharge Date and Time:  07/25/2022 10:45 AM  Attending Physician: Mitul Stafford MD   Discharging Provider: Mitul Stafford MD  Primary Care Provider: Radha Jones MD      HPI:   52 years old woman with past medical history of diabetes, SVT, chronic diarrhea, COVID infection in June 22, and ankle surgery in May 22. The patient presented due acute shortness of breath.  She stated that in the last 2 days she has been experiencing shortness of breath at rest and last night was suddenly worsened and woke her up from sleep.  She was found to be hypoxic, tachycardic, and tachypneic in the emergency department.  Her presentation was thought was secondary to asthma exacerbation and the patient received bronchodilators, oxygen supplement, and IV magnesium I was contacted for admission for observation.  During my encounter, the patient was mildly tachypneic on 4 L of nasal cannula with heart rate of 110-115 sinus tachycardia.  She is able to speak full sentences.  She stated that she has never been diagnosed with asthma she has never been in the hospital due to asthma exacerbation and she stated that she is experiencing mild left-sided chest pain in addition to the shortness of breath.  She stated that she has ankle surgery in May and she has not been moving around as usual.  There was no labs result by the time I was assessing the patient      * No surgery found *      Hospital Course:   52 years old woman with past medical history of diabetes, SVT, chronic diarrhea, COVID infection in June 22, and ankle surgery in May 22. The patient presented due acute shortness of breath.  She stated that in the last 2 days she has been experiencing shortness of breath at rest and last night was suddenly  worsened and woke her up from sleep.  She was found to be hypoxic, tachycardic, and tachypneic in the emergency department.  Her presentation was thought was secondary to asthma exacerbation and the patient received bronchodilators, oxygen supplement, and IV magnesium I was contacted for admission for observation.CTA chest show no  PE,but has infiltrate ,received IV Abx and supplemental oxygen with great improvement,patient was discharged home with PO Abx and follow up with PCP as out patient.       Goals of Care Treatment Preferences:  Code Status: Full Code      Consults:     No new Assessment & Plan notes have been filed under this hospital service since the last note was generated.  Service: Hospital Medicine    Final Active Diagnoses:    Diagnosis Date Noted POA    PRINCIPAL PROBLEM:  Acute hypoxemic respiratory failure [J96.01] 07/24/2022 Yes    Headache [R51.9]  Yes    History of supraventricular tachycardia [Z86.79] 02/25/2016 Not Applicable      Problems Resolved During this Admission:       Discharged Condition: stable    Disposition: Home or Self Care    Follow Up:   Follow-up Information     Radha Jones MD Follow up in 1 week(s).    Specialty: Family Medicine  Contact information:  [address]  [address]  [facility] [address]  [phone]             Micheal Montelongo MD Follow up on 8/11/2022.    Why: please keep already scheduled follow up appointment as prior to coming to the hospital  Contact information:  Micheal Montelongo MD    [address]    [address]    [place], [place] [id number] [phone]                     Patient Instructions:      Activity as tolerated       Significant Diagnostic Studies: Labs:   BMP:   Recent Labs   Lab 07/24/22  0915   *      K 3.3*      CO2 22*   BUN 11   CREATININE 0.8   CALCIUM 9.1   , CMP   Recent Labs   Lab 07/24/22  0915      K 3.3*      CO2 22*   *   BUN 11   CREATININE  0.8   CALCIUM 9.1   PROT 7.5   ALBUMIN 3.7   BILITOT 0.2   ALKPHOS 94   AST 16   ALT 23   ANIONGAP 14   ESTGFRAFRICA >60   EGFRNONAA >60    and CBC   Recent Labs   Lab 07/24/22  0915   WBC 8.72   HGB 13.0   HCT 40.5        Radiology: X-Ray: CXR: X-Ray Chest 1 View (CXR): No results found for this visit on 07/24/22. and X-Ray Chest PA and Lateral (CXR): No results found for this visit on 07/24/22.  CT scan:CTA chest     Pending Diagnostic Studies:     None         Medications:  Reconciled Home Medications:      Medication List      START taking these medications    doxycycline 100 MG Cap  Commonly known as: VIBRAMYCIN  Take 1 capsule (100 mg total) by mouth every 12 (twelve) hours for 7 days     nystatin 100,000 unit/mL suspension  Commonly known as: MYCOSTATIN  Take 5 mL (500,000 Units total) by mouth 4 (four) times daily for 10 days        CHANGE how you take these medications    promethazine 25 MG tablet  Commonly known as: PHENERGAN  Take 25 mg by mouth every 12 (twelve) hours as needed for Nausea.  What changed: Another medication with the same name was removed. Continue taking this medication, and follow the directions you see here.        CONTINUE taking these medications    acetaminophen 500 MG tablet  Commonly known as: TYLENOL  Take 2 tablets (1,000 mg total) by mouth every 8 (eight) hours.     * albuterol 90 mcg/actuation inhaler  Commonly known as: PROVENTIL/VENTOLIN HFA  Inhale 1-2 puffs into the lungs every 6 (six) hours as needed for Wheezing or Shortness of Breath. Rescue     * albuterol 90 mcg/actuation inhaler  Commonly known as: PROVENTIL/VENTOLIN HFA  Inhale 1-2 puffs into the lungs every 6 (six) hours as needed for Wheezing. Rescue     clonazePAM 2 MG Tab  Commonly known as: KlonoPIN  Take 2 mg by mouth 2 (two) times daily as needed.     DULoxetine 30 MG capsule  Commonly known as: CYMBALTA  TAKE 1 CAPSULE BY MOUTH EVERY DAY FOR 1 WEEK. INCREASE TO 2 CAPSULES BY MOUTH EVERY DAY      eszopiclone 3 mg Tab  Commonly known as: LUNESTA  TK 1 T PO QD HS     gabapentin 800 MG tablet  Commonly known as: NEURONTIN  Take 1 tablet (800 mg total) by mouth 3 (three) times daily.     lactulose 10 gram/15 mL solution  Commonly known as: CHRONULAC  Take 15 mLs (10 g total) by mouth 3 (three) times daily as needed (connstipation).     metoclopramide HCl 10 MG tablet  Commonly known as: REGLAN  Take 1 tablet (10 mg total) by mouth every 6 (six) hours as needed (nausea/vomiting).     naproxen 500 MG tablet  Commonly known as: NAPROSYN  Take 1 tablet (500 mg total) by mouth 2 (two) times daily.     oxyCODONE-acetaminophen  mg per tablet  Commonly known as: PERCOCET  TAKE 1 TABLET BY MOUTH TWO TO THREE TIMES DAILY AS NEEDED FOR PAIN     pantoprazole 40 MG tablet  Commonly known as: PROTONIX  Take 1 tablet (40 mg total) by mouth once daily.     polyethylene glycol 17 gram Pwpk  Commonly known as: GLYCOLAX  Take 17 g by mouth 2 (two) times daily as needed (constipation).     ranitidine 150 MG capsule  Commonly known as: ZANTAC  Take 1 capsule (150 mg total) by mouth 2 (two) times daily.         * This list has 2 medication(s) that are the same as other medications prescribed for you. Read the directions carefully, and ask your doctor or other care provider to review them with you.                Indwelling Lines/Drains at time of discharge:   Lines/Drains/Airways     None                 Time spent on the discharge of patient: less than 30  minutes         Mitul Stafford MD  Department of Hospital Medicine  [facility]

## 2022-07-25 NOTE — PLAN OF CARE
Problem: Adult Inpatient Plan of Care  Goal: Plan of Care Review  Outcome: Ongoing, Progressing  Goal: Patient-Specific Goal (Individualized)  Outcome: Ongoing, Progressing  Goal: Absence of Hospital-Acquired Illness or Injury  Outcome: Ongoing, Progressing  Goal: Optimal Comfort and Wellbeing  Outcome: Ongoing, Progressing     POC reviewed with patient. All questions and concerns addressed. Fall/safety precautions implemented and maintained. Pain management provided. IV abx administered. PRN breathing tx given per RT. No acute events noted this shift. Please see flowsheet for full assessment and vitals. Bed locked in lowest position. Call bell within reach. Will continue to monitor.

## 2022-07-25 NOTE — PLAN OF CARE
07/25/22 0927   Discharge Planning   Assessment Type Discharge Planning Brief Assessment   Resource/Environmental Concerns none   Support Systems Children   Equipment Currently Used at Home none   Current Living Arrangements home/apartment/condo   Patient/Family Anticipates Transition to home   Patient/Family Anticipated Services at Transition none   DME Needed Upon Discharge  none   Discharge Plan A Home  (with instructions to followup)     [id number] - [place] - [address]  [place] [address]  Phone: [phone] Fax: [phone]    [id number] - [place] - [address] [facility] [address]  [facility] [place] [address]  Phone: [phone] Fax: [phone]    [facility] [address] [facility] [address]  Phone: [phone] Fax: [phone]

## 2022-07-25 NOTE — NURSING
Discharged pt per orders. Pt verbalized understanding of all discharge instructions as well as follow up appointments.    Removed three IV's. tip intact no redness or swelling noted.Pt awaiting ride home.

## 2022-07-25 NOTE — HOSPITAL COURSE
52 years old woman with past medical history of diabetes, SVT, chronic diarrhea, COVID infection in June 22, and ankle surgery in May 22. The patient presented due acute shortness of breath.  She stated that in the last 2 days she has been experiencing shortness of breath at rest and last night was suddenly worsened and woke her up from sleep.  She was found to be hypoxic, tachycardic, and tachypneic in the emergency department.  Her presentation was thought was secondary to asthma exacerbation and the patient received bronchodilators, oxygen supplement, and IV magnesium I was contacted for admission for observation.CTA chest show no  PE,but has infiltrate ,received IV Abx and supplemental oxygen with great improvement,patient was discharged home with PO Abx and follow up with PCP as out patient.

## 2022-07-26 ENCOUNTER — NURSE TRIAGE (OUTPATIENT)
Dept: ADMINISTRATIVE | Facility: CLINIC | Age: 52
End: 2022-07-26
Payer: MEDICAID

## 2022-07-27 NOTE — TELEPHONE ENCOUNTER
Caller states that she has question about Symbicort prescription that was written per her PCP, caller states that she has sputum coming up and has questions about that being normal. Caller denies difficulty breathing or swallowing at this time. Pt states that her current 02 sat is 97%. Caller questions answered using nursing knowledge. Caller told to follow up with PCP for further advise about pulse ox parameters.  Pt advised per protocol and verbalized understanding.    Reason for Disposition   [1] Caller has NON-URGENT medicine question about med that PCP prescribed AND [2] triager unable to answer question    Additional Information   Negative: [1] Intentional drug overdose AND [2] suicidal thoughts or ideas   Negative: MORE THAN A DOUBLE DOSE of a prescription or over-the-counter (OTC) drug   Negative: [1] DOUBLE DOSE (an extra dose or lesser amount) of prescription drug AND [2] any symptoms (e.g., dizziness, nausea, pain, sleepiness)   Negative: [1] DOUBLE DOSE (an extra dose or lesser amount) of over-the-counter (OTC) drug AND [2] any symptoms (e.g., dizziness, nausea, pain, sleepiness)   Negative: Took another person's prescription drug   Negative: [1] DOUBLE DOSE (an extra dose or lesser amount) of prescription drug AND [2] NO symptoms (Exception: a double dose of antibiotics)   Negative: Diabetes drug error or overdose (e.g., took wrong type of insulin or took extra dose)   Negative: [1] Prescription refill request for ESSENTIAL medicine (i.e., likelihood of harm to patient if not taken) AND [2] triager unable to refill per department policy   Negative: [1] Prescription not at pharmacy AND [2] was prescribed by PCP recently (Exception: triager has access to EMR and prescription is recorded there. Go to Home Care and confirm for pharmacy.)   Negative: [1] Pharmacy calling with prescription question AND [2] triager unable to answer question   Negative: [1] Caller has URGENT medicine question about  med that PCP or specialist prescribed AND [2] triager unable to answer question   Negative: Medicine patch causing local rash or itching   Negative: [1] Caller has medicine question about med NOT prescribed by PCP AND [2] triager unable to answer question (e.g., compatibility with other med, storage)   Negative: Prescription request for new medicine (not a refill)   Negative: [1] Prescription refill request for NON-ESSENTIAL medicine (i.e., no harm to patient if med not taken) AND [2] triager unable to refill per department policy   Negative: Prescription refill request for a CONTROLLED substance (e.g., narcotics, ADHD medicines)    Protocols used: MEDICATION QUESTION CALL-A-AH

## 2022-07-28 LAB
BACTERIA BLD CULT: NORMAL
BACTERIA BLD CULT: NORMAL

## 2022-07-28 NOTE — PROGRESS NOTES
[facility]   Physical Therapy Treatment Note     Name: Aide Almaraz  [facility] Number: [id number]    Therapy Diagnosis:   Encounter Diagnoses   Name Primary?    Sinus tarsi syndrome, right Yes    Difficulty walking     Decreased range of motion of right ankle      Physician: Pita Kelly PA-C    Visit Date: 7/29/2022    Physician Orders: PT Eval and Treat   Medical Diagnosis from Referral: History of ankle surgery [Z98.890], Ankle instability, right [M25.371]  Evaluation Date: 5/26/2022  Authorization Period Expiration: 12/31/2022  Plan of Care Expiration: 08/26/2022  Progress Note Due: 08/29/2022  Visit # / Visits authorized: 4/20 (+Evaluation)    PTA Visit #: 0/5     Time In: 7:45 am  Time Out: 8:30 am  Total Billable Time: 40 minutes      Precautions: Diabetes, Partial WB, s/p Right ankle arthroscopy and extensive debridment;  Right Brostrom lateral ligament reconstruction with Artelon      · Weight bearing: Weight bearing as tolerated right leg  · Immobilization: Tall boot when mobilizing; may remove for sleep and hygiene and and then wean out of boot 10 weeks postop  · Therapy: Continue PT- note sent to PT with updates    DOS: 4/18/2022.    POW: 14 weeks and 4 days as of 07/29/2022    SUBJECTIVE     Pt reports that she saw Dr. Garcia on 7-19-22. She states she is doing fine. Pt states Dr. Garcia reports to transitioning off the cam bot. Pt states she has had COVID and Pneumonia recently. She just got out of the hospital this Tuesday. Pt denies any R ankle stiffness   :She was compliant with home exercise program.  Response to previous treatment: No adverse reactions.   Functional change: Ongoin    Pain: 3/10  Location: right lateral ankle      OBJECTIVE         Objective measurements taken on 6/21/2022. Improvements bolded below:     Range of Motion: AROM:     Ankle Right Left   Dorsiflexion 10 degrees minor pain 10  degrees   Plantarflexion 39  degrees minor pain  40   "degrees   Inversion 33  degrees No pain  35  degrees   Eversion 30  degrees with pain 15  degrees         Ankle Right Left   Dorsiflexion 4/5 4+/5   Plantarflexion 4/5 4+/5   Inversion 4/5 4+/5   Eversion 4/5 4+/5          Joint Mobility: hypomobile medial and lateral subtalar glide     Palpation: (+) tender to moderate palpation along surgical incision       Treatment     Aide received the treatments listed below:      Manual therapy techniques: Joint mobilizations were applied to the: R ankle for 00 minutes, including:  MTP mobilization, PROM      Therapeutic exercises to develop strength, endurance and ROM for 40  minutes including:    Seated toe raises 3x10  Seated calf raises 3x10  Seated marbles  1 Trial  [place] 2 min   Standing Gastroc stretch 3x30 sec   Standing soleus stretch 3x30 sec   Standing  Tilt board (black elysia band) 30x     NP:   Ankle 4-way: 20x, GTB  Seated Towel Scrunches: 3x10, 5" holds  Seated Heel Raises: [id number] kettle bell  Towel Eversion: 3 rounds  R-bike x 8 min    Patient Education and Home Exercises     Home Exercises Provided and Patient Education Provided     Education provided:   - Importance of continuing HEP to supplement therapy sessions.    Written Home Exercises Provided: yes. Exercises were reviewed and Aide was able to demonstrate them prior to the end of the session.  Aide demonstrated good  understanding of the education provided. See EMR under Patient Instructions for exercises provided during therapy sessions    ASSESSMENT         Pt presented to therapy on Cam boot. Pt has been dealing with some medical health issues. She just got out of the hospital on Tuesday. Pt is 14 weeks and 4 days s/p Right ankle arthroscopy and extensive debridment; Right Brostrom lateral ligament reconstruction with Artelon. Pt was re-assessed today. Pt demonstrated improvement in R ankle AROM in all planes. Pt cont with R foot intrinsic and extrinsic muscles weakness. Pt has met " 2/5 LTGs. Overall, pt is imrpoving slowly in therapy. Pt was educated the importance to show up to therapy.   Aide Is progressing well towards her goals.     Pt prognosis is Good.     Pt will continue to benefit from skilled outpatient physical therapy to address the deficits listed in the problem list box on initial evaluation, provide pt/family education and to maximize pt's level of independence in the home and community environment.     Pt's spiritual, cultural and educational needs considered and pt agreeable to plan of care and goals.     Anticipated barriers to physical therapy: : s/p Right Brostrom lateral ligament reconstruction with Artelon    Goals:   Short Term Goals: 4 weeks   1. The patient with report compliance with HEP to maximize functional outcomes. Progressing, Not Met  2. The patient will demonstrate increase in BLE MMT by 1/2 grade to improve pt's functional mobility. Progressing, Not Met  3. The patient will decrease pain level by 50% in order to improve QOL. Progressing, Not Met     Long Term Goals: 12 weeks   1. The patient will demonstrate understanding and performance of advanced HEP to allow for independence once discharged from therapy. Progressing, met 7-29-22  2. 2. The patient will demonstrate increase in BLE MMT by 1 grade to improve pt's functional mobility. Progressing,  Not Met 7-29-22  3. The patient will have Right ankle range of motion WFL in order to improve her functional mobility. Progressing, Met 7-29-22  4. The patient will be able to walk for 15' w/o AD in order to improve pt's ability to perform ADLs. Progressing, Not Met 7-29-22  5. Pt will be able to sleep through the night w/o waking from pain in order to improve her QOL. Progressing, Not Met 7-29-22    PLAN     Continue to progress ROM and strength as tolerated. Recommended wearing tennis shoes for next visit for WB activities.       Jean Tylre, PT

## 2022-07-29 ENCOUNTER — CLINICAL SUPPORT (OUTPATIENT)
Dept: REHABILITATION | Facility: HOSPITAL | Age: 52
End: 2022-07-29
Attending: PHYSICIAN ASSISTANT
Payer: MEDICAID

## 2022-07-29 DIAGNOSIS — R26.2 DIFFICULTY WALKING: ICD-10-CM

## 2022-07-29 DIAGNOSIS — M25.571 SINUS TARSI SYNDROME, RIGHT: Primary | ICD-10-CM

## 2022-07-29 DIAGNOSIS — M25.671 DECREASED RANGE OF MOTION OF RIGHT ANKLE: ICD-10-CM

## 2022-07-29 PROCEDURE — 97110 THERAPEUTIC EXERCISES: CPT | Mod: PN

## 2022-08-02 ENCOUNTER — CLINICAL SUPPORT (OUTPATIENT)
Dept: REHABILITATION | Facility: HOSPITAL | Age: 52
End: 2022-08-02
Attending: PHYSICIAN ASSISTANT
Payer: MEDICAID

## 2022-08-02 DIAGNOSIS — M25.571 SINUS TARSI SYNDROME, RIGHT: Primary | ICD-10-CM

## 2022-08-02 DIAGNOSIS — R26.2 DIFFICULTY WALKING: ICD-10-CM

## 2022-08-02 DIAGNOSIS — M25.671 DECREASED RANGE OF MOTION OF RIGHT ANKLE: ICD-10-CM

## 2022-08-02 PROCEDURE — 97110 THERAPEUTIC EXERCISES: CPT | Mod: PN

## 2022-08-02 NOTE — PROGRESS NOTES
[facility]   Physical Therapy Treatment Note     Name: Aide Almaraz  Clinic Number: [id number]    Therapy Diagnosis:   Encounter Diagnoses   Name Primary?    Sinus tarsi syndrome, right Yes    Difficulty walking     Decreased range of motion of right ankle      Physician: Pita Kelly PA-C    Visit Date: 8/2/2022    Physician Orders: PT Eval and Treat   Medical Diagnosis from Referral: History of ankle surgery [Z98.890], Ankle instability, right [M25.371]  Evaluation Date: 5/26/2022  Authorization Period Expiration: 12/31/2022  Plan of Care Expiration: 08/26/2022  Progress Note Due: 08/29/2022  Visit # / Visits authorized: 6/20 (+Evaluation)    PTA Visit #: 0/5     Time In: 7:45 am  Time Out: 8:30 am  Total Billable Time: 40 minutes      Precautions: Diabetes, Partial WB, s/p Right ankle arthroscopy and extensive debridment;  Right Brostrom lateral ligament reconstruction with Artelon      · Weight bearing: Weight bearing as tolerated right leg  · Immobilization: Tall boot when mobilizing; may remove for sleep and hygiene and and then wean out of boot 10 weeks postop  · Therapy: Continue PT- note sent to PT with updates    DOS: 4/18/2022.    POW: 14 weeks and 4 days as of 08/02/2022    SUBJECTIVE     Pt reports she was in excruciating pain after doing new exercises last time. Unable to get up and walk for 2-3 days. It is feeling better now with less pain    :She was compliant with home exercise program.  Response to previous treatment: increased pain  Functional change: Ongoing    Pain: 4/10  Location: right lateral ankle      OBJECTIVE         Objective measurements taken on 7/29/2022. Improvements bolded below:     Range of Motion: AROM:     Ankle Right Left   Dorsiflexion 10 degrees minor pain 10  degrees   Plantarflexion 39  degrees minor pain  40  degrees   Inversion 33  degrees No pain  35  degrees   Eversion 30  degrees with pain 15  degrees         Ankle Right Left  "  Dorsiflexion 4/5 4+/5   Plantarflexion 4/5 4+/5   Inversion 4/5 4+/5   Eversion 4/5 4+/5          Joint Mobility: hypomobile medial and lateral subtalar glide     Palpation: (+) tender to moderate palpation along surgical incision       Treatment     Aide received the treatments listed below:      Manual therapy techniques: Joint mobilizations were applied to the: R ankle for 10 minutes, including:  MTP mobilization - NP  PROM  Posterior and anterior talocrural mobs grade III/IV  Talocrural disctraction      Therapeutic exercises to develop strength, endurance and ROM for 45  minutes including:    R-bike x 5 min  Ankle inv/ev/DF: 20x, GTB  Plantarflexion Blue TB 20x  Seated marbles  1 Trial  [place] 2 min   Standing Gastroc stretch 3x30 sec   Standing soleus stretch 3x30 sec   Standing Tilt board (black elysia band) 30x    Seated Heel Raises: 3x10 10lb kettle bell  +Weight shifting barefoot on airex 20x3"  +DF mob on step 20x5"  +tandem balance on airex 3x30" ea     NP:   Seated toe raises 3x10  Seated calf raises 3x10  Seated Towel Scrunches: 3x10, 5" holds  Towel Eversion: 3 rounds    Patient Education and Home Exercises     Home Exercises Provided and Patient Education Provided     Education provided:   - Importance of continuing HEP to supplement therapy sessions.    Written Home Exercises Provided: yes. Exercises were reviewed and Aide was able to demonstrate them prior to the end of the session.  Adie demonstrated good  understanding of the education provided. See EMR under Patient Instructions for exercises provided during therapy sessions    ASSESSMENT     Pt presents today with reports of improved walking tolerance without boot after experiencing extreme calf pain for 2 days after last treatment session. Continuation of progression of dynamic foot and ankle strengthening, balance, and weight bearing exercises with good tolerance and no adverse effects. Continue to progress as tolerated.      " Aide Is progressing well towards her goals.     Pt prognosis is Good.     Pt will continue to benefit from skilled outpatient physical therapy to address the deficits listed in the problem list box on initial evaluation, provide pt/family education and to maximize pt's level of independence in the home and community environment.     Pt's spiritual, cultural and educational needs considered and pt agreeable to plan of care and goals.     Anticipated barriers to physical therapy: : s/p Right Brostrom lateral ligament reconstruction with Artelon    Goals:   Short Term Goals: 4 weeks   1. The patient with report compliance with HEP to maximize functional outcomes. Progressing, Not Met  2. The patient will demonstrate increase in BLE MMT by 1/2 grade to improve pt's functional mobility. Progressing, Not Met  3. The patient will decrease pain level by 50% in order to improve QOL. Progressing, Not Met     Long Term Goals: 12 weeks   1. The patient will demonstrate understanding and performance of advanced HEP to allow for independence once discharged from therapy. Progressing, met 7-29-22  2. 2. The patient will demonstrate increase in BLE MMT by 1 grade to improve pt's functional mobility. Progressing,  Not Met 7-29-22  3. The patient will have Right ankle range of motion WFL in order to improve her functional mobility. Progressing, Met 7-29-22  4. The patient will be able to walk for 15' w/o AD in order to improve pt's ability to perform ADLs. Progressing, Not Met 7-29-22  5. Pt will be able to sleep through the night w/o waking from pain in order to improve her QOL. Progressing, Not Met 7-29-22    PLAN     Continue to progress ROM and strength as tolerated. Recommended wearing tennis shoes for next visit for WB activities.       Anmol Ortega, PT

## 2022-08-30 ENCOUNTER — CLINICAL SUPPORT (OUTPATIENT)
Dept: REHABILITATION | Facility: HOSPITAL | Age: 52
End: 2022-08-30
Attending: PHYSICIAN ASSISTANT
Payer: MEDICAID

## 2022-08-30 DIAGNOSIS — M25.571 SINUS TARSI SYNDROME, RIGHT: Primary | ICD-10-CM

## 2022-08-30 DIAGNOSIS — R26.2 DIFFICULTY WALKING: ICD-10-CM

## 2022-08-30 DIAGNOSIS — M25.671 DECREASED RANGE OF MOTION OF RIGHT ANKLE: ICD-10-CM

## 2022-08-30 PROCEDURE — 97110 THERAPEUTIC EXERCISES: CPT | Mod: PN

## 2022-08-30 NOTE — PROGRESS NOTES
[facility]   Physical Therapy Treatment Note     Name: Aide Almaraz  [facility] Number: [id number]    Therapy Diagnosis:   Encounter Diagnoses   Name Primary?    Sinus tarsi syndrome, right Yes    Difficulty walking     Decreased range of motion of right ankle        Physician: Pita Kelly PA-C    Visit Date: 8/30/2022    Physician Orders: PT Eval and Treat   Medical Diagnosis from Referral: History of ankle surgery [Z98.890], Ankle instability, right [M25.371]  Evaluation Date: 5/26/2022  Authorization Period Expiration: 12/31/2022  Plan of Care Expiration: Updated to 09/30/2022  Progress Note Due: 09/30/2022  Visit # / Visits authorized: 8/20 (+Evaluation)    PTA Visit #: 0/5     Time In: 10:00 am  Time Out: 11:00 am  Total Billable Time: 30 minutes of 1:1 (2 TE)      Precautions: Diabetes, Partial WB, s/p Right ankle arthroscopy and extensive debridment;  Right Brostrom lateral ligament reconstruction with Artelon      Weight bearing: Weight bearing as tolerated right leg  Immobilization: Tall boot when mobilizing; may remove for sleep and hygiene and and then wean out of boot 10 weeks postop  Therapy: Continue PT- note sent to PT with updates    DOS: 4/18/2022.    POW: 14 weeks and 4 days as of 08/30/2022    SUBJECTIVE     Pt reports she has not been wearing the boot much at all and is getting around much better. It is still sore most of the time, but she feels like she is walking normal    :She was compliant with home exercise program.  Response to previous treatment: increased pain  Functional change: Ongoing    Pain: 5/10  Location: right lateral ankle      OBJECTIVE         Objective measurements taken on 8/302022. Improvements bolded below:     Range of Motion: AROM:     Ankle Right Left   Dorsiflexion 10 degrees no pain 10  degrees   Plantarflexion 39  degrees no pain  40  degrees   Inversion 33  degrees No pain  35  degrees   Eversion 30  degrees no pain 30  degrees     "     Ankle Right Left   Dorsiflexion 4+/5 5/5   Plantarflexion 4/5 5/5   Inversion 4+/5 5/5   Eversion 4+/5 5/5          Joint Mobility: normal mobility, no differences L vs R     Palpation: (+) tender to moderate palpation along surgical incision       Treatment     Aide received the treatments listed below:      Manual therapy techniques: Joint mobilizations were applied to the: R ankle for 10 minutes, including:  MTP mobilization - NP  PROM  Posterior and anterior talocrural mobs grade III/IV  Talocrural disctraction      Therapeutic exercises to develop strength, endurance and ROM for 45  minutes including:    R-bike x 5 min  Ankle inv/ev/DF: 20x, GTB  Plantarflexion Blue TB 20x  Seated marbles  1 Trial  [place] 2 min   Standing Gastroc stretch 3x30 sec   Standing soleus stretch 3x30 sec   Standing Tilt board (black elysia band) 30x    Seated Heel Raises: 3x10 10lb kettle bell  Weight shifting barefoot on airex 20x3"  DF mob on step 20x5"  tandem balance on airex 3x30" ea   +Heel raises on airex 2x15  +SLB 4x20"  +Hip abd/ext standing on airex 2x10   + ball toss in tandem stance x20 fwd and each side      NP:   Seated toe raises 3x10  Seated calf raises 3x10  Seated Towel Scrunches: 3x10, 5" holds  Towel Eversion: 3 rounds    Patient Education and Home Exercises     Home Exercises Provided and Patient Education Provided     Education provided:   - Importance of continuing HEP to supplement therapy sessions.    Written Home Exercises Provided: yes. Exercises were reviewed and Aide was able to demonstrate them prior to the end of the session.  Aide demonstrated good  understanding of the education provided. See EMR under Patient Instructions for exercises provided during therapy sessions    ASSESSMENT     Pt presents today with continued complaints of pain, but with reports of significant improvements in mobility and gait tolerance. Reassessment was performed with pt demo gains in R ankle strength, " ROM, and joint mobility, min difference L vs R. Improvements in static and dynamic balance as well, but still with poor balance compared to no surgical leg. Continued and progressed dynamic LE balance and strengthening exercises with good tolerance and no adverse effects. Continue to progress as tolerated.      Aide Is progressing well towards her goals.     Pt prognosis is Good.     Pt will continue to benefit from skilled outpatient physical therapy to address the deficits listed in the problem list box on initial evaluation, provide pt/family education and to maximize pt's level of independence in the home and community environment.     Pt's spiritual, cultural and educational needs considered and pt agreeable to plan of care and goals.     Anticipated barriers to physical therapy: : s/p Right Brostrom lateral ligament reconstruction with Artelon    Goals:   Short Term Goals: 4 weeks   1. The patient with report compliance with HEP to maximize functional outcomes. Progressing, Not Met  2. The patient will demonstrate increase in BLE MMT by 1/2 grade to improve pt's functional mobility. Progressing, Not Met  3. The patient will decrease pain level by 50% in order to improve QOL. Progressing, Not Met     Long Term Goals: 12 weeks   1. The patient will demonstrate understanding and performance of advanced HEP to allow for independence once discharged from therapy. Progressing, met 7-29-22  2. 2. The patient will demonstrate increase in BLE MMT by 1 grade to improve pt's functional mobility. Progressing,  Not Met 7-29-22  3. The patient will have Right ankle range of motion WFL in order to improve her functional mobility. Progressing, Met 7-29-22  4. The patient will be able to walk for 15' w/o AD in order to improve pt's ability to perform ADLs. Progressing, Not Met 7-29-22  5. Pt will be able to sleep through the night w/o waking from pain in order to improve her QOL. Progressing, Not Met 7-29-22    PLAN      Continue to progress ROM and strength as tolerated. Recommended wearing tennis shoes for next visit for WB activities.       Anmol Ortega, PT

## 2022-09-13 RX ORDER — GABAPENTIN 800 MG/1
800 TABLET ORAL 3 TIMES DAILY
Qty: 90 TABLET | Refills: 0 | Status: CANCELLED | OUTPATIENT
Start: 2022-09-13 | End: 2022-10-13

## 2022-09-13 RX ORDER — NAPROXEN 500 MG/1
500 TABLET ORAL 2 TIMES DAILY
Qty: 60 TABLET | Refills: 0 | Status: CANCELLED | OUTPATIENT
Start: 2022-09-13 | End: 2022-10-13

## 2022-09-16 RX ORDER — GABAPENTIN 800 MG/1
800 TABLET ORAL 3 TIMES DAILY
Qty: 90 TABLET | Refills: 0 | Status: SHIPPED | OUTPATIENT
Start: 2022-09-16 | End: 2022-11-21 | Stop reason: SDUPTHER

## 2022-09-16 RX ORDER — NAPROXEN 500 MG/1
500 TABLET ORAL 2 TIMES DAILY
Qty: 60 TABLET | Refills: 0 | Status: SHIPPED | OUTPATIENT
Start: 2022-09-16 | End: 2022-11-18

## 2022-09-22 ENCOUNTER — CLINICAL SUPPORT (OUTPATIENT)
Dept: REHABILITATION | Facility: HOSPITAL | Age: 52
End: 2022-09-22
Attending: PHYSICIAN ASSISTANT
Payer: MEDICAID

## 2022-09-22 DIAGNOSIS — M25.571 SINUS TARSI SYNDROME, RIGHT: Primary | ICD-10-CM

## 2022-09-22 DIAGNOSIS — M25.671 DECREASED RANGE OF MOTION OF RIGHT ANKLE: ICD-10-CM

## 2022-09-22 DIAGNOSIS — R26.2 DIFFICULTY WALKING: ICD-10-CM

## 2022-09-22 PROCEDURE — 97140 MANUAL THERAPY 1/> REGIONS: CPT | Mod: PN

## 2022-09-22 PROCEDURE — 97110 THERAPEUTIC EXERCISES: CPT | Mod: PN

## 2022-09-22 NOTE — PROGRESS NOTES
[facility]   Physical Therapy Discharge Note     Name: Aide Almaraz  [facility] Number: [id number]    Therapy Diagnosis:   Encounter Diagnoses   Name Primary?    Sinus tarsi syndrome, right Yes    Difficulty walking     Decreased range of motion of right ankle          Physician: Pita Kelly PA-C    Visit Date: 9/22/2022    Physician Orders: PT Eval and Treat   Medical Diagnosis from Referral: History of ankle surgery [Z98.890], Ankle instability, right [M25.371]  Evaluation Date: 5/26/2022  Authorization Period Expiration: 12/31/2022  Plan of Care Expiration: Updated to 10/30/2022  Progress Note Due: 09/30/2022  Visit # / Visits authorized: 9/20 (+Evaluation)    PTA Visit #: 0/5     Time In: 0230 PM  Time Out: 0311 PM  Total Billable Time: 41 minutes of     Precautions: Diabetes, Partial WB, s/p Right ankle arthroscopy and extensive debridment;  Right Brostrom lateral ligament reconstruction with Artelon      Weight bearing: Weight bearing as tolerated right leg  Immobilization: Tall boot when mobilizing; may remove for sleep and hygiene and and then wean out of boot 10 weeks postop  Therapy: Continue PT- note sent to PT with updates    DOS: 4/18/2022.    POW: 14 weeks and 4 days as of 09/22/2022    SUBJECTIVE     Pt reports She has been walking without boot on which has been fine for her. However ,continues reporting swelling and pain at site of incision    :She was compliant with home exercise program.  Response to previous treatment: increased pain  Functional change: Ongoing    Pain: 5/10  Location: right lateral ankle      OBJECTIVE   Time taken to complete : 10 minutes      dion of Motion: AROM:     Ankle Right Left   Dorsiflexion 10 degrees minor pain 10  degrees   Plantarflexion 40  degrees minor pain  40  degrees   Inversion 35  degrees No pain  35  degrees   Eversion 30  degrees with pain 15  degrees         Ankle Right Left   Dorsiflexion 4+/5 4+/5   Plantarflexion  "4+/5 4+/5   Inversion 4+/5 4+/5   Eversion 4+/5 4+/5          Joint Mobility: Normal joint play R ankle      Palpation: Some tenderness on ventral aspect of R ankle    Treatment     Aide received the treatments listed below:      Manual therapy techniques: Joint mobilizations were applied to the: R ankle for 10 minutes, including:  MTP mobilization   PROM  Posterior and anterior talocrural mobs grade III/IV  Talocrural disctraction      Therapeutic exercises to develop strength, endurance and ROM for 21  minutes including:    R-bike x 6 min  Long sitting gastroc stretch w/ towel 3 x 30 sec  4 way ankle strengthening w/ GTB 1x 15 each way  Seated heel raises with 15# KB 2 x 15  B calf stretch on incline 3 x 30 sec  SL balance on airex 3 x 30 sec R LE  Heel raises on airex 2x15        NP:   Seated toe raises 3x10  Seated calf raises 3x10  Seated Towel Scrunches: 3x10, 5" holds  Towel Eversion: 3 rounds  eated marbles  1 Trial    tandem balance on airex 3x30" ea   Weight shifting barefoot on airex 20x3"    Ankle inv/ev/DF: 20x, GTB  Plantarflexion Blue TB 20x    Tigre 2 min   Standing Gastroc stretch 3x30 sec   Standing soleus stretch 3x30 sec   Standing Tilt board (black elysia band) 30x    Seated Heel Raises: 3x10 10lb kettle bell    DF mob on step 20x5"      +SLB 4x20"  +Hip abd/ext standing on airex 2x10   + ball toss in tandem stance x20 fwd and each side    Patient Education and Home Exercises     Home Exercises Provided and Patient Education Provided     Education provided:   - Importance of continuing HEP to supplement therapy sessions.    Written Home Exercises Provided: yes. Exercises were reviewed and Aide was able to demonstrate them prior to the end of the session.  Aide demonstrated good  understanding of the education provided. See EMR under Patient Instructions for exercises provided during therapy sessions    ASSESSMENT       Pt tolerated tx well. Continued with general ankle " strengthening and introduced to weighted seated heel raises with positive response and no adverse effects. Therapist provided verbal/tactile cues to maintain proper form. Pt reported no adverse effects to exercises.       Patient was discharged today. Patient feels she has made good recovery and demonstrated improvement in strength, ROM and overall gait biomechanics. Patient has met all goals as of today.        Aide Is progressing well towards her goals.     Pt prognosis is Good.     Pt will continue to benefit from skilled outpatient physical therapy to address the deficits listed in the problem list box on initial evaluation, provide pt/family education and to maximize pt's level of independence in the home and community environment.     Pt's spiritual, cultural and educational needs considered and pt agreeable to plan of care and goals.     Anticipated barriers to physical therapy: : s/p Right Brostrom lateral ligament reconstruction with Artelon    Goals:   Short Term Goals: 4 weeks   1. The patient with report compliance with HEP to maximize functional outcomes. Met 09/22/2022  2. The patient will demonstrate increase in BLE MMT by 1/2 grade to improve pt's functional mobility. Met 09/22/2022  3. The patient will decrease pain level by 50% in order to improve QOL. Met 09/22/2022  Long Term Goals: 12 weeks   1. The patient will demonstrate understanding and performance of advanced HEP to allow for independence once discharged from therapy. Met 09/22/2022  2. 2. The patient will demonstrate increase in BLE MMT by 1 grade to improve pt's functional mobility. Met 09/22/2022  3. The patient will have Right ankle range of motion WFL in order to improve her functional mobility. Met 09/22/2022  4. The patient will be able to walk for 15' w/o AD in order to improve pt's ability to perform ADLs. Met 09/22/2022  5. Pt will be able to sleep through the night w/o waking from pain in order to improve her QOL. Met  09/22/2022    PLAN     Patient discharged today.      Huang Bocanegra, PT

## 2022-11-04 ENCOUNTER — TELEPHONE (OUTPATIENT)
Dept: ORTHOPEDICS | Facility: CLINIC | Age: 52
End: 2022-11-04
Payer: MEDICAID

## 2022-11-04 NOTE — TELEPHONE ENCOUNTER
Spoke to pt and r/s her appt to [place] location, pt verbalized understanding.----- Message from Doreen Jackson sent at 11/4/2022  8:47 AM CDT -----  Regarding: Appt Post Op  Contact: @[phone]  Pt requesting a call back regarding re scheduling her post op appt that was missed on 10/25/22.  Please call to discuss further.

## 2022-11-15 ENCOUNTER — OFFICE VISIT (OUTPATIENT)
Dept: ORTHOPEDICS | Facility: CLINIC | Age: 52
End: 2022-11-15
Payer: MEDICAID

## 2022-11-15 DIAGNOSIS — M67.01 ACHILLES TENDON CONTRACTURE, RIGHT: ICD-10-CM

## 2022-11-15 DIAGNOSIS — M25.371 ANKLE INSTABILITY, RIGHT: Primary | ICD-10-CM

## 2022-11-15 DIAGNOSIS — Z98.890 HISTORY OF ANKLE SURGERY: ICD-10-CM

## 2022-11-15 PROCEDURE — 1159F MED LIST DOCD IN RCRD: CPT | Mod: CPTII,,, | Performed by: ORTHOPAEDIC SURGERY

## 2022-11-15 PROCEDURE — 99213 PR OFFICE/OUTPT VISIT, EST, LEVL III, 20-29 MIN: ICD-10-PCS | Mod: S$PBB,,, | Performed by: ORTHOPAEDIC SURGERY

## 2022-11-15 PROCEDURE — 99213 OFFICE O/P EST LOW 20 MIN: CPT | Mod: PBBFAC,PO | Performed by: ORTHOPAEDIC SURGERY

## 2022-11-15 PROCEDURE — 99213 OFFICE O/P EST LOW 20 MIN: CPT | Mod: S$PBB,,, | Performed by: ORTHOPAEDIC SURGERY

## 2022-11-15 PROCEDURE — 1159F PR MEDICATION LIST DOCUMENTED IN MEDICAL RECORD: ICD-10-PCS | Mod: CPTII,,, | Performed by: ORTHOPAEDIC SURGERY

## 2022-11-15 PROCEDURE — 99999 PR PBB SHADOW E&M-EST. PATIENT-LVL III: ICD-10-PCS | Mod: PBBFAC,,, | Performed by: ORTHOPAEDIC SURGERY

## 2022-11-15 PROCEDURE — 99999 PR PBB SHADOW E&M-EST. PATIENT-LVL III: CPT | Mod: PBBFAC,,, | Performed by: ORTHOPAEDIC SURGERY

## 2022-11-15 NOTE — PROGRESS NOTES
Subjective:   Chief complaint: Follow-up right ankle.    HPI:   Aide Almaraz is a 52 y.o. female who presents today for follow-up.  Pain is 7/10.  Returns today to report multiple set backs in her recovery - COVID, FLU, PNA and unfortunately challenges in PT as well along with this.  No instability but ongoing ankle pain.    ROS:  Musculoskeletal: per HPI     Objective:   Exam:  There were no vitals filed for this visit.  General: No acute distress, well-appearing  Musculoskeletal: ankle stable to stress testing, incisions benign and no sensitivity, intact peroneals.    Imaging:  None          Assessment:     1. Ankle instability, right    2. Achilles tendon contracture, right    3. History of ankle surgery         Patient is seen for a postop visit (>90 days postop) with uncertain prognosis.    Data: none    Treatment plan: Low risk of morbidity from treatment plan     Discussed strategies for optimization her therapeutic response to surgery and therapy.  Will try a new PT option - one with whom patient is familiar from prior surgery with [facility].     Plan:       1.  Therapy: Continue PT - new order placed  2.  Symptomatic treatment: No changes made  3.  Restrictions: Advance activity as tolerated, use pain as guide  4.  Brace/orthotics/etc: none  5.  Follow-up: 3 months with 3v right ankle standing     Orders Placed This Encounter   Procedures    Ambulatory referral/consult to Physical/Occupational Therapy     Standing Status:   Future     Standing Expiration Date:   12/15/2023     Referral Priority:   Routine     Referral Type:   Physical Medicine     Referral Reason:   Specialty Services Required     Requested Specialty:   Physical Therapy     Number of Visits Requested:   1       Past Medical History:   Diagnosis Date    Anemia     Celiac disease     Diabetes mellitus     Gall stones     Hypertension     PTSD (post-traumatic stress disorder)     Supraventricular tachycardia 2001    Yeast infection         Past Surgical History:   Procedure Laterality Date    ABDOMINAL SURGERY      Gastric sleeve    ANKLE FUSION      ARTHROSCOPY OF ANKLE Right 2022    Procedure: ARTHROSCOPY, ANKLE;  Surgeon: Tessa Garcia MD;  Location: [facility] OR;  Service: Orthopedics;  Laterality: Right;    BREAST SURGERY       SECTION      placenta previa    CHOLECYSTECTOMY  2009    EXCISIONAL BIOPSY Left 2019    Procedure: EXCISIONAL BIOPSY LEFT with SEED (CONSENT AM OF) 1.0 hr case;  Surgeon: Flor Rosas MD;  Location: [address];  Service: General;  Laterality: Left;    gastric      gastric sleeve      pt reported     HERNIA REPAIR      HYSTERECTOMY  2001    Inguinal hernia      REPAIR OF LIGAMENT OF ANKLE Right 2022    Procedure: REPAIR, LIGAMENT, ANKLE;  Surgeon: Tessa Garcia MD;  Location: [facility];  Service: Orthopedics;  Laterality: Right;  Single Shot    SKIN SURGERY      Kasi Gómez       Family History   Problem Relation Age of Onset    Heart disease Father     Diabetes Mother         hypoglycemic    Breast cancer Maternal Aunt     Breast cancer Sister     Breast cancer Paternal Aunt     Ovarian cancer Neg Hx        Social History     Socioeconomic History    Marital status: Single    Number of children: 2   Tobacco Use    Smoking status: Never    Smokeless tobacco: Never   Substance and Sexual Activity    Alcohol use: Yes     Comment: rarely    Drug use: No    Sexual activity: Not Currently     Partners: Male     Birth control/protection: Surgical

## 2022-11-15 NOTE — PATIENT INSTRUCTIONS
Today, you were seen for aftercare following surgery    We decided the next step(s) in in your post-surgical care is/are  Weight bearing: Weight bearing as tolerated right leg  Immobilization: None needed  Therapy: PT referral placed- start PT [facility]  Blood clot prevention: None needed; encouraged mobilization  Wound care: None  Showering: No restrictions.  Pain meds: No refills needed  Bone health: Recommend balanced diet  Follow-up: Recheck in 2 months; will need no x-rays     Thank you for allowing me to participate in your care.

## 2022-11-21 ENCOUNTER — TELEPHONE (OUTPATIENT)
Dept: ORTHOPEDICS | Facility: CLINIC | Age: 52
End: 2022-11-21
Payer: MEDICAID

## 2022-11-21 ENCOUNTER — NURSE TRIAGE (OUTPATIENT)
Dept: ADMINISTRATIVE | Facility: CLINIC | Age: 52
End: 2022-11-21
Payer: MEDICAID

## 2022-11-21 DIAGNOSIS — G89.18 POST-OP PAIN: Primary | ICD-10-CM

## 2022-11-21 RX ORDER — NAPROXEN 500 MG/1
500 TABLET ORAL EVERY 12 HOURS PRN
Qty: 60 TABLET | Refills: 0 | Status: SHIPPED | OUTPATIENT
Start: 2022-11-21 | End: 2022-12-30

## 2022-11-21 RX ORDER — GABAPENTIN 800 MG/1
800 TABLET ORAL 3 TIMES DAILY
Qty: 90 TABLET | Refills: 0 | Status: SHIPPED | OUTPATIENT
Start: 2022-11-21 | End: 2023-01-19 | Stop reason: SDUPTHER

## 2022-11-21 NOTE — TELEPHONE ENCOUNTER
Informed her the rxs were sent to ----- Message from Carson Watts MD sent at 11/21/2022  1:07 PM CST -----  Regarding: RE: PT NEEDS MEDS SENT TO [facility]  Contact: pt  Okay it is done    ----- Message -----  From: Lana Smiley MA  Sent: 11/21/2022  12:13 PM CST  To: Carson Watts MD  Subject: FW: PT NEEDS MEDS SENT TO [facility]            Can you refill these meds at [facility]  ----- Message -----  From: Edward Gilliland  Sent: 11/21/2022  12:01 PM CST  To: Jose Zarate Staff  Subject: PT NEEDS MEDS SENT TO [facility]                gabapentin (NEURONTIN) 800 MG tablet  naproxen (NAPROSYN) 500      Confirmed contact info below:  Contact Name: Aide Almaraz  Phone Number: [phone]

## 2022-11-22 NOTE — TELEPHONE ENCOUNTER
Developed asthma after having COVID and now having SOB. Has two rescue inhalers and Symbicort but hasn't used them yet because unsure if SOB is asthma. SOB x 2 days. Used inhaler twice yesterday and Symbicort with improvement but SOB back today.     @2020 instructed pt to take 4 puffs of albuterol now. Spo2 92-95%. Dental surgery x 4 days ago to extract teeth. Had general anesthesia.     Advised to do rescue inhaler every 20 min x 3, if no relief, go to ED. If completely resolved call back to update.   Reason for Disposition   [1] Wheezing or coughing AND [2] hasn't used neb or inhaler (up to 3 treatments given 20 minutes apart) AND [3] it's available    Additional Information   Negative: SEVERE asthma attack (e.g., struggling for each breath, speaks in single words, loud wheezes, pulse >120)  (RED  Zone)   Negative: Bluish (or gray) lips or face now   Negative: Difficult to awaken or acting confused (e.g., disoriented, slurred speech)   Negative: Passed out (i.e., lost consciousness, collapsed and was not responding)   Negative: Wheezing started suddenly after medicine, an allergic food, or bee sting   Negative: Sounds like a life-threatening emergency to the triager   Negative: [1] MODERATE asthma attack (e.g., SOB at rest, speaks in phrases, audible wheezes) AND [2] doesn't have neb or inhaler available   Negative: Peak flow rate less than 50% of baseline level  (RED  Zone)   Negative: Oxygen level (e.g., pulse oximetry) 90 percent or lower   Negative: [1] Hospitalized before with asthma AND [2] feels the same now   Negative: Chest pain   Negative: [1] MODERATE asthma attack (e.g., SOB at rest, speaks in phrases, audible wheezes) AND [2] not resolved after 2 or 3 inhaler or nebulizer treatments given 20 minutes apart   Negative: Quick-relief asthma medicine (e.g., albuterol /salbutamol, levalbuterol by inhaler or nebulizer) is needed more frequently than every 4 hours to keep you comfortable   Negative: Fever >  103 F (39.4 C)   Negative: Patient sounds very sick or weak to the triager   Negative: [1] Continuous (nonstop) coughing AND [2] keeps from working or sleeping AND [3] not improved after 2 or 3 inhaler or nebulizer treatments given 20 minutes apart   Negative: [1] Fever > 101 F (38.3 C) AND [2] age > 60 years    Protocols used: Asthma Attack-A-AH

## 2022-12-07 ENCOUNTER — TELEPHONE (OUTPATIENT)
Dept: ORTHOPEDICS | Facility: CLINIC | Age: 52
End: 2022-12-07
Payer: MEDICAID

## 2022-12-12 ENCOUNTER — TELEPHONE (OUTPATIENT)
Dept: ORTHOPEDICS | Facility: CLINIC | Age: 52
End: 2022-12-12
Payer: MEDICAID

## 2022-12-12 NOTE — TELEPHONE ENCOUNTER
Called patient to reschedule appointment on 1/3/23 to 2/7/23  [place] location [address] [facility], 5th floor check in at desk . Patient agreed and was pleasant.    Patient still has not gotten into PT at [facility] - patient went to facility and they told her that they would call her back. Asked patient to please let us know if we need to try to get her in at [facility].instead.    Padilla Trejo MS, OTC   Sports Medicine Assistant   [facility]  (P) [phone]  (F) [phone]

## 2023-01-19 ENCOUNTER — PATIENT MESSAGE (OUTPATIENT)
Dept: ORTHOPEDICS | Facility: CLINIC | Age: 53
End: 2023-01-19
Payer: MEDICAID

## 2023-01-19 ENCOUNTER — TELEPHONE (OUTPATIENT)
Dept: ORTHOPEDICS | Facility: CLINIC | Age: 53
End: 2023-01-19
Payer: MEDICAID

## 2023-01-19 DIAGNOSIS — G89.18 POST-OP PAIN: ICD-10-CM

## 2023-01-19 RX ORDER — GABAPENTIN 800 MG/1
800 TABLET ORAL 3 TIMES DAILY
Qty: 90 TABLET | Refills: 0 | Status: SHIPPED | OUTPATIENT
Start: 2023-01-19 | End: 2023-01-19

## 2023-01-19 RX ORDER — NAPROXEN 500 MG/1
500 TABLET ORAL 2 TIMES DAILY PRN
Qty: 120 TABLET | Refills: 1 | Status: SHIPPED | OUTPATIENT
Start: 2023-01-19 | End: 2023-01-19

## 2023-01-19 RX ORDER — NAPROXEN 500 MG/1
500 TABLET ORAL 2 TIMES DAILY PRN
Qty: 120 TABLET | Refills: 1 | Status: SHIPPED | OUTPATIENT
Start: 2023-01-19 | End: 2023-05-19

## 2023-01-19 RX ORDER — GABAPENTIN 800 MG/1
800 TABLET ORAL 3 TIMES DAILY
Qty: 90 TABLET | Refills: 1 | Status: SHIPPED | OUTPATIENT
Start: 2023-01-19 | End: 2023-07-20 | Stop reason: SDUPTHER

## 2023-01-31 DIAGNOSIS — M25.571 ACUTE RIGHT ANKLE PAIN: Primary | ICD-10-CM

## 2023-02-07 ENCOUNTER — TELEPHONE (OUTPATIENT)
Dept: ORTHOPEDICS | Facility: CLINIC | Age: 53
End: 2023-02-07
Payer: MEDICAID

## 2023-02-07 DIAGNOSIS — M25.371 ANKLE INSTABILITY, RIGHT: Primary | ICD-10-CM

## 2023-02-07 DIAGNOSIS — M67.01 ACHILLES TENDON CONTRACTURE, RIGHT: ICD-10-CM

## 2023-02-07 NOTE — TELEPHONE ENCOUNTER
patient was no show today. She is unable to get into PT at the location she requested externally. Sent order to [facility] facility for patient to get scheduled with 6 wk f/u.    Padilla Trejo MS, OTC   Sports Medicine Assistant   [facility]  (P) [phone]  (F) [phone]

## 2023-03-10 NOTE — TELEPHONE ENCOUNTER
Patient called.  Reports that she is extremely anxious regarding biopsy of breat coming up on Monday.  Has taken klonapin prescribed, and is now out. Wanting a refill for anxiety this weekend and until procedure. Rx given.     Radha Alcocer    
none

## 2023-03-28 DIAGNOSIS — M25.371 ANKLE INSTABILITY, RIGHT: Primary | ICD-10-CM

## 2023-06-08 ENCOUNTER — HOSPITAL ENCOUNTER (OUTPATIENT)
Facility: HOSPITAL | Age: 53
Discharge: HOME | DRG: 872 | End: 2023-06-09
Attending: EMERGENCY MEDICINE | Admitting: STUDENT IN AN ORGANIZED HEALTH CARE EDUCATION/TRAINING PROGRAM
Payer: MEDICAID

## 2023-06-08 DIAGNOSIS — K04.7 DENTAL ABSCESS: ICD-10-CM

## 2023-06-08 DIAGNOSIS — R07.9 CHEST PAIN: ICD-10-CM

## 2023-06-08 DIAGNOSIS — R50.9 FEBRILE ILLNESS: ICD-10-CM

## 2023-06-08 DIAGNOSIS — I10 ESSENTIAL HYPERTENSION, BENIGN: ICD-10-CM

## 2023-06-08 DIAGNOSIS — R82.71 BACTERIURIA: ICD-10-CM

## 2023-06-08 DIAGNOSIS — G89.4 CHRONIC PAIN SYNDROME: ICD-10-CM

## 2023-06-08 DIAGNOSIS — A41.9 SEPSIS, DUE TO UNSPECIFIED ORGANISM, UNSPECIFIED WHETHER ACUTE ORGAN DYSFUNCTION PRESENT: Primary | ICD-10-CM
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
## 2023-06-08 PROBLEM — F11.90 CHRONIC, CONTINUOUS USE OF OPIOIDS: Status: ACTIVE | Noted: 2023-06-08

## 2023-06-08 PROBLEM — K90.0 CELIAC DISEASE: Status: ACTIVE | Noted: 2023-06-08

## 2023-06-08 PROBLEM — R29.6 FALLS: Status: ACTIVE | Noted: 2023-06-08

## 2023-06-08 PROBLEM — K05.6 PERIODONTAL DISEASE: Status: ACTIVE | Noted: 2023-06-08

## 2023-06-08 PROBLEM — J45.20 MILD INTERMITTENT ASTHMA WITHOUT COMPLICATION: Status: ACTIVE | Noted: 2023-06-08

## 2023-06-08 PROBLEM — K58.0 IRRITABLE BOWEL SYNDROME WITH DIARRHEA: Status: ACTIVE | Noted: 2023-06-08

## 2023-06-08 LAB
ALBUMIN SERPL BCP-MCNC: 4.2 G/DL (ref 3.5–5.2)
ALP SERPL-CCNC: 69 U/L (ref 55–135)
ALT SERPL W/O P-5'-P-CCNC: 14 U/L (ref 10–44)
AMPHET+METHAMPHET UR QL: NEGATIVE
ANION GAP SERPL CALC-SCNC: 11 MMOL/L (ref 8–16)
ASCENDING AORTA: 2.9 CM
AST SERPL-CCNC: 14 U/L (ref 10–40)
AV INDEX (PROSTH): 0.86
AV MEAN GRADIENT: 4 MMHG
AV PEAK GRADIENT: 6 MMHG
AV VALVE AREA: 2.67 CM2
AV VELOCITY RATIO: 0.84
BACTERIA #/AREA URNS HPF: ABNORMAL /HPF
BARBITURATES UR QL SCN>200 NG/ML: NEGATIVE
BASOPHILS # BLD AUTO: 0.04 K/UL (ref 0–0.2)
BASOPHILS NFR BLD: 0.7 % (ref 0–1.9)
BENZODIAZ UR QL SCN>200 NG/ML: ABNORMAL
BILIRUB SERPL-MCNC: 0.3 MG/DL (ref 0.1–1)
BILIRUB UR QL STRIP: NEGATIVE
BSA FOR ECHO PROCEDURE: 1.82 M2
BUN SERPL-MCNC: 7 MG/DL (ref 6–20)
BZE UR QL SCN: NEGATIVE
CALCIUM SERPL-MCNC: 9.7 MG/DL (ref 8.7–10.5)
CANNABINOIDS UR QL SCN: NEGATIVE
CHLORIDE SERPL-SCNC: 108 MMOL/L (ref 95–110)
CHOLEST SERPL-MCNC: 226 MG/DL (ref 120–199)
CHOLEST/HDLC SERPL: 4.2 {RATIO} (ref 2–5)
CLARITY UR: ABNORMAL
CO2 SERPL-SCNC: 24 MMOL/L (ref 23–29)
COLOR UR: YELLOW
CREAT SERPL-MCNC: 0.8 MG/DL (ref 0.5–1.4)
CREAT UR-MCNC: 274.6 MG/DL (ref 15–325)
CTP QC/QA: YES
CTP QC/QA: YES
CV ECHO LV RWT: 0.42 CM
DIFFERENTIAL METHOD: ABNORMAL
DOP CALC AO PEAK VEL: 1.23 M/S
DOP CALC AO VTI: 22.8 CM
DOP CALC LVOT AREA: 3.1 CM2
DOP CALC LVOT DIAMETER: 1.99 CM
DOP CALC LVOT PEAK VEL: 1.03 M/S
DOP CALC LVOT STROKE VOLUME: 60.93 CM3
DOP CALC MV VTI: 21.3 CM
DOP CALCLVOT PEAK VEL VTI: 19.6 CM
E WAVE DECELERATION TIME: 178.34 MSEC
E/A RATIO: 0.92
E/E' RATIO: 6.87 M/S
ECHO LV POSTERIOR WALL: 0.89 CM (ref 0.6–1.1)
EJECTION FRACTION: 60 %
EOSINOPHIL # BLD AUTO: 0 K/UL (ref 0–0.5)
EOSINOPHIL NFR BLD: 0.7 % (ref 0–8)
ERYTHROCYTE [DISTWIDTH] IN BLOOD BY AUTOMATED COUNT: 14 % (ref 11.5–14.5)
EST. GFR  (NO RACE VARIABLE): >60 ML/MIN/1.73 M^2
ETHANOL SERPL-MCNC: <10 MG/DL
FRACTIONAL SHORTENING: 35 % (ref 28–44)
GLUCOSE SERPL-MCNC: 105 MG/DL (ref 70–110)
GLUCOSE UR QL STRIP: NEGATIVE
HCT VFR BLD AUTO: 40.7 % (ref 37–48.5)
HDLC SERPL-MCNC: 54 MG/DL (ref 40–75)
HDLC SERPL: 23.9 % (ref 20–50)
HGB BLD-MCNC: 13.2 G/DL (ref 12–16)
HGB UR QL STRIP: NEGATIVE
HYALINE CASTS #/AREA URNS LPF: 0 /LPF
IMM GRANULOCYTES # BLD AUTO: 0.01 K/UL (ref 0–0.04)
IMM GRANULOCYTES NFR BLD AUTO: 0.2 % (ref 0–0.5)
INTERVENTRICULAR SEPTUM: 0.94 CM (ref 0.6–1.1)
IVC DIAMETER: 2.03 CM
IVRT: 91.34 MSEC
KETONES UR QL STRIP: ABNORMAL
LA MAJOR: 4.13 CM
LA MINOR: 4.78 CM
LA WIDTH: 4 CM
LACTATE SERPL-SCNC: 1.2 MMOL/L (ref 0.5–2.2)
LACTATE SERPL-SCNC: 2.5 MMOL/L (ref 0.5–2.2)
LDLC SERPL CALC-MCNC: 140.4 MG/DL (ref 63–159)
LEFT ATRIUM SIZE: 3.15 CM
LEFT ATRIUM VOLUME INDEX: 26.4 ML/M2
LEFT ATRIUM VOLUME: 47.46 CM3
LEFT INTERNAL DIMENSION IN SYSTOLE: 2.8 CM (ref 2.1–4)
LEFT VENTRICLE DIASTOLIC VOLUME INDEX: 45.59 ML/M2
LEFT VENTRICLE DIASTOLIC VOLUME: 82.06 ML
LEFT VENTRICLE MASS INDEX: 70 G/M2
LEFT VENTRICLE SYSTOLIC VOLUME INDEX: 16.5 ML/M2
LEFT VENTRICLE SYSTOLIC VOLUME: 29.64 ML
LEFT VENTRICULAR INTERNAL DIMENSION IN DIASTOLE: 4.28 CM (ref 3.5–6)
LEFT VENTRICULAR MASS: 125.15 G
LEUKOCYTE ESTERASE UR QL STRIP: ABNORMAL
LIPASE SERPL-CCNC: 18 U/L (ref 4–60)
LV LATERAL E/E' RATIO: 5.64 M/S
LV SEPTAL E/E' RATIO: 8.78 M/S
LVOT MG: 2.17 MMHG
LVOT MV: 0.68 CM/S
LYMPHOCYTES # BLD AUTO: 2.2 K/UL (ref 1–4.8)
LYMPHOCYTES NFR BLD: 36.7 % (ref 18–48)
MAGNESIUM SERPL-MCNC: 1.8 MG/DL (ref 1.6–2.6)
MCH RBC QN AUTO: 31.2 PG (ref 27–31)
MCHC RBC AUTO-ENTMCNC: 32.4 G/DL (ref 32–36)
MCV RBC AUTO: 96 FL (ref 82–98)
METHADONE UR QL SCN>300 NG/ML: NEGATIVE
MICROSCOPIC COMMENT: ABNORMAL
MONOCYTES # BLD AUTO: 0.4 K/UL (ref 0.3–1)
MONOCYTES NFR BLD: 7.2 % (ref 4–15)
MV MEAN GRADIENT: 2 MMHG
MV PEAK A VEL: 0.86 M/S
MV PEAK E VEL: 0.79 M/S
MV PEAK GRADIENT: 3 MMHG
MV STENOSIS PRESSURE HALF TIME: 51.72 MS
MV VALVE AREA BY CONTINUITY EQUATION: 2.86 CM2
MV VALVE AREA P 1/2 METHOD: 4.25 CM2
NEUTROPHILS # BLD AUTO: 3.3 K/UL (ref 1.8–7.7)
NEUTROPHILS NFR BLD: 54.5 % (ref 38–73)
NITRITE UR QL STRIP: NEGATIVE
NONHDLC SERPL-MCNC: 172 MG/DL
NRBC BLD-RTO: 0 /100 WBC
OPIATES UR QL SCN: ABNORMAL
PCP UR QL SCN>25 NG/ML: NEGATIVE
PH UR STRIP: 6 [PH] (ref 5–8)
PISA TR MAX VEL: 1.81 M/S
PLATELET # BLD AUTO: 351 K/UL (ref 150–450)
PMV BLD AUTO: 11 FL (ref 9.2–12.9)
POC MOLECULAR INFLUENZA A AGN: NEGATIVE
POC MOLECULAR INFLUENZA B AGN: NEGATIVE
POCT GLUCOSE: 109 MG/DL (ref 70–110)
POCT GLUCOSE: 160 MG/DL (ref 70–110)
POCT GLUCOSE: 84 MG/DL (ref 70–110)
POTASSIUM SERPL-SCNC: 3.7 MMOL/L (ref 3.5–5.1)
PROCALCITONIN SERPL IA-MCNC: <0.02 NG/ML
PROT SERPL-MCNC: 7.5 G/DL (ref 6–8.4)
PROT UR QL STRIP: ABNORMAL
PV PEAK VELOCITY: 0.76 CM/S
RA MAJOR: 4.45 CM
RA PRESSURE: 3 MMHG
RA WIDTH: 3.5 CM
RBC # BLD AUTO: 4.23 M/UL (ref 4–5.4)
RBC #/AREA URNS HPF: 5 /HPF (ref 0–4)
RIGHT VENTRICULAR END-DIASTOLIC DIMENSION: 3.34 CM
RV TISSUE DOPPLER FREE WALL SYSTOLIC VELOCITY 1 (APICAL 4 CHAMBER VIEW): 0.02 CM/S
SARS-COV-2 RDRP RESP QL NAA+PROBE: NEGATIVE
SINUS: 3.27 CM
SODIUM SERPL-SCNC: 143 MMOL/L (ref 136–145)
SP GR UR STRIP: 1.03 (ref 1–1.03)
SQUAMOUS #/AREA URNS HPF: 13 /HPF
STJ: 2.74 CM
TDI LATERAL: 0.14 M/S
TDI SEPTAL: 0.09 M/S
TDI: 0.12 M/S
TOXICOLOGY INFORMATION: ABNORMAL
TR MAX PG: 13 MMHG
TRICUSPID ANNULAR PLANE SYSTOLIC EXCURSION: 1.95 CM
TRIGL SERPL-MCNC: 158 MG/DL (ref 30–150)
TV REST PULMONARY ARTERY PRESSURE: 16 MMHG
URN SPEC COLLECT METH UR: ABNORMAL
UROBILINOGEN UR STRIP-ACNC: ABNORMAL EU/DL
WBC # BLD AUTO: 5.97 K/UL (ref 3.9–12.7)
WBC #/AREA URNS HPF: 10 /HPF (ref 0–5)

## 2023-06-08 PROCEDURE — 96366 THER/PROPH/DIAG IV INF ADDON: CPT

## 2023-06-08 PROCEDURE — 96372 THER/PROPH/DIAG INJ SC/IM: CPT | Mod: 59 | Performed by: STUDENT IN AN ORGANIZED HEALTH CARE EDUCATION/TRAINING PROGRAM

## 2023-06-08 PROCEDURE — 82962 GLUCOSE BLOOD TEST: CPT

## 2023-06-08 PROCEDURE — 80053 COMPREHEN METABOLIC PANEL: CPT | Performed by: PHYSICIAN ASSISTANT

## 2023-06-08 PROCEDURE — 80307 DRUG TEST PRSMV CHEM ANLYZR: CPT | Performed by: PHYSICIAN ASSISTANT

## 2023-06-08 PROCEDURE — 63600175 PHARM REV CODE 636 W HCPCS: Performed by: STUDENT IN AN ORGANIZED HEALTH CARE EDUCATION/TRAINING PROGRAM

## 2023-06-08 PROCEDURE — 11000001 HC ACUTE MED/SURG PRIVATE ROOM

## 2023-06-08 PROCEDURE — 99291 CRITICAL CARE FIRST HOUR: CPT

## 2023-06-08 PROCEDURE — 96365 THER/PROPH/DIAG IV INF INIT: CPT

## 2023-06-08 PROCEDURE — 25000003 PHARM REV CODE 250: Performed by: EMERGENCY MEDICINE

## 2023-06-08 PROCEDURE — G0378 HOSPITAL OBSERVATION PER HR: HCPCS

## 2023-06-08 PROCEDURE — 83690 ASSAY OF LIPASE: CPT | Performed by: PHYSICIAN ASSISTANT

## 2023-06-08 PROCEDURE — 87502 INFLUENZA DNA AMP PROBE: CPT

## 2023-06-08 PROCEDURE — 96376 TX/PRO/DX INJ SAME DRUG ADON: CPT

## 2023-06-08 PROCEDURE — 25500020 PHARM REV CODE 255: Performed by: EMERGENCY MEDICINE

## 2023-06-08 PROCEDURE — 84145 PROCALCITONIN (PCT): CPT | Performed by: PHYSICIAN ASSISTANT

## 2023-06-08 PROCEDURE — 25000003 PHARM REV CODE 250: Performed by: PHYSICIAN ASSISTANT

## 2023-06-08 PROCEDURE — 96367 TX/PROPH/DG ADDL SEQ IV INF: CPT

## 2023-06-08 PROCEDURE — 83735 ASSAY OF MAGNESIUM: CPT | Performed by: PHYSICIAN ASSISTANT

## 2023-06-08 PROCEDURE — 87040 BLOOD CULTURE FOR BACTERIA: CPT | Mod: 59 | Performed by: PHYSICIAN ASSISTANT

## 2023-06-08 PROCEDURE — 87086 URINE CULTURE/COLONY COUNT: CPT | Performed by: PHYSICIAN ASSISTANT

## 2023-06-08 PROCEDURE — 81000 URINALYSIS NONAUTO W/SCOPE: CPT | Mod: 59 | Performed by: PHYSICIAN ASSISTANT

## 2023-06-08 PROCEDURE — 63600175 PHARM REV CODE 636 W HCPCS: Performed by: PHYSICIAN ASSISTANT

## 2023-06-08 PROCEDURE — 85025 COMPLETE CBC W/AUTO DIFF WBC: CPT | Performed by: PHYSICIAN ASSISTANT

## 2023-06-08 PROCEDURE — 25000003 PHARM REV CODE 250: Performed by: STUDENT IN AN ORGANIZED HEALTH CARE EDUCATION/TRAINING PROGRAM

## 2023-06-08 PROCEDURE — 63600175 PHARM REV CODE 636 W HCPCS: Performed by: EMERGENCY MEDICINE

## 2023-06-08 PROCEDURE — 80061 LIPID PANEL: CPT | Performed by: PHYSICIAN ASSISTANT

## 2023-06-08 PROCEDURE — 99285 EMERGENCY DEPT VISIT HI MDM: CPT | Mod: 25

## 2023-06-08 PROCEDURE — 82077 ASSAY SPEC XCP UR&BREATH IA: CPT | Performed by: PHYSICIAN ASSISTANT

## 2023-06-08 PROCEDURE — 96361 HYDRATE IV INFUSION ADD-ON: CPT

## 2023-06-08 PROCEDURE — 83605 ASSAY OF LACTIC ACID: CPT | Mod: 91 | Performed by: PHYSICIAN ASSISTANT

## 2023-06-08 PROCEDURE — 96375 TX/PRO/DX INJ NEW DRUG ADDON: CPT

## 2023-06-08 PROCEDURE — 96374 THER/PROPH/DIAG INJ IV PUSH: CPT

## 2023-06-08 RX ORDER — SEMAGLUTIDE 1.34 MG/ML
1 INJECTION, SOLUTION SUBCUTANEOUS
COMMUNITY
Start: 2023-05-28

## 2023-06-08 RX ORDER — GABAPENTIN 400 MG/1
800 CAPSULE ORAL 3 TIMES DAILY
Status: DISCONTINUED | OUTPATIENT
Start: 2023-06-08 | End: 2023-06-09 | Stop reason: HOSPADM

## 2023-06-08 RX ORDER — OXYCODONE HYDROCHLORIDE 5 MG/1
10 TABLET ORAL EVERY 6 HOURS PRN
Status: DISCONTINUED | OUTPATIENT
Start: 2023-06-08 | End: 2023-06-08

## 2023-06-08 RX ORDER — NALOXONE HCL 0.4 MG/ML
0.02 VIAL (ML) INJECTION
Status: DISCONTINUED | OUTPATIENT
Start: 2023-06-08 | End: 2023-06-09 | Stop reason: HOSPADM

## 2023-06-08 RX ORDER — ACETAMINOPHEN 325 MG/1
650 TABLET ORAL EVERY 8 HOURS PRN
Status: DISCONTINUED | OUTPATIENT
Start: 2023-06-08 | End: 2023-06-09 | Stop reason: HOSPADM

## 2023-06-08 RX ORDER — OXYCODONE AND ACETAMINOPHEN 5; 325 MG/1; MG/1
1 TABLET ORAL
Status: DISCONTINUED | OUTPATIENT
Start: 2023-06-08 | End: 2023-06-08

## 2023-06-08 RX ORDER — SODIUM FLUORIDE 5 MG/G
CREAM DENTAL DAILY
COMMUNITY
Start: 2023-04-24

## 2023-06-08 RX ORDER — MORPHINE SULFATE 4 MG/ML
4 INJECTION, SOLUTION INTRAMUSCULAR; INTRAVENOUS
Status: COMPLETED | OUTPATIENT
Start: 2023-06-08 | End: 2023-06-08

## 2023-06-08 RX ORDER — ONDANSETRON 2 MG/ML
4 INJECTION INTRAMUSCULAR; INTRAVENOUS EVERY 8 HOURS PRN
Status: DISCONTINUED | OUTPATIENT
Start: 2023-06-08 | End: 2023-06-09 | Stop reason: HOSPADM

## 2023-06-08 RX ORDER — OXYCODONE HYDROCHLORIDE 5 MG/1
10 TABLET ORAL EVERY 4 HOURS PRN
Status: DISCONTINUED | OUTPATIENT
Start: 2023-06-08 | End: 2023-06-08

## 2023-06-08 RX ORDER — DEXTROSE 40 %
15 GEL (GRAM) ORAL
Status: DISCONTINUED | OUTPATIENT
Start: 2023-06-08 | End: 2023-06-09 | Stop reason: HOSPADM

## 2023-06-08 RX ORDER — SODIUM CHLORIDE 0.9 % (FLUSH) 0.9 %
10 SYRINGE (ML) INJECTION EVERY 12 HOURS PRN
Status: DISCONTINUED | OUTPATIENT
Start: 2023-06-08 | End: 2023-06-09 | Stop reason: HOSPADM

## 2023-06-08 RX ORDER — OXYCODONE HYDROCHLORIDE 5 MG/1
5 TABLET ORAL EVERY 4 HOURS PRN
Status: DISCONTINUED | OUTPATIENT
Start: 2023-06-08 | End: 2023-06-08

## 2023-06-08 RX ORDER — HYDROCODONE BITARTRATE AND ACETAMINOPHEN 10; 325 MG/1; MG/1
1 TABLET ORAL EVERY 4 HOURS PRN
Status: DISCONTINUED | OUTPATIENT
Start: 2023-06-08 | End: 2023-06-09

## 2023-06-08 RX ORDER — ONDANSETRON 2 MG/ML
4 INJECTION INTRAMUSCULAR; INTRAVENOUS
Status: COMPLETED | OUTPATIENT
Start: 2023-06-08 | End: 2023-06-08

## 2023-06-08 RX ORDER — OXYCODONE HYDROCHLORIDE 15 MG/1
15 TABLET ORAL EVERY 6 HOURS PRN
Status: DISCONTINUED | OUTPATIENT
Start: 2023-06-08 | End: 2023-06-08

## 2023-06-08 RX ORDER — OXYCODONE HYDROCHLORIDE 5 MG/1
5 TABLET ORAL EVERY 6 HOURS PRN
Status: DISCONTINUED | OUTPATIENT
Start: 2023-06-08 | End: 2023-06-08

## 2023-06-08 RX ORDER — DEXTROSE 40 %
30 GEL (GRAM) ORAL
Status: DISCONTINUED | OUTPATIENT
Start: 2023-06-08 | End: 2023-06-09 | Stop reason: HOSPADM

## 2023-06-08 RX ORDER — MORPHINE SULFATE 4 MG/ML
2 INJECTION, SOLUTION INTRAMUSCULAR; INTRAVENOUS
Status: COMPLETED | OUTPATIENT
Start: 2023-06-08 | End: 2023-06-08

## 2023-06-08 RX ORDER — GLUCAGON 1 MG
1 KIT INJECTION
Status: DISCONTINUED | OUTPATIENT
Start: 2023-06-08 | End: 2023-06-09 | Stop reason: HOSPADM

## 2023-06-08 RX ORDER — AMPICILLIN AND SULBACTAM 2; 1 G/1; G/1
3 INJECTION, POWDER, FOR SOLUTION INTRAMUSCULAR; INTRAVENOUS
Status: DISCONTINUED | OUTPATIENT
Start: 2023-06-08 | End: 2023-06-08

## 2023-06-08 RX ORDER — OXYCODONE AND ACETAMINOPHEN 10; 325 MG/1; MG/1
1 TABLET ORAL
COMMUNITY
Start: 2023-06-06

## 2023-06-08 RX ORDER — HYDROCODONE BITARTRATE AND ACETAMINOPHEN 5; 325 MG/1; MG/1
1 TABLET ORAL EVERY 4 HOURS PRN
Status: DISCONTINUED | OUTPATIENT
Start: 2023-06-08 | End: 2023-06-09

## 2023-06-08 RX ORDER — IBUPROFEN 800 MG/1
800 TABLET ORAL EVERY 8 HOURS PRN
COMMUNITY
Start: 2023-06-01

## 2023-06-08 RX ORDER — SEMAGLUTIDE 1.34 MG/ML
INJECTION, SOLUTION SUBCUTANEOUS
COMMUNITY
Start: 2023-03-28 | End: 2023-06-08 | Stop reason: DRUGHIGH

## 2023-06-08 RX ORDER — MORPHINE SULFATE 4 MG/ML
3 INJECTION, SOLUTION INTRAMUSCULAR; INTRAVENOUS ONCE
Status: COMPLETED | OUTPATIENT
Start: 2023-06-08 | End: 2023-06-08

## 2023-06-08 RX ORDER — ACETAMINOPHEN 500 MG
1000 TABLET ORAL
Status: COMPLETED | OUTPATIENT
Start: 2023-06-08 | End: 2023-06-08

## 2023-06-08 RX ORDER — OXYCODONE HYDROCHLORIDE 5 MG/1
5 TABLET ORAL
Status: DISCONTINUED | OUTPATIENT
Start: 2023-06-08 | End: 2023-06-08

## 2023-06-08 RX ORDER — ENOXAPARIN SODIUM 100 MG/ML
40 INJECTION SUBCUTANEOUS EVERY 24 HOURS
Status: DISCONTINUED | OUTPATIENT
Start: 2023-06-08 | End: 2023-06-09 | Stop reason: HOSPADM

## 2023-06-08 RX ORDER — AMOXICILLIN AND CLAVULANATE POTASSIUM 875; 125 MG/1; MG/1
1 TABLET, FILM COATED ORAL 2 TIMES DAILY
Status: ON HOLD | COMMUNITY
Start: 2023-06-06 | End: 2023-06-09 | Stop reason: HOSPADM

## 2023-06-08 RX ORDER — OXYCODONE HYDROCHLORIDE 5 MG/1
10 TABLET ORAL EVERY 4 HOURS PRN
Status: DISCONTINUED | OUTPATIENT
Start: 2023-06-08 | End: 2023-06-09

## 2023-06-08 RX ORDER — BISACODYL 5 MG
10 TABLET, DELAYED RELEASE (ENTERIC COATED) ORAL DAILY PRN
Status: DISCONTINUED | OUTPATIENT
Start: 2023-06-08 | End: 2023-06-09 | Stop reason: HOSPADM

## 2023-06-08 RX ORDER — IPRATROPIUM BROMIDE AND ALBUTEROL SULFATE 2.5; .5 MG/3ML; MG/3ML
3 SOLUTION RESPIRATORY (INHALATION) EVERY 4 HOURS PRN
Status: DISCONTINUED | OUTPATIENT
Start: 2023-06-08 | End: 2023-06-09 | Stop reason: HOSPADM

## 2023-06-08 RX ADMIN — SODIUM CHLORIDE, POTASSIUM CHLORIDE, SODIUM LACTATE AND CALCIUM CHLORIDE 2000 ML: 600; 310; 30; 20 INJECTION, SOLUTION INTRAVENOUS at 08:06

## 2023-06-08 RX ADMIN — MORPHINE SULFATE 3 MG: 4 INJECTION, SOLUTION INTRAMUSCULAR; INTRAVENOUS at 09:06

## 2023-06-08 RX ADMIN — GABAPENTIN 800 MG: 400 CAPSULE ORAL at 09:06

## 2023-06-08 RX ADMIN — MORPHINE SULFATE 4 MG: 4 INJECTION INTRAVENOUS at 03:06

## 2023-06-08 RX ADMIN — HYDROCODONE BITARTRATE AND ACETAMINOPHEN 1 TABLET: 10; 325 TABLET ORAL at 11:06

## 2023-06-08 RX ADMIN — OXYCODONE HYDROCHLORIDE 15 MG: 15 TABLET ORAL at 02:06

## 2023-06-08 RX ADMIN — OXYCODONE HYDROCHLORIDE 5 MG: 5 TABLET ORAL at 09:06

## 2023-06-08 RX ADMIN — ACETAMINOPHEN 1000 MG: 500 TABLET ORAL at 02:06

## 2023-06-08 RX ADMIN — ONDANSETRON 4 MG: 2 INJECTION INTRAMUSCULAR; INTRAVENOUS at 03:06

## 2023-06-08 RX ADMIN — AMPICILLIN SODIUM AND SULBACTAM SODIUM 3 G: 2; 1 INJECTION, POWDER, FOR SOLUTION INTRAMUSCULAR; INTRAVENOUS at 09:06

## 2023-06-08 RX ADMIN — PIPERACILLIN AND TAZOBACTAM 4.5 G: 4; .5 INJECTION, POWDER, LYOPHILIZED, FOR SOLUTION INTRAVENOUS; PARENTERAL at 08:06

## 2023-06-08 RX ADMIN — ENOXAPARIN SODIUM 40 MG: 40 INJECTION SUBCUTANEOUS at 04:06

## 2023-06-08 RX ADMIN — AMPICILLIN SODIUM AND SULBACTAM SODIUM 3 G: 2; 1 INJECTION, POWDER, FOR SOLUTION INTRAMUSCULAR; INTRAVENOUS at 04:06

## 2023-06-08 RX ADMIN — SODIUM CHLORIDE, POTASSIUM CHLORIDE, SODIUM LACTATE AND CALCIUM CHLORIDE 1000 ML: 600; 310; 30; 20 INJECTION, SOLUTION INTRAVENOUS at 03:06

## 2023-06-08 RX ADMIN — ONDANSETRON 4 MG: 2 INJECTION INTRAMUSCULAR; INTRAVENOUS at 09:06

## 2023-06-08 RX ADMIN — SODIUM CHLORIDE, POTASSIUM CHLORIDE, SODIUM LACTATE AND CALCIUM CHLORIDE 500 ML: 600; 310; 30; 20 INJECTION, SOLUTION INTRAVENOUS at 04:06

## 2023-06-08 RX ADMIN — MORPHINE SULFATE 2 MG: 4 INJECTION INTRAVENOUS at 08:06

## 2023-06-08 RX ADMIN — MORPHINE SULFATE 4 MG: 4 INJECTION INTRAVENOUS at 05:06

## 2023-06-08 RX ADMIN — IOHEXOL 60 ML: 350 INJECTION, SOLUTION INTRAVENOUS at 05:06

## 2023-06-08 RX ADMIN — GABAPENTIN 800 MG: 400 CAPSULE ORAL at 03:06

## 2023-06-08 RX ADMIN — OXYCODONE HYDROCHLORIDE 10 MG: 5 TABLET ORAL at 01:06

## 2023-06-08 NOTE — ASSESSMENT & PLAN NOTE
- reviewed as above under chronic pain syndrome  -See plan as above  -Needs f/u with outpatient pain management

## 2023-06-08 NOTE — ASSESSMENT & PLAN NOTE
- reviewed, Klonopin on 01/02/2023  - reviewed. Last filled oxycodone-acetaminophen on 05/07/23  -Per patient, has chronic pain from DJD. Follows with pain management outpatient   -Continue oxycodone PRN while inpatient

## 2023-06-08 NOTE — ASSESSMENT & PLAN NOTE
-History noted. Of note, patient is not on prednisone outpatient  -Diet controlled at this time   -Continue gluten free diet

## 2023-06-08 NOTE — SUBJECTIVE & OBJECTIVE
Past Medical History:   Diagnosis Date    Anemia     Celiac disease     Diabetes mellitus     Gall stones     Hypertension     PTSD (post-traumatic stress disorder)     Supraventricular tachycardia     Yeast infection        Past Surgical History:   Procedure Laterality Date    ABDOMINAL SURGERY      Gastric sleeve    ANKLE FUSION      ARTHROSCOPY OF ANKLE Right 2022    Procedure: ARTHROSCOPY, ANKLE;  Surgeon: Tessa Garcia MD;  Location: [facility] OR;  Service: Orthopedics;  Laterality: Right;    BREAST SURGERY       SECTION      placenta previa    CHOLECYSTECTOMY  2009    EXCISIONAL BIOPSY Left 2019    Procedure: EXCISIONAL BIOPSY LEFT with SEED (CONSENT AM OF) 1.0 hr case;  Surgeon: Flor Rosas MD;  Location: [address];  Service: General;  Laterality: Left;    gastric      gastric sleeve      pt reported     HERNIA REPAIR      HYSTERECTOMY  2001    Inguinal hernia  2004    REPAIR OF LIGAMENT OF ANKLE Right 2022    Procedure: REPAIR, LIGAMENT, ANKLE;  Surgeon: Tessa Garcia MD;  Location: [facility] OR;  Service: Orthopedics;  Laterality: Right;  Single Shot    SKIN SURGERY      Tumy Nixonck       Review of patient's allergies indicates:   Allergen Reactions    Latex, natural rubber Itching and Rash    Lidocaine Rash    Tramadol Hives and Rash       No current facility-administered medications on file prior to encounter.     Current Outpatient Medications on File Prior to Encounter   Medication Sig    albuterol (PROVENTIL/VENTOLIN HFA) 90 mcg/actuation inhaler Inhale 1-2 puffs into the lungs every 6 (six) hours as needed for Wheezing. Rescue    amoxicillin-clavulanate 875-125mg (AUGMENTIN) 875-125 mg per tablet Take 1 tablet by mouth 2 (two) times daily.    gabapentin (NEURONTIN) 800 MG tablet Take 1 tablet (800 mg total) by mouth 3 (three) times daily.    ibuprofen (ADVIL,MOTRIN) 800 MG tablet Take 800 mg by mouth every 8 (eight) hours as needed.    oxyCODONE-acetaminophen  (PERCOCET)  mg per tablet Take 1 tablet by mouth. TAKE 1 TABLET BY MOUTH TWICE DAILY TO THREE TIMES DAILY AS NEEDED FOR PAIN FOR 30 DAYS    OZEMPIC 1 mg/dose (4 mg/3 mL) Inject 1 mg into the skin every 7 days. Sundays    pantoprazole (PROTONIX) 40 MG tablet Take 1 tablet (40 mg total) by mouth once daily.    promethazine (PHENERGAN) 25 MG tablet Take 25 mg by mouth every 12 (twelve) hours as needed for Nausea.    SODIUM FLUORIDE 5000 PLUS 1.1 % Crea Apply topically once daily.    [DISCONTINUED] acetaminophen (TYLENOL) 500 MG tablet Take 2 tablets (1,000 mg total) by mouth every 8 (eight) hours.    [DISCONTINUED] clonazePAM (KLONOPIN) 2 MG Tab Take 2 mg by mouth 2 (two) times daily as needed.    [DISCONTINUED] DULoxetine (CYMBALTA) 30 MG capsule TAKE 1 CAPSULE BY MOUTH EVERY DAY FOR 1 WEEK. INCREASE TO 2 CAPSULES BY MOUTH EVERY DAY    [DISCONTINUED] eszopiclone (LUNESTA) 3 mg Tab TK 1 T PO QD HS    [DISCONTINUED] lactulose (CHRONULAC) 10 gram/15 mL solution Take 15 mLs (10 g total) by mouth 3 (three) times daily as needed (connstipation).    [DISCONTINUED] metoclopramide HCl (REGLAN) 10 MG tablet Take 1 tablet (10 mg total) by mouth every 6 (six) hours as needed (nausea/vomiting).    [DISCONTINUED] OZEMPIC 0.25 mg or 0.5 mg(2 mg/1.5 mL) pen injector Inject into the skin.    [DISCONTINUED] polyethylene glycol (GLYCOLAX) 17 gram PwPk Take 17 g by mouth 2 (two) times daily as needed (constipation).    [DISCONTINUED] ranitidine (ZANTAC) 150 MG capsule Take 1 capsule (150 mg total) by mouth 2 (two) times daily.     Family History       Problem Relation (Age of Onset)    Breast cancer Maternal Aunt, Sister, Paternal Aunt    Diabetes Mother    Heart disease Father          Tobacco Use    Smoking status: Never    Smokeless tobacco: Never   Substance and Sexual Activity    Alcohol use: Yes     Comment: rarely    Drug use: No    Sexual activity: Not Currently     Partners: Male     Birth control/protection: Surgical      Review of Systems   Constitutional:  Positive for chills and fever. Negative for fatigue.   HENT:  Positive for dental problem. Negative for congestion, drooling, ear pain, facial swelling, sinus pain, sore throat and trouble swallowing.    Eyes:  Negative for visual disturbance.   Respiratory:  Negative for cough and shortness of breath.    Cardiovascular:  Negative for chest pain.   Gastrointestinal:  Positive for diarrhea (hx of intermittent diarrhea from IBS per patient). Negative for abdominal distention, abdominal pain, nausea and vomiting.   Genitourinary:  Positive for difficulty urinating. Negative for dysuria and frequency.   Musculoskeletal:  Negative for joint swelling.   Neurological:  Negative for dizziness, speech difficulty, weakness, light-headedness and headaches.   Psychiatric/Behavioral:  Negative for confusion and hallucinations. The patient is nervous/anxious.      Objective:     Vital Signs (Most Recent):  Temp: 98.9 °F (37.2 °C) (06/08/23 1118)  Pulse: 90 (06/08/23 1059)  Resp: 16 (06/08/23 0917)  BP: 111/67 (06/08/23 1059)  SpO2: (!) 93 % (06/08/23 1059) Vital Signs (24h Range):  Temp:  [98.6 °F (37 °C)-102.5 °F (39.2 °C)] 98.9 °F (37.2 °C)  Pulse:  [] 90  Resp:  [16-18] 16  SpO2:  [93 %-100 %] 93 %  BP: ()/(52-80) 111/67     Weight: 72.6 kg (160 lb)  Body mass index is 26.63 kg/m².     Physical Exam  Vitals and nursing note reviewed.   Constitutional:       General: She is not in acute distress.     Appearance: She is not ill-appearing.      Comments: Appears very anxious on exam.    HENT:      Head: Atraumatic.      Right Ear: Tympanic membrane, ear canal and external ear normal.      Left Ear: Tympanic membrane, ear canal and external ear normal.      Nose: Nose normal.      Mouth/Throat:      Mouth: Mucous membranes are moist.      Comments: Teeth with evident caries and plaques build up. Noted to have a small polyp along left lower gumline that appears inflammed. See  images. No obvious abscess. No pus seen  Eyes:      Extraocular Movements: Extraocular movements intact.      Pupils: Pupils are equal, round, and reactive to light.   Cardiovascular:      Rate and Rhythm: Normal rate and regular rhythm.      Heart sounds: No murmur heard.    No gallop.   Pulmonary:      Effort: Pulmonary effort is normal. No respiratory distress.      Breath sounds: Normal breath sounds. No wheezing.   Abdominal:      General: There is no distension.      Palpations: Abdomen is soft.      Tenderness: There is no abdominal tenderness. There is no guarding.   Musculoskeletal:         General: No swelling or tenderness.      Right lower leg: No edema.      Left lower leg: No edema.   Skin:     General: Skin is warm and dry.   Neurological:      General: No focal deficit present.      Mental Status: She is alert and oriented to person, place, and time.   Psychiatric:         Mood and Affect: Mood is anxious.         Speech: Speech normal.         Behavior: Behavior is cooperative.         Thought Content: Thought content normal.         Cognition and Memory: Memory is impaired.                    CRANIAL NERVES     CN III, IV, VI   Pupils are equal, round, and reactive to light.     Significant Labs: All pertinent labs within the past 24 hours have been reviewed.    CBC:   Recent Labs   Lab 06/08/23  0241   WBC 5.97   HGB 13.2   HCT 40.7        CMP:   Recent Labs   Lab 06/08/23  0241      K 3.7      CO2 24      BUN 7   CREATININE 0.8   CALCIUM 9.7   PROT 7.5   ALBUMIN 4.2   BILITOT 0.3   ALKPHOS 69   AST 14   ALT 14   ANIONGAP 11       Lactic Acid:   Recent Labs   Lab 06/08/23  0241 06/08/23  0440   LACTATE 2.5* 1.2     Magnesium:   Recent Labs   Lab 06/08/23  0241   MG 1.8       Urine Studies:   Recent Labs   Lab 06/08/23  0319   COLORU Yellow   APPEARANCEUA Hazy*   PHUR 6.0   SPECGRAV 1.030   PROTEINUA 1+*   GLUCUA Negative   KETONESU Trace*   BILIRUBINUA Negative   OCCULTUA  Negative   NITRITE Negative   UROBILINOGEN 2.0-3.0*   LEUKOCYTESUR Trace*   RBCUA 5*   WBCUA 10*   BACTERIA Many*   SQUAMEPITHEL 13   HYALINECASTS 0       Significant Imaging: I have reviewed all pertinent imaging results/findings within the past 24 hours.

## 2023-06-08 NOTE — NURSING
Patient down to cardiology. NAD.   Received call from patient re: assistance with her [employer] bill. It is due on 12/9 and she has called [employer] to request an extension which was granted. Patient has appointments in clinic next week on 12/8 and 12/9. Informed her to bring her current [employer] bill which I can assist with paying through our [employer] Patient Assistance Fund. Also asked her to bring her Social Security Award letter, which will be needed for an application to [employer] which I will help her to complete and submit, to request utility bill assistance for when patient has exhausted her per patient max amount through the [employer] Patient [employer]. Informed patient that while [employer] may be able to provide limited assistance with some utility bills it will not be on a monthly basis ongoing. Patient expressed understanding. Will continue to follow.

## 2023-06-08 NOTE — ASSESSMENT & PLAN NOTE
-no longer on Klonopin.   reviewed, Klonopin on 01/02/2023  - reviewed. Last filled oxycodone-acetaminophen on 05/07/23  -Monitor

## 2023-06-08 NOTE — ED PROVIDER NOTES
Encounter Date: 6/8/2023       History     Chief Complaint   Patient presents with    Dental Pain     To left lower jaw X 2 days     52yo F presents to ED with chief complaint left lower molar dental pain x3 days.  She states there is associated swelling to her left-sided gums as well, associated tenderness.  States poor p.o. intake due to painful mastication, tenderness to the tooth.  Denies fever, denies myalgias, does admit to chills.  She admits to nonproductive cough over the past 3 days as well.  States the cough sounds wet, occasional wheezing.  No associated shortness of breath.  No chest pain.  No abdominal pain.  She does admit to nausea with infrequent episodes of emesis and loose stools over the past 3 days.  No melena or hematochezia.  She admits to generalized headache.  She denies neck stiffness.  She states there left anterior neck pain due to dental pain, jaw soreness.  She admits to decreased urination frequency, urination volume.  No dysuria, hematuria, strong odor to urine.  No flank pain.  Denies any recent illness or known sick contacts.    Contacted dentist office on Monday, started on Augmentin BID.    States on chronic prednisone 2/2 celiac dz    PMH:  PTSD   Asthma  Hypertension   History of SVT   Hypercholesterolemia   History of idiopathic acute pancreatitis   Insomnia  Celiac disease  Colonic polyps  IDDM  GERD  HTN  Anxiety    Review of patient's allergies indicates:   Allergen Reactions    Latex, natural rubber Itching and Rash    Lidocaine Rash    Tramadol Hives and Rash     Past Medical History:   Diagnosis Date    Anemia     Celiac disease     Diabetes mellitus     Gall stones     Hypertension     PTSD (post-traumatic stress disorder)     Supraventricular tachycardia 2001    Yeast infection      Past Surgical History:   Procedure Laterality Date    ABDOMINAL SURGERY      Gastric sleeve    ANKLE FUSION  2011    ARTHROSCOPY OF ANKLE Right 4/18/2022    Procedure: ARTHROSCOPY, ANKLE;   Surgeon: Tessa Garcia MD;  Location: [facility] OR;  Service: Orthopedics;  Laterality: Right;    BREAST SURGERY       SECTION      placenta previa    CHOLECYSTECTOMY  2009    EXCISIONAL BIOPSY Left 2019    Procedure: EXCISIONAL BIOPSY LEFT with SEED (CONSENT AM OF) 1.0 hr case;  Surgeon: Flor Rosas MD;  [address];  Service: General;  Laterality: Left;    gastric      gastric sleeve      pt reported     HERNIA REPAIR      HYSTERECTOMY  2001    Inguinal hernia  2004    REPAIR OF LIGAMENT OF ANKLE Right 2022    Procedure: REPAIR, LIGAMENT, ANKLE;  Surgeon: Tessa Garcia MD;  Location: [facility] OR;  Service: Orthopedics;  Laterality: Right;  Single Shot    SKIN SURGERY      Tumy Tuck     Family History   Problem Relation Age of Onset    Heart disease Father     Diabetes Mother         hypoglycemic    Breast cancer Maternal Aunt     Breast cancer Sister     Breast cancer Paternal Aunt     Ovarian cancer Neg Hx      Social History     Tobacco Use    Smoking status: Never    Smokeless tobacco: Never   Substance Use Topics    Alcohol use: Yes     Comment: rarely    Drug use: No     Review of Systems   Constitutional:  Positive for appetite change and chills. Negative for fever.   HENT:  Positive for dental problem. Negative for congestion, facial swelling, rhinorrhea and sore throat.    Respiratory:  Positive for cough. Negative for shortness of breath.    Cardiovascular:  Negative for chest pain.   Gastrointestinal:  Positive for diarrhea, nausea and vomiting. Negative for abdominal pain and constipation.   Genitourinary:  Negative for dysuria, flank pain, frequency and hematuria.   Musculoskeletal:  Negative for myalgias, neck pain and neck stiffness.   Neurological:  Positive for headaches. Negative for syncope.     Physical Exam     Initial Vitals [23 0149]   BP Pulse Resp Temp SpO2   (!) 145/80 (!) 130 18 (!) 102.5 °F (39.2 °C) 100 %      MAP       --         Physical  Exam    Nursing note and vitals reviewed.  Constitutional: She appears well-developed and well-nourished. She is not diaphoretic. No distress.   Uncomfortable appearing nontoxic, resting recumbent in bed.   HENT:   Head: Normocephalic and atraumatic.   Tooth # 19 with white filling in place, tooth exquisitely ttp. There is associated lateral gum pyogenic granuloma? No obvious fluctuant mass.  Sublingual area is soft.  Some mental area soft.  There is no significant facial swelling or swelling overlying the mandible although the left-sided mandible as tender.  Jaw with full range of motion with mild discomfort.   Neck: Neck supple.   No nuchal rigidity   Normal range of motion.  Cardiovascular:  Intact distal pulses.           Sinus tachycardia.  No pretibial edema.  No unilateral leg swelling or calf tenderness.   Pulmonary/Chest: Breath sounds normal. No respiratory distress.   Abdominal: Abdomen is soft. There is no abdominal tenderness.   Musculoskeletal:         General: No tenderness. Normal range of motion.      Cervical back: Normal range of motion and neck supple.     Neurological: She is alert and oriented to person, place, and time. GCS score is 15. GCS eye subscore is 4. GCS verbal subscore is 5. GCS motor subscore is 6.   Skin: Skin is warm. Capillary refill takes less than 2 seconds.   Psychiatric: She has a normal mood and affect. Thought content normal.       ED Course   Critical Care    Date/Time: 6/8/2023 8:21 AM  Performed by: Bear Eden MD  Authorized by: Bear Eden MD   Direct patient critical care time: 22 minutes  Additional history critical care time: 11 minutes  Ordering / reviewing critical care time: 11 minutes  Documentation critical care time: 11 minutes  Consulting other physicians critical care time: 11 minutes  Total critical care time (exclusive of procedural time) : 66 minutes  Critical care time was exclusive of separately billable procedures and treating other patients  and teaching time.  Critical care was necessary to treat or prevent imminent or life-threatening deterioration of the following conditions: sepsis.  Critical care was time spent personally by me on the following activities: development of treatment plan with patient or surrogate, discussions with primary provider, evaluation of patient's response to treatment, examination of patient, obtaining history from patient or surrogate, ordering and performing treatments and interventions, ordering and review of laboratory studies, ordering and review of radiographic studies, pulse oximetry, re-evaluation of patient's condition and review of old charts.      Labs Reviewed   CBC W/ AUTO DIFFERENTIAL - Abnormal; Notable for the following components:       Result Value    MCH 31.2 (*)     All other components within normal limits   LACTIC ACID, PLASMA - Abnormal; Notable for the following components:    Lactate (Lactic Acid) 2.5 (*)     All other components within normal limits   URINALYSIS, REFLEX TO URINE CULTURE - Abnormal; Notable for the following components:    Appearance, UA Hazy (*)     Protein, UA 1+ (*)     Ketones, UA Trace (*)     Urobilinogen, UA 2.0-3.0 (*)     Leukocytes, UA Trace (*)     All other components within normal limits    Narrative:     Specimen Source->Urine   DRUG SCREEN PANEL, URINE EMERGENCY - Abnormal; Notable for the following components:    Benzodiazepines Presumptive Positive (*)     Opiate Scrn, Ur Presumptive Positive (*)     All other components within normal limits    Narrative:     Specimen Source->Urine   URINALYSIS MICROSCOPIC - Abnormal; Notable for the following components:    RBC, UA 5 (*)     WBC, UA 10 (*)     Bacteria Many (*)     All other components within normal limits    Narrative:     Specimen Source->Urine   POCT GLUCOSE - Abnormal; Notable for the following components:    POCT Glucose 160 (*)     All other components within normal limits   CULTURE, BLOOD   CULTURE, BLOOD    CULTURE, URINE   COMPREHENSIVE METABOLIC PANEL   MAGNESIUM   PROCALCITONIN   LIPASE   ALCOHOL,MEDICAL (ETHANOL)   LACTIC ACID, PLASMA   POCT INFLUENZA A/B MOLECULAR   SARS-COV-2 RDRP GENE          Imaging Results              CT Maxillofacial With Contrast (Final result)  Result time 06/08/23 06:35:24      Final result by Dennys Webb MD (06/08/23 06:35:24)                   Impression:      Dental and periodontal disease involving the bilateral mandibular molar teeth, with cortical disruption along the lateral aspect of the left mandible adjacent to the left 1st mandibular molar tooth (#19).  No adjacent drainable abscess.  Suggest correlation with physical exam and dental follow-up as appropriate.    Additional findings discussed in the body of the report.      Electronically signed by: Dennys Webb MD  Date:    06/08/2023  Time:    06:35               Narrative:    EXAMINATION:  CT MAXILLOFACIAL WITH CONTRAST    CLINICAL HISTORY:  Maxillofacial pain;tooth #19 abscess?;    TECHNIQUE:  CT images of the maxillofacial region obtained after the administration of 60 mL Omnipaque 350 IV contrast.  Axial, coronal, and sagittal reconstructions were created from the source data.    COMPARISON:  None.    FINDINGS:  No acute facial fractures.  Minimal facial soft tissue edema.  No drainable fluid collection identified.    Bilateral 2nd and 3rd maxillary molar teeth are absent.  Bilateral 3rd mandibular molar teeth are absent.  Dental caries and periodontal disease involving the right 1st mandibular molar tooth.  Minimal periodontal disease involving the right mandibular premolar teeth.  Periodontal disease involving the left 1st mandibular molar tooth with minimal periapical lucency involving the left 2nd mandibular molar tooth.  Cortical disruption in the mandible adjacent to the left 1st mandibular molar tooth (series 3, image 135) without adjacent drainable fluid collection.    Prominent but subcentimeter left  submandibular lymph nodes.  Partially visualized prominent cervical chain lymph nodes, likely reactive.    Small mucosal retention cyst in the right maxillary sinus.  Otherwise, the visualized paranasal sinuses and mastoid air cells are essentially clear.    Limited intracranial evaluation is negative for acute finding.    Visualized jugular veins are grossly patent.  No severe atherosclerosis in the visualized carotid arteries.                                       X-Ray Chest 1 View (Final result)  Result time 06/08/23 04:26:00      Final result by Orville Renner MD (06/08/23 04:26:00)                   Impression:      Suspected subsegmental atelectasis or scarring at the right lung base.  No definite radiographic evidence of acute intrathoracic process on this single view..      Electronically signed by: Orville Renner MD  Date:    06/08/2023  Time:    04:26               Narrative:    EXAMINATION:  XR CHEST 1 VIEW    CLINICAL HISTORY:  Fever, unspecified    TECHNIQUE:  Single frontal view of the chest was performed.    COMPARISON:  Chest radiograph, CTA chest 07/24/2022    FINDINGS:  Cardiac silhouette appears within normal limits.  The lungs are symmetrically expanded.  Linear subsegmental opacity at the right lung base is favored to relate to atelectasis.  No significant volume of pleural fluid or pneumothorax identified.  Osseous structures demonstrate mild degenerative changes.                                       Medications   lactated ringers bolus 2,000 mL (2,000 mLs Intravenous New Bag 6/8/23 0803)   piperacillin-tazobactam (ZOSYN) 4.5 g in dextrose 5 % in water (D5W) 5 % 100 mL IVPB (MB+) (has no administration in time range)   piperacillin-tazobactam (ZOSYN) 4.5 g in dextrose 5 % in water (D5W) 5 % 100 mL IVPB (MB+) (4.5 g Intravenous New Bag 6/8/23 0801)   acetaminophen tablet 1,000 mg (1,000 mg Oral Given 6/8/23 0244)   lactated ringers bolus 1,000 mL (0 mLs Intravenous Stopped 6/8/23 0422)    morphine injection 4 mg (4 mg Intravenous Given 6/8/23 0337)   ondansetron injection 4 mg (4 mg Intravenous Given 6/8/23 0336)   lactated ringers bolus 500 mL (0 mLs Intravenous Stopped 6/8/23 0624)   iohexoL (OMNIPAQUE 350) injection 60 mL (60 mLs Intravenous Given 6/8/23 0501)   morphine injection 4 mg (4 mg Intravenous Given 6/8/23 0528)   morphine injection 2 mg (2 mg Intravenous Given 6/8/23 0803)     Medical Decision Making:   Differential Diagnosis:   Dental abscess, viral URI, sepsis, UTI, gastroenteritis, pneumonia  Clinical Tests:   Lab Tests: Ordered  Radiological Study: Ordered  ED Management:  No photophobia, no nuchal rigidity, there is no meningismus.           ED Course as of 06/08/23 0815   Thu Jun 08, 2023   0406 Reported nausea vomiting diarrhea without abdominal pain.  Associated chills.  Did not realize she was febrile.  Poor appetite and intake since onset of symptoms.  May represent a viral gastroenteritis given no leukocytosis, normal procalcitonin.  Chest x-ray without focal consolidation although she does admit to new nonproductive cough.  Urinalysis with possible infection although contaminated specimen, she denies any urinary symptoms.  Urine culture pending.  There is dental pain and associated tenderness but no convincing abscess at this time.  There is no nuchal rigidity. [SM]   7128 On record review, diarrhea may be a chronic issue. Feels much better on reevaluation. CT max/face pending to evaluate if dental origin of fever, presentation.  [SM]      ED Course User Index  [SM] Cosmo Heredia PA-C     Medical decision making:  This is doctor Eden dictating.  This patient presented to the emergency room with a temperature of a 102.5° and a heart rate of 130 complaining of dental pain.  The patient is currently on Augmentin for periodontal disease.  She is immunocompromised because she is on prednisone for celiac disease.  The patient has had some lowish blood pressures at 96/53.   At the same time she looks well.  I re-evaluated this patient at 8:00 a.m..  I assumed care this patient at 6:30 a.m. in the morning.  I am concerned that she may be septic.  I have sent blood cultures.  I will treat with IV antibiotics.  The patient has a normal procalcitonin.  She has a normal white blood count.  She is a normal hemoglobin and hematocrit.  Anemia is not complicating her presentation and causing her low blood pressure.  Her lactate is elevated at 2.5 raising questions about sepsis and hypovolemia.  She has a normal procalcitonin against systemic infection.  She has many bacteria in the urine but 13 squamous cells and no symptoms.  UTI as a possible source of infection.  A CT scan was done of the maxillofacial region.  No abscess was identified.  No surgical intervention is required for the patient's severe periodontal disease.  She does have bony erosions.  I have consulted Hospital Medicine.  I presented the case in detail to Dr. Hughes, who will evaluate her for hospital admission.                   Clinical Impression:   Final diagnoses:  [R50.9] Febrile illness               Bear Eden MD  06/08/23 0821

## 2023-06-08 NOTE — H&P
[facility] [place] Emergency Dept  [facility] Medicine  History & Physical    Patient Name: Aide Almaraz  MRN: [id number]  Patient Class: IP- Inpatient  Admission Date: 6/8/2023  Attending Physician: Ame Hughes DO   Primary Care Provider: Radha Jones MD         Patient information was obtained from patient and ER records.     Subjective:     Principal Problem:Sepsis    Chief Complaint:   Chief Complaint   Patient presents with    Dental Pain     To left lower jaw X 2 days        HPI: 53-year-old female with history of hypertension (no longer on BP meds), celiac disease, hyperlipidemia, chronic back pain(on opiods, and follows pain management outpatient) presents to the ED with worsening left lower jaw pain.  States she has chronic dental problem for months now, but left lower teeth pain has been worsening over the last 2 weeks.  Contact her dentist who started her on Augmentin for 10 days.  Completed course of antibiotics without improvement.  States she has not seen an oral surgeon due to the cost. States the pain is worse with eating, has not been able to tolerate much diet given the pain.  Admits chills but no fevers at home.  No nausea, vomiting, abdominal pain.    In the ED, patient was febrile, tachycardic, and mildly hypotensive.  Was given fluids with improvement.  Labs overall unremarkable.  Lactic acid mildly elevated at 2.5, but repeat trended down to normal.  Urine drug screen positive for benzos and opiates. CT Maxillofacial With Contrast showed dental and periodontal disease involving the bilateral mandibular molar teeth, with cortical disruption along the lateral aspect of the left mandible adjacent to the left 1st mandibular molar tooth (#19).Patient given Zosyn in the ED.  No adjacent drainable abscess.  Pt given zosyn in ED. Patient admitted to hospital medicine for further evaluation.      Past Medical History:   Diagnosis Date    Anemia     Celiac disease     Diabetes mellitus      Gall stones     Hypertension     PTSD (post-traumatic stress disorder)     Supraventricular tachycardia     Yeast infection        Past Surgical History:   Procedure Laterality Date    ABDOMINAL SURGERY      Gastric sleeve    ANKLE FUSION  2011    ARTHROSCOPY OF ANKLE Right 2022    Procedure: ARTHROSCOPY, ANKLE;  Surgeon: Tessa Garcia MD;  Location: [facility];  Service: Orthopedics;  Laterality: Right;    BREAST SURGERY       SECTION      placenta previa    CHOLECYSTECTOMY  2009    EXCISIONAL BIOPSY Left 2019    Procedure: EXCISIONAL BIOPSY LEFT with SEED (CONSENT AM OF) 1.0 hr case;  Surgeon: Flor Rosas MD;  Location: [address];  Service: General;  Laterality: Left;    gastric      gastric sleeve      pt reported     HERNIA REPAIR      HYSTERECTOMY  2001    Inguinal hernia  2004    REPAIR OF LIGAMENT OF ANKLE Right 2022    Procedure: REPAIR, LIGAMENT, ANKLE;  Surgeon: Tessa Garcia MD;  Location: [facility];  Service: Orthopedics;  Laterality: Right;  Single Shot    SKIN SURGERY      Kasi Gómez       Review of patient's allergies indicates:   Allergen Reactions    Latex, natural rubber Itching and Rash    Lidocaine Rash    Tramadol Hives and Rash       No current facility-administered medications on file prior to encounter.     Current Outpatient Medications on File Prior to Encounter   Medication Sig    albuterol (PROVENTIL/VENTOLIN HFA) 90 mcg/actuation inhaler Inhale 1-2 puffs into the lungs every 6 (six) hours as needed for Wheezing. Rescue    amoxicillin-clavulanate 875-125mg (AUGMENTIN) 875-125 mg per tablet Take 1 tablet by mouth 2 (two) times daily.    gabapentin (NEURONTIN) 800 MG tablet Take 1 tablet (800 mg total) by mouth 3 (three) times daily.    ibuprofen (ADVIL,MOTRIN) 800 MG tablet Take 800 mg by mouth every 8 (eight) hours as needed.    oxyCODONE-acetaminophen (PERCOCET)  mg per tablet Take 1 tablet by mouth. TAKE 1 TABLET  BY MOUTH TWICE DAILY TO THREE TIMES DAILY AS NEEDED FOR PAIN FOR 30 DAYS    OZEMPIC 1 mg/dose (4 mg/3 mL) Inject 1 mg into the skin every 7 days. Sundays    pantoprazole (PROTONIX) 40 MG tablet Take 1 tablet (40 mg total) by mouth once daily.    promethazine (PHENERGAN) 25 MG tablet Take 25 mg by mouth every 12 (twelve) hours as needed for Nausea.    SODIUM FLUORIDE 5000 PLUS 1.1 % Crea Apply topically once daily.    [DISCONTINUED] acetaminophen (TYLENOL) 500 MG tablet Take 2 tablets (1,000 mg total) by mouth every 8 (eight) hours.    [DISCONTINUED] clonazePAM (KLONOPIN) 2 MG Tab Take 2 mg by mouth 2 (two) times daily as needed.    [DISCONTINUED] DULoxetine (CYMBALTA) 30 MG capsule TAKE 1 CAPSULE BY MOUTH EVERY DAY FOR 1 WEEK. INCREASE TO 2 CAPSULES BY MOUTH EVERY DAY    [DISCONTINUED] eszopiclone (LUNESTA) 3 mg Tab TK 1 T PO QD HS    [DISCONTINUED] lactulose (CHRONULAC) 10 gram/15 mL solution Take 15 mLs (10 g total) by mouth 3 (three) times daily as needed (connstipation).    [DISCONTINUED] metoclopramide HCl (REGLAN) 10 MG tablet Take 1 tablet (10 mg total) by mouth every 6 (six) hours as needed (nausea/vomiting).    [DISCONTINUED] OZEMPIC 0.25 mg or 0.5 mg(2 mg/1.5 mL) pen injector Inject into the skin.    [DISCONTINUED] polyethylene glycol (GLYCOLAX) 17 gram PwPk Take 17 g by mouth 2 (two) times daily as needed (constipation).    [DISCONTINUED] ranitidine (ZANTAC) 150 MG capsule Take 1 capsule (150 mg total) by mouth 2 (two) times daily.     Family History       Problem Relation (Age of Onset)    Breast cancer Maternal Aunt, Sister, Paternal Aunt    Diabetes Mother    Heart disease Father          Tobacco Use    Smoking status: Never    Smokeless tobacco: Never   Substance and Sexual Activity    Alcohol use: Yes     Comment: rarely    Drug use: No    Sexual activity: Not Currently     Partners: Male     Birth control/protection: Surgical     Review of Systems   Constitutional:  Positive for  chills and fever. Negative for fatigue.   HENT:  Positive for dental problem. Negative for congestion, drooling, ear pain, facial swelling, sinus pain, sore throat and trouble swallowing.    Eyes:  Negative for visual disturbance.   Respiratory:  Negative for cough and shortness of breath.    Cardiovascular:  Negative for chest pain.   Gastrointestinal:  Positive for diarrhea (hx of intermittent diarrhea from IBS per patient). Negative for abdominal distention, abdominal pain, nausea and vomiting.   Genitourinary:  Positive for difficulty urinating. Negative for dysuria and frequency.   Musculoskeletal:  Negative for joint swelling.   Neurological:  Negative for dizziness, speech difficulty, weakness, light-headedness and headaches.   Psychiatric/Behavioral:  Negative for confusion and hallucinations. The patient is nervous/anxious.      Objective:     Vital Signs (Most Recent):  Temp: 98.9 °F (37.2 °C) (06/08/23 1118)  Pulse: 90 (06/08/23 1059)  Resp: 16 (06/08/23 0917)  BP: 111/67 (06/08/23 1059)  SpO2: (!) 93 % (06/08/23 1059) Vital Signs (24h Range):  Temp:  [98.6 °F (37 °C)-102.5 °F (39.2 °C)] 98.9 °F (37.2 °C)  Pulse:  [] 90  Resp:  [16-18] 16  SpO2:  [93 %-100 %] 93 %  BP: ()/(52-80) 111/67     Weight: 72.6 kg (160 lb)  Body mass index is 26.63 kg/m².     Physical Exam  Vitals and nursing note reviewed.   Constitutional:       General: She is not in acute distress.     Appearance: She is not ill-appearing.      Comments: Appears very anxious on exam.    HENT:      Head: Atraumatic.      Right Ear: Tympanic membrane, ear canal and external ear normal.      Left Ear: Tympanic membrane, ear canal and external ear normal.      Nose: Nose normal.      Mouth/Throat:      Mouth: Mucous membranes are moist.      Comments: Teeth with evident caries and plaques build up. Noted to have a small polyp along left lower gumline that appears inflammed. See images. No obvious abscess. No pus seen  Eyes:       Extraocular Movements: Extraocular movements intact.      Pupils: Pupils are equal, round, and reactive to light.   Cardiovascular:      Rate and Rhythm: Normal rate and regular rhythm.      Heart sounds: No murmur heard.    No gallop.   Pulmonary:      Effort: Pulmonary effort is normal. No respiratory distress.      Breath sounds: Normal breath sounds. No wheezing.   Abdominal:      General: There is no distension.      Palpations: Abdomen is soft.      Tenderness: There is no abdominal tenderness. There is no guarding.   Musculoskeletal:         General: No swelling or tenderness.      Right lower leg: No edema.      Left lower leg: No edema.   Skin:     General: Skin is warm and dry.   Neurological:      General: No focal deficit present.      Mental Status: She is alert and oriented to person, place, and time.   Psychiatric:         Mood and Affect: Mood is anxious.         Speech: Speech normal.         Behavior: Behavior is cooperative.         Thought Content: Thought content normal.         Cognition and Memory: Memory is impaired.                    CRANIAL NERVES     CN III, IV, VI   Pupils are equal, round, and reactive to light.     Significant Labs: All pertinent labs within the past 24 hours have been reviewed.    CBC:   Recent Labs   Lab 06/08/23  0241   WBC 5.97   HGB 13.2   HCT 40.7        CMP:   Recent Labs   Lab 06/08/23  0241      K 3.7      CO2 24      BUN 7   CREATININE 0.8   CALCIUM 9.7   PROT 7.5   ALBUMIN 4.2   BILITOT 0.3   ALKPHOS 69   AST 14   ALT 14   ANIONGAP 11       Lactic Acid:   Recent Labs   Lab 06/08/23  0241 06/08/23  0440   LACTATE 2.5* 1.2     Magnesium:   Recent Labs   Lab 06/08/23  0241   MG 1.8       Urine Studies:   Recent Labs   Lab 06/08/23  0319   COLORU Yellow   APPEARANCEUA Hazy*   PHUR 6.0   SPECGRAV 1.030   PROTEINUA 1+*   GLUCUA Negative   KETONESU Trace*   BILIRUBINUA Negative   OCCULTUA Negative   NITRITE Negative   UROBILINOGEN 2.0-3.0*    LEUKOCYTESUR Trace*   RBCUA 5*   WBCUA 10*   BACTERIA Many*   SQUAMEPITHEL 13   HYALINECASTS 0       Significant Imaging: I have reviewed all pertinent imaging results/findings within the past 24 hours.    Assessment/Plan:     * Sepsis  This patient does have evidence of infective focus  My overall impression is sepsis.  Source: oral cavity/mouth  Antibiotics given-   Antibiotics (72h ago, onward)    Start     Stop Route Frequency Ordered    06/08/23 0945  ampicillin-sulbactam (UNASYN) 3 g in sodium chloride 0.9 % 100 mL IVPB (MB+)         -- IV Every 6 hours (non-standard times) 06/08/23 0843        Latest lactate reviewed-  Recent Labs   Lab 06/08/23  0241 06/08/23  0440   LACTATE 2.5* 1.2     Organ dysfunction indicated by none    Fluid challenge Ideal Body Weight- The patient's ideal body weight is Ideal body weight: 57 kg (125 lb 10.6 oz) which will be used to calculate fluid bolus of 30 ml/kg for treatment of septic shock.      Post- resuscitation assessment No - Post resuscitation assessment not needed       Will Not start Pressors-  Source control achieved by: antibiotics    -patient presented to the ED with left lower gum and teeth pain.  Patient febrile, tachycardic, and mildly hypotensive in the ED. hypotension improved with fluids.  Received Zosyn in the ED  -no leukocytosis on labs.    -recently completed 10 day course of Augmentin for dental infection without improvement  -chest x-ray with no acute pulmonary process.    -CT maxillofacial w/Dental and periodontal disease but no adjacent drainable abscess  -blood culture ordered   -urine culture pending   -start Unasyn    Periodontal disease  -patient with history of celiac disease and history of gastric bypass surgery in 2014  -admits chronic nausea and vomiting that has worsened her dentition over the years  -does not follow a dentist regularly  -will continue antibiotics as above for dental infection   -will need follow-up with periodontitis and/or  oral surgeon on discharge      Celiac disease  -History noted. Of note, patient is not on prednisone outpatient  -Diet controlled at this time   -Continue gluten free diet       Essential hypertension, benign  -Hx of HTN in the past. No longer on BP meds since gastric sleeve in 2014  -Pt with hypotension in the ED, improved with fluids  -Continue to monitor at this time       Hypercholesterolemia  -Last lipid panel in 2021 with chol of 224, and   -had gastric sleeve in 2014 and lost about 70lbs  -Pt not on statin outpatient  -Repeat lipid panel     Anxiety disorder  -no longer on Klonopin.   reviewed, Klonopin on 01/02/2023  - reviewed. Last filled oxycodone-acetaminophen on 05/07/23  -Monitor     Chronic pain syndrome  - reviewed, Klonopin on 01/02/2023  - reviewed. Last filled oxycodone-acetaminophen on 05/07/23  -Per patient, has chronic pain from DJD. Follows with pain management outpatient   -Continue oxycodone PRN while inpatient       Chronic, continuous use of opioids  - reviewed as above under chronic pain syndrome  -See plan as above  -Needs f/u with outpatient pain management     Irritable bowel syndrome with diarrhea  -Pt with history of IBS with intermittent diarrhea   -Stable       Mild intermittent asthma without complication  -Stable, pt on room air  -Duonebs PRN       Falls  -PT/OT consulted        VTE Risk Mitigation (From admission, onward)         Ordered     enoxaparin injection 40 mg  Daily         06/08/23 0845     IP VTE HIGH RISK PATIENT  Once         06/08/23 0845     Place sequential compression device  Until discontinued         06/08/23 0845                           Ame Hughes DO  Department of Hospital Medicine  [facility] - Emergency Dept

## 2023-06-08 NOTE — ED TRIAGE NOTES
Pt to ED with c/o dental pain x2 days. Abscess noted near lower second left molar, pt reports localized pain accompanied with fever, chills, N/V, HA, blurry vision, dizziness, and VH. PMHx DM,  HTN, gall stones, PTSD, and celiac disease. Pt denies dysuria or diarrhea. Pt is AAOx4, NAD, VSS, breathing even and unlabored. Motrin x1 taken 1 hour ago.

## 2023-06-08 NOTE — NURSING
[facility]  Nurses Note -- 4 Eyes      6/8/2023       Skin assessed on: Admit      [x] No Pressure Injuries Present    [x]Prevention Measures Documented    [] Yes LDA  for Pressure Injury Previously documented     [] Yes New Pressure Injury Discovered   [] LDA for New Pressure Injury Added      Attending RN:  Danish Murphy RN     Second RN:  VITALIY Fuller

## 2023-06-08 NOTE — HPI
53-year-old female with history of hypertension (no longer on BP meds), celiac disease, hyperlipidemia, chronic back pain(on opiods, and follows pain management outpatient) presents to the ED with worsening left lower jaw pain.  States she has chronic dental problem for months now, but left lower teeth pain has been worsening over the last 2 weeks.  Contact her dentist who started her on Augmentin for 10 days.  Completed course of antibiotics without improvement.  States she has not seen an oral surgeon due to the cost. States the pain is worse with eating, has not been able to tolerate much diet given the pain.  Admits chills but no fevers at home.  No nausea, vomiting, abdominal pain.    In the ED, patient was febrile, tachycardic, and mildly hypotensive.  Was given fluids with improvement.  Labs overall unremarkable.  Lactic acid mildly elevated at 2.5, but repeat trended down to normal.  Urine drug screen positive for benzos and opiates. CT Maxillofacial With Contrast showed dental and periodontal disease involving the bilateral mandibular molar teeth, with cortical disruption along the lateral aspect of the left mandible adjacent to the left 1st mandibular molar tooth (#19).Patient given Zosyn in the ED.  No adjacent drainable abscess.  Pt given zosyn in ED. Patient admitted to hospital medicine for further evaluation.

## 2023-06-08 NOTE — ASSESSMENT & PLAN NOTE
-Last lipid panel in 2021 with chol of 224, and   -had gastric sleeve in 2014 and lost about 70lbs  -Pt not on statin outpatient  -Repeat lipid panel

## 2023-06-08 NOTE — ASSESSMENT & PLAN NOTE
This patient does have evidence of infective focus  My overall impression is sepsis.  Source: oral cavity/mouth  Antibiotics given-   Antibiotics (72h ago, onward)    Start     Stop Route Frequency Ordered    06/08/23 0945  ampicillin-sulbactam (UNASYN) 3 g in sodium chloride 0.9 % 100 mL IVPB (MB+)         -- IV Every 6 hours (non-standard times) 06/08/23 0843        Latest lactate reviewed-  Recent Labs   Lab 06/08/23  0241 06/08/23  0440   LACTATE 2.5* 1.2     Organ dysfunction indicated by none    Fluid challenge Ideal Body Weight- The patient's ideal body weight is Ideal body weight: 57 kg (125 lb 10.6 oz) which will be used to calculate fluid bolus of 30 ml/kg for treatment of septic shock.      Post- resuscitation assessment No - Post resuscitation assessment not needed       Will Not start Pressors-  Source control achieved by: antibiotics    -patient presented to the ED with left lower gum and teeth pain.  Patient febrile, tachycardic, and mildly hypotensive in the ED. hypotension improved with fluids.  Received Zosyn in the ED  -no leukocytosis on labs.    -recently completed 10 day course of Augmentin for dental infection without improvement  -chest x-ray with no acute pulmonary process.    -CT maxillofacial w/Dental and periodontal disease but no adjacent drainable abscess  -blood culture ordered   -urine culture pending   -start Unasyn

## 2023-06-08 NOTE — ED NOTES
Pt sating at 90% while asleep and 95-96% while awake. Placed pt on 1L O2 NC, pt tolerating well. Will continue to monitor.

## 2023-06-08 NOTE — ASSESSMENT & PLAN NOTE
-patient with history of celiac disease and history of gastric bypass surgery in 2014  -admits chronic nausea and vomiting that has worsened her dentition over the years  -does not follow a dentist regularly  -will continue antibiotics as above for dental infection   -will need follow-up with periodontitis and/or oral surgeon on discharge

## 2023-06-08 NOTE — ASSESSMENT & PLAN NOTE
-Hx of HTN in the past. No longer on BP meds since gastric sleeve in 2014  -Pt with hypotension in the ED, improved with fluids  -Continue to monitor at this time

## 2023-06-09 VITALS
WEIGHT: 169 LBS | OXYGEN SATURATION: 96 % | TEMPERATURE: 99 F | HEIGHT: 65 IN | BODY MASS INDEX: 28.16 KG/M2 | HEART RATE: 102 BPM | SYSTOLIC BLOOD PRESSURE: 101 MMHG | RESPIRATION RATE: 17 BRPM | DIASTOLIC BLOOD PRESSURE: 56 MMHG

## 2023-06-09 PROBLEM — A41.9 SEPSIS: Status: RESOLVED | Noted: 2023-06-08 | Resolved: 2023-06-09

## 2023-06-09 LAB
BASOPHILS # BLD AUTO: 0.05 K/UL (ref 0–0.2)
BASOPHILS NFR BLD: 1 % (ref 0–1.9)
DIFFERENTIAL METHOD: ABNORMAL
EOSINOPHIL # BLD AUTO: 0.1 K/UL (ref 0–0.5)
EOSINOPHIL NFR BLD: 1 % (ref 0–8)
ERYTHROCYTE [DISTWIDTH] IN BLOOD BY AUTOMATED COUNT: 14 % (ref 11.5–14.5)
HCT VFR BLD AUTO: 39.9 % (ref 37–48.5)
HGB BLD-MCNC: 12.4 G/DL (ref 12–16)
IMM GRANULOCYTES # BLD AUTO: 0.01 K/UL (ref 0–0.04)
IMM GRANULOCYTES NFR BLD AUTO: 0.2 % (ref 0–0.5)
LYMPHOCYTES # BLD AUTO: 2.6 K/UL (ref 1–4.8)
LYMPHOCYTES NFR BLD: 54.4 % (ref 18–48)
MCH RBC QN AUTO: 30.8 PG (ref 27–31)
MCHC RBC AUTO-ENTMCNC: 31.1 G/DL (ref 32–36)
MCV RBC AUTO: 99 FL (ref 82–98)
MONOCYTES # BLD AUTO: 0.4 K/UL (ref 0.3–1)
MONOCYTES NFR BLD: 8.4 % (ref 4–15)
NEUTROPHILS # BLD AUTO: 1.7 K/UL (ref 1.8–7.7)
NEUTROPHILS NFR BLD: 35 % (ref 38–73)
NRBC BLD-RTO: 0 /100 WBC
PLATELET # BLD AUTO: ABNORMAL K/UL (ref 150–450)
PLATELET BLD QL SMEAR: ABNORMAL
PMV BLD AUTO: ABNORMAL FL (ref 9.2–12.9)
RBC # BLD AUTO: 4.03 M/UL (ref 4–5.4)
WBC # BLD AUTO: 4.78 K/UL (ref 3.9–12.7)

## 2023-06-09 PROCEDURE — 96376 TX/PRO/DX INJ SAME DRUG ADON: CPT

## 2023-06-09 PROCEDURE — 96366 THER/PROPH/DIAG IV INF ADDON: CPT

## 2023-06-09 PROCEDURE — G0378 HOSPITAL OBSERVATION PER HR: HCPCS

## 2023-06-09 PROCEDURE — 97161 PT EVAL LOW COMPLEX 20 MIN: CPT

## 2023-06-09 PROCEDURE — 25000003 PHARM REV CODE 250: Performed by: STUDENT IN AN ORGANIZED HEALTH CARE EDUCATION/TRAINING PROGRAM

## 2023-06-09 PROCEDURE — 85025 COMPLETE CBC W/AUTO DIFF WBC: CPT | Performed by: STUDENT IN AN ORGANIZED HEALTH CARE EDUCATION/TRAINING PROGRAM

## 2023-06-09 PROCEDURE — 63600175 PHARM REV CODE 636 W HCPCS: Performed by: STUDENT IN AN ORGANIZED HEALTH CARE EDUCATION/TRAINING PROGRAM

## 2023-06-09 PROCEDURE — 36415 COLL VENOUS BLD VENIPUNCTURE: CPT | Performed by: STUDENT IN AN ORGANIZED HEALTH CARE EDUCATION/TRAINING PROGRAM

## 2023-06-09 RX ORDER — PENICILLIN V POTASSIUM 500 MG/1
500 TABLET, FILM COATED ORAL EVERY 6 HOURS
Qty: 28 TABLET | Refills: 0 | Status: SHIPPED | OUTPATIENT
Start: 2023-06-09 | End: 2023-06-16

## 2023-06-09 RX ADMIN — GABAPENTIN 800 MG: 400 CAPSULE ORAL at 08:06

## 2023-06-09 RX ADMIN — ONDANSETRON 4 MG: 2 INJECTION INTRAMUSCULAR; INTRAVENOUS at 12:06

## 2023-06-09 RX ADMIN — HYDROCODONE BITARTRATE AND ACETAMINOPHEN 1 TABLET: 10; 325 TABLET ORAL at 03:06

## 2023-06-09 RX ADMIN — HYDROCODONE BITARTRATE AND ACETAMINOPHEN 1 TABLET: 10; 325 TABLET ORAL at 10:06

## 2023-06-09 RX ADMIN — AMPICILLIN SODIUM AND SULBACTAM SODIUM 3 G: 2; 1 INJECTION, POWDER, FOR SOLUTION INTRAMUSCULAR; INTRAVENOUS at 03:06

## 2023-06-09 RX ADMIN — AMPICILLIN SODIUM AND SULBACTAM SODIUM 3 G: 2; 1 INJECTION, POWDER, FOR SOLUTION INTRAMUSCULAR; INTRAVENOUS at 08:06

## 2023-06-09 NOTE — PLAN OF CARE
TN sent a secure chat to med surg nurse Tia this patient is cleared for discharge from case management's viewpoint.  PCP appt in the [facility] Patient will need referral to oral surgeon and pain management.     06/09/23 1044   Final Note   Assessment Type Final Discharge Note   Anticipated Discharge Disposition Home   What phone number can be called within the next 1-3 days to see how you are doing after discharge?   (see chart)   Hospital Resources/Appts/Education Provided Provided patient/caregiver with written discharge plan information;Appointments scheduled by Navigator/Coordinator;Appointments scheduled and added to AVS   Post-Acute Status   Coverage Medicaid [employer]   Discharge Delays None known at this time

## 2023-06-09 NOTE — PLAN OF CARE
Case Management Assessment     PCP: Radha Jones MD    Pharmacy: [facility] [address]    Patient Arrived From: home  Existing Help at Home: daughter  Barriers to Discharge: none    Discharge Plan:    A. home   B. home      INdependent          06/09/23 0924   Discharge Assessment   Assessment Type Discharge Planning Assessment   Source of Information patient   Communicated KAILEE with patient/caregiver Yes   Do you expect to return to your current living situation? Yes   Do you have help at home or someone to help you manage your care at home? Yes   Who are your caregiver(s) and their phone number(s)? Devorah Joseph (Daughter)   [phone] (Home Phone)   Prior to hospitilization cognitive status: Alert/Oriented   Current cognitive status: Alert/Oriented   Readmission within 30 days? No   Do you currently have service(s) that help you manage your care at home? No   Do you have prescription coverage? Yes   Coverage MEDICAID - [facility] COMMUNITY PLAN [facility] ([place])   Do you have any problems affording any of your prescribed medications? No   Who is going to help you get home at discharge? Devorah Joseph (Daughter)   [phone] (Home Phone)   How do you get to doctors appointments? family or friend will provide   Are you on dialysis? No   Do you take coumadin? No   Discharge Plan A Home   Discharge Plan B Home   DME Needed Upon Discharge  none   Discharge Plan discussed with: Patient   Transition of Care Barriers None

## 2023-06-09 NOTE — HOSPITAL COURSE
53-year-old female with history of hypertension (no longer on BP meds), celiac disease, hyperlipidemia, chronic back pain(on opiods, and follows pain management outpatient) admitted on 06/08/2023 for sepsis, likely secondary to periodontal disease and dental infection. Patient was febrile and mildly hypotensive on presentation. Patient started on IV unasyn. Labs without any leukocytosis. Blood cx with NGTD and urine cx with no growth. Patient had some inflammation of her left lower gumline that appeared to improve. Pt remained afebrile for over 24 hrs, and patient pain was controlled.     Patient admits feeling better overall, but still have some pain in her left lower gumline.  Was able to eat dinner last night.  Patient appeared clinically better this morning and in less distress.  inflammation of her left lower gumline appear to improved, still mildly tender with examination.  No cervical lymphadenopathy palpated on exam. Patient states that she has a very good relationship with her pain management doctor and that she recently refilled her pain medication script.  Request for me not to prescribe her any pain medications on discharge. Pt denies any fever, headaches, vision changes, chest pain, shortness of breath, palpitations, abdominal pain, nausea, vomiting, or any new weaknesses. Patient's exam on discharge was as follow: Patient is alert and oriented, appears in no acute distress, heart with regular rate and rhythm, lungs clear to asculation with non-labored breathing, abdomen soft, and no new weaknesses or focal deficits seen. Bilateral lower extremities without any edema or calf tenderness. Inflammation of her left lower gumline appear better still some pain with touch on examination.     Patient was counseled regarding any abnormal labs, differential diagnosis, treatment options, risk-benefit, lifestyle changes,  current condition, and medications. Patient was interactive and attentive.  Patient's  questions were answered in a respectful and timely manner. Patient was instructed to follow-up with PCP within 1 week and to continue taking medications as prescribed.  Instructed to also follow up with dentist or her oral surgeon and also her pain management physician. Also, extensively discussed the risks, benefits, and side effects of patient's medications. Discussed with patient about any medication changes. Patient verbalized understanding and agrees to treatment plan.  Patient is stable for discharge.  Patient has no other questions or concerns at this time.  ED precautions discussed with the patient.    Vital signs are stable. Ambulating without any difficulty. Tolerating p.o. intake without any nausea or vomiting. Afebrile for over 24 hours. Patient is in stable condition and has no questions or concerns. Patient will be discharge to home once transportation secured. Prescriptions sent to pharmacy.  CM/SW to assist with discharge planning.     Vitals:    06/09/23 0857 06/09/23 1034 06/09/23 1133 06/09/23 1227   BP:   (!) 92/51 (!) 101/56   BP Location:   Left arm Right arm   Patient Position:   Sitting Sitting   Pulse:   (!) 119 102   Resp:  20 17    Temp:   99.3 °F (37.4 °C) 99.1 °F (37.3 °C)   TempSrc:   Oral Oral   SpO2:   (!) 94% 96%   Weight: 76.7 kg (169 lb)      Height:

## 2023-06-09 NOTE — PLAN OF CARE
Problem: Adult Inpatient Plan of Care  Goal: Plan of Care Review  Outcome: Ongoing, Progressing  Goal: Patient-Specific Goal (Individualized)  Outcome: Ongoing, Progressing  Goal: Optimal Comfort and Wellbeing  Outcome: Ongoing, Progressing  Goal: Readiness for Transition of Care  Outcome: Ongoing, Progressing     Pt free from falls or any further trauma through out the shift. Prescribed medication administered. Complaint of pain, prn medication administered. Pt in no distress. Will continue to monitor.

## 2023-06-09 NOTE — DISCHARGE SUMMARY
[facility]  Discharge Summary      Patient Name: Aide Almaraz  MRN: [id number]  MARILUZ: [id number]  Patient Class: IP- Inpatient  Admission Date: 6/8/2023  Hospital Length of Stay: 1 days  Discharge Date and Time:  06/09/2023 2:47 PM  Attending Physician: Keshia att. providers found   Discharging Provider: mAe Hughes DO  Primary Care Provider: Radha Jones MD    Primary Care Team:  HOSP MED 1    HPI:   53-year-old female with history of hypertension (no longer on BP meds), celiac disease, hyperlipidemia, chronic back pain(on opiods, and follows pain management outpatient) presents to the ED with worsening left lower jaw pain.  States she has chronic dental problem for months now, but left lower teeth pain has been worsening over the last 2 weeks.  Contact her dentist who started her on Augmentin for 10 days.  Completed course of antibiotics without improvement.  States she has not seen an oral surgeon due to the cost. States the pain is worse with eating, has not been able to tolerate much diet given the pain.  Admits chills but no fevers at home.  No nausea, vomiting, abdominal pain.    In the ED, patient was febrile, tachycardic, and mildly hypotensive.  Was given fluids with improvement.  Labs overall unremarkable.  Lactic acid mildly elevated at 2.5, but repeat trended down to normal.  Urine drug screen positive for benzos and opiates. CT Maxillofacial With Contrast showed dental and periodontal disease involving the bilateral mandibular molar teeth, with cortical disruption along the lateral aspect of the left mandible adjacent to the left 1st mandibular molar tooth (#19).Patient given Zosyn in the ED.  No adjacent drainable abscess.  Pt given zosyn in ED. Patient admitted to hospital medicine for further evaluation.      * No surgery found *      Hospital Course:   53-year-old female with history of hypertension (no longer on BP meds), celiac disease,  hyperlipidemia, chronic back pain(on opiods, and follows pain management outpatient) admitted on 06/08/2023 for sepsis, likely secondary to periodontal disease and dental infection. Patient was febrile and mildly hypotensive on presentation. Patient started on IV unasyn. Labs without any leukocytosis. Blood cx with NGTD and urine cx with no growth. Patient had some inflammation of her left lower gumline that appeared to improve. Pt remained afebrile for over 24 hrs, and patient pain was controlled.     Patient admits feeling better overall, but still have some pain in her left lower gumline.  Was able to eat dinner last night.  Patient appeared clinically better this morning and in less distress.  inflammation of her left lower gumline appear to improved, still mildly tender with examination.  No cervical lymphadenopathy palpated on exam. Patient states that she has a very good relationship with her pain management doctor and that she recently refilled her pain medication script.  Request for me not to prescribe her any pain medications on discharge. Pt denies any fever, headaches, vision changes, chest pain, shortness of breath, palpitations, abdominal pain, nausea, vomiting, or any new weaknesses. Patient's exam on discharge was as follow: Patient is alert and oriented, appears in no acute distress, heart with regular rate and rhythm, lungs clear to asculation with non-labored breathing, abdomen soft, and no new weaknesses or focal deficits seen. Bilateral lower extremities without any edema or calf tenderness. Inflammation of her left lower gumline appear better still some pain with touch on examination.     Patient was counseled regarding any abnormal labs, differential diagnosis, treatment options, risk-benefit, lifestyle changes,  current condition, and medications. Patient was interactive and attentive.  Patient's questions were answered in a respectful and timely manner. Patient was instructed to follow-up  with PCP within 1 week and to continue taking medications as prescribed.  Instructed to also follow up with dentist or her oral surgeon and also her pain management physician. Also, extensively discussed the risks, benefits, and side effects of patient's medications. Discussed with patient about any medication changes. Patient verbalized understanding and agrees to treatment plan.  Patient is stable for discharge.  Patient has no other questions or concerns at this time.  ED precautions discussed with the patient.    Vital signs are stable. Ambulating without any difficulty. Tolerating p.o. intake without any nausea or vomiting. Afebrile for over 24 hours. Patient is in stable condition and has no questions or concerns. Patient will be discharge to home once transportation secured. Prescriptions sent to pharmacy.  CM/SW to assist with discharge planning.     Vitals:    06/09/23 0857 06/09/23 1034 06/09/23 1133 06/09/23 1227   BP:   (!) 92/51 (!) 101/56   BP Location:   Left arm Right arm   Patient Position:   Sitting Sitting   Pulse:   (!) 119 102   Resp:  20 17    Temp:   99.3 °F (37.4 °C) 99.1 °F (37.3 °C)   TempSrc:   Oral Oral   SpO2:   (!) 94% 96%   Weight: 76.7 kg (169 lb)      Height:                  Goals of Care Treatment Preferences:  Code Status: Full Code      Consults:     No new Assessment & Plan notes have been filed under this hospital service since the last note was generated.  Service: Hospital Medicine    Final Active Diagnoses:    Diagnosis Date Noted POA    Periodontal disease [K05.6] 06/08/2023 Yes    Celiac disease [K90.0] 06/08/2023 Yes    Essential hypertension, benign [I10] 02/25/2016 Yes    Hypercholesterolemia [E78.00] 04/21/2016 Yes    Anxiety disorder [F41.9] 02/25/2016 Yes    Chronic pain syndrome [G89.4] 06/08/2023 Yes    Chronic, continuous use of opioids [F11.90] 06/08/2023 Yes    Irritable bowel syndrome with diarrhea [K58.0] 06/08/2023 Yes    Mild intermittent asthma  without complication [J45.20] 06/08/2023 Yes    Falls [W19.XXXA] 06/08/2023 Yes      Problems Resolved During this Admission:    Diagnosis Date Noted Date Resolved POA    PRINCIPAL PROBLEM:  Sepsis [A41.9] 06/08/2023 06/09/2023 Yes       Discharged Condition: stable    Disposition: Home or Self Care    Follow Up:   Follow-up Information     Radha Jones MD Follow up on 6/20/2023.    Specialty: Family Medicine  Why: PATIENT TO GO TO THE [facility]  MARCIA SANDOVAL NP, PLEASE ASK FOR ORAL SURGEON REFERRAL., out patient services  Contact information:  [address]  [address]  [facility]  [facility] [address]  [phone]             Dentist/Oral surgeon Follow up in 1 day(s).    Why: As soon as possible. WILL NEED REFERRAL FROM PCP           Pain management Follow up in 1 week(s).    Why: NEED REFERRAL FROM PCP  Contact information:  [phone]                     Patient Instructions:      Diet diabetic     Diet Cardiac     Notify your health care provider if you experience any of the following:  temperature >100.4     Notify your health care provider if you experience any of the following:  persistent nausea and vomiting or diarrhea     Notify your health care provider if you experience any of the following:  redness, tenderness, or signs of infection (pain, swelling, redness, odor or green/yellow discharge around incision site)     Notify your health care provider if you experience any of the following:  severe uncontrolled pain     Notify your health care provider if you experience any of the following:  increased confusion or weakness     Activity as tolerated       Significant Diagnostic Studies: Labs: All labs within the past 24 hours have been reviewed       Recent Results (from the past 100 hour(s))   POCT glucose    Collection Time: 06/08/23  1:56 AM   Result Value Ref Range    POCT Glucose 160 (H) 70 - 110 mg/dL   Blood culture x two cultures. Draw prior to  antibiotics.    Collection Time: 06/08/23  2:40 AM    Specimen: Peripheral, Antecubital, Right; Blood   Result Value Ref Range    Blood Culture, Routine No Growth to date     Blood Culture, Routine No Growth to date    CBC auto differential    Collection Time: 06/08/23  2:41 AM   Result Value Ref Range    WBC 5.97 3.90 - 12.70 K/uL    RBC 4.23 4.00 - 5.40 M/uL    Hemoglobin 13.2 12.0 - 16.0 g/dL    Hematocrit 40.7 37.0 - 48.5 %    MCV 96 82 - 98 fL    MCH 31.2 (H) 27.0 - 31.0 pg    MCHC 32.4 32.0 - 36.0 g/dL    RDW 14.0 11.5 - 14.5 %    Platelets 351 150 - 450 K/uL    MPV 11.0 9.2 - 12.9 fL    Immature Granulocytes 0.2 0.0 - 0.5 %    Gran # (ANC) 3.3 1.8 - 7.7 K/uL    Immature Grans (Abs) 0.01 0.00 - 0.04 K/uL    Lymph # 2.2 1.0 - 4.8 K/uL    Mono # 0.4 0.3 - 1.0 K/uL    Eos # 0.0 0.0 - 0.5 K/uL    Baso # 0.04 0.00 - 0.20 K/uL    nRBC 0 0 /100 WBC    Gran % 54.5 38.0 - 73.0 %    Lymph % 36.7 18.0 - 48.0 %    Mono % 7.2 4.0 - 15.0 %    Eosinophil % 0.7 0.0 - 8.0 %    Basophil % 0.7 0.0 - 1.9 %    Differential Method Automated    Comprehensive metabolic panel    Collection Time: 06/08/23  2:41 AM   Result Value Ref Range    Sodium 143 136 - 145 mmol/L    Potassium 3.7 3.5 - 5.1 mmol/L    Chloride 108 95 - 110 mmol/L    CO2 24 23 - 29 mmol/L    Glucose 105 70 - 110 mg/dL    BUN 7 6 - 20 mg/dL    Creatinine 0.8 0.5 - 1.4 mg/dL    Calcium 9.7 8.7 - 10.5 mg/dL    Total Protein 7.5 6.0 - 8.4 g/dL    Albumin 4.2 3.5 - 5.2 g/dL    Total Bilirubin 0.3 0.1 - 1.0 mg/dL    Alkaline Phosphatase 69 55 - 135 U/L    AST 14 10 - 40 U/L    ALT 14 10 - 44 U/L    Anion Gap 11 8 - 16 mmol/L    eGFR >60 >60 mL/min/1.73 m^2   Lactic acid, plasma #2    Collection Time: 06/08/23  2:41 AM   Result Value Ref Range    Lactate (Lactic Acid) 2.5 (H) 0.5 - 2.2 mmol/L   Magnesium    Collection Time: 06/08/23  2:41 AM   Result Value Ref Range    Magnesium 1.8 1.6 - 2.6 mg/dL   Procalcitonin    Collection Time: 06/08/23  2:41 AM   Result Value Ref  Range    Procalcitonin <0.02 <0.25 ng/mL   Lipase    Collection Time: 06/08/23  2:41 AM   Result Value Ref Range    Lipase 18 4 - 60 U/L   Lipid Panel    Collection Time: 06/08/23  2:41 AM   Result Value Ref Range    Cholesterol 226 (H) 120 - 199 mg/dL    Triglycerides 158 (H) 30 - 150 mg/dL    HDL 54 40 - 75 mg/dL    LDL Cholesterol 140.4 63.0 - 159.0 mg/dL    HDL/Cholesterol Ratio 23.9 20.0 - 50.0 %    Total Cholesterol/HDL Ratio 4.2 2.0 - 5.0    Non-HDL Cholesterol 172 mg/dL   Blood culture x two cultures. Draw prior to antibiotics.    Collection Time: 06/08/23  2:54 AM    Specimen: Peripheral, Forearm, Left; Blood   Result Value Ref Range    Blood Culture, Routine No Growth to date     Blood Culture, Routine No Growth to date    Ethanol    Collection Time: 06/08/23  2:54 AM   Result Value Ref Range    Alcohol, Serum <10 <10 mg/dL   Urinalysis, Reflex to Urine Culture Urine, Clean Catch    Collection Time: 06/08/23  3:19 AM    Specimen: Urine   Result Value Ref Range    Specimen UA Urine, Clean Catch     Color, UA Yellow Yellow, Straw, Kacie    Appearance, UA Hazy (A) Clear    pH, UA 6.0 5.0 - 8.0    Specific Gravity, UA 1.030 1.005 - 1.030    Protein, UA 1+ (A) Negative    Glucose, UA Negative Negative    Ketones, UA Trace (A) Negative    Bilirubin (UA) Negative Negative    Occult Blood UA Negative Negative    Nitrite, UA Negative Negative    Urobilinogen, UA 2.0-3.0 (A) <2.0 EU/dL    Leukocytes, UA Trace (A) Negative   Drug screen panel, emergency    Collection Time: 06/08/23  3:19 AM   Result Value Ref Range    Benzodiazepines Presumptive Positive (A) Negative    Methadone metabolites Negative Negative    Cocaine (Metab.) Negative Negative    Opiate Scrn, Ur Presumptive Positive (A) Negative    Barbiturate Screen, Ur Negative Negative    Amphetamine Screen, Ur Negative Negative    THC Negative Negative    Phencyclidine Negative Negative    Creatinine, Urine 274.6 15.0 - 325.0 mg/dL    Toxicology Information  SEE COMMENT    Urinalysis Microscopic    Collection Time: 06/08/23  3:19 AM   Result Value Ref Range    RBC, UA 5 (H) 0 - 4 /hpf    WBC, UA 10 (H) 0 - 5 /hpf    Bacteria Many (A) None-Occ /hpf    Squam Epithel, UA 13 /hpf    Hyaline Casts, UA 0 0-1/lpf /lpf    Microscopic Comment SEE COMMENT    POCT COVID-19 Rapid Screening    Collection Time: 06/08/23  3:30 AM   Result Value Ref Range    POC Rapid COVID Negative Negative     Acceptable Yes    POCT Influenza A/B Molecular    Collection Time: 06/08/23  3:30 AM   Result Value Ref Range    POC Molecular Influenza A Ag Negative Negative, Not Reported    POC Molecular Influenza B Ag Negative Negative, Not Reported     Acceptable Yes    Lactic acid, plasma    Collection Time: 06/08/23  4:40 AM   Result Value Ref Range    Lactate (Lactic Acid) 1.2 0.5 - 2.2 mmol/L   Urine Culture - High Risk **CANNOT BE ORDERED STAT**    Collection Time: 06/08/23  7:30 AM    Specimen: Urine   Result Value Ref Range    Urine Culture, Routine No growth to date    Echo    Collection Time: 06/08/23  2:06 PM   Result Value Ref Range    BSA 1.82 m2    TDI SEPTAL 0.09 m/s    LV LATERAL E/E' RATIO 5.64 m/s    LV SEPTAL E/E' RATIO 8.78 m/s    IVC diameter 2.03 cm    Left Ventricular Outflow Tract Mean Velocity 0.68 cm/s    Left Ventricular Outflow Tract Mean Gradient 2.17 mmHg    TDI LATERAL 0.14 m/s    PV PEAK VELOCITY 0.76 cm/s    LVIDd 4.28 3.5 - 6.0 cm    IVS 0.94 0.6 - 1.1 cm    Posterior Wall 0.89 0.6 - 1.1 cm    LVIDs 2.80 2.1 - 4.0 cm    FS 35 28 - 44 %    Sinus 3.27 cm    STJ 2.74 cm    Ascending aorta 2.90 cm    LV mass 125.15 g    LA size 3.15 cm    RVDD 3.34 cm    TAPSE 1.95 cm    RV S' 0.02 cm/s    Left Ventricle Relative Wall Thickness 0.42 cm    AV mean gradient 4 mmHg    AV valve area 2.67 cm2    AV Velocity Ratio 0.84     AV index (prosthetic) 0.86     MV mean gradient 2 mmHg    MV valve area p 1/2 method 4.25 cm2    MV valve area by continuity eq 2.86  cm2    E/A ratio 0.92     Mean e' 0.12 m/s    E wave deceleration time 178.34 msec    IVRT 91.34 msec    LVOT diameter 1.99 cm    LVOT area 3.1 cm2    LVOT peak jorden 1.03 m/s    LVOT peak VTI 19.60 cm    Ao peak jorden 1.23 m/s    Ao VTI 22.8 cm    LVOT stroke volume 60.93 cm3    AV peak gradient 6 mmHg    MV peak gradient 3 mmHg    E/E' ratio 6.87 m/s    MV Peak E Jorden 0.79 m/s    TR Max Jorden 1.81 m/s    MV VTI 21.3 cm    MV stenosis pressure 1/2 time 51.72 ms    MV Peak A Jorden 0.86 m/s    LV Systolic Volume 29.64 mL    LV Systolic Volume Index 16.5 mL/m2    LV Diastolic Volume 82.06 mL    LV Diastolic Volume Index 45.59 mL/m2    LV Mass Index 70 g/m2    RA Major Axis 4.45 cm    Left Atrium Minor Axis 4.78 cm    Left Atrium Major Axis 4.13 cm    Triscuspid Valve Regurgitation Peak Gradient 13 mmHg    LA WIDTH 4.00 cm    LA volume 47.46 cm3    LA Volume Index 26.4 mL/m2    RA Width 3.50 cm    Right Atrial Pressure (from IVC) 3 mmHg    EF 60 %    TV rest pulmonary artery pressure 16 mmHg   POCT glucose    Collection Time: 06/08/23  5:20 PM   Result Value Ref Range    POCT Glucose 84 70 - 110 mg/dL   POCT glucose    Collection Time: 06/08/23  7:56 PM   Result Value Ref Range    POCT Glucose 109 70 - 110 mg/dL   CBC with Automated Differential    Collection Time: 06/09/23  4:14 AM   Result Value Ref Range    WBC 4.78 3.90 - 12.70 K/uL    RBC 4.03 4.00 - 5.40 M/uL    Hemoglobin 12.4 12.0 - 16.0 g/dL    Hematocrit 39.9 37.0 - 48.5 %    MCV 99 (H) 82 - 98 fL    MCH 30.8 27.0 - 31.0 pg    MCHC 31.1 (L) 32.0 - 36.0 g/dL    RDW 14.0 11.5 - 14.5 %    Platelets SEE COMMENT 150 - 450 K/uL    MPV SEE COMMENT 9.2 - 12.9 fL    Immature Granulocytes 0.2 0.0 - 0.5 %    Gran # (ANC) 1.7 (L) 1.8 - 7.7 K/uL    Immature Grans (Abs) 0.01 0.00 - 0.04 K/uL    Lymph # 2.6 1.0 - 4.8 K/uL    Mono # 0.4 0.3 - 1.0 K/uL    Eos # 0.1 0.0 - 0.5 K/uL    Baso # 0.05 0.00 - 0.20 K/uL    nRBC 0 0 /100 WBC    Gran % 35.0 (L) 38.0 - 73.0 %    Lymph % 54.4 (H)  18.0 - 48.0 %    Mono % 8.4 4.0 - 15.0 %    Eosinophil % 1.0 0.0 - 8.0 %    Basophil % 1.0 0.0 - 1.9 %    Platelet Estimate Clumped (A)     Differential Method Automated        Microbiology Results (last 7 days)     Procedure Component Value Units Date/Time    Urine Culture - High Risk **CANNOT BE ORDERED STAT** [[id number]] Collected: 06/08/23 0730    Order Status: Completed Specimen: Urine Updated: 06/09/23 0851     Urine Culture, Routine No growth to date    Narrative:      Indicated criteria for high risk culture:->Other  Other (specify):->fever    Blood culture x two cultures. Draw prior to antibiotics. [[id number]] Collected: 06/08/23 0254    Order Status: Completed Specimen: Blood from Peripheral, Forearm, Left Updated: 06/09/23 0503     Blood Culture, Routine No Growth to date      No Growth to date    Narrative:      Aerobic and anaerobic    Blood culture x two cultures. Draw prior to antibiotics. [[id number]] Collected: 06/08/23 0240    Order Status: Completed Specimen: Blood from Peripheral, Antecubital, Right Updated: 06/09/23 0503     Blood Culture, Routine No Growth to date      No Growth to date    Narrative:      Aerobic and anaerobic          Imaging Results          CT Maxillofacial With Contrast (Final result)  Result time 06/08/23 06:35:24    Final result by Dennys Webb MD (06/08/23 06:35:24)                 Impression:      Dental and periodontal disease involving the bilateral mandibular molar teeth, with cortical disruption along the lateral aspect of the left mandible adjacent to the left 1st mandibular molar tooth (#19).  No adjacent drainable abscess.  Suggest correlation with physical exam and dental follow-up as appropriate.    Additional findings discussed in the body of the report.      Electronically signed by: Dennys Webb MD  Date:    06/08/2023  Time:    06:35             Narrative:    EXAMINATION:  CT MAXILLOFACIAL WITH CONTRAST    CLINICAL HISTORY:  Maxillofacial pain;tooth  #19 abscess?;    TECHNIQUE:  CT images of the maxillofacial region obtained after the administration of 60 mL Omnipaque 350 IV contrast.  Axial, coronal, and sagittal reconstructions were created from the source data.    COMPARISON:  None.    FINDINGS:  No acute facial fractures.  Minimal facial soft tissue edema.  No drainable fluid collection identified.    Bilateral 2nd and 3rd maxillary molar teeth are absent.  Bilateral 3rd mandibular molar teeth are absent.  Dental caries and periodontal disease involving the right 1st mandibular molar tooth.  Minimal periodontal disease involving the right mandibular premolar teeth.  Periodontal disease involving the left 1st mandibular molar tooth with minimal periapical lucency involving the left 2nd mandibular molar tooth.  Cortical disruption in the mandible adjacent to the left 1st mandibular molar tooth (series 3, image 135) without adjacent drainable fluid collection.    Prominent but subcentimeter left submandibular lymph nodes.  Partially visualized prominent cervical chain lymph nodes, likely reactive.    Small mucosal retention cyst in the right maxillary sinus.  Otherwise, the visualized paranasal sinuses and mastoid air cells are essentially clear.    Limited intracranial evaluation is negative for acute finding.    Visualized jugular veins are grossly patent.  No severe atherosclerosis in the visualized carotid arteries.                               X-Ray Chest 1 View (Final result)  Result time 06/08/23 04:26:00    Final result by Orville Renner MD (06/08/23 04:26:00)                 Impression:      Suspected subsegmental atelectasis or scarring at the right lung base.  No definite radiographic evidence of acute intrathoracic process on this single view..      Electronically signed by: Orville Renner MD  Date:    06/08/2023  Time:    04:26             Narrative:    EXAMINATION:  XR CHEST 1 VIEW    CLINICAL HISTORY:  Fever,  unspecified    TECHNIQUE:  Single frontal view of the chest was performed.    COMPARISON:  Chest radiograph, CTA chest 07/24/2022    FINDINGS:  Cardiac silhouette appears within normal limits.  The lungs are symmetrically expanded.  Linear subsegmental opacity at the right lung base is favored to relate to atelectasis.  No significant volume of pleural fluid or pneumothorax identified.  Osseous structures demonstrate mild degenerative changes.                                    Pending Diagnostic Studies:     None         Medications:  Reconciled Home Medications:      Medication List      START taking these medications    penicillin v potassium 500 MG tablet  Commonly known as: VEETID  Take 1 tablet (500 mg total) by mouth every 6 (six) hours. for 7 days        CONTINUE taking these medications    albuterol 90 mcg/actuation inhaler  Commonly known as: PROVENTIL/VENTOLIN HFA  Inhale 1-2 puffs into the lungs every 6 (six) hours as needed for Wheezing. Rescue     gabapentin 800 MG tablet  Commonly known as: NEURONTIN  Take 1 tablet (800 mg total) by mouth 3 (three) times daily.     ibuprofen 800 MG tablet  Commonly known as: ADVIL,MOTRIN  Take 800 mg by mouth every 8 (eight) hours as needed.     oxyCODONE-acetaminophen  mg per tablet  Commonly known as: PERCOCET  Take 1 tablet by mouth. TAKE 1 TABLET BY MOUTH TWICE DAILY TO THREE TIMES DAILY AS NEEDED FOR PAIN FOR 30 DAYS     OZEMPIC 1 mg/dose (4 mg/3 mL)  Generic drug: semaglutide  Inject 1 mg into the skin every 7 days. Sundays     pantoprazole 40 MG tablet  Commonly known as: PROTONIX  Take 1 tablet (40 mg total) by mouth once daily.     promethazine 25 MG tablet  Commonly known as: PHENERGAN  Take 25 mg by mouth every 12 (twelve) hours as needed for Nausea.     SODIUM FLUORIDE 5000 PLUS 1.1 % Crea  Generic drug: fluoride (sodium)  Apply topically once daily.        STOP taking these medications    amoxicillin-clavulanate 875-125mg 875-125 mg per  tablet  Commonly known as: AUGMENTIN            Indwelling Lines/Drains at time of discharge:   Lines/Drains/Airways     None                 Time spent on the discharge of patient: Greater than 35 minutes         Ame Hughes DO  Department of Hospital Medicine  [facility]

## 2023-06-09 NOTE — PLAN OF CARE
Recommendations    Recommend Boost BID to help meet EEN/EPN  Recommend Fidel to promote wound healing  Monitor labs and weights  Monitor PO intake/tolerance    Goals: 1. Meet % EEN/EPN by RD follow up  Nutrition Goal Status: new  Communication of RD Recs:  (POC)    Assessment and Plan    Nutrition Problem  Difficulty chewing    Related to (etiology):   Abscess pain    Signs and Symptoms (as evidenced by):   Poor PO intake  Painful mastication  Tenderness to tooth    Interventions/Recommendations (treatment strategy):  Collaboration with medical providers  [employer]  Liquid diet    Nutrition Diagnosis Status:   New

## 2023-06-09 NOTE — PT/OT/SLP EVAL
Physical Therapy Evaluation and Discharge Note    Patient Name:  Aide Almaraz   MRN:  [id number]    Recommendations:     Discharge Recommendations: outpatient PT with family assistance   Discharge Equipment Recommendations: none   Barriers to discharge: Inaccessible home    Assessment:     Aide Almaraz is a 53 y.o. female admitted with a medical diagnosis of Sepsis.  At this time, patient is functioning at their prior level of function and does not require further acute PT services.     Recent Surgery: * No surgery found *      Plan:     During this hospitalization, patient does not require further acute PT services.  Please re-consult if situation changes.      Subjective     Chief Complaint: chronic back and R foot pain; dental pain is improving  Patient/Family Comments/goals: Pt agreeable to ambulation in the hallway.   Pain/Comfort:  Pain Rating 1:  (Pt c/o dental pain, however much better than yesterday.  Pt able to eat a sandwich today.)  Pain Addressed 1: Distraction, Cessation of Activity      Living Environment:  Pt lives with s.o. and dtr in a 2SH, bedroom on the 2nd floor.   Prior to admission, patients level of function was independent.  Pt reported being sedentary at home.  Equipment used at home: none.  Upon discharge, patient will have assistance from family.  Pt has 2 dtrs.  There's a sister visiting with pt at this time.     Objective:     Patient found up in chair with peripheral IV upon PT entry to room.    General Precautions: Standard, fall, diabetic    Orthopedic Precautions:N/A   Braces: N/A  Respiratory Status: Room air    Exams:  Cognitive Exam:  Patient was able to follow multiple commands.   Gross Motor Coordination:  WFL  Postural Exam:  Patient presented with the following abnormalities:    -       No postural abnormalities identified  Skin Integrity/Edema:      -       Skin integrity: Visible skin intact  -       Edema: None noted BUE/BLE  BUE ROM: WFL  BUE Strength: WFL  BLE  ROM: WFL; Pt with decreased R ankle eversion, reported since ankle sx.  BLE Strength: WFL    Functional Mobility: Pt in good spirits, quirky personality.  Pt reported h/o chronic back and R foot pain, stated mostly sedentary at home.  Pt stated that she just has not been the same since getting COVID in 2022, had outpatient PT services after hospital stay.  Pt doesn't want family to know about all of her medical issues.  Pt reported close to baseline with ambulation and opened to outpatient PT services again.    Transfers:     Sit to Stand:  modified independence with no AD  Bed to Chair: modified independence with  no AD  using  Step Transfer  Gait: Pt ambulated ~200 ft with supervision using no AD.  Pt c/o some dizziness during ambulation.  Pt with mild unsteadiness, occasionally reaching for bacon rail, declined to use a cane.  Pt with decreased step length and enoc.    Balance: Pt with fair dynamic standing balance.     AM-PAC 6 CLICK MOBILITY  Total Score:22       Treatment and Education:  Pt educated on outpatient PT services, verbalized good understanding.       Patient left up in chair with all lines intact, call button in reach, nurse Tia notified, and sister present.  Tray table in front.    GOALS:   Multidisciplinary Problems       Physical Therapy Goals       Not on file              Multidisciplinary Problems (Resolved)          Problem: Physical Therapy    Goal Priority Disciplines Outcome Goal Variances Interventions   Physical Therapy Goal   (Resolved)     PT, PT/OT Met                         History:     Past Medical History:   Diagnosis Date    Anemia     Celiac disease     Diabetes mellitus     Gall stones     Hypertension     PTSD (post-traumatic stress disorder)     Supraventricular tachycardia 2001    Yeast infection        Past Surgical History:   Procedure Laterality Date    ABDOMINAL SURGERY      Gastric sleeve    ANKLE FUSION  2011    ARTHROSCOPY OF ANKLE Right 4/18/2022    Procedure:  ARTHROSCOPY, ANKLE;  Surgeon: Tessa Garcia MD;  Location: [facility];  Service: Orthopedics;  Laterality: Right;    BREAST SURGERY       SECTION      placenta previa    CHOLECYSTECTOMY      EXCISIONAL BIOPSY Left 2019    Procedure: EXCISIONAL BIOPSY LEFT with SEED (CONSENT AM OF) 1.0 hr case;  Surgeon: Flor Rosas MD;  Location: [address];  Service: General;  Laterality: Left;    gastric      gastric sleeve      pt reported     HERNIA REPAIR      HYSTERECTOMY  2001    Inguinal hernia      REPAIR OF LIGAMENT OF ANKLE Right 2022    Procedure: REPAIR, LIGAMENT, ANKLE;  Surgeon: Tessa Garcia MD;  Location: [facility];  Service: Orthopedics;  Laterality: Right;  Single Shot    SKIN SURGERY      Tumy Tuck       Time Tracking:     PT Received On: 23  PT Start Time: 1043     PT Stop Time: 1058  PT Total Time (min): 15 min     Billable Minutes: Evaluation 15 min      2023

## 2023-06-09 NOTE — PROGRESS NOTES
"  [facility] - Med Surg  Adult Nutrition  Consult Note    SUMMARY     Recommendations    Recommend Boost BID to help meet EEN/EPN  Recommend Fidel to promote wound healing  Monitor labs and weights  Monitor PO intake/tolerance    Goals: 1. Meet % EEN/EPN by RD follow up  Nutrition Goal Status: new  Communication of RD Recs:  (POC)    Assessment and Plan    Nutrition Problem  Difficulty chewing    Related to (etiology):   Abscess pain    Signs and Symptoms (as evidenced by):   Poor PO intake  Painful mastication  Tenderness to tooth    Interventions/Recommendations (treatment strategy):  Collaboration with medical providers  Commercial Beverage  Liquid diet    Nutrition Diagnosis Status:   New     Reason for Assessment    Reason For Assessment: identified at risk by screening criteria  Diagnosis: infection/sepsis  Relevant Medical History: .prob  Interdisciplinary Rounds: did not attend  General Information Comments: RD consult 2/2 identified at risk per malnutrition screening tool. Pt with difficulty chewing/swallowing on dysphagia mechanical soft diet. Pt with no intake per chart. Monitor and record PO intake. Consider Fidel to promote wound healing. Recommend Boost Plus to help meet EEN. Pt not eating 2/2 abscess in mouth/ trouble eating. Pt with major weight fluctuations. Previous diagnosis of non morbid obesity due to excess calories. Continue to monitor weight changes.  LBM 6/7/23  Nutrition Discharge Planning: Dysphagia Soft or per SLP/MD    Nutrition Risk Screen    Nutrition Risk Screen: difficulty chewing/swallowing    Nutrition/Diet History    Spiritual, Cultural Beliefs, Sikhism Practices, Values that Affect Care: no  Food Allergies: NKFA    Anthropometrics    Temp: 99.1 °F (37.3 °C)  Height: 5' 5" (165.1 cm)  Height (inches): 65 in  Weight Method: Bed Scale  Weight: 76.7 kg (169 lb)  Weight (lb): 169 lb  Ideal Body Weight (IBW), Female: 125 lb  % Ideal Body Weight, Female (lb): 128 %  BMI " (Calculated): 28.1  BMI Grade: 25 - 29.9 - overweight       Lab/Procedures/Meds    Pertinent Labs Reviewed: reviewed  Pertinent Medications Reviewed: reviewed    Physical Findings/Assessment         Estimated/Assessed Needs    Weight Used For Calorie Calculations: 76.7 kg (169 lb)  Energy Calorie Requirements (kcal): 1372 KCAL  Energy Need Method: Rush-St Jeor  Protein Requirements: 76.82 (1.0 g/kg)  Weight Used For Protein Calculations: 76.7 kg (169 lb)        RDA Method (mL): 1372         Nutrition Prescription Ordered    Current Diet Order: IDDSI lvl 5    Evaluation of Received Nutrient/Fluid Intake    I/O: +2.6 L  Energy Calories Required: not meeting needs  Protein Required: not meeting needs  Fluid Required: not meeting needs  Comments: LBM 6/7/23  % Intake of Estimated Energy Needs: 0 - 25 %  % Meal Intake: 0 - 25 %    Nutrition Risk    Level of Risk/Frequency of Follow-up: low - moderate       Monitor and Evaluation    Food and Nutrient Intake: food and beverage intake, enteral nutrition intake, parenteral nutrition intake  Food and Nutrient Adminstration: diet order  Knowledge/Beliefs/Attitudes: food and nutrition knowledge/skill, beliefs and attitudes  Physical Activity and Function: nutrition-related ADLs and IADLs, factors affecting access to physical activity  Anthropometric Measurements: weight, weight change, body mass index  Biochemical Data, Medical Tests and Procedures: electrolyte and renal panel, gastrointestinal profile, glucose/endocrine profile, inflammatory profile, lipid profile  Nutrition-Focused Physical Findings: overall appearance       Nutrition Follow-Up    RD Follow-up?: Yes

## 2023-06-09 NOTE — PLAN OF CARE
Problem: Physical Therapy  Goal: Physical Therapy Goal  Outcome: Met     Pt ambulated ~200 ft with supervision using no AD.  Pt familiar to outpatient PT services in the past and opened to try outpatient PT services again.  Pt can be D/C'ed home with family support.

## 2023-06-10 LAB — BACTERIA UR CULT: NO GROWTH

## 2023-06-12 LAB
BACTERIA BLD CULT: NORMAL
BACTERIA BLD CULT: NORMAL

## 2023-07-19 ENCOUNTER — TELEPHONE (OUTPATIENT)
Dept: ORTHOPEDICS | Facility: CLINIC | Age: 53
End: 2023-07-19
Payer: MEDICAID

## 2023-07-19 NOTE — TELEPHONE ENCOUNTER
Spoke to pt and informed her I will send to a provider to fill and informed her dr morales is taking rea place. Pt verbalized understanding.----- Message from Carl Pretty sent at 7/19/2023  2:47 PM CDT -----  Regarding: refill medication  Contact: @ [phone]  Pt is calling to speak to someone in the office to request a refill to be sent to the pharmacy. Please call to advise. Thanks.         Refill Needed: gabapentin (NEURONTIN) 800 MG tablet    Pharmacy:     [facility] [address]  [address] [address]  Phone: [phone] Fax: [phone]    Additional Information: Pt is a previous pt of Dr. Garcia and stated that she did not receive any notice that Dr. Garcia is no longer with [facility]. Pt is also wanting to know who took her place so that she can establish care with another provider.

## 2023-07-20 DIAGNOSIS — G89.18 POST-OP PAIN: ICD-10-CM

## 2023-07-20 RX ORDER — GABAPENTIN 800 MG/1
800 TABLET ORAL 3 TIMES DAILY
Qty: 90 TABLET | Refills: 1 | Status: SHIPPED | OUTPATIENT
Start: 2023-07-20 | End: 2023-10-24 | Stop reason: SDUPTHER

## 2023-10-24 ENCOUNTER — TELEPHONE (OUTPATIENT)
Dept: ORTHOPEDICS | Facility: CLINIC | Age: 53
End: 2023-10-24
Payer: MEDICAID

## 2023-10-24 DIAGNOSIS — G89.18 POST-OP PAIN: ICD-10-CM

## 2023-10-24 RX ORDER — GABAPENTIN 800 MG/1
800 TABLET ORAL 3 TIMES DAILY
Qty: 90 TABLET | Refills: 1 | Status: SHIPPED | OUTPATIENT
Start: 2023-10-24 | End: 2024-01-09

## 2023-11-15 ENCOUNTER — TELEPHONE (OUTPATIENT)
Dept: SURGERY | Facility: CLINIC | Age: 53
End: 2023-11-15
Payer: MEDICAID

## 2023-11-15 NOTE — TELEPHONE ENCOUNTER
----- Message from Yesenia Wayne sent at 11/14/2023 10:25 AM CST -----  Regarding: FW: Flor Rosas patient bloody discharge from breast, had surgery 2019  Contact: @[phone]    ----- Message -----  From: Marilyn Wagner  Sent: 11/14/2023  10:12 AM CST  To: #  Subject: Flor Rosas patient bloody discharge from b#    Regarding: Flor Rosas patient bloody discharge from breast, had surgery 2019    Contact: Aide    Type: Call back to advise about apt options/ next steps.    Who Called: Aide    Would the patient rather a call back or a response via [facility]? Phone call    Best Call Back Number: @[phone]

## 2024-01-09 DIAGNOSIS — G89.18 POST-OP PAIN: ICD-10-CM

## 2024-01-09 RX ORDER — GABAPENTIN 800 MG/1
800 TABLET ORAL 3 TIMES DAILY
Qty: 90 TABLET | Refills: 1 | Status: SHIPPED | OUTPATIENT
Start: 2024-01-09 | End: 2024-02-01 | Stop reason: SDUPTHER

## 2024-02-01 ENCOUNTER — OFFICE VISIT (OUTPATIENT)
Dept: ORTHOPEDICS | Facility: CLINIC | Age: 54
End: 2024-02-01
Payer: MEDICAID

## 2024-02-01 ENCOUNTER — HOSPITAL ENCOUNTER (OUTPATIENT)
Dept: RADIOLOGY | Facility: HOSPITAL | Age: 54
Discharge: HOME | End: 2024-02-01
Attending: PHYSICIAN ASSISTANT
Payer: MEDICAID

## 2024-02-01 VITALS — BODY MASS INDEX: 30.12 KG/M2 | HEIGHT: 63 IN | WEIGHT: 170 LBS

## 2024-02-01 DIAGNOSIS — M25.571 CHRONIC PAIN OF RIGHT ANKLE: ICD-10-CM

## 2024-02-01 DIAGNOSIS — M79.641 HAND PAIN, RIGHT: ICD-10-CM

## 2024-02-01 DIAGNOSIS — M79.641 HAND PAIN, RIGHT: Primary | ICD-10-CM

## 2024-02-01 DIAGNOSIS — M25.571 PAIN IN JOINT INVOLVING RIGHT ANKLE AND FOOT: ICD-10-CM

## 2024-02-01 DIAGNOSIS — G89.29 CHRONIC PAIN OF RIGHT ANKLE: ICD-10-CM

## 2024-02-01 DIAGNOSIS — M65.4 DE QUERVAIN'S DISEASE (TENOSYNOVITIS): ICD-10-CM

## 2024-02-01 DIAGNOSIS — G89.18 POST-OP PAIN: ICD-10-CM

## 2024-02-01 PROCEDURE — 73610 X-RAY EXAM OF ANKLE: CPT | Mod: TC,RT

## 2024-02-01 PROCEDURE — 99999 PR PBB SHADOW E&M-EST. PATIENT-LVL III: CPT | Mod: PBBFAC,,, | Performed by: PHYSICIAN ASSISTANT

## 2024-02-01 PROCEDURE — 99214 OFFICE O/P EST MOD 30 MIN: CPT | Mod: S$PBB,25,, | Performed by: PHYSICIAN ASSISTANT

## 2024-02-01 PROCEDURE — 73130 X-RAY EXAM OF HAND: CPT | Mod: 26,RT,, | Performed by: RADIOLOGY

## 2024-02-01 PROCEDURE — 73130 X-RAY EXAM OF HAND: CPT | Mod: TC,RT

## 2024-02-01 PROCEDURE — 99213 OFFICE O/P EST LOW 20 MIN: CPT | Mod: PBBFAC,25 | Performed by: PHYSICIAN ASSISTANT

## 2024-02-01 PROCEDURE — 99999PBSHW PR PBB SHADOW TECHNICAL ONLY FILED TO HB: Mod: PBBFAC,,,

## 2024-02-01 PROCEDURE — 73610 X-RAY EXAM OF ANKLE: CPT | Mod: 26,RT,, | Performed by: RADIOLOGY

## 2024-02-01 PROCEDURE — 1159F MED LIST DOCD IN RCRD: CPT | Mod: CPTII,,, | Performed by: PHYSICIAN ASSISTANT

## 2024-02-01 PROCEDURE — 20550 NJX 1 TENDON SHEATH/LIGAMENT: CPT | Mod: S$PBB,RT,, | Performed by: PHYSICIAN ASSISTANT

## 2024-02-01 PROCEDURE — 1160F RVW MEDS BY RX/DR IN RCRD: CPT | Mod: CPTII,,, | Performed by: PHYSICIAN ASSISTANT

## 2024-02-01 PROCEDURE — 3008F BODY MASS INDEX DOCD: CPT | Mod: CPTII,,, | Performed by: PHYSICIAN ASSISTANT

## 2024-02-01 PROCEDURE — 20550 NJX 1 TENDON SHEATH/LIGAMENT: CPT | Mod: PBBFAC,RT | Performed by: PHYSICIAN ASSISTANT

## 2024-02-01 RX ORDER — GABAPENTIN 800 MG/1
800 TABLET ORAL 3 TIMES DAILY
Qty: 90 TABLET | Refills: 1 | Status: SHIPPED | OUTPATIENT
Start: 2024-02-01 | End: 2024-03-11

## 2024-02-01 RX ORDER — TRIAMCINOLONE ACETONIDE 40 MG/ML
20 INJECTION, SUSPENSION INTRA-ARTICULAR; INTRAMUSCULAR
Status: DISCONTINUED | OUTPATIENT
Start: 2024-02-01 | End: 2024-02-01 | Stop reason: HOSPADM

## 2024-02-01 RX ADMIN — TRIAMCINOLONE ACETONIDE 20 MG: 40 INJECTION, SUSPENSION INTRA-ARTICULAR; INTRAMUSCULAR at 08:02

## 2024-02-01 NOTE — PROCEDURES
Tendon Sheath    Date/Time: 2/1/2024 8:30 AM    Performed by: Lupe Steward PA-C  Authorized by: Lupe Steward PA-C    Indications:  Pain  Timeout: prior to procedure the correct patient, procedure, and site was verified    Location:  Wrist  Site:  R first doral compartment  Needle size:  25 G  Approach:  Radial  Medications:  20 mg triamcinolone acetonide 40 mg/mL  Patient tolerance:  Patient tolerated the procedure well with no immediate complications

## 2024-02-01 NOTE — PROGRESS NOTES
"  SUBJECTIVE:     Chief Complaint & History of Present Illness:  Aide Almaraz is a 54 y.o. year old female who presents for evaluation of constant right ankle pain.  She is s/p Right ankle arthroscopy and extensive debridement, Right Brostrom lateral ligament reconstruction with Artelon- 4/18/22 with Dr. Garcia. There is not a new injury.  The pain is located in the lateral aspect of the ankle.  The pain is described as achy.  It is aggravated by  standing and walking .  She denies instability.  She mostly complains of stiffness.  She states she did have a period of time post operatively when she got better, but mostly has had trouble since her surgery.  States she never did do any more PT after she last saw Dr. Garcia. She takes gabapentin and NSAIDS daily.  The patient does not use an assistive device.    She also has wrist pain x 1 week.  She feels some catching sensation at the base of her thumb and pain around her wrist.  Her thumb is not getting "stuck" but she does feel some popping. She has tried rest, NSAIDS without much improvement        Review of patient's allergies indicates:   Allergen Reactions    Latex, natural rubber Itching and Rash    Lidocaine Rash    Tramadol Hives and Rash         Current Outpatient Medications   Medication Sig Dispense Refill    albuterol (PROVENTIL/VENTOLIN HFA) 90 mcg/actuation inhaler Inhale 1-2 puffs into the lungs every 6 (six) hours as needed for Wheezing. Rescue 6.7 g 1    gabapentin (NEURONTIN) 800 MG tablet TAKE 1 TABLET(800 MG) BY MOUTH THREE TIMES DAILY 90 tablet 1    ibuprofen (ADVIL,MOTRIN) 800 MG tablet Take 800 mg by mouth every 8 (eight) hours as needed.      oxyCODONE-acetaminophen (PERCOCET)  mg per tablet Take 1 tablet by mouth. TAKE 1 TABLET BY MOUTH TWICE DAILY TO THREE TIMES DAILY AS NEEDED FOR PAIN FOR 30 DAYS      OZEMPIC 1 mg/dose (4 mg/3 mL) Inject 1 mg into the skin every 7 days. Sundays      promethazine (PHENERGAN) 25 MG tablet Take 25 mg " "by mouth every 12 (twelve) hours as needed for Nausea.      SODIUM FLUORIDE 5000 PLUS 1.1 % Crea Apply topically once daily.      pantoprazole (PROTONIX) 40 MG tablet Take 1 tablet (40 mg total) by mouth once daily. 30 tablet 1     No current facility-administered medications for this visit.       Past Medical History:   Diagnosis Date    Anemia     Celiac disease     Diabetes mellitus     Gall stones     Hypertension     PTSD (post-traumatic stress disorder)     Supraventricular tachycardia     Yeast infection        Past Surgical History:   Procedure Laterality Date    ABDOMINAL SURGERY      Gastric sleeve    ANKLE FUSION      ARTHROSCOPY OF ANKLE Right 2022    Procedure: ARTHROSCOPY, ANKLE;  Surgeon: Tessa Garcia MD;  Location: [facility];  Service: Orthopedics;  Laterality: Right;    BREAST SURGERY       SECTION      placenta previa    CHOLECYSTECTOMY  2009    EXCISIONAL BIOPSY Left 2019    Procedure: EXCISIONAL BIOPSY LEFT with SEED (CONSENT AM OF) 1.0 hr case;  Surgeon: Flor Rosas MD;  Location: [address];  Service: General;  Laterality: Left;    gastric      gastric sleeve      pt reported     HERNIA REPAIR      HYSTERECTOMY  2001    Inguinal hernia  2004    REPAIR OF LIGAMENT OF ANKLE Right 2022    Procedure: REPAIR, LIGAMENT, ANKLE;  Surgeon: Tessa Garcia MD;  Location: [facility];  Service: Orthopedics;  Laterality: Right;  Single Shot    SKIN SURGERY      Kasi Gómez         Review of Systems:  ROS:  Constitutional: no fever or chills  Eyes: no visual changes  ENT: no nasal congestion or sore throat  Respiratory: no cough or shortness of breath  Musculoskeletal: no arthralgias or myalgias  Behavioral/Psych: no auditory or visual hallucinations    OBJECTIVE:     PHYSICAL EXAM:  Height: 5' 3.39" (161 cm) Weight: 77.1 kg (170 lb), General Appearance: Well nourished, well developed, in no acute distress.  CV: 2+ UE and LE distal pulses bilaterally  RESP: Respirations " equal and unlabored  GI: Abdomen soft and non-tender  Neurological: Mood & affect are normal.  right  Foot/Ankle  Skin intact  Well healed scar  No significant swelling  TTP around incision, anterolateral ankle  Pain worse with inversion  No instability    Right hand  Skin intact  FROM in fingers  TTP at wrist, first dorsal compartment  + finkelstein  No swelling or erythema  2+ RP    IMAGING:  X-rays of the right ankle, personally reviewed by me, demonstrate no acute abnormality.  No fracture or dislocation.     X-rays of the right hand personally reviewed by me, demonstrate minimal DJD.  No fracture or dislocation.     ASSESSMENT/PLAN:     54 year old female with right wrist dequervain's tenosynovitis, chronic right ankle pain    Plan:  - Will repeat MRI of right ankle- plan to refer to foot/ankle MD for second opinion as she seems to have struggled with recovery following last surgery.  Offered PT but would like to see doctor first  - Right wrist Dequervain's - CSI performed today, Spica brace, Continue rest, ice .  Possibly also getting trigger thumb, but will monitor.  Refer to hand clinic if no improvement

## 2024-02-02 ENCOUNTER — TELEPHONE (OUTPATIENT)
Dept: ORTHOPEDICS | Facility: CLINIC | Age: 54
End: 2024-02-02
Payer: MEDICAID

## 2024-02-02 DIAGNOSIS — M79.643 PAIN OF HAND, UNSPECIFIED LATERALITY: Primary | ICD-10-CM

## 2024-02-02 RX ORDER — NAPROXEN 500 MG/1
500 TABLET ORAL 2 TIMES DAILY PRN
Qty: 60 TABLET | Refills: 0 | Status: SHIPPED | OUTPATIENT
Start: 2024-02-02 | End: 2024-03-18

## 2024-02-02 NOTE — TELEPHONE ENCOUNTER
Spoke with patient regarding her hand pain , patient had a injection yesterday, USHA Arango will send medication naproxen to her [facility] pharmacy [phone]

## 2024-02-02 NOTE — TELEPHONE ENCOUNTER
----- Message from Nadna Lopez sent at 2/2/2024  2:12 PM CST -----  Regarding: Consult/Advisory  Contact: [phone]  CONSULT/ADVISORY    Name of Caller:  RAGHU LENTZ [[id number]]    Contact Preference:   [phone]    Nature of Call:  Pt was seen on yesterday and received an injection in her hand.  States her hand is very painful.  Requesting to see if a rx for Naproxen can be called in.      [id number] - [place] - [address] [place] [address] AT [address]  [place] [address]  Phone: [phone] Fax: [phone]

## 2024-03-10 DIAGNOSIS — G89.18 POST-OP PAIN: ICD-10-CM

## 2024-03-11 RX ORDER — GABAPENTIN 800 MG/1
800 TABLET ORAL 3 TIMES DAILY
Qty: 90 TABLET | Refills: 1 | Status: SHIPPED | OUTPATIENT
Start: 2024-03-11

## 2024-03-18 ENCOUNTER — NURSE TRIAGE (OUTPATIENT)
Dept: ADMINISTRATIVE | Facility: CLINIC | Age: 54
End: 2024-03-18
Payer: MEDICAID

## 2024-03-18 VITALS
WEIGHT: 193 LBS | TEMPERATURE: 98 F | HEART RATE: 92 BPM | SYSTOLIC BLOOD PRESSURE: 136 MMHG | DIASTOLIC BLOOD PRESSURE: 73 MMHG | RESPIRATION RATE: 18 BRPM | BODY MASS INDEX: 32.15 KG/M2 | HEIGHT: 65 IN | OXYGEN SATURATION: 97 %

## 2024-03-18 DIAGNOSIS — M79.643 PAIN OF HAND, UNSPECIFIED LATERALITY: ICD-10-CM

## 2024-03-18 PROCEDURE — 99284 EMERGENCY DEPT VISIT MOD MDM: CPT

## 2024-03-18 RX ORDER — NAPROXEN 500 MG/1
TABLET ORAL
Qty: 60 TABLET | Refills: 0 | Status: SHIPPED | OUTPATIENT
Start: 2024-03-18 | End: 2024-04-19

## 2024-03-19 ENCOUNTER — HOSPITAL ENCOUNTER (EMERGENCY)
Facility: HOSPITAL | Age: 54
Discharge: HOME | End: 2024-03-19
Attending: STUDENT IN AN ORGANIZED HEALTH CARE EDUCATION/TRAINING PROGRAM
Payer: MEDICAID

## 2024-03-19 DIAGNOSIS — M26.621 ARTHRALGIA OF RIGHT TEMPOROMANDIBULAR JOINT: ICD-10-CM

## 2024-03-19 DIAGNOSIS — R20.2 PARESTHESIAS: Primary | ICD-10-CM

## 2024-03-19 LAB
BILIRUB UR QL STRIP: NEGATIVE
CLARITY UR: CLEAR
COLOR UR: YELLOW
GLUCOSE UR QL STRIP: NEGATIVE
HGB UR QL STRIP: NEGATIVE
KETONES UR QL STRIP: NEGATIVE
LEUKOCYTE ESTERASE UR QL STRIP: NEGATIVE
NITRITE UR QL STRIP: NEGATIVE
PH UR STRIP: 6 [PH] (ref 5–8)
PROT UR QL STRIP: ABNORMAL
SP GR UR STRIP: >1.03 (ref 1–1.03)
URN SPEC COLLECT METH UR: ABNORMAL
UROBILINOGEN UR STRIP-ACNC: NEGATIVE EU/DL

## 2024-03-19 PROCEDURE — 81003 URINALYSIS AUTO W/O SCOPE: CPT | Performed by: STUDENT IN AN ORGANIZED HEALTH CARE EDUCATION/TRAINING PROGRAM

## 2024-03-19 NOTE — TELEPHONE ENCOUNTER
"Pt reports that she had numbness and tingling to right side of head. Pt states it felt tight "like I was trying to pull a helmet off of it". Also reports numbness to right arm. Symptoms started at 2035 and continue now.     Dispo is to call  now. Pt verbalized understanding.   Reason for Disposition   [1] Numbness (i.e., loss of sensation) of the face, arm / hand, or leg / foot on one side of the body AND [2] sudden onset AND [3] present now    Additional Information   Negative: [1] SEVERE weakness (i.e., unable to walk or barely able to walk, requires support) AND [2] new-onset or worsening   Negative: [1] Weakness (i.e., paralysis, loss of muscle strength) of the face, arm / hand, or leg / foot on one side of the body AND [2] sudden onset AND [3] present now  (Exception: Bell's palsy suspected [i.e., weakness only on one side of the face, developing over hours to days, no other symptoms].)     Chronic    Protocols used: Neurologic Deficit-A-AH    "

## 2024-03-19 NOTE — DISCHARGE INSTRUCTIONS

## 2024-03-19 NOTE — ED PROVIDER NOTES
Encounter Date: 3/18/2024       History     Chief Complaint   Patient presents with    Numbness     Reports right sided facial numbness, left arm weakness, denies weakness at this time, +2  strength bilaterally, no drift noted, reports decrease sensations to right face with touch, reports head pressure     54-year-old female presents for right-sided facial paresthesias, right-sided pressure headache, mild dizziness without difficulty walking, ongoing for 45 minutes prior to arrival.  The paresthesias have resolved.  She denies any vision changes, hearing changes, facial numbness, change in voice, weakness, difficulty walking.  She now feels well.      Review of patient's allergies indicates:   Allergen Reactions    Latex, natural rubber Itching and Rash    Lidocaine Rash    Tramadol Hives and Rash     Past Medical History:   Diagnosis Date    Anemia     Celiac disease     Diabetes mellitus     Gall stones     Hypertension     PTSD (post-traumatic stress disorder)     Supraventricular tachycardia     Yeast infection      Past Surgical History:   Procedure Laterality Date    ABDOMINAL SURGERY      Gastric sleeve    ANKLE FUSION      ARTHROSCOPY OF ANKLE Right 2022    Procedure: ARTHROSCOPY, ANKLE;  Surgeon: Tessa Garcia MD;  Location: [facility] OR;  Service: Orthopedics;  Laterality: Right;    BREAST SURGERY       SECTION      placenta previa    CHOLECYSTECTOMY  2009    EXCISIONAL BIOPSY Left 2019    Procedure: EXCISIONAL BIOPSY LEFT with SEED (CONSENT AM OF) 1.0 hr case;  Surgeon: Flor Rosas MD;  Location: [address];  Service: General;  Laterality: Left;    gastric      gastric sleeve      pt reported     HERNIA REPAIR      HYSTERECTOMY  2001    Inguinal hernia  2004    REPAIR OF LIGAMENT OF ANKLE Right 2022    Procedure: REPAIR, LIGAMENT, ANKLE;  Surgeon: Tessa Garcia MD;  Location: [facility] OR;  Service: Orthopedics;  Laterality: Right;  Single Shot    SKIN SURGERY       Kasi Gómez     Family History   Problem Relation Age of Onset    Heart disease Father     Diabetes Mother         hypoglycemic    Breast cancer Maternal Aunt     Breast cancer Sister     Breast cancer Paternal Aunt     Ovarian cancer Neg Hx      Social History     Tobacco Use    Smoking status: Never    Smokeless tobacco: Never   Substance Use Topics    Alcohol use: Yes     Comment: rarely    Drug use: No     Review of Systems   Constitutional:  Negative for activity change, appetite change, chills, fever and unexpected weight change.   HENT:  Negative for dental problem and drooling.    Eyes:  Negative for discharge and itching.   Respiratory:  Negative for cough, chest tightness, shortness of breath, wheezing and stridor.    Cardiovascular:  Negative for chest pain, palpitations and leg swelling.   Gastrointestinal:  Negative for abdominal distention, abdominal pain, diarrhea and nausea.   Genitourinary:  Negative for difficulty urinating, dysuria, frequency and urgency.   Musculoskeletal:  Negative for back pain, gait problem and joint swelling.   Neurological:  Positive for numbness and headaches. Negative for dizziness and syncope.   Psychiatric/Behavioral:  Negative for agitation, behavioral problems and confusion.        Physical Exam     Initial Vitals [03/18/24 2356]   BP Pulse Resp Temp SpO2   136/73 92 18 98.4 °F (36.9 °C) 97 %      MAP       --         Physical Exam    Nursing note and vitals reviewed.  Constitutional: She appears well-developed and well-nourished. She is not diaphoretic.   HENT:   Head: Normocephalic and atraumatic.   Mouth/Throat: Oropharynx is clear and moist.   Eyes: EOM are normal. Pupils are equal, round, and reactive to light. Right eye exhibits no discharge. Left eye exhibits no discharge.   Neck: No tracheal deviation present.   Normal range of motion.  Cardiovascular:  Normal rate, regular rhythm and intact distal pulses.           Pulmonary/Chest: No respiratory distress. She  has no wheezes. She exhibits no tenderness.   Abdominal: Abdomen is soft. She exhibits no distension. There is no abdominal tenderness.   Musculoskeletal:         General: No tenderness or edema. Normal range of motion.      Cervical back: Normal range of motion.     Neurological: She is alert and oriented to person, place, and time. She has normal strength. No cranial nerve deficit or sensory deficit. GCS eye subscore is 4. GCS verbal subscore is 5. GCS motor subscore is 6.   Skin: Skin is warm and dry. No rash noted.   Psychiatric: She has a normal mood and affect. Her behavior is normal. Thought content normal.         ED Course   Procedures  Labs Reviewed   URINALYSIS, REFLEX TO URINE CULTURE - Abnormal; Notable for the following components:       Result Value    Specific Gravity, UA >1.030 (*)     Protein, UA Trace (*)     All other components within normal limits    Narrative:     Specimen Source->Urine          Imaging Results              CT Head Without Contrast (Final result)  Result time 03/19/24 01:04:52      Final result by Lisa Estrada MD (03/19/24 01:04:52)                   Impression:      No acute intracranial abnormality detected.      Electronically signed by: Lisa Estrada  Date:    03/19/2024  Time:    01:04               Narrative:    EXAMINATION:  CT OF THE HEAD WITHOUT    CLINICAL HISTORY:  Transient ischemic attack (TIA);    TECHNIQUE:  5 mm unenhanced axial images were obtained from the skull base to the vertex.    COMPARISON:  08/19/2021    FINDINGS:  The ventricles, basal cisterns, and cortical sulci are within normal limits for patient's stated age. There is no acute intracranial hemorrhage, territorial infarct or mass effect, or midline shift. The visualized paranasal sinuses and mastoid air cells are clear.                                       Medications - No data to display  Medical Decision Making  Differential diagnosis includes stroke, CNS lesion, trigeminal neuralgia,  TMJ syndrome,    Amount and/or Complexity of Data Reviewed  Radiology: ordered.               ED Course as of 03/19/24 0401   Tue Mar 19, 2024   7730 54-year-old female presents facial paresthesias, describing paresthesias in the 5th nerve distribution along the right forehead.  Vitals normal.  Neurological exam is reassuring.  She has no focal neurological deficits.  NIH stroke scale of 0.  She is well-appearing and comfortable in no acute distress.  CT head without acute intracranial abnormality.  On reexamination, patient is eating lays potato chips and relaxing.  She would like to go home.  Review of prior MRI from 2020 did show scattered nonspecific lesions concerning for possible microscopic disease versus gliosis.  Patient has subsequently lost contact with that physician.  Will provide her neurology follow-up outpatient.  I think she is stable for discharge with outpatient follow-up with Neurology, return precautions discussed for worsening symptoms.  She is amenable to that plan. [BS]      ED Course User Index  [BS] Emiliano Dick MD                           Clinical Impression:  Final diagnoses:  [R20.2] Paresthesias (Primary)  [M26.621] Arthralgia of right temporomandibular joint          ED Disposition Condition    Discharge Stable          ED Prescriptions    None       Follow-up Information       Follow up With Specialties Details Why Contact Info    Gorge Robert MD Neurology Call in 1 day To set up a follow-up appointment, To recheck today's symptoms [address] [facility]  [address]  [facility] [address]  [phone]               Emiliano Dick MD  03/19/24 0400

## 2024-04-02 NOTE — TELEPHONE ENCOUNTER
Aide calling and states she is now having moderate pain and loss of feeling in her foot after a ligament repair surgery. States the ace wrap is loose and the split is moving and unstable. States med that was prescribed for pain isn't helping much. Pt sent to ED per care advice.Protocol reviewed and care advice given. Pt agrees with advice and verbalizes understanding. Instructed to call for questions, concerns or changes.  Reason for Disposition   [1] Numbness (loss of sensation) of fingers or toes AND [2] persists > 1 hour after loosening elastic bandage or Velcro    Additional Information   Negative: Chest pain   Negative: Difficulty breathing    Protocols used: SPLINT SYMPTOMS AND [id number]     ---

## 2024-04-19 DIAGNOSIS — M79.643 PAIN OF HAND, UNSPECIFIED LATERALITY: ICD-10-CM

## 2024-04-19 RX ORDER — NAPROXEN 500 MG/1
TABLET ORAL
Qty: 60 TABLET | Refills: 0 | Status: SHIPPED | OUTPATIENT
Start: 2024-04-19 | End: 2024-05-14

## 2024-05-14 DIAGNOSIS — M79.643 PAIN OF HAND, UNSPECIFIED LATERALITY: ICD-10-CM

## 2024-05-14 RX ORDER — NAPROXEN 500 MG/1
TABLET ORAL
Qty: 60 TABLET | Refills: 0 | Status: SHIPPED | OUTPATIENT
Start: 2024-05-14

## 2024-06-18 ENCOUNTER — TELEPHONE (OUTPATIENT)
Dept: ORTHOPEDICS | Facility: CLINIC | Age: 54
End: 2024-06-18
Payer: MEDICAID

## 2024-06-18 NOTE — TELEPHONE ENCOUNTER
Attempted to call patient about MRI- no answer, left voicemail  If still having pain- plan to refer for follow up Dr. Santiago or Dr. Watts

## 2024-06-19 DIAGNOSIS — M79.643 PAIN OF HAND, UNSPECIFIED LATERALITY: ICD-10-CM

## 2024-06-20 ENCOUNTER — PATIENT MESSAGE (OUTPATIENT)
Dept: ORTHOPEDICS | Facility: CLINIC | Age: 54
End: 2024-06-20
Payer: MEDICAID

## 2024-06-20 RX ORDER — NAPROXEN 500 MG/1
500 TABLET ORAL 2 TIMES DAILY WITH MEALS
Qty: 60 TABLET | Refills: 0 | Status: SHIPPED | OUTPATIENT
Start: 2024-06-20 | End: 2024-07-20

## 2024-06-20 RX ORDER — NAPROXEN 500 MG/1
TABLET ORAL
Qty: 60 TABLET | Refills: 0 | OUTPATIENT
Start: 2024-06-20

## 2024-06-21 ENCOUNTER — PATIENT MESSAGE (OUTPATIENT)
Dept: ORTHOPEDICS | Facility: CLINIC | Age: 54
End: 2024-06-21
Payer: MEDICAID

## 2024-06-21 DIAGNOSIS — G89.18 POST-OP PAIN: ICD-10-CM

## 2024-06-24 RX ORDER — GABAPENTIN 800 MG/1
800 TABLET ORAL 3 TIMES DAILY
Qty: 90 TABLET | Refills: 1 | Status: SHIPPED | OUTPATIENT
Start: 2024-06-24

## 2024-07-12 ENCOUNTER — OFFICE VISIT (OUTPATIENT)
Dept: ORTHOPEDICS | Facility: CLINIC | Age: 54
End: 2024-07-12
Payer: MEDICAID

## 2024-07-12 VITALS — HEIGHT: 65 IN | BODY MASS INDEX: 32.14 KG/M2 | WEIGHT: 192.88 LBS

## 2024-07-12 DIAGNOSIS — Z98.890 HISTORY OF ANKLE SURGERY: Primary | ICD-10-CM

## 2024-07-12 PROCEDURE — 99213 OFFICE O/P EST LOW 20 MIN: CPT | Mod: PBBFAC | Performed by: SURGERY

## 2024-07-12 PROCEDURE — 3008F BODY MASS INDEX DOCD: CPT | Mod: CPTII,,, | Performed by: SURGERY

## 2024-07-12 PROCEDURE — 99999 PR PBB SHADOW E&M-EST. PATIENT-LVL III: CPT | Mod: PBBFAC,,, | Performed by: SURGERY

## 2024-07-12 PROCEDURE — 99213 OFFICE O/P EST LOW 20 MIN: CPT | Mod: S$PBB,,, | Performed by: SURGERY

## 2024-07-12 PROCEDURE — 1159F MED LIST DOCD IN RCRD: CPT | Mod: CPTII,,, | Performed by: SURGERY

## 2024-07-15 NOTE — PROGRESS NOTES
"SUBJECTIVE:    Ms. Almaraz is here today for a follow up visit.  She has a history of Brostrom procedure to the right lower extremity 2 years ago.  She is done well however she still endorses some pain to the ankle joint.  She does not complain of instability however mostly pain.  Presents here for further follow up.  She has not tried wearing a brace.      OBJECTIVE:      Vitals:    07/12/24 1308   Weight: 87.5 kg (192 lb 14.4 oz)   Height: 5' 5" (1.651 m)   PainSc:   5       Lower Extremity Exam  Alert and oriented: Right lower extremity with incisions clean dry and well healed  Stable to anterior drawer and talar tilt testing   Tenderness to palpation about the ankle joint   Excellent range of motion   No cavus abnormality   Neurovascularly intact        DIAGNOSTIC STUDIES:  MRI of the right ankle demonstrates she has status post a Brostrom procedure with the Kang modification with hardware intact and inappropriate placement.    ASSESSMENT:   1. Status post the procedure above      PLAN:  There are no diagnoses linked to this encounter.    We recommended trying brace wear for 6 weeks.  We will see her after the 6 weeks.  She may have some elements of micro instability which could be amenable to bracing.  We could also try an injection into the ankle joint at her next visit.    Follow up in 6 weeks for further care.  Continue weight-bearing as tolerated ASO lace-up ankle brace    Paul Santiago MD  [facility]  Orthopedic Surgery      This note was done with voice recognition software. Please excuse any errors missed in proof reading.     "

## 2024-07-21 DIAGNOSIS — M79.643 PAIN OF HAND, UNSPECIFIED LATERALITY: ICD-10-CM

## 2024-07-22 RX ORDER — NAPROXEN 500 MG/1
500 TABLET ORAL 2 TIMES DAILY WITH MEALS
Qty: 60 TABLET | Refills: 0 | Status: SHIPPED | OUTPATIENT
Start: 2024-07-22

## 2024-08-12 ENCOUNTER — OFFICE VISIT (OUTPATIENT)
Dept: ORTHOPEDICS | Facility: CLINIC | Age: 54
End: 2024-08-12
Payer: MEDICAID

## 2024-08-12 DIAGNOSIS — M65.4 DE QUERVAIN'S DISEASE (TENOSYNOVITIS): Primary | ICD-10-CM

## 2024-08-12 PROCEDURE — 20550 NJX 1 TENDON SHEATH/LIGAMENT: CPT | Mod: S$PBB,RT,, | Performed by: PHYSICIAN ASSISTANT

## 2024-08-12 PROCEDURE — 99999 PR PBB SHADOW E&M-EST. PATIENT-LVL III: CPT | Mod: PBBFAC,,, | Performed by: PHYSICIAN ASSISTANT

## 2024-08-12 PROCEDURE — 20550 NJX 1 TENDON SHEATH/LIGAMENT: CPT | Mod: PBBFAC,RT | Performed by: PHYSICIAN ASSISTANT

## 2024-08-12 PROCEDURE — 99499 UNLISTED E&M SERVICE: CPT | Mod: S$PBB,,, | Performed by: PHYSICIAN ASSISTANT

## 2024-08-12 PROCEDURE — 99999PBSHW PR PBB SHADOW TECHNICAL ONLY FILED TO HB: Mod: PBBFAC,,,

## 2024-08-12 PROCEDURE — 99213 OFFICE O/P EST LOW 20 MIN: CPT | Mod: PBBFAC,25 | Performed by: PHYSICIAN ASSISTANT

## 2024-08-12 RX ORDER — TRIAMCINOLONE ACETONIDE 40 MG/ML
20 INJECTION, SUSPENSION INTRA-ARTICULAR; INTRAMUSCULAR
Status: DISCONTINUED | OUTPATIENT
Start: 2024-08-12 | End: 2024-08-12 | Stop reason: HOSPADM

## 2024-08-12 RX ORDER — LIDOCAINE HYDROCHLORIDE 10 MG/ML
1 INJECTION, SOLUTION INFILTRATION; PERINEURAL
Status: DISCONTINUED | OUTPATIENT
Start: 2024-08-12 | End: 2024-08-12 | Stop reason: HOSPADM

## 2024-08-12 RX ADMIN — LIDOCAINE HYDROCHLORIDE 1 ML: 10 INJECTION, SOLUTION INFILTRATION; PERINEURAL at 03:08

## 2024-08-12 RX ADMIN — TRIAMCINOLONE ACETONIDE 20 MG: 40 INJECTION, SUSPENSION INTRA-ARTICULAR; INTRAMUSCULAR at 03:08

## 2024-08-12 NOTE — PROGRESS NOTES
Patient ID: Aide Almaraz is a 54 y.o. female.    Chief Complaint: Pain and Injections of the Right Hand      HISTORY:  Aide Almaraz is a 54 y.o. female who returns to me today for follow up of right wrist pain.  She was last seen by me 2/1/2024.  She had injection for Dequervain's with good relief. Her pain recently returned.  She is interested in repeat injection.        PMH/PSH/FamHx/SocHx:    Unchanged from prior visit.    ROS:  Constitution: Negative for chills, fever and weakness.   Respiratory: Negative for cough and shortness of breath.   Musculoskeletal: Positive for right wrist pain  Psychiatric/Behavioral: The patient is not nervous/anxious.       PHYSICAL EXAM:   Right wrist  Skin intact  TTP first dorsal compartment  + Finkelstein  FROM in fingers  NVI      ASSESSMENT/PLAN:    Aide was seen today for pain and injections.    Diagnoses and all orders for this visit:    De Quervain's disease (tenosynovitis)  -     Tendon Sheath      - she elected to proceed with repeat injection today.  Advised of side effects such as hypopigmentation reaction and she elected to proceed.  - Recommend continued rest, ice   - Thumb spica brace today

## 2024-08-12 NOTE — PROCEDURES
Tendon Sheath    Date/Time: 8/12/2024 3:15 PM    Performed by: Lupe Steward PA-C  Authorized by: Lupe Steward PA-C    Consent Done?:  Yes (Verbal)  Indications:  Pain  Local anesthetic:  Co-phenylcaine spray  Location:  Wrist  Site:  R first doral compartment  Needle size:  25 G  Medications:  1 mL LIDOcaine HCL 10 mg/ml (1%) 10 mg/mL (1 %); 20 mg triamcinolone acetonide 40 mg/mL  Patient tolerance:  Patient tolerated the procedure well with no immediate complications

## 2024-08-16 DIAGNOSIS — M79.643 PAIN OF HAND, UNSPECIFIED LATERALITY: ICD-10-CM

## 2024-08-19 RX ORDER — NAPROXEN 500 MG/1
500 TABLET ORAL 2 TIMES DAILY WITH MEALS
Qty: 60 TABLET | Refills: 0 | Status: SHIPPED | OUTPATIENT
Start: 2024-08-19

## 2024-09-16 DIAGNOSIS — M79.643 PAIN OF HAND, UNSPECIFIED LATERALITY: ICD-10-CM

## 2024-09-16 RX ORDER — NAPROXEN 500 MG/1
500 TABLET ORAL 2 TIMES DAILY WITH MEALS
Qty: 60 TABLET | Refills: 0 | Status: SHIPPED | OUTPATIENT
Start: 2024-09-16

## 2024-10-11 ENCOUNTER — OFFICE VISIT (OUTPATIENT)
Dept: ORTHOPEDICS | Facility: CLINIC | Age: 54
End: 2024-10-11
Payer: MEDICAID

## 2024-10-11 VITALS — BODY MASS INDEX: 32.1 KG/M2 | HEIGHT: 65 IN

## 2024-10-11 DIAGNOSIS — M25.371 ANKLE INSTABILITY, RIGHT: ICD-10-CM

## 2024-10-11 DIAGNOSIS — Z98.890 HISTORY OF ANKLE SURGERY: Primary | ICD-10-CM

## 2024-10-11 PROCEDURE — 99212 OFFICE O/P EST SF 10 MIN: CPT | Mod: PBBFAC | Performed by: SURGERY

## 2024-10-11 PROCEDURE — 99999 PR PBB SHADOW E&M-EST. PATIENT-LVL II: CPT | Mod: PBBFAC,,, | Performed by: SURGERY

## 2024-10-11 NOTE — PROGRESS NOTES
"SUBJECTIVE:    Ms. Almaraz is here today for a follow up visit.  She has a history of Brostrom procedure to the right lower extremity 2 years ago.  She is done well however she still endorses some pain to the ankle joint.  She does not complain of instability however mostly pain.  Presents here for further follow up.  She has not tried wearing a brace.    10/11/24 - presents for further management. Would like to try an injection. Brace did not help her and was not able to tolerate it.    OBJECTIVE:      Vitals:    10/11/24 0946   Height: 5' 5" (1.651 m)   PainSc:   4   PainLoc: Ankle       Lower Extremity Exam  Alert and oriented: Right lower extremity with incisions clean dry and well healed  Stable to anterior drawer and talar tilt testing   Tenderness to palpation about the ankle joint   Excellent range of motion   No cavus abnormality   Neurovascularly intact        DIAGNOSTIC STUDIES:  MRI of the right ankle demonstrates she has status post a Brostrom procedure with the Kang modification with hardware intact and inappropriate placement.    ASSESSMENT:   1. Status post the procedure above      PLAN:  There are no diagnoses linked to this encounter.  MRI /x-rays show hardware in place and appropriate.  Injection performed today  Follow up in 3 months    Paul Santiago MD  [facility]  Orthopedic Surgery      This note was done with voice recognition software. Please excuse any errors missed in proof reading.       "

## 2024-10-16 DIAGNOSIS — G89.18 POST-OP PAIN: ICD-10-CM

## 2024-10-17 RX ORDER — GABAPENTIN 800 MG/1
800 TABLET ORAL 3 TIMES DAILY
Qty: 90 TABLET | Refills: 1 | Status: SHIPPED | OUTPATIENT
Start: 2024-10-17

## 2024-10-20 DIAGNOSIS — M79.643 PAIN OF HAND, UNSPECIFIED LATERALITY: ICD-10-CM

## 2024-10-21 ENCOUNTER — OFFICE VISIT (OUTPATIENT)
Dept: OTOLARYNGOLOGY | Facility: CLINIC | Age: 54
End: 2024-10-21
Payer: MEDICAID

## 2024-10-21 VITALS
HEART RATE: 96 BPM | SYSTOLIC BLOOD PRESSURE: 116 MMHG | WEIGHT: 200.38 LBS | BODY MASS INDEX: 33.38 KG/M2 | HEIGHT: 65 IN | DIASTOLIC BLOOD PRESSURE: 72 MMHG

## 2024-10-21 DIAGNOSIS — R09.81 NASAL CONGESTION: Primary | ICD-10-CM

## 2024-10-21 DIAGNOSIS — K21.9 LARYNGOPHARYNGEAL REFLUX (LPR): ICD-10-CM

## 2024-10-21 DIAGNOSIS — H61.21 IMPACTED CERUMEN OF RIGHT EAR: ICD-10-CM

## 2024-10-21 PROCEDURE — 99213 OFFICE O/P EST LOW 20 MIN: CPT | Mod: PBBFAC | Performed by: PHYSICIAN ASSISTANT

## 2024-10-21 PROCEDURE — 3044F HG A1C LEVEL LT 7.0%: CPT | Mod: CPTII,,, | Performed by: PHYSICIAN ASSISTANT

## 2024-10-21 PROCEDURE — 99999 PR PBB SHADOW E&M-EST. PATIENT-LVL III: CPT | Mod: PBBFAC,,, | Performed by: PHYSICIAN ASSISTANT

## 2024-10-21 PROCEDURE — 3078F DIAST BP <80 MM HG: CPT | Mod: CPTII,,, | Performed by: PHYSICIAN ASSISTANT

## 2024-10-21 PROCEDURE — 1160F RVW MEDS BY RX/DR IN RCRD: CPT | Mod: CPTII,,, | Performed by: PHYSICIAN ASSISTANT

## 2024-10-21 PROCEDURE — 31575 DIAGNOSTIC LARYNGOSCOPY: CPT | Mod: PBBFAC | Performed by: PHYSICIAN ASSISTANT

## 2024-10-21 PROCEDURE — 1159F MED LIST DOCD IN RCRD: CPT | Mod: CPTII,,, | Performed by: PHYSICIAN ASSISTANT

## 2024-10-21 PROCEDURE — 3008F BODY MASS INDEX DOCD: CPT | Mod: CPTII,,, | Performed by: PHYSICIAN ASSISTANT

## 2024-10-21 PROCEDURE — 3074F SYST BP LT 130 MM HG: CPT | Mod: CPTII,,, | Performed by: PHYSICIAN ASSISTANT

## 2024-10-21 PROCEDURE — 99204 OFFICE O/P NEW MOD 45 MIN: CPT | Mod: 25,S$PBB,, | Performed by: PHYSICIAN ASSISTANT

## 2024-10-21 PROCEDURE — 31575 DIAGNOSTIC LARYNGOSCOPY: CPT | Mod: S$PBB,,, | Performed by: PHYSICIAN ASSISTANT

## 2024-10-21 RX ORDER — NAPROXEN 500 MG/1
500 TABLET ORAL 2 TIMES DAILY WITH MEALS
Qty: 60 TABLET | Refills: 0 | Status: SHIPPED | OUTPATIENT
Start: 2024-10-21

## 2024-10-21 NOTE — PATIENT INSTRUCTIONS
Debrox for wax in R ear.    Daily sinus rinses. NeilMed sinus rinse with distilled water.  Take prescribe protonix daily.  Flonase spray daily.

## 2024-10-21 NOTE — PROGRESS NOTES
No chief complaint on file.        54 y.o. female presents for evaluation of nasal congestion, R>L.  Initially, congestion was intermittent but now seems more constant fullness/swelling mostly to the R nose. Denies any mucus production.  She has used flonase in the past but stopped 2 years ago.  She has used sinus rinses in the past but only once every 6 months. She is followed by GI for celiac disease - she does have protonix but takes it PRN. Denies history of recurrent sinus infections requiring antibiotic therapy. She does have intermittent globus sensation, throat clearing. Denies dysphagia, ear pain, sore throat.     No PSHx of the head or neck.  No history of radiation therpay  Non smoker.     Chart Review  CT Maxillofacial 6/8/23  FINDINGS:  No acute facial fractures.  Minimal facial soft tissue edema.  No drainable fluid collection identified.     Bilateral 2nd and 3rd maxillary molar teeth are absent.  Bilateral 3rd mandibular molar teeth are absent.  Dental caries and periodontal disease involving the right 1st mandibular molar tooth.  Minimal periodontal disease involving the right mandibular premolar teeth.  Periodontal disease involving the left 1st mandibular molar tooth with minimal periapical lucency involving the left 2nd mandibular molar tooth.  Cortical disruption in the mandible adjacent to the left 1st mandibular molar tooth (series 3, image 135) without adjacent drainable fluid collection.     Prominent but subcentimeter left submandibular lymph nodes.  Partially visualized prominent cervical chain lymph nodes, likely reactive.     Small mucosal retention cyst in the right maxillary sinus.  Otherwise, the visualized paranasal sinuses and mastoid air cells are essentially clear.     Limited intracranial evaluation is negative for acute finding.     Visualized jugular veins are grossly patent.  No severe atherosclerosis in the visualized carotid arteries.     Impression:     Dental and  periodontal disease involving the bilateral mandibular molar teeth, with cortical disruption along the lateral aspect of the left mandible adjacent to the left 1st mandibular molar tooth (#19).  No adjacent drainable abscess.  Suggest correlation with physical exam and dental follow-up as appropriate.     Additional findings discussed in the body of the report.     Review of Systems     Constitutional: Negative for fatigue and unexpected weight change.   HENT: Per HPI  Eyes: Negative for visual disturbance.   Respiratory: Negative for shortness of breath, hemoptysis  Cardiovascular: Negative for chest pain and palpitations.   Musculoskeletal: Negative for decreased ROM, back pain.   Skin: Negative for rash.      Past Medical History:   Diagnosis Date    Anemia     Celiac disease     Diabetes mellitus     Gall stones     Hypertension     PTSD (post-traumatic stress disorder)     Supraventricular tachycardia     Yeast infection        Past Surgical History:   Procedure Laterality Date    ABDOMINAL SURGERY      Gastric sleeve    ANKLE FUSION      ARTHROSCOPY OF ANKLE Right 2022    Procedure: ARTHROSCOPY, ANKLE;  Surgeon: Tessa Garcia MD;  Location: [facility] OR;  Service: Orthopedics;  Laterality: Right;    BREAST SURGERY       SECTION      placenta previa    CHOLECYSTECTOMY  2009    EXCISIONAL BIOPSY Left 2019    Procedure: EXCISIONAL BIOPSY LEFT with SEED (CONSENT AM OF) 1.0 hr case;  Surgeon: Flor Rosas MD;  Location: [address];  Service: General;  Laterality: Left;    gastric      gastric sleeve      pt reported     HERNIA REPAIR      HYSTERECTOMY  2001    Inguinal hernia  2004    REPAIR OF LIGAMENT OF ANKLE Right 2022    Procedure: REPAIR, LIGAMENT, ANKLE;  Surgeon: Tessa Garcia MD;  Location: [facility] OR;  Service: Orthopedics;  Laterality: Right;  Single Shot    SKIN SURGERY      Kasi Gómez       family history includes Breast cancer in her maternal aunt, paternal aunt,  and sister; Diabetes in her mother; Heart disease in her father.    Pt  reports that she has never smoked. She has never used smokeless tobacco. She reports current alcohol use. She reports that she does not use drugs.    Review of patient's allergies indicates:   Allergen Reactions    Latex, natural rubber Itching and Rash    Lidocaine Rash    Tramadol Hives and Rash        Physical Exam    Vitals:    10/21/24 1116   BP: 116/72   Pulse: 96     Body mass index is 33.35 kg/m².    General: AOx3, NAD  Right Ear: External Auditory Canal WNL,Unable to visualize TM due to cerumen impaction.  Left Ear:  External Auditory Canal WNL,TM w/o masses/lesions/perforations  Nose: No gross nasal septal deviation.  Inferior Turbinates WNL bilaterally.  No septal perforation.  No masses/lesions.  Oral Cavity: FOM Soft, no masses palpated.  Oral Tongue mobile.  Hard Palate WNL.  Oropharynx: BOT WNL.  No masses/lesions noted.  Tonsillar fossa without lesions.  Soft palate without masses.  Midline uvula.  Neck: No palpable lymphadenopathy at I - VI.    Face: House Brackmann I bilaterally.  Eyes: Normal extra ocular motion bilaterally.    Nasopharynx - the torus is clear. There are no lesions of the posterior wall. Clear post nasal drainage.   Oropharynx - no lesions of the tongue base. There is no obvious fullness or asymmetry.  Hypopharynx - there are no lesions of the pyriform sinuses or postcricoid region   Larynx - there are no lesions of the supraglottic or glottic larynx. Vocal fold mobility is normal with complete closure.     Procedure: Flexible laryngoscopy  In order to fully examine the upper aerodigestive tract, including the larynx, in a patient with a hyperactive gag reflex, flexible endoscopy is required.  After explaining the procedure and obtaining verbal consent, a timeout was performed with the patient's participation according to the universal protocol. Both nasal cavities were anesthetized with 4% Xylocaine spray  mixed with Surya-Synephrine. The flexible laryngoscope was inserted into the nasal cavity and advanced to visualize the nasal cavity, nasopharynx, the posterior oropharynx, hypopharynx, and the endolarynx with the above findings noted. The scope was removed and the procedure terminated. The patient tolerated this procedure well without apparent complication.         Assessment     1. Nasal congestion    2. Laryngopharyngeal reflux (LPR)          Plan    Problem List Items Addressed This Visit    None  Visit Diagnoses       Nasal congestion    -  Primary    Laryngopharyngeal reflux (LPR)              Symptoms may be due to LPR. Recommend taking prescribed protonix daily. Continue follow up with GI.   Daily sinus rinses. Flonase daily.  Debrox for R cerumen impaction.  Follow up PRN. All questions answered.

## 2024-11-18 DIAGNOSIS — M79.643 PAIN OF HAND, UNSPECIFIED LATERALITY: ICD-10-CM

## 2024-11-19 ENCOUNTER — PATIENT MESSAGE (OUTPATIENT)
Dept: ORTHOPEDICS | Facility: CLINIC | Age: 54
End: 2024-11-19
Payer: MEDICAID

## 2024-11-19 RX ORDER — NAPROXEN 500 MG/1
500 TABLET ORAL 2 TIMES DAILY WITH MEALS
Qty: 60 TABLET | Refills: 0 | Status: SHIPPED | OUTPATIENT
Start: 2024-11-19

## 2024-12-17 DIAGNOSIS — M79.643 PAIN OF HAND, UNSPECIFIED LATERALITY: ICD-10-CM

## 2024-12-17 DIAGNOSIS — G89.18 POST-OP PAIN: ICD-10-CM

## 2024-12-17 RX ORDER — GABAPENTIN 800 MG/1
800 TABLET ORAL 3 TIMES DAILY
Qty: 90 TABLET | Refills: 1 | Status: SHIPPED | OUTPATIENT
Start: 2024-12-17

## 2024-12-17 RX ORDER — NAPROXEN 500 MG/1
500 TABLET ORAL 2 TIMES DAILY WITH MEALS
Qty: 60 TABLET | Refills: 0 | Status: SHIPPED | OUTPATIENT
Start: 2024-12-17

## 2024-12-29 DIAGNOSIS — G89.18 POST-OP PAIN: ICD-10-CM

## 2024-12-30 RX ORDER — GABAPENTIN 800 MG/1
800 TABLET ORAL 3 TIMES DAILY
Qty: 90 TABLET | Refills: 1 | Status: SHIPPED | OUTPATIENT
Start: 2024-12-30

## 2025-01-12 DIAGNOSIS — M79.643 PAIN OF HAND, UNSPECIFIED LATERALITY: ICD-10-CM

## 2025-01-14 ENCOUNTER — PATIENT MESSAGE (OUTPATIENT)
Dept: ORTHOPEDICS | Facility: CLINIC | Age: 55
End: 2025-01-14
Payer: MEDICAID

## 2025-01-16 RX ORDER — NAPROXEN 500 MG/1
500 TABLET ORAL 2 TIMES DAILY WITH MEALS
Qty: 60 TABLET | Refills: 0 | Status: SHIPPED | OUTPATIENT
Start: 2025-01-16

## 2025-02-14 ENCOUNTER — OFFICE VISIT (OUTPATIENT)
Dept: ORTHOPEDICS | Facility: CLINIC | Age: 55
End: 2025-02-14
Payer: MEDICAID

## 2025-02-14 VITALS — HEIGHT: 65 IN | BODY MASS INDEX: 33.35 KG/M2

## 2025-02-14 DIAGNOSIS — Z98.890 HISTORY OF ANKLE SURGERY: Primary | ICD-10-CM

## 2025-02-14 PROCEDURE — 99213 OFFICE O/P EST LOW 20 MIN: CPT | Mod: PBBFAC | Performed by: SURGERY

## 2025-02-14 RX ORDER — GABAPENTIN 300 MG/1
300 CAPSULE ORAL 2 TIMES DAILY PRN
Qty: 30 CAPSULE | Refills: 2 | Status: SHIPPED | OUTPATIENT
Start: 2025-02-14 | End: 2026-02-14

## 2025-02-14 NOTE — PROGRESS NOTES
"SUBJECTIVE:    Ms. Almaraz is here today for a follow up visit.  She has a history of Brostrom procedure to the right lower extremity 2 years ago.  She is done well however she still endorses some pain to the ankle joint.  She does not complain of instability however mostly pain.  Presents here for further follow up.  She has not tried wearing a brace.    10/11/24 - presents for further management. Would like to try an injection. Brace did not help her and was not able to tolerate it.  2/14/25: plan to continue nonoperative management at this time. Plan for PT.    OBJECTIVE:      Vitals:    02/14/25 1025   Height: 5' 5" (1.651 m)   PainSc:   4   PainLoc: Ankle       Lower Extremity Exam  Alert and oriented: Right lower extremity with incisions clean dry and well healed  Stable to anterior drawer and talar tilt testing   Tenderness to palpation about the ankle joint   Excellent range of motion   No cavus abnormality   Neurovascularly intact        DIAGNOSTIC STUDIES:  MRI of the right ankle demonstrates she has status post a Brostrom procedure with the Kang modification with hardware intact and inappropriate placement.    ASSESSMENT:   1. Status post the procedure above      PLAN:  Aide was seen today for follow-up and pain.    Diagnoses and all orders for this visit:    History of ankle surgery  -     Ambulatory Referral/Consult to Physical Therapy/Occupational Therapy; Future    Other orders  -     gabapentin (NEURONTIN) 300 MG capsule; Take 1 capsule (300 mg total) by mouth 2 (two) times daily as needed (nerve pain).      PT ordered   Discussed that I continue to recommend non-operative management as oppose to exploratory surgery.  Follow up 3 months.    Paul Santiago MD  [facility]  Orthopedic Surgery      This note was done with voice recognition software. Please excuse any errors missed in proof reading.       "

## 2025-02-15 DIAGNOSIS — M79.643 PAIN OF HAND, UNSPECIFIED LATERALITY: ICD-10-CM

## 2025-02-18 DIAGNOSIS — G89.18 POST-OP PAIN: ICD-10-CM

## 2025-02-18 DIAGNOSIS — M79.643 PAIN OF HAND, UNSPECIFIED LATERALITY: ICD-10-CM

## 2025-02-18 RX ORDER — GABAPENTIN 300 MG/1
300 CAPSULE ORAL 2 TIMES DAILY PRN
Qty: 30 CAPSULE | Refills: 2 | Status: CANCELLED | OUTPATIENT
Start: 2025-02-18 | End: 2026-02-18

## 2025-02-18 RX ORDER — NAPROXEN 500 MG/1
500 TABLET ORAL 2 TIMES DAILY WITH MEALS
Qty: 60 TABLET | Refills: 0 | Status: SHIPPED | OUTPATIENT
Start: 2025-02-18

## 2025-02-18 RX ORDER — NAPROXEN 500 MG/1
500 TABLET ORAL 2 TIMES DAILY WITH MEALS
Qty: 60 TABLET | Refills: 0 | Status: SHIPPED | OUTPATIENT
Start: 2025-02-18 | End: 2025-02-18 | Stop reason: SDUPTHER

## 2025-02-18 RX ORDER — GABAPENTIN 800 MG/1
800 TABLET ORAL 3 TIMES DAILY
Qty: 90 TABLET | Refills: 1 | Status: SHIPPED | OUTPATIENT
Start: 2025-02-18

## 2025-02-18 NOTE — TELEPHONE ENCOUNTER
----- Message from Med Assistant Molly sent at 2/18/2025  8:50 AM CST -----  Regarding: FW: PT'S REQUESTING A CALL BACK REGARDING HER MEDS BELOW  Contact: pt  Please advise. Lupe Steward is no longer a provider with our clinic. Pt was last seen by Dr. Barajas on Valentines day.  ----- Message -----  From: Edward Gilliland  Sent: 2/18/2025   8:37 AM CST  To: Nichelle Andrade Staff  Subject: PT'S REQUESTING A CALL BACK REGARDING HER ME#    naproxen (NAPROSYN) 500 MG tablet, Pt was told by pharm that refill was denied.. Pt is unsure why?Confirmed contact info below:Contact Name: Aide AlmarazPhone Number: [phone]

## 2025-02-18 NOTE — TELEPHONE ENCOUNTER
----- Message from Med Assistant Molly sent at 2/18/2025  8:47 AM CST -----  Regarding: FW: PT'S REQUESTING A CALL BACK REGAGRDING REFILLING OF MEDS BELOW  Contact: PT  Please advise.  ----- Message -----  From: Edward Gilliland  Sent: 2/18/2025   8:42 AM CST  To: Nichelle Andrade Staff; Jack Miller Staff  Subject: PT'S REQUESTING A CALL BACK REGAGRDING REFIL#    naproxen (NAPROSYN) 500 MG tabletgabapentin (NEURONTIN) 300 MG capsule

## 2025-02-18 NOTE — TELEPHONE ENCOUNTER
----- Message from Med Assistant Molly sent at 2/18/2025  8:50 AM CST -----  Regarding: FW: PT'S REQUESTING A CALL BACK REGARDING HER MEDS BELOW  Contact: pt  Please advise. Lupe Steward is no longer a provider with our clinic. Pt was last seen by Dr. Barajas on Valentines day.  ----- Message -----  From: Edward Gilliland  Sent: 2/18/2025   8:37 AM CST  To: Nichelle Andrade Staff  Subject: PT'S REQUESTING A CALL BACK REGARDING HER ME#    naproxen (NAPROSYN) 500 MG tablet, Pt was told by pharm that refill was denied.. Pt is unsure why?Confirmed contact info below:Contact Name: Aide AlmarazPhone Number: [phone]

## 2025-04-14 DIAGNOSIS — M79.643 PAIN OF HAND, UNSPECIFIED LATERALITY: ICD-10-CM

## 2025-04-14 RX ORDER — NAPROXEN 500 MG/1
500 TABLET ORAL 2 TIMES DAILY WITH MEALS
Qty: 60 TABLET | Refills: 0 | Status: SHIPPED | OUTPATIENT
Start: 2025-04-14

## 2025-04-21 ENCOUNTER — CLINICAL SUPPORT (OUTPATIENT)
Dept: REHABILITATION | Facility: HOSPITAL | Age: 55
End: 2025-04-21
Attending: SURGERY
Payer: MEDICAID

## 2025-04-21 DIAGNOSIS — R68.89 DECREASED STRENGTH, ENDURANCE, AND MOBILITY: ICD-10-CM

## 2025-04-21 DIAGNOSIS — G89.29 CHRONIC PAIN OF LEFT ANKLE: Primary | ICD-10-CM

## 2025-04-21 DIAGNOSIS — Z98.890 HISTORY OF ANKLE SURGERY: ICD-10-CM

## 2025-04-21 DIAGNOSIS — M25.572 CHRONIC PAIN OF LEFT ANKLE: Primary | ICD-10-CM

## 2025-04-21 DIAGNOSIS — R53.1 DECREASED STRENGTH, ENDURANCE, AND MOBILITY: ICD-10-CM

## 2025-04-21 DIAGNOSIS — Z74.09 DECREASED STRENGTH, ENDURANCE, AND MOBILITY: ICD-10-CM

## 2025-04-21 PROBLEM — R26.2 DIFFICULTY WALKING: Status: RESOLVED | Noted: 2022-05-26 | Resolved: 2025-04-21

## 2025-04-21 PROBLEM — M25.571 SINUS TARSI SYNDROME, RIGHT: Status: RESOLVED | Noted: 2022-05-26 | Resolved: 2025-04-21

## 2025-04-21 PROBLEM — M25.671 DECREASED RANGE OF MOTION OF RIGHT ANKLE: Status: RESOLVED | Noted: 2022-05-26 | Resolved: 2025-04-21

## 2025-04-21 PROCEDURE — 97110 THERAPEUTIC EXERCISES: CPT | Mod: PN

## 2025-04-21 PROCEDURE — 97161 PT EVAL LOW COMPLEX 20 MIN: CPT | Mod: PN

## 2025-04-21 NOTE — PROGRESS NOTES
Outpatient Rehab    Physical Therapy Evaluation    Patient Name: Aide Almaraz  MRN: [id number]  YOB: 1970  Encounter Date: 4/21/2025    Therapy Diagnosis:   Encounter Diagnoses   Name Primary?    History of ankle surgery     Chronic pain of left ankle Yes    Decreased strength, endurance, and mobility      Physician: Paul Santiago MD    Physician Orders: Eval and Treat  Medical Diagnosis: History of ankle surgery    Visit # / Visits Authorized:  1 / 1  Insurance Authorization Period: 2/14/2025 to 2/14/2026  Date of Evaluation: 4/21/2025  Plan of Care Certification: 4/21/2025 to 7-14-25     Time In: 1100   Time Out: 1145  Total Time: 45   Total Billable Time: 45    Intake Outcome Measure for FOTO Survey    Therapist reviewed FOTO scores for Aide Almaraz on 4/21/2025.   FOTO report - see Media section or FOTO account episode details.     Intake Score: 31%         Subjective   History of Present Illness  Aide is a 55 y.o. female who reports to physical therapy with a chief concern of R ankle.     The patient reports a medical diagnosis of History of ankle surgery.    Diagnostic tests related to this condition: MRI studies.   MRI Studies Details: Impression:     1. Structurally intact ATFL and CFL status post reconstruction (Broström procedure).  2. Thickened extensor retinaculum and Frondiform ligament, likely related to postoperative scarring.  3. Mild chondral fissuring and worsening subchondral marrow edema and cystic change at the proximal articular surface of the cuboid.  4. Mild Achilles insertional tendinosis and peritendinitis.    History of Present Condition/Illness: Pt states she still have pain. She had surgery R ankle 2022. She feels like the ankle never heal. She states she feels her ankle rolls at time. Pt states he M.D send her here for dry needling. Pt denied any falls. Pt states pain is at the cut. Pt states pain is in the outside her R ankle.     Activities of Daily  Living  Social history was obtained from Patient.               Previously independent with activities of daily living? Yes     Currently independent with activities of daily living? No  Activities currently needing assistance include Functional mobility.        Previously independent with instrumental activities of daily living? Yes     Currently independent with instrumental activities of daily living? No  Activities currently needing assistance include: Grocery/shopping, Driving, and Community mobility.            Pain     Patient reports a current pain level of 2/10. Pain at best is reported as 0/10. Pain at worst is reported as 8/10.   Location: R lateral ankle  Clinical Progression (since onset): Stable  Pain Qualities: Dull, Burning, Needle-like  Pain-Relieving Factors: Rest  Pain-Aggravating Factors: Bending, Cooking, Driving, Exercise, Movement, Squatting, Stair climbing, Standing, Transfers, Walking, Twisting         Treatment History  Treatments  Previously Received Treatments: Yes  Previous Treatments: Exercise, Ice, Heat, Physical therapy  Currently Receiving Treatments: No    Employment  Patient reports: Does the patient's condition impact their ability to work?  Employment Status: Employed full-time   [facility]       Past Medical History/Physical Systems Review:   Aide Almaraz  has a past medical history of Anemia, Celiac disease, Diabetes mellitus, Gall stones, Hypertension, PTSD (post-traumatic stress disorder), Supraventricular tachycardia, and Yeast infection.    Aide Almaraz  has a past surgical history that includes Hysterectomy (2001); Inguinal hernia ();  section; Ankle Fusion (); Cholecystectomy (); gastric; Abdominal surgery; Hernia repair; gastric sleeve; Breast surgery; Excisional biopsy (Left, 2019); Skin surgery; Repair of ligament of ankle (Right, 2022); and Arthroscopy of ankle (Right, 2022).    Aide has a current  medication list which includes the following prescription(s): albuterol, gabapentin, gabapentin, ibuprofen, naproxen, oxycodone-acetaminophen, pantoprazole, promethazine, and sodium fluoride 5000 plus.    Review of patient's allergies indicates:   Allergen Reactions    Latex, natural rubber Itching and Rash    Lidocaine Rash    Tramadol Hives and Rash        Objective   Posture                 Right ankle/foot exhibits: Calcaneovalgus  R ankle pronation     Lower Extremity Sensation  General Lumbar/Lower Extremity Sensation  Intact: Right and Left  Right Lumbar/Lower Extremity Sensation  Intact: Light Touch, Sharp/Dull Discrimination, Static Two Point Discrimination, Dynamic Two Point Discrimination, Kinesthesia, and Proprioception  Right Lumbar/Lower Extremity Sensation Stocking Glove Pattern: No    Left Lumbar/Lower Extremity Sensation  Intact: Light Touch, Static Two Point Discrimination, Dynamic Two Point Discrimination, Sharp/Dull Discrimination, Kinesthesia, and Proprioception  Left Lumbar/Lower Extremity Sensation Stocking Glove Pattern: No             Right Lower Extremity Reflexes  Patellar, L4: Normal (2+)    Hamstring, L5: Normal (2+)    Achilles, S1: Normal (2+)         Left Lower Extremity Reflexes  Patellar, L4: Normal (2+)     Hamstring, L5: Normal (2+)    Achilles, S1: Normal (2+)             Ankle/Foot Palpation  Right Ankle/Foot Palpation  Abnormal: Muscle  Right Ankle/Foot Muscle Palpation Observations: increase pain at lateral ankle at incesion                    Ankle/Foot Range of Motion   Right Ankle/Foot   Active (deg) Passive (deg) Pain   Dorsiflexion (KE) 10 15     Dorsiflexion (KF) 15 20     Plantar Flexion 50 60     Ankle Inversion 30 36     Ankle Eversion 10 12     Subtalar Inversion         Subtalar Eversion         Great Toe MTP Flexion         Great Toe MTP Extension 80 80     Great Toe IP Flexion             Left Ankle/Foot   Active (deg) Passive (deg) Pain   Dorsiflexion (KE) 10 15      Dorsiflexion (KF) 15 20     Plantar Flexion 50 60     Ankle Inversion 30 36     Ankle Eversion 10 12     Subtalar Inversion         Subtalar Eversion         Great Toe MTP Flexion         Great Toe MTP Extension 80 80     Great Toe IP Flexion                            Ankle/Foot Strength - Planes of Motion   Right Strength Right Pain Left Strength Left  Pain   Dorsiflexion (L4) 4- Yes 4+     Plantar Flexion (S1) 4- Yes 4+     Inversion 4- Yes 4+     Eversion 3 Yes 4+     Great Toe Flexion           Great Toe Extension (L5)           Lesser Toes Flexion           Lesser Toes Extension                     Four Stage Balance Test  Narrow Base of Support: 30 sec sec  Tandem Stand - Right Foot in Front: 4 sec  Tandem Stand - Left Foot in Front: 4 sec        Single Leg Stand - Right Foot: 2 sec  Single Leg Stand - Left Foot: 2 sec        Gait Analysis  Base of Support: Narrow  Gait Pattern: Antalgic  Walking Speed: Decreased    Right Side Walking Observations  Decreased: Stance Time, Swing Time, and Step Length       Left Side Walking Observations  Decreased: Stance Time, Swing Time, and Step Length            Treatment:  Therapeutic Exercise  TE 1: long sitting Plantar fascia stretch  TE 2: Long sitting Gastroc stretch  TE 3: ankle eversion YTB  TE 4: ankle DF YTB      Time Entry(in minutes):  PT Evaluation (Low) Time Entry: 35  Therapeutic Exercise Time Entry: 10    Assessment & Plan   Assessment  Aide presents with a condition of Low complexity.   Presentation of Symptoms: Stable  Will Comorbidities Impact Care: No       Functional Limitations: Activity tolerance, Ambulating on uneven surfaces, Pain with ADLs/IADLs, Painful locomotion/ambulation, Participating in leisure activities, Standing tolerance, Transfers, Squatting, Sitting tolerance, Gait limitations, Functional mobility  Impairments: Abnormal gait, Impaired balance, Activity intolerance, Lack of appropriate home exercise program, Impaired physical  strength  Personal Factors Affecting Prognosis: Pain, Physical limitations, Schedule    Patient Goal for Therapy (PT): Decrease R ankle pain  Prognosis: Good  Assessment Details: Pt presents with signs and symptoms consistent with referring diagnosis.  Pt demonstrated decrease ankle proprioception, ankle eversion and DF strength, and  decrease gastroc flexibility. Evaluation has determined a decrease in functional status and subjective and objective deficits that can be addressed by physical therapy intervention. Pt demonstrates pain limiting functional activities. Decreased flexibility and strength limiting normal movement patterns. Decreased postural strength and awareness. . Decreased participation in functional and recreational activities. Subjective and objective measures are addressed by goals in the plan of care.  Patient/family are involved in the development of these goals. Patient/family are educated about current injury and treatment. Pt demonstrates no additional cultural, spiritual or educational need and currently has no barriers to learning. Pt responded well to treatment today. Pt is a good candidate for skilled physical therapy intervention and has a good prognosis and is motivated to return to functional and  recreational activities.    Plan  From a physical therapy perspective, the patient would benefit from: Skilled Rehab Services    Planned therapy interventions include: Therapeutic exercise, Therapeutic activities, Neuromuscular re-education, Manual therapy, ADLs/IADLs, and Other (Comment). Dry needling           Visit Frequency: 2 times Per Week for 12 Weeks.       This plan was discussed with Patient.   Discussion participants: Agreed Upon Plan of Care             Patient's spiritual, cultural, and educational needs considered and patient agreeable to plan of care and goals.     Education  Education was done with Patient. The patient's learning style includes Demonstration, Listening, and  Pictures/video. The patient Demonstrates understanding and Verbalizes understanding.                 Goals:   Active       LTGs        Independent with home exercise program.         Start:  04/21/25    Expected End:  07/14/25             Full return to functional activities with manageable complaints.         Start:  04/21/25    Expected End:  07/14/25            Patient to demonstrate improved posture and body mechanics.         Start:  04/21/25    Expected End:  07/14/25            Decrease pain by 75% during functional activities.         Start:  04/21/25    Expected End:  07/14/25               STGs       Pt to increase strength by a 1/2 grade of muscles test to allow for improvement in functional activities such as performing chores.            Start:  04/21/25    Expected End:  07/14/25            Pt to report compliance with HEP and demonstrate proper exercise technique to PT to show competence with self management of condition.         Start:  04/21/25    Expected End:  07/14/25             Decrease pain by 25% during functional activities.         Start:  04/21/25    Expected End:  07/14/25                Jean Tyler, PT

## 2025-04-24 DIAGNOSIS — M65.4 DE QUERVAIN'S DISEASE (TENOSYNOVITIS): Primary | ICD-10-CM

## 2025-04-28 ENCOUNTER — TELEPHONE (OUTPATIENT)
Dept: ORTHOPEDICS | Facility: CLINIC | Age: 55
End: 2025-04-28
Payer: MEDICAID

## 2025-04-28 ENCOUNTER — CLINICAL SUPPORT (OUTPATIENT)
Dept: REHABILITATION | Facility: HOSPITAL | Age: 55
End: 2025-04-28
Payer: MEDICAID

## 2025-04-28 DIAGNOSIS — R53.1 DECREASED STRENGTH, ENDURANCE, AND MOBILITY: ICD-10-CM

## 2025-04-28 DIAGNOSIS — M25.572 CHRONIC PAIN OF LEFT ANKLE: Primary | ICD-10-CM

## 2025-04-28 DIAGNOSIS — Z74.09 DECREASED STRENGTH, ENDURANCE, AND MOBILITY: ICD-10-CM

## 2025-04-28 DIAGNOSIS — G89.29 CHRONIC PAIN OF LEFT ANKLE: Primary | ICD-10-CM

## 2025-04-28 DIAGNOSIS — R68.89 DECREASED STRENGTH, ENDURANCE, AND MOBILITY: ICD-10-CM

## 2025-04-28 PROCEDURE — 97110 THERAPEUTIC EXERCISES: CPT | Mod: PN

## 2025-04-28 NOTE — PROGRESS NOTES
Outpatient Rehab    Physical Therapy Visit    Patient Name: Aide Almaraz  MRN: [id number]  YOB: 1970  Encounter Date: 4/28/2025    Therapy Diagnosis:   Encounter Diagnoses   Name Primary?    Chronic pain of left ankle Yes    Decreased strength, endurance, and mobility      Physician: Paul Santiago MD    Physician Orders: Eval and Treat  Medical Diagnosis: History of ankle surgery    Visit # / Visits Authorized:  1 / 20  Insurance Authorization Period: 3/21/2025 to 12/31/2025  Date of Evaluation: 4/21/2025  Plan of Care Certification: 4/21/2025 to 7-14-25      PT/PTA:     Number of PTA visits since last PT visit:   Time In: 1200   Time Out: 1253  Total Time: 53   Total Billable Time: 53    FOTO:  Intake Score:  %  Survey Score 1:  %  Survey Score 2:  %         Subjective   She stil have L ankle pain.  Pain reported as 3/10.      Objective            Treatment:  Therapeutic Exercise  TE 1: long sitting Plantar fascia stretch  TE 2: Long sitting Gastroc stretch  TE 3: ankle eversion YTB 3x10  TE 4: ankle DF YTB 3x10  TE 5: R-bike 10  TE 6: SL ankle eversion 3x10  TE 7: SEated windshield wiper 3x10  TE 8: marbles  focus ankle eversion 1 trials  TE 9: Tandem stance on blue foam 5x30 sec ea      Time Entry(in minutes):  Therapeutic Exercise Time Entry: 53    Assessment & Plan   Assessment: Pt presented to first f/u.  We went over HEP. Pt demonstrated decrease R ankle eversion and DF msucles strength. Pt has gostroc and soleus tightness. Pt demonstrated decrease ankle propricoetion. Pt required min v/c's to perfom exericses wtih proper form and proper muscles activation. Plan to cont with POC       Patient will continue to benefit from skilled outpatient physical therapy to address the deficits listed in the problem list box on initial evaluation, provide pt/family education and to maximize pt's level of independence in the home and community environment.     Patient's spiritual, cultural, and  educational needs considered and patient agreeable to plan of care and goals.           Plan: Plan to cont with POC    Goals:   Active       LTGs        Independent with home exercise program.         Start:  04/21/25    Expected End:  07/14/25             Full return to functional activities with manageable complaints.         Start:  04/21/25    Expected End:  07/14/25            Patient to demonstrate improved posture and body mechanics.         Start:  04/21/25    Expected End:  07/14/25            Decrease pain by 75% during functional activities.         Start:  04/21/25    Expected End:  07/14/25               STGs       Pt to increase strength by a 1/2 grade of muscles test to allow for improvement in functional activities such as performing chores.            Start:  04/21/25    Expected End:  07/14/25            Pt to report compliance with HEP and demonstrate proper exercise technique to PT to show competence with self management of condition.         Start:  04/21/25    Expected End:  07/14/25             Decrease pain by 25% during functional activities.         Start:  04/21/25    Expected End:  07/14/25                Jean Tyler, PT

## 2025-05-05 DIAGNOSIS — G89.18 POST-OP PAIN: ICD-10-CM

## 2025-05-05 RX ORDER — GABAPENTIN 800 MG/1
800 TABLET ORAL 3 TIMES DAILY
Qty: 90 TABLET | Refills: 1 | Status: SHIPPED | OUTPATIENT
Start: 2025-05-05

## 2025-05-12 ENCOUNTER — CLINICAL SUPPORT (OUTPATIENT)
Dept: REHABILITATION | Facility: HOSPITAL | Age: 55
End: 2025-05-12
Payer: MEDICAID

## 2025-05-12 DIAGNOSIS — G89.29 CHRONIC PAIN OF LEFT ANKLE: Primary | ICD-10-CM

## 2025-05-12 DIAGNOSIS — M25.572 CHRONIC PAIN OF LEFT ANKLE: Primary | ICD-10-CM

## 2025-05-12 DIAGNOSIS — R68.89 DECREASED STRENGTH, ENDURANCE, AND MOBILITY: ICD-10-CM

## 2025-05-12 DIAGNOSIS — Z74.09 DECREASED STRENGTH, ENDURANCE, AND MOBILITY: ICD-10-CM

## 2025-05-12 DIAGNOSIS — R53.1 DECREASED STRENGTH, ENDURANCE, AND MOBILITY: ICD-10-CM

## 2025-05-12 PROCEDURE — 97110 THERAPEUTIC EXERCISES: CPT | Mod: PN

## 2025-05-12 NOTE — PROGRESS NOTES
Outpatient Rehab    Physical Therapy Visit    Patient Name: Aide Almaraz  MRN: [id number]  YOB: 1970  Encounter Date: 5/12/2025    Therapy Diagnosis:   Encounter Diagnoses   Name Primary?    Chronic pain of left ankle Yes    Decreased strength, endurance, and mobility      Physician: Paul Santiago MD    Physician Orders: Eval and Treat  Medical Diagnosis: History of ankle surgery    Visit # / Visits Authorized:  2 / 20  Insurance Authorization Period: 3/21/2025 to 12/31/2025  Date of Evaluation: 4/21/2025  Plan of Care Certification: 4/21/2025 to 7/14/2025      PT/PTA:     Number of PTA visits since last PT visit:   Time In: 0800   Time Out: 0900  Total Time (in minutes): 60   Total Billable Time (in minutes): 60    FOTO:  Intake Score:  %  Survey Score 2:  %  Survey Score 3:  %    Precautions:       Subjective   She is feeling R ankle pain 4/10. She states yesterday she was on her feet all day because of high school graduations..  Pain reported as 4/10.      Objective            Treatment:  Therapeutic Exercise  TE 1: long sitting R Plantar fascia stretch  TE 2: Long sitting R Gastroc stretch  TE 3: R ankle eversion YTB 3x10  TE 4: R ankle DF YTB 3x10  TE 5: R-bike 10  TE 6: R SL ankle eversion 3x10  TE 7: Seated R windshield wiper 3x10  TE 8: marbles  focus ankle eversion 1 trials  TE 9: Tandem stance on blue foam 5x30 sec ea  Manual Therapy  MT 1: STM of R ankle scar      Time Entry(in minutes):  Therapeutic Exercise Time Entry: 60    Assessment & Plan   Assessment: Pt presented to f/u. We cont with strengtnhing program and added STM of scar region Pt demonstrated decrease R ankle eversion and DF msucles strength. Pt has gostroc and soleus tightness. Pt demonstrated decrease ankle propricoetion. Pt required min v/c's to perfom exericses wtih proper form and proper muscles activation. STM performed to R scar region. Plan to cont with POC       Patient will continue to benefit from  skilled outpatient physical therapy to address the deficits listed in the problem list box on initial evaluation, provide pt/family education and to maximize pt's level of independence in the home and community environment.     Patient's spiritual, cultural, and educational needs considered and patient agreeable to plan of care and goals.           Plan: Plan to cont with POC    Goals:   Active       LTGs        Independent with home exercise program.         Start:  04/21/25    Expected End:  07/14/25             Full return to functional activities with manageable complaints.         Start:  04/21/25    Expected End:  07/14/25            Patient to demonstrate improved posture and body mechanics.         Start:  04/21/25    Expected End:  07/14/25            Decrease pain by 75% during functional activities.         Start:  04/21/25    Expected End:  07/14/25               STGs       Pt to increase strength by a 1/2 grade of muscles test to allow for improvement in functional activities such as performing chores.            Start:  04/21/25    Expected End:  07/14/25            Pt to report compliance with HEP and demonstrate proper exercise technique to PT to show competence with self management of condition.         Start:  04/21/25    Expected End:  07/14/25             Decrease pain by 25% during functional activities.         Start:  04/21/25    Expected End:  07/14/25                Jean Tyler, PT

## 2025-05-13 DIAGNOSIS — R52 PAIN: Primary | ICD-10-CM

## 2025-05-14 ENCOUNTER — HOSPITAL ENCOUNTER (OUTPATIENT)
Facility: HOSPITAL | Age: 55
Discharge: HOME | End: 2025-05-14
Payer: MEDICAID

## 2025-05-14 ENCOUNTER — OFFICE VISIT (OUTPATIENT)
Dept: ORTHOPEDICS | Facility: CLINIC | Age: 55
End: 2025-05-14
Payer: MEDICAID

## 2025-05-14 DIAGNOSIS — R52 PAIN: ICD-10-CM

## 2025-05-14 DIAGNOSIS — M25.571 PAIN IN JOINT INVOLVING RIGHT ANKLE AND FOOT: ICD-10-CM

## 2025-05-14 DIAGNOSIS — G89.29 CHRONIC PAIN OF RIGHT ANKLE: Primary | ICD-10-CM

## 2025-05-14 DIAGNOSIS — M25.571 CHRONIC PAIN OF RIGHT ANKLE: Primary | ICD-10-CM

## 2025-05-14 PROCEDURE — 73630 X-RAY EXAM OF FOOT: CPT | Mod: TC,PN,RT

## 2025-05-14 PROCEDURE — 1159F MED LIST DOCD IN RCRD: CPT | Mod: CPTII,,,

## 2025-05-14 PROCEDURE — 99214 OFFICE O/P EST MOD 30 MIN: CPT | Mod: S$PBB,,,

## 2025-05-14 PROCEDURE — 99999 PR PBB SHADOW E&M-EST. PATIENT-LVL III: CPT | Mod: PBBFAC,,,

## 2025-05-14 PROCEDURE — 1160F RVW MEDS BY RX/DR IN RCRD: CPT | Mod: CPTII,,,

## 2025-05-14 PROCEDURE — 99213 OFFICE O/P EST LOW 20 MIN: CPT | Mod: PBBFAC,25,PN

## 2025-05-14 NOTE — PROGRESS NOTES
SUBJECTIVE:    Ms. Almaraz is here today for a follow up visit.  She has a history of Brostrom procedure to the right lower extremity 2 years ago.  She is done well however she still endorses some pain to the ankle joint.  She does not complain of instability however mostly pain.  Presents here for further follow up.  She has not tried wearing a brace.    10/11/24 - presents for further management. Would like to try an injection. Brace did not help her and was not able to tolerate it.  2/14/25: plan to continue nonoperative management at this time. Plan for PT.    5/14/2025: no improvement with PT. Reports neuropathic symptoms (present since surgery 2 years ago) and discomfort related to decreased ROM in the ankle    OBJECTIVE:      Vitals:    05/14/25 1011   PainSc:   6   PainLoc: Ankle       Lower Extremity Exam  Alert and oriented: Right lower extremity with incisions clean dry and well healed  Stable to anterior drawer and talar tilt testing   Tenderness to palpation about the ankle joint   Excellent range of motion   No cavus abnormality   Neurovascularly intact        DIAGNOSTIC STUDIES:  MRI of the right ankle demonstrates she has status post a Brostrom procedure with the Kang modification with hardware intact and inappropriate placement.    ASSESSMENT:   1. Status post the procedure above      PLAN:  There are no diagnoses linked to this encounter.    Plan: The patient and I had a thorough discussion today.  We discussed the working diagnosis as well as several other potential alternative diagnoses.    We discussed conservative and surgical treatment options. Conservative options include rest, activity modifications, workplace modifications, po and topical analgesia (OTC and rx), formal or home PT, and others. Surgical treatment was also mentioned.    At this time, the patient would like to proceed with the following, which I agree with.    Pain Management:  Continue current pain regimen  Therapy:   Continue PT  Procedures: pt is a candidate for Iovera cryotherapy procedure. The procedure was discussed in detail, and pt is interested in proceeding. Orders placed today  Work status:  [employer] with limitation secondary to pain  Follow up: for Iovera procedure (right ankle) with Lissette Lea PA-C    All of the patient's questions were answered and the patient will contact us if they have any questions or concerns in the interim.    Case discussed with Dr Santiago, who is in agreement with plan for Iovera.      Lissette Lea PA-C  [facility]  Orthopedic Surgery

## 2025-05-15 ENCOUNTER — TELEPHONE (OUTPATIENT)
Dept: ORTHOPEDICS | Facility: CLINIC | Age: 55
End: 2025-05-15
Payer: MEDICAID

## 2025-05-15 NOTE — TELEPHONE ENCOUNTER
----- Message from Walker Lea PA-C sent at 5/15/2025  8:17 AM CDT -----  I saw this pt yesterday, and we decided after the appt that we will proceed with R ankle Iovera. She would like to do the procedure on 5/28 at 3. Can someone schedule that, please?walker

## 2025-05-16 ENCOUNTER — PATIENT MESSAGE (OUTPATIENT)
Dept: ORTHOPEDICS | Facility: CLINIC | Age: 55
End: 2025-05-16
Payer: MEDICAID

## 2025-05-19 DIAGNOSIS — M79.643 PAIN OF HAND, UNSPECIFIED LATERALITY: ICD-10-CM

## 2025-05-19 RX ORDER — NAPROXEN 500 MG/1
500 TABLET ORAL 2 TIMES DAILY WITH MEALS
Qty: 60 TABLET | Refills: 0 | Status: SHIPPED | OUTPATIENT
Start: 2025-05-19

## 2025-05-21 ENCOUNTER — PATIENT MESSAGE (OUTPATIENT)
Dept: ORTHOPEDICS | Facility: CLINIC | Age: 55
End: 2025-05-21
Payer: MEDICAID

## 2025-05-28 DIAGNOSIS — M65.4 DE QUERVAIN'S DISEASE (TENOSYNOVITIS): Primary | ICD-10-CM

## 2025-05-28 NOTE — H&P (VIEW-ONLY)
Assessment: 55 y.o. female with R DeQuervain's     I explained my diagnostic impression and the reasoning behind it in detail, using layman's terms.  Models and/or pictures were used to help in the explanation.  We discussed non operative and operative treatment modalities -- She would like to start with operative treatment in the form of R first dorsal compartment release .     Plan:   - The spectrum of treatment options were discussed with the patient, including nonoperative and operative options.  After thorough discussion, the patient has elected to undergo surgical treatment to include:    Right first dorsal compartment release     We have discussed the surgery and anticipated recovery.  She understands that there may be limited mobility up to several weeks after surgery depending on procedures that are performed at the time of surgery.    The details of the surgical procedure were explained, including the location of probable incisions and a description of likely hardware and/or grafts to be used.  The patient understands the likely convalescence after surgery, in particular the expected postop rehab and recovery course. Alternatives both operative and non-operative with associated risks and benefits discussed. The outlined risks and potential complications of the proposed procedure include but are not limited to:  incomplete release, scar sensitivity, bleeding, infection, vessel and/or nerve damage, pain, numbness, tingling, recurrence, incomplete release, need for additional surgery,  complex regional pain syndrome, weakness, partial and/or incomplete relief of symptoms, persistence of and/or worsening of symptoms, stiffness,  decreased  strength, need for prolonged postoperative rehabilitation,  deep venous thrombosis, pulmonary embolism, arthritis and death.  The patient states an understanding and wishes to proceed with surgery.   All questions were answered.  No guarantees were implied or stated.   Written informed consent was obtained.    She was also informed and understands the risks of surgery are greater for patients with a current condition or history of heart disease, obesity, clotting disorders, recurrent infections, steroid use, current or past smoking, and factors such as sedentary lifestyle and noncompliance with medications, therapy or follow-up. The degree of the increased risk is hard to estimate with any degree of precision.    All questions were answered. The patient has verbalized understanding of these issues and wishes to proceed as discussed.   Will proceed with medical clearance.    Scheduled 5/17/25    Pertinent previous surgeries:  None    GLP-1: No   Anticoagulation: No  Last A1c: 5.6  Smoking status: Non smoker      All questions were answered in detail. The patient is in full agreement with the treatment plan and will proceed accordingly.    Chief Complaint   Patient presents with    Right Hand - Pain       Initial visit (6/5/25): Aide Almaraz is a 55 y.o. female who presents today complaining of Pain of the Right Hand     Duration of symptoms:  several years   Has had injections x2 with relief of symptoms and recurrence several months later   Pain is worse now than in the past  Trauma or new activity: no  Pain is constant  Aggravating factors: Motion/use of hand and thumb    Pain does interfere with activities of daily living .      This patient was seen in consultation at the request of No ref. provider found    This is the extent of the patient's complaints at this time.     Hand dominance: Right     Occupation: Mental health worker, previously worked as a desk worker      Review of patient's allergies indicates:   Allergen Reactions    Latex, natural rubber Itching and Rash    Lidocaine Rash    Tramadol Hives and Rash       Current Medications[1]    Physical Exam:   Vitals:    06/05/25 1424   PainSc:   7   PainLoc: Hand       General:  Patient is alert, awake and oriented to  time, place and person. Mood and affect are appropriate.  Patient does not appear to be in any distress, denies any constitutional symptoms and appears stated age.   HEENT:  Pupils are equal and round, sclera are not injected. External examination of ears and nose reveals no abnormalities. Cranial nerves II-X are grossly intact  Skin:  no rashes, abrasions or open wounds on the affected extremity   Resp:  No respiratory distress or audible wheezing   CV: 2+  pulses, all extremities warm and well perfused   Right Hand/Wrist Examination:    Observation/Inspection:  Swelling  none    Deformity  none  Discoloration  none     Scars   none    Atrophy  none    HAND/WRIST EXAMINATION:  Finkelstein's Test   Pos  WHAT Test    Pos  CMC grind    Neg  Circumduction test   Neg    Neurovascular Exam:  Digits WWP, brisk CR < 3s throughout  NVI motor/LTS to M/R/U nerves, radial pulse 2+  Tinel's Test - Carpal Tunnel  Neg  Tinel's Test - Cubital Tunnel  Neg  Phalen's Test    Neg  Median Nerve Compression Test Neg    ROM hand/wrist/elbow full, painless       Imaging: 3 views of the right hand negative for fractures, degenerative changes. No CMC OA    I personally reviewed and interpreted the patient's imaging obtained today in clinic       A note notifying No ref. provider found of my findings was sent via the electronic medical record     This note was created by combination of typed  and M-Modal dictation. Transcription and phonetic errors may be present.  If there are any questions, please contact me.    Past Medical History:   Diagnosis Date    Anemia     Celiac disease     Diabetes mellitus     Gall stones     Hypertension     PTSD (post-traumatic stress disorder)     Supraventricular tachycardia 2001    Yeast infection        Active Problem List with Overview Notes    Diagnosis Date Noted    Chronic pain of left ankle 04/21/2025    Decreased strength, endurance, and mobility 04/21/2025    Periodontal disease 06/08/2023     Falls 2023    Celiac disease 2023    Mild intermittent asthma without complication 2023    Irritable bowel syndrome with diarrhea 2023    Chronic pain syndrome 2023    Chronic, continuous use of opioids 2023    Asthma exacerbation 2022    Acute hypoxemic respiratory failure 2022    History of ankle surgery 2022    Ankle instability, right 2022    SOB (shortness of breath) 2022    Left breast mass 2019    Grief reaction     Idiopathic acute pancreatitis without infection or necrosis 10/31/2017    Headache     Epigastric pain     Fatigue 2016    Hypercholesterolemia 2016    Essential hypertension, benign 2016    Anxiety disorder 2016    History of depression 2016    PTSD (post-traumatic stress disorder) 2016    Insomnia 2016    History of supraventricular tachycardia 2016    BMI 30.0-30.9,adult 2016    Non morbid obesity due to excess calories 2016       Past Surgical History:   Procedure Laterality Date    ABDOMINAL SURGERY      Gastric sleeve    ANKLE FUSION      ARTHROSCOPY OF ANKLE Right 2022    Procedure: ARTHROSCOPY, ANKLE;  Surgeon: Tessa Garcia MD;  Location: [facility] OR;  Service: Orthopedics;  Laterality: Right;    BREAST SURGERY       SECTION      placenta previa    CHOLECYSTECTOMY  2009    EXCISIONAL BIOPSY Left 2019    Procedure: EXCISIONAL BIOPSY LEFT with SEED (CONSENT AM OF) 1.0 hr case;  Surgeon: Flor Rosas MD;  Location: [address];  Service: General;  Laterality: Left;    gastric      gastric sleeve      pt reported     HERNIA REPAIR      HYSTERECTOMY  2001    Inguinal hernia  2004    REPAIR OF LIGAMENT OF ANKLE Right 2022    Procedure: REPAIR, LIGAMENT, ANKLE;  Surgeon: Tessa Garcia MD;  Location: [facility] OR;  Service: Orthopedics;  Laterality: Right;  Single Shot    SKIN SURGERY      Kasi Gómez       Family History   Problem  Relation Name Age of Onset    Heart disease Father      Diabetes Mother          hypoglycemic    Breast cancer Maternal Aunt      Breast cancer Sister      Breast cancer Paternal Aunt      Ovarian cancer Neg Hx                  [1]   Current Outpatient Medications:     albuterol (PROVENTIL/VENTOLIN HFA) 90 mcg/actuation inhaler, Inhale 1-2 puffs into the lungs every 6 (six) hours as needed for Wheezing. Rescue, Disp: 6.7 g, Rfl: 1    gabapentin (NEURONTIN) 300 MG capsule, Take 1 capsule (300 mg total) by mouth 2 (two) times daily as needed (nerve pain)., Disp: 30 capsule, Rfl: 2    gabapentin (NEURONTIN) 800 MG tablet, Take 1 tablet (800 mg total) by mouth 3 (three) times daily., Disp: 90 tablet, Rfl: 1    ibuprofen (ADVIL,MOTRIN) 800 MG tablet, Take 800 mg by mouth every 8 (eight) hours as needed. (Patient not taking: Reported on 10/21/2024), Disp: , Rfl:     naproxen (NAPROSYN) 500 MG tablet, Take 1 tablet (500 mg total) by mouth 2 (two) times daily with meals., Disp: 60 tablet, Rfl: 0    oxyCODONE-acetaminophen (PERCOCET)  mg per tablet, Take 1 tablet by mouth. TAKE 1 TABLET BY MOUTH TWICE DAILY TO THREE TIMES DAILY AS NEEDED FOR PAIN FOR 30 DAYS, Disp: , Rfl:     pantoprazole (PROTONIX) 40 MG tablet, Take 1 tablet (40 mg total) by mouth once daily., Disp: 30 tablet, Rfl: 1    promethazine (PHENERGAN) 25 MG tablet, Take 25 mg by mouth every 12 (twelve) hours as needed for Nausea., Disp: , Rfl:     SODIUM FLUORIDE 5000 PLUS 1.1 % Crea, Apply topically once daily. (Patient not taking: Reported on 10/21/2024), Disp: , Rfl:

## 2025-06-05 ENCOUNTER — OFFICE VISIT (OUTPATIENT)
Dept: ORTHOPEDICS | Facility: CLINIC | Age: 55
End: 2025-06-05
Payer: MEDICAID

## 2025-06-05 ENCOUNTER — APPOINTMENT (OUTPATIENT)
Dept: RADIOLOGY | Facility: HOSPITAL | Age: 55
End: 2025-06-05
Attending: ORTHOPAEDIC SURGERY
Payer: MEDICAID

## 2025-06-05 DIAGNOSIS — M65.4 DE QUERVAIN'S DISEASE (TENOSYNOVITIS): ICD-10-CM

## 2025-06-05 DIAGNOSIS — M65.4 DE QUERVAIN'S DISEASE (TENOSYNOVITIS): Primary | ICD-10-CM

## 2025-06-05 PROCEDURE — 1160F RVW MEDS BY RX/DR IN RCRD: CPT | Mod: CPTII,,, | Performed by: ORTHOPAEDIC SURGERY

## 2025-06-05 PROCEDURE — 73110 X-RAY EXAM OF WRIST: CPT | Mod: TC,FY,PN,RT

## 2025-06-05 PROCEDURE — 99214 OFFICE O/P EST MOD 30 MIN: CPT | Mod: PBBFAC,25,PN | Performed by: ORTHOPAEDIC SURGERY

## 2025-06-05 PROCEDURE — 73110 X-RAY EXAM OF WRIST: CPT | Mod: 26,RT,, | Performed by: RADIOLOGY

## 2025-06-05 PROCEDURE — 99999 PR PBB SHADOW E&M-EST. PATIENT-LVL IV: CPT | Mod: PBBFAC,,, | Performed by: ORTHOPAEDIC SURGERY

## 2025-06-05 PROCEDURE — 99213 OFFICE O/P EST LOW 20 MIN: CPT | Mod: S$PBB,,, | Performed by: ORTHOPAEDIC SURGERY

## 2025-06-05 PROCEDURE — 1159F MED LIST DOCD IN RCRD: CPT | Mod: CPTII,,, | Performed by: ORTHOPAEDIC SURGERY

## 2025-06-05 RX ORDER — SODIUM CHLORIDE 9 MG/ML
INJECTION, SOLUTION INTRAVENOUS CONTINUOUS
OUTPATIENT
Start: 2025-06-05

## 2025-06-06 ENCOUNTER — ANESTHESIA EVENT (OUTPATIENT)
Dept: SURGERY | Facility: HOSPITAL | Age: 55
End: 2025-06-06
Payer: MEDICAID

## 2025-06-10 ENCOUNTER — HOSPITAL ENCOUNTER (OUTPATIENT)
Dept: PREADMISSION TESTING | Facility: HOSPITAL | Age: 55
Discharge: HOME | End: 2025-06-10
Attending: ORTHOPAEDIC SURGERY
Payer: MEDICAID

## 2025-06-10 VITALS
TEMPERATURE: 97 F | BODY MASS INDEX: 33.7 KG/M2 | DIASTOLIC BLOOD PRESSURE: 75 MMHG | OXYGEN SATURATION: 97 % | HEIGHT: 65 IN | HEART RATE: 78 BPM | WEIGHT: 202.25 LBS | RESPIRATION RATE: 18 BRPM | SYSTOLIC BLOOD PRESSURE: 119 MMHG

## 2025-06-10 DIAGNOSIS — Z01.818 PREOP TESTING: ICD-10-CM

## 2025-06-10 NOTE — DISCHARGE INSTRUCTIONS
YOUR PROCEDURE WILL BE AT [facility] at [address], [place] [address]                 Enter through the Main Entrance facing [address].      Your procedure  is scheduled for __6/17/2025________.    Call [phone] between 2pm and 5pm on __6/16/2025_____to find out your arrival time for the day of surgery.    You may have up to three visitors.  No children under 18 years old.     You will be going to the Same Day Surgery Unit on the 2nd floor of the hospital.    Report to the Same Day Surgery Registration Desk in the hallway.(Just beside the Same Day Surgery Unit)                    DO NOT PARK IN THE GARAGE.  THERE IS NO OPEN ENTRANCE TO THE HOSPITAL BUILDING AND NO ELEVATOR.      Important instructions:  Do not eat anything after midnight.  You may have plain water, non carbonated.  You may also have Gatorade or Powerade after midnight.    Stop all fluids 2 hours before your surgery.    It is okay to brush your teeth.  Do not have gum, candy or mints.    SEE MEDICATION SHEET.   TAKE MEDICATIONS AS DIRECTED.    Do not take any diabetic medication on the morning of surgery unless instructed to do so by your doctor or pre op nurse.    All GLP-1 weekly diabetic/weight loss medications must not be taken for one week before your surgery, or your surgery could be canceled.      STOP taking for 7 days before surgery:    Aspirin           Voltaren (Diclofenac)  Ibuprofen  (Advil, Motrin)                Indomethocin  Mobic (meloxicam, celebrex)      Etodolac   Aleve (naproxen)          Toradol (ketoralac)  Fish oil, Krill oil and Vitamin E  Headache Powders (BC Powder, Goody's Powder, Stanback)                           You may take Tylenol if needed which is not a blood thinner.    Please shower the night before and the morning of your surgery.      Follow any Prep Instructions given by your surgeon.               Use Chlorhexidine soap as instructed by your pre op nurse.    Please place clean linens on your bed the night before surgery. Please wear fresh clean clothing after each shower.    No shaving of procedural area at least 4-5 days before surgery due to increased risk of skin irritation and/or possible infection.    Female patients may be asked for a urine specimen on the morning of the surgery.  Please check with your nurse before using the restroom.    Contact lenses and removable denture work may not be worn during your procedure.    You may wear deodorant only. If you are having breast surgery, do not wear deodorant on the operative side.    Do not wear powder, body lotion, perfume/cologne or make-up.    Do not wear any jewelry or have any metal on your body.    You will be asked to remove any dentures or partials for the procedure.    If you are going home on the same day of surgery, you must arrange for a family member or a friend to drive you home.  Public transportation is prohibited.  You will not be able to drive home if you were given anesthesia or sedation.    Patients who want to have their Post-op prescriptions filled from our in-house [facility], bring a Credit/Debit Card or cash with you. A co-pay may be required.  The pharmacy closes at 5:30 pm.    Wear loose fitting clothes allowing for bandages.    Please leave money and valuables home.      You may bring your cell phone.    Call the doctor if fever or illness should occur before your surgery.    Call [phone] to contact us here if needed.                            CLOTHES ON DAY OF SURGERY    SHOULDER surgery:  you must have a very oversized shirt.  Very, Very large.  You will probably have a large sling on with your arm strapped to your chest.  You will not be able to put the arm of the operated shoulder into a sleeve.  You can put the arm of the un-operated shoulder into the sleeve, but the shirt will need to be draped over the operated shoulder.       ARM or HAND surgery:  make sure that your sleeves  are large and loose enough to pass over large dressings or cast.      BREAST or UNDERARM surgery:  wear a loose, button down shirt so that you can dress without raising your arms over your head.    ABDOMINAL surgery:  wear loose, comfortable clothing.  Nothing tight around the abdomen.  NO JEANS    PENIS or SCROTAL surgery:  loose comfortable clothing.  Large sweat pants, pajama pants or a robe.  ABSOLUTELY NO JEANS      LEG or FOOT surgery:  wear large loose pants that are able to pass over any large dressings or casts.  You could also wear loose shorts or a skirt.

## 2025-06-10 NOTE — ANESTHESIA PREPROCEDURE EVALUATION
06/10/2025  Aide Almaraz is a 55 y.o., female scheduled for RELEASE, HAND, FOR DEQUERVAIN'S TENOSYNOVITIS (Right: Hand) on 2025.      Past Medical History:   Diagnosis Date    Anemia     Celiac disease     Diabetes mellitus     Gall stones     Hypertension     PTSD (post-traumatic stress disorder)     Supraventricular tachycardia     Yeast infection        Past Surgical History:   Procedure Laterality Date    ABDOMINAL SURGERY      Gastric sleeve    ANKLE FUSION  2011    ARTHROSCOPY OF ANKLE Right 2022    Procedure: ARTHROSCOPY, ANKLE;  Surgeon: Tessa Garcia MD;  Location: [facility];  Service: Orthopedics;  Laterality: Right;    BREAST SURGERY       SECTION      placenta previa    CHOLECYSTECTOMY  2009    EXCISIONAL BIOPSY Left 2019    Procedure: EXCISIONAL BIOPSY LEFT with SEED (CONSENT AM OF) 1.0 hr case;  Surgeon: Flor Rosas MD;  Location: [address];  Service: General;  Laterality: Left;    gastric      gastric sleeve      pt reported     HERNIA REPAIR      HYSTERECTOMY  2001    Inguinal hernia  2004    REPAIR OF LIGAMENT OF ANKLE Right 2022    Procedure: REPAIR, LIGAMENT, ANKLE;  Surgeon: Tessa Garcia MD;  Location: [facility];  Service: Orthopedics;  Laterality: Right;  Single Shot    SKIN SURGERY      Kasi Gómez           Pre-op Assessment    I have reviewed the Patient Summary Reports.     I have reviewed the Nursing Notes. I have reviewed the NPO Status.   I have reviewed the Medications.     Review of Systems  Anesthesia Hx:  No problems with previous Anesthesia             Denies Family Hx of Anesthesia complications.    Denies Personal Hx of Anesthesia complications.                    Social:  Non-Smoker, No Alcohol Use       Hematology/Oncology:  Hematology Normal   Oncology Normal                                   EENT/Dental:  EENT/Dental  Normal           Cardiovascular:  Exercise tolerance: good    Denies Hypertension.                                          Pulmonary:    Asthma   Denies Shortness of breath.                  Renal/:  Renal/ Normal                 Hepatic/GI:  Hepatic/GI Normal                    Musculoskeletal:     De Quervain's disease             Neurological:      Headaches                                 Endocrine:  Denies Diabetes.           Dermatological:  Skin Normal        Physical Exam  General: Well nourished, Cooperative, Alert and Oriented    Chest/Lungs:  Clear to auscultation, Normal Respiratory Rate    Heart:  Rate: Normal  Rhythm: Regular Rhythm  Sounds: Normal        Anesthesia Plan  Type of Anesthesia, risks & benefits discussed:    Anesthesia Type: MAC, Gen Natural Airway  Intra-op Monitoring Plan: Standard ASA Monitors  Induction:  IV  Informed Consent: Informed consent signed with the Patient and all parties understand the risks and agree with anesthesia plan.  All questions answered.   ASA Score: 2  Day of Surgery Review of History & Physical: H&P Update referred to the surgeon/provider.  Anesthesia Plan Notes: Pending PCP clearance    Ready For Surgery From Anesthesia Perspective.     .

## 2025-06-11 ENCOUNTER — HOSPITAL ENCOUNTER (OUTPATIENT)
Dept: PREADMISSION TESTING | Facility: HOSPITAL | Age: 55
Discharge: HOME | End: 2025-06-11
Attending: ORTHOPAEDIC SURGERY
Payer: MEDICAID

## 2025-06-11 DIAGNOSIS — Z01.818 PREOP TESTING: ICD-10-CM

## 2025-06-11 LAB
ABSOLUTE EOSINOPHIL (OHS): 0.17 K/UL
ABSOLUTE MONOCYTE (OHS): 0.47 K/UL (ref 0.3–1)
ABSOLUTE NEUTROPHIL COUNT (OHS): 3.12 K/UL (ref 1.8–7.7)
ALBUMIN SERPL BCP-MCNC: 3.7 G/DL (ref 3.5–5.2)
ALP SERPL-CCNC: 77 UNIT/L (ref 40–150)
ALT SERPL W/O P-5'-P-CCNC: 12 UNIT/L (ref 10–44)
ANION GAP (OHS): 11 MMOL/L (ref 8–16)
AST SERPL-CCNC: 14 UNIT/L (ref 11–45)
BASOPHILS # BLD AUTO: 0.05 K/UL
BASOPHILS NFR BLD AUTO: 0.8 %
BILIRUB SERPL-MCNC: 0.4 MG/DL (ref 0.1–1)
BUN SERPL-MCNC: 12 MG/DL (ref 6–20)
CALCIUM SERPL-MCNC: 9.3 MG/DL (ref 8.7–10.5)
CHLORIDE SERPL-SCNC: 110 MMOL/L (ref 95–110)
CO2 SERPL-SCNC: 21 MMOL/L (ref 23–29)
CREAT SERPL-MCNC: 0.8 MG/DL (ref 0.5–1.4)
ERYTHROCYTE [DISTWIDTH] IN BLOOD BY AUTOMATED COUNT: 13.4 % (ref 11.5–14.5)
GFR SERPLBLD CREATININE-BSD FMLA CKD-EPI: >60 ML/MIN/1.73/M2
GLUCOSE SERPL-MCNC: 109 MG/DL (ref 70–110)
HCT VFR BLD AUTO: 38.8 % (ref 37–48.5)
HGB BLD-MCNC: 12.5 GM/DL (ref 12–16)
IMM GRANULOCYTES # BLD AUTO: 0.02 K/UL (ref 0–0.04)
IMM GRANULOCYTES NFR BLD AUTO: 0.3 % (ref 0–0.5)
LYMPHOCYTES # BLD AUTO: 2.5 K/UL (ref 1–4.8)
MCH RBC QN AUTO: 30 PG (ref 27–31)
MCHC RBC AUTO-ENTMCNC: 32.2 G/DL (ref 32–36)
MCV RBC AUTO: 93 FL (ref 82–98)
NUCLEATED RBC (/100WBC) (OHS): 0 /100 WBC
OHS QRS DURATION: 72 MS
OHS QTC CALCULATION: 442 MS
PLATELET # BLD AUTO: 452 K/UL (ref 150–450)
PMV BLD AUTO: 10.5 FL (ref 9.2–12.9)
POTASSIUM SERPL-SCNC: 3.9 MMOL/L (ref 3.5–5.1)
PROT SERPL-MCNC: 7.4 GM/DL (ref 6–8.4)
RBC # BLD AUTO: 4.16 M/UL (ref 4–5.4)
RELATIVE EOSINOPHIL (OHS): 2.7 %
RELATIVE LYMPHOCYTE (OHS): 39.5 % (ref 18–48)
RELATIVE MONOCYTE (OHS): 7.4 % (ref 4–15)
RELATIVE NEUTROPHIL (OHS): 49.3 % (ref 38–73)
SODIUM SERPL-SCNC: 142 MMOL/L (ref 136–145)
WBC # BLD AUTO: 6.33 K/UL (ref 3.9–12.7)

## 2025-06-11 PROCEDURE — 93005 ELECTROCARDIOGRAM TRACING: CPT

## 2025-06-11 PROCEDURE — 85025 COMPLETE CBC W/AUTO DIFF WBC: CPT | Performed by: ORTHOPAEDIC SURGERY

## 2025-06-11 PROCEDURE — 93010 ELECTROCARDIOGRAM REPORT: CPT | Mod: ,,, | Performed by: INTERNAL MEDICINE

## 2025-06-11 PROCEDURE — 82435 ASSAY OF BLOOD CHLORIDE: CPT | Performed by: ORTHOPAEDIC SURGERY

## 2025-06-12 ENCOUNTER — PATIENT MESSAGE (OUTPATIENT)
Dept: ORTHOPEDICS | Facility: CLINIC | Age: 55
End: 2025-06-12
Payer: MEDICAID

## 2025-06-16 ENCOUNTER — TELEPHONE (OUTPATIENT)
Dept: SURGERY | Facility: HOSPITAL | Age: 55
End: 2025-06-16
Payer: MEDICAID

## 2025-06-16 ENCOUNTER — TELEPHONE (OUTPATIENT)
Dept: ORTHOPEDICS | Facility: CLINIC | Age: 55
End: 2025-06-16
Payer: MEDICAID

## 2025-06-16 ENCOUNTER — PATIENT MESSAGE (OUTPATIENT)
Dept: ORTHOPEDICS | Facility: CLINIC | Age: 55
End: 2025-06-16
Payer: MEDICAID

## 2025-06-16 NOTE — TELEPHONE ENCOUNTER
PCP clearance for surgery received. Patient was notified and documents are uploaded under the media tab.     Tessa Vasquez MS, ATC, OTC  Clinical/Surgical Assistant - Dr. Celina Begum  Orthopedics  Phone: [phone]

## 2025-06-17 ENCOUNTER — HOSPITAL ENCOUNTER (OUTPATIENT)
Facility: HOSPITAL | Age: 55
Discharge: HOME | End: 2025-06-17
Attending: ORTHOPAEDIC SURGERY | Admitting: ORTHOPAEDIC SURGERY
Payer: MEDICAID

## 2025-06-17 ENCOUNTER — ANESTHESIA (OUTPATIENT)
Dept: SURGERY | Facility: HOSPITAL | Age: 55
End: 2025-06-17
Payer: MEDICAID

## 2025-06-17 VITALS
HEART RATE: 73 BPM | WEIGHT: 202.38 LBS | OXYGEN SATURATION: 99 % | BODY MASS INDEX: 33.68 KG/M2 | TEMPERATURE: 98 F | SYSTOLIC BLOOD PRESSURE: 117 MMHG | DIASTOLIC BLOOD PRESSURE: 80 MMHG | RESPIRATION RATE: 18 BRPM

## 2025-06-17 DIAGNOSIS — M65.4 DE QUERVAIN'S DISEASE (TENOSYNOVITIS): ICD-10-CM

## 2025-06-17 DIAGNOSIS — Z01.818 PREOP TESTING: Primary | ICD-10-CM

## 2025-06-17 PROCEDURE — 71000016 HC POSTOP RECOV ADDL HR: Performed by: ORTHOPAEDIC SURGERY

## 2025-06-17 PROCEDURE — 63600175 PHARM REV CODE 636 W HCPCS: Performed by: STUDENT IN AN ORGANIZED HEALTH CARE EDUCATION/TRAINING PROGRAM

## 2025-06-17 PROCEDURE — 36000707: Performed by: ORTHOPAEDIC SURGERY

## 2025-06-17 PROCEDURE — 37000008 HC ANESTHESIA 1ST 15 MINUTES: Performed by: ORTHOPAEDIC SURGERY

## 2025-06-17 PROCEDURE — 36000706: Performed by: ORTHOPAEDIC SURGERY

## 2025-06-17 PROCEDURE — 25000 INCISION OF TENDON SHEATH: CPT | Mod: RT,,, | Performed by: ORTHOPAEDIC SURGERY

## 2025-06-17 PROCEDURE — 71000015 HC POSTOP RECOV 1ST HR: Performed by: ORTHOPAEDIC SURGERY

## 2025-06-17 PROCEDURE — 63600175 PHARM REV CODE 636 W HCPCS: Performed by: NURSE ANESTHETIST, CERTIFIED REGISTERED

## 2025-06-17 PROCEDURE — 25000003 PHARM REV CODE 250: Performed by: STUDENT IN AN ORGANIZED HEALTH CARE EDUCATION/TRAINING PROGRAM

## 2025-06-17 PROCEDURE — 25000003 PHARM REV CODE 250: Performed by: ORTHOPAEDIC SURGERY

## 2025-06-17 PROCEDURE — 37000009 HC ANESTHESIA EA ADD 15 MINS: Performed by: ORTHOPAEDIC SURGERY

## 2025-06-17 PROCEDURE — 63600175 PHARM REV CODE 636 W HCPCS: Performed by: ORTHOPAEDIC SURGERY

## 2025-06-17 RX ORDER — PROPOFOL 10 MG/ML
VIAL (ML) INTRAVENOUS CONTINUOUS PRN
Status: DISCONTINUED | OUTPATIENT
Start: 2025-06-17 | End: 2025-06-17

## 2025-06-17 RX ORDER — HYDROMORPHONE HYDROCHLORIDE 2 MG/ML
0.2 INJECTION, SOLUTION INTRAMUSCULAR; INTRAVENOUS; SUBCUTANEOUS EVERY 5 MIN PRN
Refills: 0 | OUTPATIENT
Start: 2025-06-17

## 2025-06-17 RX ORDER — BUPIVACAINE HYDROCHLORIDE 2.5 MG/ML
INJECTION, SOLUTION INFILTRATION; PERINEURAL
Status: DISCONTINUED | OUTPATIENT
Start: 2025-06-17 | End: 2025-06-17 | Stop reason: HOSPADM

## 2025-06-17 RX ORDER — IBUPROFEN 800 MG/1
800 TABLET, FILM COATED ORAL 3 TIMES DAILY
Qty: 42 TABLET | Refills: 0 | Status: SHIPPED | OUTPATIENT
Start: 2025-06-17 | End: 2025-07-01

## 2025-06-17 RX ORDER — IBUPROFEN 400 MG/1
800 TABLET, FILM COATED ORAL ONCE
Status: COMPLETED | OUTPATIENT
Start: 2025-06-17 | End: 2025-06-17

## 2025-06-17 RX ORDER — MEPERIDINE HYDROCHLORIDE 25 MG/ML
12.5 INJECTION INTRAMUSCULAR; INTRAVENOUS; SUBCUTANEOUS EVERY 10 MIN PRN
Refills: 0 | OUTPATIENT
Start: 2025-06-17 | End: 2025-06-17

## 2025-06-17 RX ORDER — DIPHENHYDRAMINE HYDROCHLORIDE 50 MG/ML
25 INJECTION, SOLUTION INTRAMUSCULAR; INTRAVENOUS EVERY 6 HOURS PRN
OUTPATIENT
Start: 2025-06-17

## 2025-06-17 RX ORDER — ONDANSETRON HYDROCHLORIDE 2 MG/ML
INJECTION, SOLUTION INTRAVENOUS
Status: DISCONTINUED | OUTPATIENT
Start: 2025-06-17 | End: 2025-06-17

## 2025-06-17 RX ORDER — OXYCODONE AND ACETAMINOPHEN 10; 325 MG/1; MG/1
1 TABLET ORAL EVERY 4 HOURS PRN
Qty: 10 TABLET | Refills: 0 | Status: SHIPPED | OUTPATIENT
Start: 2025-06-17

## 2025-06-17 RX ORDER — DEXAMETHASONE SODIUM PHOSPHATE 4 MG/ML
INJECTION, SOLUTION INTRA-ARTICULAR; INTRALESIONAL; INTRAMUSCULAR; INTRAVENOUS; SOFT TISSUE
Status: DISCONTINUED | OUTPATIENT
Start: 2025-06-17 | End: 2025-06-17

## 2025-06-17 RX ORDER — PROPOFOL 10 MG/ML
VIAL (ML) INTRAVENOUS
Status: DISCONTINUED | OUTPATIENT
Start: 2025-06-17 | End: 2025-06-17

## 2025-06-17 RX ORDER — SODIUM CHLORIDE, SODIUM LACTATE, POTASSIUM CHLORIDE, CALCIUM CHLORIDE 600; 310; 30; 20 MG/100ML; MG/100ML; MG/100ML; MG/100ML
INJECTION, SOLUTION INTRAVENOUS CONTINUOUS
Status: DISCONTINUED | OUTPATIENT
Start: 2025-06-17 | End: 2025-06-17 | Stop reason: HOSPADM

## 2025-06-17 RX ORDER — FENTANYL CITRATE 50 UG/ML
INJECTION, SOLUTION INTRAMUSCULAR; INTRAVENOUS
Status: DISCONTINUED | OUTPATIENT
Start: 2025-06-17 | End: 2025-06-17

## 2025-06-17 RX ORDER — MIDAZOLAM HYDROCHLORIDE 1 MG/ML
INJECTION INTRAMUSCULAR; INTRAVENOUS
Status: DISCONTINUED | OUTPATIENT
Start: 2025-06-17 | End: 2025-06-17

## 2025-06-17 RX ORDER — SODIUM CHLORIDE 9 MG/ML
INJECTION, SOLUTION INTRAVENOUS CONTINUOUS
Status: DISCONTINUED | OUTPATIENT
Start: 2025-06-17 | End: 2025-06-17 | Stop reason: HOSPADM

## 2025-06-17 RX ORDER — ACETAMINOPHEN 500 MG
1000 TABLET ORAL
Status: COMPLETED | OUTPATIENT
Start: 2025-06-17 | End: 2025-06-17

## 2025-06-17 RX ADMIN — FENTANYL CITRATE 50 MCG: 0.05 INJECTION, SOLUTION INTRAMUSCULAR; INTRAVENOUS at 07:06

## 2025-06-17 RX ADMIN — ACETAMINOPHEN 1000 MG: 500 TABLET ORAL at 06:06

## 2025-06-17 RX ADMIN — DEXAMETHASONE SODIUM PHOSPHATE 4 MG: 4 INJECTION, SOLUTION INTRAMUSCULAR; INTRAVENOUS at 07:06

## 2025-06-17 RX ADMIN — PROPOFOL 40 MG: 10 INJECTION, EMULSION INTRAVENOUS at 07:06

## 2025-06-17 RX ADMIN — SODIUM CHLORIDE, SODIUM LACTATE, POTASSIUM CHLORIDE, AND CALCIUM CHLORIDE: .6; .31; .03; .02 INJECTION, SOLUTION INTRAVENOUS at 06:06

## 2025-06-17 RX ADMIN — IBUPROFEN 800 MG: 400 TABLET ORAL at 08:06

## 2025-06-17 RX ADMIN — GLYCOPYRROLATE 0.2 MG: 0.2 INJECTION, SOLUTION INTRAMUSCULAR; INTRAVITREAL at 07:06

## 2025-06-17 RX ADMIN — MIDAZOLAM HYDROCHLORIDE 2 MG: 1 INJECTION INTRAMUSCULAR; INTRAVENOUS at 07:06

## 2025-06-17 RX ADMIN — PROPOFOL 100 MCG/KG/MIN: 10 INJECTION, EMULSION INTRAVENOUS at 07:06

## 2025-06-17 RX ADMIN — ONDANSETRON 4 MG: 2 INJECTION, SOLUTION INTRAMUSCULAR; INTRAVENOUS at 07:06

## 2025-06-17 NOTE — TRANSFER OF CARE
Anesthesia Transfer of Care Note    Patient: Aide Almaraz    Procedure(s) Performed: Procedure(s) (LRB):  RELEASE, HAND, FOR DEQUERVAIN'S TENOSYNOVITIS (Right)    Patient location: [facility]    Anesthesia Type: MAC    Transport from OR: Transported from OR on room air with adequate spontaneous ventilation    Post pain: adequate analgesia    Post assessment: no apparent anesthetic complications and tolerated procedure well    Post vital signs: stable    Level of consciousness: awake, alert and oriented    Nausea/Vomiting: no nausea/vomiting    Complications: none    Transfer of care protocol was followed      Last vitals: Visit Vitals  /69 (BP Location: Left arm, Patient Position: Lying)   Pulse 80   Temp 36.4 °C (97.5 °F) (Oral)   Resp 17   Wt 91.8 kg (202 lb 6.4 oz)   LMP  (LMP Unknown)   SpO2 100%   Breastfeeding No   BMI 33.68 kg/m²

## 2025-06-17 NOTE — BRIEF OP NOTE
[facility] [place]rief [place]ative Note    Surgery Date: 6/17/2025     Surgeons and Role:     * Celina Begum MD - Primary    Assisting Surgeon: None    Pre-op Diagnosis:  De Quervain's disease (tenosynovitis) [M65.4]    Post-op Diagnosis:  Post-Op Diagnosis Codes:     * De Quervain's disease (tenosynovitis) [M65.4]    Procedure(s) (LRB):  RELEASE, HAND, FOR DEQUERVAIN'S TENOSYNOVITIS (Right)    Anesthesia: Local MAC    Operative Findings: tenosynovitis     Estimated Blood Loss: less than 5 cc          Specimens:   Specimen (24h ago, onward)      None            * No specimens in log *        Discharge Note    OUTCOME: Patient tolerated treatment/procedure well without complication and is now ready for discharge.    DISPOSITION: Home or Self Care    FINAL DIAGNOSIS:  R dequervains tenosynovitis     FOLLOWUP: In clinic    DISCHARGE INSTRUCTIONS:    Discharge Procedure Orders   Diet Adult Regular   Order Comments: Resume previous home diet     Keep surgical extremity elevated     Ice to affected area     Lifting restrictions     Leave dressing on - Keep it clean, dry, and intact until clinic visit     Notify your health care provider if you experience any of the following:  severe uncontrolled pain     Notify your health care provider if you experience any of the following:  redness, tenderness, or signs of infection (pain, swelling, redness, odor or green/yellow discharge around incision site)     Weight bearing restrictions (specify):

## 2025-06-17 NOTE — DISCHARGE INSTRUCTIONS
Dequervain's Release Postoperative Instructions  Celina Begum MD  Clinic phone number: [phone]    You may use the hand for simple activities of daily living (writing, brushing teeth etc)  No strenuous activity or heavy lifting with the operative upper extremity   Start range of motion of fingers (open and close a fist) to prevent stiffness and help swelling resolve  Ice to the hand as needed -- 30 minutes on, 10 minutes off   Keep the hand and wrist elevated above the heart as much as possible - this will help with swelling and keep pain under control  Keep dressing clean and dry and leave it on until you are seen in clinic.  If it gets wet please call clinic as soon as possible so the dressing can be changed.     You will be seen in clinic 2 weeks after your surgery.  The dressing will be removed and sutures will be taken out. After this visit you can advance the range of motion and use of your hand as tolerated with a 5 pound lifting restriction.   After 6 weeks you may return to full activity as tolerated     You may notice that the incision remains sensitive after it is healed.  This is normal and will improve over time.     Do not drive, drink alcohol, or sign legal documents for 24 hours, or if taking narcotic pain medication.    Fall Prevention  Millions of people fall every year and injure themselves. You may have had anesthesia or sedation which may increase your risk of falling. You may have health issues that put you at an increased risk of falling.     Here are ways to reduce your risk of falling.    Make your home safe by keeping walkways clear of objects you may trip over.  Use non-slip pads under rugs. Do not use area rugs or small throw rugs.  Use non-slip mats in bathtubs and showers.  Install handrails and lights on staircases.  Do not walk in poorly lit areas.  Do not stand on chairs or wobbly ladders.  Use caution when reaching overhead or looking upward. This position can cause a loss of  balance.  Be sure your shoes fit properly, have non-slip bottoms and are in good condition.   Wear shoes both inside and out. Avoid going barefoot or wearing slippers.  Be cautious when going up and down stairs, curbs, and when walking on uneven sidewalks.  If your balance is poor, consider using a cane or walker.  If your fall was related to alcohol use, stop or limit alcohol intake.   If your fall was related to use of sleeping medicines, talk to your doctor about this. You may need to reduce your dosage at bedtime if you awaken during the night to go to the bathroom.    To reduce the need for nighttime bathroom trips:  Avoid drinking fluids for several hours before going to bed  Empty your bladder before going to bed  Men can keep a urinal at the bedside  Stay as active as you can. Balance, flexibility, strength, and endurance all come from exercise. They all play a role in preventing falls. Ask your healthcare provider which types of activity are right for you.  Get your vision checked on a regular basis.  If you have pets, know where they are before you stand up or walk so you don't trip over them.  Use night lights.

## 2025-06-17 NOTE — INTERVAL H&P NOTE
The patient has been examined and the H&P has been reviewed:    I concur with the findings and no changes have occurred since H&P was written.    Surgery risks, benefits and alternative options discussed and understood by patient/family.         There are no hospital problems to display for this patient.

## 2025-06-17 NOTE — OP NOTE
SERVICE: ORTHOPEDICS      DATE OF PROCEDURE:ORDATE@     SURGEON: Celina Begum M.D.     ASSISTANT: SMA David     PREOPERATIVE DIAGNOSIS:   1)Right deQuervains tenosynovitis     POSTOPERATIVE DIAGNOSIS:  1) Right deQuervains tenosynovitis     PROCEDURE PERFORMED:  1) Right first dorsal compartment release     ANESTHESIA: Local/MAC     ESTIMATED BLOOD LOSS: Minimal     SPECIMENS: None     COMPLICATIONS: None           CONDITION: Stable       IMPLANTS: None     TOURNIQUET TIME: 10 minutes at 250 mmHg     INDICATIONS FOR PROCEDURE: The patient is a 55 y.o. feale who presented to clinic with findings and workup consistent with dequervains tenosynovitis of the Right wrist.  The patient had not responded to nonsurgical treatments and desired surgical intervention which was deemed appropriate.  Thus, the risks, benefits and alternatives to surgery were discussed with the patient in detail.  Specific risks include but are not limited to bleeding, infection, vessel and/or nerve damage, pain, numbness, tingling, complex regional pain syndrome, compartment syndrome, failure to return to pre-injury and/or preoperative functional status, scar sensitivity, delayed healing, inability to return to work, pulley injury, tendon injury, bowstringing, partial and/or incomplete relief of symptoms, weakness, persistence of and/or worsening of symptoms, surgical failure, osteomyelitis, amputation, loss of function, stiffness, functional debility, dysfunction, decreased  strength, need for prolonged postoperative rehabilitation, need for further surgery, deep venous thrombosis, pulmonary embolism, arthritis and death.  The patient states an understanding and wishes to proceed with surgery.   All questions were answered.  No guarantees were implied or stated.  Written informed consent was obtained.       PROCEDURE IN DETAIL: With the patient's participation in the preoperative holding area, the Right upper extremity was  marked as the surgical site. Preoperative checks were completed and consents were verified.  The patient was brought to the Operating Room and placed in supine position.  A well-padded nonsterile tourniquet was placed on the forearm.  The operative arm was then prepped and draped in the usual sterile fashion.  Timeout was called for to verify the correct site, procedure and patient.  All were in agreement and we proceeded.  MAC anesthesia was introduced.  Under sterile conditions, an injection of 1% plain marcaine was injected into the skin and subcutaneous tissues at the first dorsal compartment.  The arm was exsanguinated using an Esmarch and the tourniquet was inflated to 250mmHg. A transverse skin incision was made with the scalpel approximately 1 cm from the styloid and blunt dissection carried down through subcutaneous tissues.  The radial sensory nerve and several of its branches were identified and retracted.  Dissection carried more deeply to the level of the first dorsal compartment.  The EPB and APL tendons were identified proximal to the sheath and a synovetcomy was performed. The marino was incised and complete division was taken proximally and distally to ensure complete release.  Division was made dorsally in the retinaculum to decrease the risk of tendon subluxation. A separate slip of the APL was identified and released.  Once complete release was performed of both the APL as well as the EPB the wound was irrigated copiously with sterile saline. Skin was closed with 4-0 Vicryl, 4-0 Monocryl, and dermabond.   Sterile dressing was applied consisting of Xeroform 4 x 4 gauze and a thumb spica splint.  Tourniquet was then deflated at which point brisk capillary refill ensued throughout the operative extremity, specifically including the thumb.  There was no significant bleeding.  The patient was transferred to the recovery room in stable condition.      DISPOSITION: The patient will be discharged home  with followup in 2 weeks for wound check.  They are to keep the dressing clean, dry, and intact until that time.  Range of motion of the non operative digits was discussed and encouraged as tolerated without restrictions.

## 2025-06-17 NOTE — ANESTHESIA POSTPROCEDURE EVALUATION
Anesthesia Post Evaluation    Patient: Aide Almaraz    Procedure(s) Performed: Procedure(s) (LRB):  RELEASE, HAND, FOR DEQUERVAIN'S TENOSYNOVITIS (Right)    Final Anesthesia Type: general      Patient location during evaluation: PACU  Patient participation: Yes- Able to Participate  Level of consciousness: awake and alert  Post-procedure vital signs: reviewed and stable  Pain management: adequate  Airway patency: patent    PONV status at discharge: No PONV  Anesthetic complications: no      Respiratory status: spontaneous ventilation and room air  Hydration status: euvolemic  Follow-up not needed.              Vitals Value Taken Time   /80 06/17/25 09:13   Temp 36.4 °C (97.6 °F) 06/17/25 09:13   Pulse 73 06/17/25 09:13   Resp 18 06/17/25 09:13   SpO2 99 % 06/17/25 09:13         No case tracking events are documented in the log.      Pain/Estefanía Score: Pain Rating Prior to Med Admin: 4 (6/17/2025  8:50 AM)  Pain Rating Post Med Admin: 2 (6/17/2025  9:15 AM)  Estefanía Score: 10 (6/17/2025  9:15 AM)

## 2025-06-18 ENCOUNTER — PATIENT MESSAGE (OUTPATIENT)
Dept: ORTHOPEDICS | Facility: CLINIC | Age: 55
End: 2025-06-18
Payer: MEDICAID

## 2025-06-19 ENCOUNTER — TELEPHONE (OUTPATIENT)
Dept: ORTHOPEDICS | Facility: CLINIC | Age: 55
End: 2025-06-19
Payer: MEDICAID

## 2025-06-19 NOTE — TELEPHONE ENCOUNTER
Spoke with patient. She needs to keep the dressing on until her 2 week post op appointment. Patient can come in tomorrow from 8-11am and I will rewrap her dressing.     Tessa Vasquez MS, ATC, OTC  Clinical/Surgical Assistant - Dr. Celina Begum  Orthopedics  Phone: [phone]

## 2025-06-23 ENCOUNTER — TELEPHONE (OUTPATIENT)
Dept: ORTHOPEDICS | Facility: CLINIC | Age: 55
End: 2025-06-23
Payer: MEDICAID

## 2025-06-23 RX ORDER — NAPROXEN 500 MG/1
500 TABLET ORAL 2 TIMES DAILY WITH MEALS
Qty: 30 TABLET | Refills: 6 | Status: SHIPPED | OUTPATIENT
Start: 2025-06-23 | End: 2025-07-23

## 2025-06-23 NOTE — TELEPHONE ENCOUNTER
Copied from CRM #[id number]. Topic: Medications - Medication Refill  >> Jun 23, 2025  3:53 PM Julesjoseph wrote:  Rx Refill/Request Is this a Refill or New Rx:  REFILL    Rx Name and Strength:  naproxen (NAPROSYN) 500 MG tablet    Preferred Pharmacy with phone number:    [facility] #[id number] - [place] - [facility]  [place] [address]  Phone: [phone] Fax: [phone]       Communication Preference :[phone]     Additional Information: pt is asking to have at least 1 refill attatched to prescription.

## 2025-06-23 NOTE — PROGRESS NOTES
Postoperative Visit    History of Present Illness:   Aide is 2 weeks s/p right DeQuervain's release  (DOS-6/17/25)  She is doing well -- pain is well controlled   She is compliant with activity restrictions and is happy with her results so far   L sided wrist pain has worsened since R sided surgery     Physical Examination:    Vitals:    07/02/25 0847   PainLoc: Wrist       NAD  right upper extremity:  Incision over the wrist is healing well with subQ suture in place   No erythema, drainageor other signs of infection. Appropriate post operative swelling is present    Assessment/Plan:  55 y.o. female  s/p right DeQuervain's release (DOS-6/17/25)    Plan   Suture tail was removed today   Activity as tolerated with 5 lb weight lifting restriction  Will monitor exacerbation of L sided pain - may be related to R wrist restrictions. If persistent can consider injection or release   Follow up in 4 weeks    All questions were answered in detail. The patient is in full agreement with the treatment plan and will proceed accordingly.

## 2025-06-24 ENCOUNTER — TELEPHONE (OUTPATIENT)
Dept: ORTHOPEDICS | Facility: CLINIC | Age: 55
End: 2025-06-24
Payer: MEDICAID

## 2025-06-24 NOTE — TELEPHONE ENCOUNTER
Spoke to patient about Ms. Danielson request for her to come in an hour early , and she states that she will be able to be here for 2pm

## 2025-06-24 NOTE — TELEPHONE ENCOUNTER
Copied from CRM #[id number]. Topic: Medications - Medication Question  >> Jun 24, 2025  4:49 PM Edward wrote:  naproxen (NAPROSYN) 500 MG tablet    Pt has questions regarding her meds and for scheduling as well..    Rx Refill/Request    Is this a Refill or New Rx: refill   Rx Name and Strength:naproxen (NAPROSYN) 500 MG tablet  Preferred Pharmacy with phone number:   [facility] #[id number] - [place]  [facility]  [place] [address]  Phone: [phone] Fax: [phone]       Communication Preference: call   Additional Information: pt Is stating that she should have a 60 day supply of meds

## 2025-06-25 ENCOUNTER — TELEPHONE (OUTPATIENT)
Dept: ORTHOPEDICS | Facility: CLINIC | Age: 55
End: 2025-06-25
Payer: MEDICAID

## 2025-06-25 NOTE — TELEPHONE ENCOUNTER
Spoke with patient. Dressing was scratching her so she took it off. She is also having pain from bumping into things. Told patient to get a large waterproof bandage for over the incision and then fold up a clean sock for padding and put the ACE wrap on top. Patient verbalized understanding. She will call back if needed.     Tessa Vasquez MS, ATC, OTC  Clinical/Surgical Assistant - Dr. Celina Begum  Orthopedics  Phone: [phone]

## 2025-06-26 ENCOUNTER — PATIENT MESSAGE (OUTPATIENT)
Dept: ORTHOPEDICS | Facility: CLINIC | Age: 55
End: 2025-06-26
Payer: MEDICAID

## 2025-06-30 ENCOUNTER — PROCEDURE VISIT (OUTPATIENT)
Dept: ORTHOPEDICS | Facility: CLINIC | Age: 55
End: 2025-06-30
Payer: MEDICAID

## 2025-06-30 DIAGNOSIS — G89.29 CHRONIC PAIN OF RIGHT ANKLE: Primary | ICD-10-CM

## 2025-06-30 DIAGNOSIS — M25.571 CHRONIC PAIN OF RIGHT ANKLE: Primary | ICD-10-CM

## 2025-06-30 PROCEDURE — 76942 ECHO GUIDE FOR BIOPSY: CPT | Mod: 26,S$PBB,,

## 2025-06-30 PROCEDURE — 99499 UNLISTED E&M SERVICE: CPT | Mod: S$PBB,,,

## 2025-06-30 PROCEDURE — 64640 INJECTION TREATMENT OF NERVE: CPT | Mod: PBBFAC,PN

## 2025-06-30 PROCEDURE — 76942 ECHO GUIDE FOR BIOPSY: CPT | Mod: PBBFAC,PN

## 2025-06-30 PROCEDURE — 64640 INJECTION TREATMENT OF NERVE: CPT | Mod: S$PBB,79,RT,

## 2025-06-30 NOTE — PROCEDURES
Procedure Note Iovera:    DATE OF PROCEDURE:  06/30/2025    PREOPERATIVE DIAGNOSIS: right foot/ankle pain.     POSTOPERATIVE DIAGNOSIS: right foot/ankle pain.     PROCEDURE: Iovera treatment of superficial fibular nerve, deep fibular nerve, sural nerve, and saphenous nerve using at least 4 different punctures to treat all 4 nerves. (cpt 64640 x4)    COMPLICATIONS: None.     IMPLANTS:  None    ESTIMATED BLOOD LOSS:  < 5cc    SPECIMENS REMOVED:  None    ANESTHESIA: Local lidocaine    INDICATIONS FOR PROCEDURE: This is a 55 y.o. female with longstanding knee pain. They have failed non operative management including injections.I discussed a new treatment therapy called Iovera, which is cryotherapy, to provide symptomatic relief along the sensory distribution of the superficial and deep fibular nerves, sural nerve, and saphenous nerves. The patient elected to move forward with this  We did discuss the fact that this is a fairly novel procedure and there is very limited scientific data around this.  However, it is FDA approved.  The patient was given patient information and literature to review prior to the procedure as well.  Based on this, the patient agreed to move forward with doing the procedure.      PROCEDURE:   After obtaining verbal consent, the patient was placed supine on the exam table. Using ultrasound guidanceof the right LE, superficial fibular nerve, deep fibular nerve, sural nerve, and saphenous nerve were located and marked. Each site was prepped with sterile chloraprep and alcohol. A line was drawn approximately 2-3 cm wide with each visualized nerve marked at the center of the marking. We then infiltrated the skin with lidocaine along both lines using a 25g needle under continued ultrasound guidance. We then introduced the Iovera device along these lines and this device penetrated the skin, creating cryotherapy to superficial fibular nerve, deep fibular nerve, sural nerve, and saphenous nerve. Three  punches were made following each line treating superficial fibular nerve, deep fibular nerve, sural nerve, and saphenous nerve. There were a total of 4 nerves treated with iovera. The patient tolerated the procedure well with no problems.      ULTRASOUND IMAGES:                      Pain rating before procedure: 5/10  Pain rating after procedure: 1-2/10    Follow up 6-8 weeks with me. Appt can be virtual      All of the patient's questions were answered and the patient will contact us if they have any questions or concerns in the interim.      Lissette Lea PA-C  [facility]  Orthopedic Surgery

## 2025-07-01 ENCOUNTER — TELEPHONE (OUTPATIENT)
Dept: ORTHOPEDICS | Facility: CLINIC | Age: 55
End: 2025-07-01
Payer: MEDICAID

## 2025-07-02 ENCOUNTER — PATIENT MESSAGE (OUTPATIENT)
Dept: ORTHOPEDICS | Facility: CLINIC | Age: 55
End: 2025-07-02
Payer: MEDICAID

## 2025-07-02 ENCOUNTER — OFFICE VISIT (OUTPATIENT)
Dept: ORTHOPEDICS | Facility: CLINIC | Age: 55
End: 2025-07-02
Payer: MEDICAID

## 2025-07-02 DIAGNOSIS — M65.4 DE QUERVAIN'S DISEASE (TENOSYNOVITIS): Primary | ICD-10-CM

## 2025-07-02 PROCEDURE — 99024 POSTOP FOLLOW-UP VISIT: CPT | Mod: ,,, | Performed by: ORTHOPAEDIC SURGERY

## 2025-07-02 PROCEDURE — 99999 PR PBB SHADOW E&M-EST. PATIENT-LVL II: CPT | Mod: PBBFAC,,, | Performed by: ORTHOPAEDIC SURGERY

## 2025-07-02 PROCEDURE — 1160F RVW MEDS BY RX/DR IN RCRD: CPT | Mod: CPTII,,, | Performed by: ORTHOPAEDIC SURGERY

## 2025-07-02 PROCEDURE — 99212 OFFICE O/P EST SF 10 MIN: CPT | Mod: PBBFAC,PN | Performed by: ORTHOPAEDIC SURGERY

## 2025-07-02 PROCEDURE — 1159F MED LIST DOCD IN RCRD: CPT | Mod: CPTII,,, | Performed by: ORTHOPAEDIC SURGERY

## 2025-07-02 NOTE — TELEPHONE ENCOUNTER
Spoke with pt who experiences numbness with associated tingling distal to the Iovera procedure (chronic tingling)  We discussed that this is normal and expected following foot/ankle Iovera tx, especially since she has experienced chronic pain in the foot/ankle for so long, she is not used to her pain level being significantly decreased  Currently takes gabapentin  Can try ice/heat  We discussed desensitization techniques, including wearing ace wrap or lace up ankle brace to help her focus less on the numbness in her joints with the tactile feedback from the brace/wrap, which she notes has helped her over the past few days  Close to pain free ambulation, although she experiences challenges with proprioception secondary to numbness. This improves while wearing ace wrap      Pain score: 2/10  Feels that she is able to increase her activity level with less/no pain

## 2025-07-07 ENCOUNTER — PATIENT MESSAGE (OUTPATIENT)
Dept: ORTHOPEDICS | Facility: CLINIC | Age: 55
End: 2025-07-07
Payer: MEDICAID

## 2025-07-22 NOTE — PROGRESS NOTES
Postoperative Visit    History of Present Illness:   Aide is 6 weeks s/p right DeQuervain's release  (DOS-6/17/25)  She is doing OK still having soreness and the burning pain at base of the thumb that were present pre op    She is compliant with activity restrictions and is happy with her results so far   L sided wrist pain has worsened since R sided surgery     Physical Examination:    Vitals:    07/30/25 0920   PainSc:   4   PainLoc: Wrist         NAD  right upper extremity:  Incision over the wrist is well healed  Negative finkelstein   + adherence of scar to deep tissues  Tenderness over radial wrist   No subluxation of tendons noted  + MNC with numbness and tingling in ulnar and median n dist     Assessment/Plan:  55 y.o. female  s/p right DeQuervain's release (DOS-6/17/25), likely with concomitant CTS and ulnar tunnel syndrome    Plan  Soreness normal at this point post op - should continue to improve  Night splinting and nerve glides for CTS/ulnar tunnel symptoms   Voltaren gel   Scar massage  Activity as tolerated    Follow up in 8 weeks    All questions were answered in detail. The patient is in full agreement with the treatment plan and will proceed accordingly.

## 2025-07-25 DIAGNOSIS — G89.18 POST-OP PAIN: ICD-10-CM

## 2025-07-25 RX ORDER — GABAPENTIN 800 MG/1
800 TABLET ORAL 3 TIMES DAILY
Qty: 90 TABLET | Refills: 1 | Status: CANCELLED | OUTPATIENT
Start: 2025-07-25

## 2025-07-25 NOTE — TELEPHONE ENCOUNTER
Copied from CRM #[id number]. Topic: Medications - Medication Refill  >> Jul 25, 2025  2:16 PM An wrote:  Type:  RX Refill Request    Who Called: Aide  Refill or New Rx:refill  RX Name and Strength:gabapentin (NEURONTIN) 800 MG tablet  How is the patient currently taking it? (ex. 1XDay):?  Is this a 30 day or 90 day RX:30  Preferred Pharmacy with phone number:  [facility] #[id number] - [place]  [facility]  [place] [address]  Phone: [phone] Fax: [phone]  Local or Mail Order:local  Ordering Provider:Karl  Would the patient rather a call back or a response via [facility]? call  Best Call Back Number:[phone]  Additional Information: .out of medication

## 2025-07-26 ENCOUNTER — PATIENT MESSAGE (OUTPATIENT)
Dept: ORTHOPEDICS | Facility: CLINIC | Age: 55
End: 2025-07-26
Payer: MEDICAID

## 2025-07-28 DIAGNOSIS — G89.18 POST-OP PAIN: ICD-10-CM

## 2025-07-30 ENCOUNTER — OFFICE VISIT (OUTPATIENT)
Dept: ORTHOPEDICS | Facility: CLINIC | Age: 55
End: 2025-07-30
Payer: MEDICAID

## 2025-07-30 DIAGNOSIS — M65.4 DE QUERVAIN'S DISEASE (TENOSYNOVITIS): Primary | ICD-10-CM

## 2025-07-30 PROCEDURE — 99024 POSTOP FOLLOW-UP VISIT: CPT | Mod: ,,, | Performed by: ORTHOPAEDIC SURGERY

## 2025-07-30 PROCEDURE — 1160F RVW MEDS BY RX/DR IN RCRD: CPT | Mod: CPTII,,, | Performed by: ORTHOPAEDIC SURGERY

## 2025-07-30 PROCEDURE — 99213 OFFICE O/P EST LOW 20 MIN: CPT | Mod: PBBFAC,PN | Performed by: ORTHOPAEDIC SURGERY

## 2025-07-30 PROCEDURE — 1159F MED LIST DOCD IN RCRD: CPT | Mod: CPTII,,, | Performed by: ORTHOPAEDIC SURGERY

## 2025-07-30 PROCEDURE — 99999 PR PBB SHADOW E&M-EST. PATIENT-LVL III: CPT | Mod: PBBFAC,,, | Performed by: ORTHOPAEDIC SURGERY

## 2025-08-04 ENCOUNTER — PATIENT MESSAGE (OUTPATIENT)
Dept: ORTHOPEDICS | Facility: CLINIC | Age: 55
End: 2025-08-04
Payer: MEDICAID

## 2025-08-04 ENCOUNTER — TELEPHONE (OUTPATIENT)
Dept: ORTHOPEDICS | Facility: CLINIC | Age: 55
End: 2025-08-04
Payer: MEDICAID

## 2025-08-04 NOTE — TELEPHONE ENCOUNTER
Copied from CRM #[id number]. Topic: Medications - Medication Refill  >> Aug 4, 2025 11:12 AM Med Assistant Sergio wrote:  Type:  RX Refill Request    Who Called:  Aide  Refill or New Rx: refill   RX Name and Strength: Disp Refills Start End CHRISTOPHER  gabapentin (NEURONTIN) 800 MG tablet 90 tablet 1 5/5/2025 - No  Sig - Route: Take 1 tablet (800 mg total) by mouth 3 (three) times daily. - Oral  Sent to pharmacy as: gabapentin (NEURONTIN) 800 MG tablet  Class: Normal      Preferred Pharmacy with phone number:  [facility] #[id number] - [place] - [facility]  [place] [address]  Phone: [phone] Fax: [phone]       Local or Mail Order: local   Ordering Provider: Dr Paul Barajas   Would the patient rather a call back or a response via [facility]? Portal   Best Call Back Number:  [phone]   Additional Information:

## 2025-08-07 RX ORDER — GABAPENTIN 800 MG/1
800 TABLET ORAL 3 TIMES DAILY
Qty: 90 TABLET | Refills: 1 | Status: SHIPPED | OUTPATIENT
Start: 2025-08-07

## 2025-08-07 RX ORDER — CYCLOBENZAPRINE HCL 5 MG
10 TABLET ORAL NIGHTLY
Qty: 21 TABLET | Refills: 0 | Status: SHIPPED | OUTPATIENT
Start: 2025-08-07 | End: 2025-08-21

## 2025-08-18 ENCOUNTER — OFFICE VISIT (OUTPATIENT)
Dept: ORTHOPEDICS | Facility: CLINIC | Age: 55
End: 2025-08-18
Payer: MEDICAID

## 2025-08-18 DIAGNOSIS — M25.371 ANKLE INSTABILITY, RIGHT: ICD-10-CM

## 2025-08-18 DIAGNOSIS — G89.29 CHRONIC PAIN OF RIGHT ANKLE: Primary | ICD-10-CM

## 2025-08-18 DIAGNOSIS — M25.571 CHRONIC PAIN OF RIGHT ANKLE: Primary | ICD-10-CM

## 2025-08-18 PROCEDURE — 1159F MED LIST DOCD IN RCRD: CPT | Mod: CPTII,93,,

## 2025-08-18 PROCEDURE — 1160F RVW MEDS BY RX/DR IN RCRD: CPT | Mod: CPTII,93,,

## 2025-08-18 PROCEDURE — 98013 SYNCH AUDIO-ONLY EST LOW 20: CPT | Mod: 24,93,,

## 2025-08-18 RX ORDER — CYCLOBENZAPRINE HCL 5 MG
10 TABLET ORAL 3 TIMES DAILY
Qty: 90 TABLET | Refills: 3 | Status: SHIPPED | OUTPATIENT
Start: 2025-08-18 | End: 2025-10-17

## (undated) DEVICE — CONTAINER SPECIMEN STRL 4OZ

## (undated) DEVICE — PROBE ARTHSCP EDGE ENERGY 30

## (undated) DEVICE — KIT ASSISTARM SH KN STRL

## (undated) DEVICE — BANDAGE ESMARK ELASTIC ST 4X9

## (undated) DEVICE — DRESSING XEROFORM NONADH 1X8IN

## (undated) DEVICE — GOWN NONREINF SET-IN SLV XL

## (undated) DEVICE — CORD FOR BIPOLAR FORCEPS 12

## (undated) DEVICE — GREAT WHITE STEALTH

## (undated) DEVICE — ELECTRODE REM PLYHSV RETURN 9

## (undated) DEVICE — TUBE SET INFLOW/OUTFLOW

## (undated) DEVICE — GAUZE SPONGE 4X4 12PLY

## (undated) DEVICE — SUT 2/0 30IN SILK BLK BRAI

## (undated) DEVICE — SUT VICRYL PLUS 0 CT1 36IN

## (undated) DEVICE — SUT VICRYL 3-0 27 SH

## (undated) DEVICE — STOCKINETTE TUBULAR 2PL 6 X 4

## (undated) DEVICE — SEE MEDLINE ITEM 146417

## (undated) DEVICE — COVER OVERHEAD SURG LT BLUE

## (undated) DEVICE — KIT CUSTOM MINOR PROC ST

## (undated) DEVICE — SUT ETHILON 3/0 18IN PS-1

## (undated) DEVICE — PAD CAST SPECIALIST STRL 3

## (undated) DEVICE — DRAPE HAND STERILE

## (undated) DEVICE — BANDAGE MATRIX HK LOOP 4IN 5YD

## (undated) DEVICE — SPONGE GAUZE 16PLY 4X4

## (undated) DEVICE — COVER TABLE 44X90 STERILE

## (undated) DEVICE — NDL SAFETY 22G X 1.5 ECLIPSE

## (undated) DEVICE — TOWEL OR XRAY BLUE 17X26IN

## (undated) DEVICE — SUT ETHILON 3-0 PS2 18 BLK

## (undated) DEVICE — SEE MEDLINE ITEM 146298

## (undated) DEVICE — SUT MCRYL PLUS 4-0 PS2 27IN

## (undated) DEVICE — GAUZE FLUFF XXLG 36X36 2 PLY

## (undated) DEVICE — BANDAGE ESMARK 6X12

## (undated) DEVICE — ALCOHOL 70% ETHYL 16OZ.

## (undated) DEVICE — Device

## (undated) DEVICE — SPONGE LAP 18X18 PREWASHED

## (undated) DEVICE — GLOVE BIOGEL ORTHOPEDIC 7.5

## (undated) DEVICE — GLOVE BIOGEL PI MICRO SZ 6.5

## (undated) DEVICE — DRESSING N ADH OIL EMUL 3X8

## (undated) DEVICE — PAD CAST SPECIALIST STRL 4

## (undated) DEVICE — BLADE TONGUE DEPRESSOR STRL

## (undated) DEVICE — SUT MONOCRYL 3-0 PS-2 UND

## (undated) DEVICE — NDL 22GA X1 1/2 REG BEVEL

## (undated) DEVICE — SUT 0 30IN ETHIBOND EXCEL G

## (undated) DEVICE — ADHESIVE DERMABOND ADVANCED

## (undated) DEVICE — SPONGE COTTON TRAY 4X4IN

## (undated) DEVICE — NDL 18GA X1 1/2 REG BEVEL

## (undated) DEVICE — GLOVE BIOGEL PI MICRO SZ 7.5

## (undated) DEVICE — MARKER SKIN STND TIP BLUE BARR

## (undated) DEVICE — SYR 10CC LUER LOCK

## (undated) DEVICE — CANISTER SUCTION RIGID 2000CC

## (undated) DEVICE — TOURNIQUET SB QC DP 18X4IN

## (undated) DEVICE — APPLICATOR CHLORAPREP ORN 26ML

## (undated) DEVICE — DRAPE STERI U-SHAPED 47X51IN

## (undated) DEVICE — ELECTRODE BLADE INSULATED 1 IN

## (undated) DEVICE — SUT CTD VICRYL 0 UND BR

## (undated) DEVICE — TRAY MINOR GEN SURG

## (undated) DEVICE — DRAPE STERI INSTRUMENT 1018

## (undated) DEVICE — PAD PREP CUFFED NS 24X48IN

## (undated) DEVICE — PACK UNIVERSAL SPLIT II

## (undated) DEVICE — SOL 0.9% NACL IRRI.IN STERIL

## (undated) DEVICE — TRAY MINOR ORTHO

## (undated) DEVICE — SUT CTD VICRYL 0 UND BR CT

## (undated) DEVICE — DRESSING XEROFORM FOIL PK 1X8

## (undated) DEVICE — TOURNIQUET SB QC DP 34X4IN

## (undated) DEVICE — DRAPE C-ARM MINI DISP

## (undated) DEVICE — SOL IRR SOD CHL .9% POUR

## (undated) DEVICE — BLADE SCALP OPHTL BEVEL STR

## (undated) DEVICE — BRA POST SURGICAL WHT 44-46IN

## (undated) DEVICE — SUT 2-0 VICRYL / SH (J417)

## (undated) DEVICE — SOL IRR NACL .9% 3000ML

## (undated) DEVICE — DRESSING N ADH OIL EMUL 3X3

## (undated) DEVICE — BANDAGE DERMACEA STRETCH 4X1IN

## (undated) DEVICE — SYS LABEL CORRECT MED

## (undated) DEVICE — BLADE SURG CARBON STEEL #10

## (undated) DEVICE — SEE MEDLINE ITEM 157150

## (undated) DEVICE — SUT VICRYL PLUS 0 CT1 18IN